# Patient Record
Sex: MALE | Race: WHITE | NOT HISPANIC OR LATINO | Employment: OTHER | URBAN - METROPOLITAN AREA
[De-identification: names, ages, dates, MRNs, and addresses within clinical notes are randomized per-mention and may not be internally consistent; named-entity substitution may affect disease eponyms.]

---

## 2017-06-02 ENCOUNTER — APPOINTMENT (OUTPATIENT)
Dept: ULTRASOUND IMAGING | Facility: CLINIC | Age: 62
End: 2017-06-02
Attending: EMERGENCY MEDICINE
Payer: OTHER GOVERNMENT

## 2017-06-02 ENCOUNTER — HOSPITAL ENCOUNTER (EMERGENCY)
Facility: CLINIC | Age: 62
Discharge: HOME | End: 2017-06-02
Attending: EMERGENCY MEDICINE | Admitting: EMERGENCY MEDICINE
Payer: OTHER GOVERNMENT

## 2017-06-02 VITALS
RESPIRATION RATE: 18 BRPM | OXYGEN SATURATION: 96 % | DIASTOLIC BLOOD PRESSURE: 102 MMHG | SYSTOLIC BLOOD PRESSURE: 145 MMHG

## 2017-06-02 DIAGNOSIS — I80.01 THROMBOPHLEBITIS OF SUPERFICIAL VEINS OF RIGHT LOWER EXTREMITY: ICD-10-CM

## 2017-06-02 PROCEDURE — 93971 EXTREMITY STUDY: CPT | Mod: RT

## 2017-06-02 PROCEDURE — 99284 EMERGENCY DEPT VISIT MOD MDM: CPT | Performed by: EMERGENCY MEDICINE

## 2017-06-02 PROCEDURE — 99284 EMERGENCY DEPT VISIT MOD MDM: CPT | Mod: 25

## 2017-06-02 ASSESSMENT — ENCOUNTER SYMPTOMS
CHEST TIGHTNESS: 0
ABDOMINAL PAIN: 0
FEVER: 0
LIGHT-HEADEDNESS: 0
SHORTNESS OF BREATH: 0
WEAKNESS: 0
COUGH: 0
NUMBNESS: 0
HEADACHES: 0
CHILLS: 0

## 2017-06-02 NOTE — ED AVS SNAPSHOT
[facility] Emergency Department    [address] [address]    Phone:  [phone]    Fax:  [phone]                                       Stiven Nguyễn   MRN: [id number]    Department:  [facility] Emergency Department   Date of Visit:  6/2/2017           Patient Information     Date Of Birth          1955        Your diagnoses for this visit were:     Thrombophlebitis of superficial veins of right lower extremity        You were seen by Colt Ness MD and Trace Guillen DO.        Discharge Instructions       Elevation to reduce dependent edema and help decompress vein.  Warm compresses 20-30 minutes 3-4 times daily  Aleve 2 tablets a.m. and p.m. for approximately 5 days  Within 3 or 4 days attempt to reapply compression stocking-wear for 2 weeks.  Continue other current medications        24 Hour Appointment Hotline       To make an appointment at any [facility] clinic, call [phone] ([phone]). If you don't have a family doctor or clinic, we will help you find one. [facility] clinics are conveniently located to serve the needs of you and your family.             Review of your medicines      Our records show that you are taking the medicines listed below. If these are incorrect, please call your family doctor or clinic.        Dose / Directions Last dose taken    amLODIPine 5 MG tablet   Commonly known as:  NORVASC   Dose:  7.5 mg   Quantity:  130 tablet        Take 1.5 tablets (7.5 mg) by mouth daily   Refills:  3        aspirin 81 MG EC tablet   Dose:  81 mg   Quantity:  30 tablet        Take 1 tablet (81 mg) by mouth daily   Refills:  2        atorvastatin 40 MG tablet   Commonly known as:  LIPITOR   Dose:  40 mg   Quantity:  30 tablet        Take 1 tablet (40 mg) by mouth At Bedtime   Refills:  2        isosorbide mononitrate 30 MG 24 hr tablet   Commonly known as:  IMDUR   Dose:  45 mg   Quantity:  135 tablet        Take 1.5 tablets (45  mg) by mouth daily   Refills:  3        lisinopril 40 MG tablet   Commonly known as:  PRINIVIL/ZESTRIL   Dose:  40 mg        Take 40 mg by mouth daily   Refills:  0        loratadine 10 MG tablet   Commonly known as:  CLARITIN   Dose:  10 mg        Take 10 mg by mouth daily   Refills:  0        nitroglycerin 0.4 MG sublingual tablet   Commonly known as:  NITROSTAT   Quantity:  25 tablet        For chest pain place 1 tablet under the tongue every 5 minutes for 3 doses. If symptoms persist 5 minutes after 1st dose call 911.   Refills:  3        omeprazole 10 MG CR capsule   Commonly known as:  priLOSEC   Dose:  20 mg        Take 20 mg by mouth   Refills:  0        tamsulosin 0.4 MG capsule   Commonly known as:  FLOMAX   Dose:  0.4 mg   Quantity:  90 capsule        Take 1 capsule (0.4 mg) by mouth daily   Refills:  3        TYLENOL PO   Dose:  4 tablet        Take 4 tablets by mouth every 4 hours as needed for mild pain or fever   Refills:  0                Procedures and tests performed during your visit     US Lower Extremity Venous Duplex Right      Orders Needing Specimen Collection     None      Pending Results     No orders found from 5/31/2017 to 6/3/2017.            Pending Culture Results     No orders found from 5/31/2017 to 6/3/2017.            Pending Results Instructions     If you had any lab results that were not finalized at the time of your Discharge, you can call the ED Lab Result RN at [phone]. You will be contacted by this team for any positive Lab results or changes in treatment. The nurses are available 7 days a week from 10A to 6:30P.  You can leave a message 24 hours per day and they will return your call.        Test Results From Your Hospital Stay        6/2/2017  6:11 AM      Narrative     US LOWER EXTREMITY VENOUS DUPLEX RIGHT   6/2/2017 6:08 AM     HISTORY: Right leg pain and swelling. History of DVT.    COMPARISON: None.    FINDINGS: Gray-scale, color and Doppler spectral analysis  ultrasound  was performed of the right leg. Compression and augmentation imaging  was performed.    There is no evidence for deep venous thrombosis. The veins compress  and augment normally.        Impression     IMPRESSION: No DVT.     ZARINA GARG MD                Thank you for choosing [facility]       Thank you for choosing [facility] for your care. Our goal is always to provide you with excellent care. Hearing back from our patients is one way we can continue to improve our services. Please take a few minutes to complete the written survey that you may receive in the mail after you visit with us. Thank you!        [employer] Information     [employer] gives you secure access to your electronic health record. If you see a primary care provider, you can also send messages to your care team and make appointments. If you have questions, please call your primary care clinic.  If you do not have a primary care provider, please call [phone] and they will assist you.        Care EveryWhere ID     This is your Care EveryWhere ID. This could be used by other organizations to access your [facility] medical records  [id number]        After Visit Summary       This is your record. Keep this with you and show to your community pharmacist(s) and doctor(s) at your next visit.

## 2017-06-02 NOTE — ED PROVIDER NOTES
History     Chief Complaint   Patient presents with     Leg Pain     right lower leg (calf) pain since yesterday.  Worsening tonight.  Past history of clots in  leg and lungs     HPI  Stiven Nguyễn is a 62 year old male with significant past medical history for DVT/PE, hyperlipidemia, hypertension, DEEPIKA, coronary artery disease, obesity.  Presents acute right inner calf pain.  Onset history.  Worse through the night.  Patient reports she's had a previous hypercoagulable workup after his PE.  Undetermined etiology.  Was on Coumadin eventually was removed from Coumadin because of no identified recent pitting for long-term anticoagulation.  Patient reports he follows low-sodium diet hypertension.  Does not elevate his legs.  Does wear compression stockings during the months was not too hot and humid.  Has noticed increased discoloration/pigmentation change to his legs in the last 2 years.  Has never had any infection such as cellulitis or open ulcerations.   With this acute pain patient knows of no injury mechanism such as lifting, exercise.  Has noted no significant calf swelling.  Patient reports no chest discomfort.      I have reviewed the Medications, Allergies, Past Medical and Surgical History, and Social History in the Epic system.  Patient Active Problem List   Diagnosis     Weakness     Advanced directives, counseling/discussion     Narcolepsy with cataplexy     Transient alteration of awareness     Spells     Kidney stones     Prostate cancer screening     Hypertrophy of prostate with urinary obstruction     Bladder retention     Chest tightness     Elevated troponin     Chest pressure     Chest pain     Pulmonary nodules     Coronary artery disease     Hypertension     Hyperlipidemia     Sleep apnea       Review of Systems     Constitutional: Negative.    HENT: Negative.    Eyes: Negative.    Respiratory: Negative.    Cardiovascular: Negative.    Gastrointestinal: Negative.    Endocrine: Negative.     Genitourinary: Negative.    Musculoskeletal: Negative.    Skin: Negative.    Allergic/Immunologic: Negative.    Neurological: Negative.    Hematological: Negative.    Psychiatric/Behavioral: Negative.    All other systems reviewed and are negative.    Physical Exam   BP: (!) 145/102  Heart Rate: 53  Resp: 18  SpO2: 96 %  Physical Exam  Vital signs reviewed  Lungs are clear to auscultation with no respiratory splinting  Heart was regular without ectopy or murmur  Patient is obese  Lower extremities:  Right lower extremity-no significant asymmetry to the size or swelling in comparison to the left.  Does have trace dependent edema.  Varicose veins and some venous lakes are present and the disposition are greater saphenous vein.  Greater saphenous vein is very tender as it courses along the inner mid upper calf.  There is slight warmth.  There is no erythema.  Skin in the lower extremity shows hemosiderin deposits consistent with stasis dermatitis.  The calf/gastroc muscles both medial and lateral are nontender to compression.  Homans test was negative.    ED Course     ED Course     Procedures                 Results for orders placed or performed during the hospital encounter of 06/02/17   US Lower Extremity Venous Duplex Right    Narrative    US LOWER EXTREMITY VENOUS DUPLEX RIGHT   6/2/2017 6:08 AM     HISTORY: Right leg pain and swelling. History of DVT.    COMPARISON: None.    FINDINGS: Gray-scale, color and Doppler spectral analysis ultrasound  was performed of the right leg. Compression and augmentation imaging  was performed.    There is no evidence for deep venous thrombosis. The veins compress  and augment normally.      Impression    IMPRESSION: No DVT.     ZARINA GARG MD           62-year-old male presents with inner right calf tenderness.  DVT was ruled out per ultrasound.  Clinical assessment is consistent with superficial thrombophlebitis the distribution greater saphenous vein.  Patient will be  discharged home.  Recommend elevation extremity.  Warm compresses.  NSAIDs therapy -no contraindications for use .  I once tolerated current use of compression stockings .     I have reviewed the nursing notes.    I have reviewed the findings, diagnosis, plan and need for follow up with the patient.    New Prescriptions    No medications on file       Final diagnoses:   Thrombophlebitis of superficial veins of right lower extremity       6/2/2017   [facility] EMERGENCY DEPARTMENT     Trace Guillen DO  06/02/17 0616

## 2017-06-02 NOTE — DISCHARGE INSTRUCTIONS
Elevation to reduce dependent edema and help decompress vein.  Warm compresses 20-30 minutes 3-4 times daily  Aleve 2 tablets a.m. and p.m. for approximately 5 days  Within 3 or 4 days attempt to reapply compression stocking-wear for 2 weeks.  Continue other current medications

## 2017-06-02 NOTE — ED PROVIDER NOTES
History     Chief Complaint   Patient presents with     Leg Pain     right lower leg (calf) pain since yesterday.  Worsening tonight.  Past history of clots in  leg and lungs     HPI  Stiven Nguyễn is a 62 year old male with a history of previous lower extremity DVT as well as PE for which he had formerly been on Coumadin but is no longer presents for evaluation of progressive pain and swelling in his right calf over the past several days.  Patient reports he is often sedentary in his work and has a long commute to work.  No other known risk factors.  No known trauma to the leg.  Patient reports pain is reminiscent of his previous blood clot.    I have reviewed the Medications, Allergies, Past Medical and Surgical History, and Social History in the Epic system.    Review of Systems   Constitutional: Negative for chills and fever.   HENT: Negative for congestion.    Respiratory: Negative for cough, chest tightness and shortness of breath.    Cardiovascular: Positive for leg swelling (right leg). Negative for chest pain.   Gastrointestinal: Negative for abdominal pain.   Skin: Negative for rash.   Neurological: Negative for weakness, light-headedness, numbness and headaches.   All other systems reviewed and are negative.      Physical Exam   BP: (!) 145/102  Heart Rate: 53  Resp: 18  SpO2: 96 %  Physical Exam   Constitutional: He is oriented to person, place, and time. He appears well-developed and well-nourished. No distress.   HENT:   Head: Atraumatic.   Cardiovascular: Regular rhythm.  Bradycardia present.    Pulmonary/Chest: Effort normal.   Abdominal: Soft. There is no tenderness.   Musculoskeletal:        Right lower leg: He exhibits tenderness ( severe tenderness to light palpation) and swelling. He exhibits no deformity.   Neurological: He is alert and oriented to person, place, and time.   Skin: Skin is warm and dry.   Psychiatric: He has a normal mood and affect.   Nursing note and vitals  reviewed.      ED Course     ED Course     Procedures                 Labs Ordered and Resulted from Time of ED Arrival Up to the Time of Departure from the ED - No data to display     6:10 AM: Patient signed out to Dr. Fly Guillen to follow up on ultrasound, pain control, further workup and disposition.    Assessments & Plan (with Medical Decision Making)  62-year-old male with history of previous idiopathic DVT and PE presenting for evaluation of recurrent right calf pain and swelling over the past few days.  With severe tenderness and slight swelling on exam.  Ultrasound ordered to evaluate for acute DVT.  No associated chest symptoms including no chest pain or difficulty breathing.       I have reviewed the nursing notes.    I have reviewed the findings, diagnosis, plan and need for follow up with the patient.    Discharge Medication List as of 6/2/2017  6:45 AM          Final diagnoses:   Thrombophlebitis of superficial veins of right lower extremity       6/2/2017   [facility] EMERGENCY DEPARTMENT     Ness, Colt Bal MD  06/03/17 0639

## 2017-06-02 NOTE — ED AVS SNAPSHOT
[facility] Department    [address] [address]    Phone:  [phone]    Fax:  [phone]                                       Stiven Nguyễn   MRN: [id number]    Department:  [facility] Emergency Department   Date of Visit:  6/2/2017           After Visit Summary Signature Page     I have received my discharge instructions, and my questions have been answered. I have discussed any challenges I see with this plan with the nurse or doctor.    ..........................................................................................................................................  Patient/Patient Representative Signature      ..........................................................................................................................................  Patient Representative Print Name and Relationship to Patient    ..................................................               ................................................  Date                                            Time    ..........................................................................................................................................  Reviewed by Signature/Title    ...................................................              ..............................................  Date                                                            Time

## 2017-06-23 ENCOUNTER — OFFICE VISIT (OUTPATIENT)
Dept: SURGERY | Facility: CLINIC | Age: 62
End: 2017-06-23

## 2017-06-23 VITALS
SYSTOLIC BLOOD PRESSURE: 138 MMHG | OXYGEN SATURATION: 95 % | DIASTOLIC BLOOD PRESSURE: 61 MMHG | HEART RATE: 53 BPM | TEMPERATURE: 98.4 F | HEIGHT: 73 IN | BODY MASS INDEX: 37.59 KG/M2 | WEIGHT: 283.6 LBS

## 2017-06-23 DIAGNOSIS — S06.0X0A CONCUSSION, WITHOUT LOSS OF CONSCIOUSNESS, INITIAL ENCOUNTER: Primary | ICD-10-CM

## 2017-06-23 ASSESSMENT — PAIN SCALES - GENERAL: PAINLEVEL: NO PAIN (0)
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
## 2017-06-23 NOTE — MR AVS SNAPSHOT
After Visit Summary   6/23/2017    Stiven Nguyễn    MRN: [id number]           Patient Information     Date Of Birth          1955        Visit Information        Provider Department      6/23/2017 10:45 AM Aubrey Sotelo NP [facility] Concussion        Today's Diagnoses     Concussion, without loss of consciousness, initial encounter    -  1      Care Instructions    1000 mg Tylenol, three times a day for 2 weeks           Follow-ups after your visit        Additional Services     CONCUSSION  REFERRAL       [facility] is referring you to the Concussion  service at [facility] [place].      The  Representative will assist you in the coordination of your concussion care as prescribed by your physician.    The  Representative will contact you within one business day, or you may contact the  Representative at [phone].    Referral Options: Follow up with me in a month   Non-Sports related concussion management follow up in 4 weeks and Rehab Services: Occupational Therapy, Evaluate and Treat and Physical Therapy, Evaluate and Treat    Coverage of these services are subject to the terms and limitations of your health insurance plan.  Please call member services at your health plan with any benefit or coverage questions.     If X-rays, CT or MRI's have been performed, please contact the facility where they were done, to arrange for  prior to your scheduled appointment.  Please bring this referral request to your appointment and present it to your specialist.                  Who to contact     Please call your clinic at [phone] to:    Ask questions about your health    Make or cancel appointments    Discuss your medicines    Learn about your test results    Speak to your doctor   If you have compliments or concerns about an experience at your clinic, or if you wish to file a complaint, please contact  "[facility] Patient Relations at [phone] or email us at [email or web address]         Additional Information About Your Visit        [employer] Information     The [employer] gives you secure access to your electronic health record. If you see a primary care provider, you can also send messages to your care team and make appointments. If you have questions, please call your primary care clinic.  If you do not have a primary care provider, please call [phone] and they will assist you.      [employer] is an electronic gateway that provides easy, online access to your medical records. With [employer], you can request a clinic appointment, read your test results, renew a prescription or communicate with your care team.     To access your existing account, please contact your [facility] or call [phone] for assistance.        Care EveryWhere ID     This is your Care EveryWhere ID. This could be used by other organizations to access your [facility] medical records  [id number]        Your Vitals Were     Pulse Temperature Height Pulse Oximetry BMI (Body Mass Index)       53 98.4  F (36.9  C) 6' 1\" 95% 37.42 kg/m2        Blood Pressure from Last 3 Encounters:   06/23/17 138/61   06/02/17 (!) 145/102   12/06/16 148/78    Weight from Last 3 Encounters:   06/23/17 283 lb 9.6 oz   12/06/16 283 lb 12.8 oz   10/28/16 281 lb 8 oz              We Performed the Following     CONCUSSION  REFERRAL          Today's Medication Changes          These changes are accurate as of: 6/23/17 11:20 AM.  If you have any questions, ask your nurse or doctor.               These medicines have changed or have updated prescriptions.        Dose/Directions    atorvastatin 40 MG tablet   Commonly known as:  LIPITOR   This may have changed:    - how much to take  - when to take this   Used for:  NSTEMI (non-ST elevated myocardial infarction) (H)        Dose:  40 mg   Take 1 " tablet (40 mg) by mouth At Bedtime   Quantity:  30 tablet   Refills:  2                Primary Care Provider Office Phone # Fax #    Oliva Zhang [phone]       [facility] [address] [facility] [address]        Equal Access to Services     BRIAN GARCIA : Hadii aad ku hadasho Soomaali, waaxda luqadaha, qaybta kaalmada adeegyada, waxay thuanin hayaan adebernadine puga ladhara ah. So [facility] [phone].    ATENCIÓN: Si habla español, tiene a morgan disposición servicios gratuitos de asistencia lingüística. Llame al [phone].    We comply with applicable federal civil rights laws and [place] laws. We do not discriminate on the basis of race, color, national origin, age, disability sex, sexual orientation or gender identity.            Thank you!     Thank you for choosing [facility]  for your care. Our goal is always to provide you with excellent care. Hearing back from our patients is one way we can continue to improve our services. Please take a few minutes to complete the written survey that you may receive in the mail after your visit with us. Thank you!             Your Updated Medication List - Protect others around you: Learn how to safely use, store and throw away your medicines at www.disposemymeds.org.          This list is accurate as of: 6/23/17 11:20 AM.  Always use your most recent med list.                   Brand Name Dispense Instructions for use Diagnosis    amLODIPine 5 MG tablet    NORVASC    130 tablet    Take 1.5 tablets (7.5 mg) by mouth daily    Chest pain, unspecified type       aspirin 81 MG EC tablet     30 tablet    Take 1 tablet (81 mg) by mouth daily    NSTEMI (non-ST elevated myocardial infarction) (H)       atorvastatin 40 MG tablet    LIPITOR    30 tablet    Take 1 tablet (40 mg) by mouth At Bedtime    NSTEMI (non-ST elevated myocardial infarction) (H)       isosorbide mononitrate 30 MG 24 hr tablet    IMDUR    135 tablet    Take 1.5 tablets (45 mg) by  mouth daily    Chest pain, unspecified chest pain type       lisinopril 40 MG tablet    PRINIVIL/ZESTRIL     Take 40 mg by mouth daily        loratadine 10 MG tablet    CLARITIN     Take 10 mg by mouth daily        nitroglycerin 0.4 MG sublingual tablet    NITROSTAT    25 tablet    For chest pain place 1 tablet under the tongue every 5 minutes for 3 doses. If symptoms persist 5 minutes after 1st dose call 911.    Coronary vasospasm (H)       omeprazole 10 MG CR capsule    priLOSEC     Take 20 mg by mouth        tamsulosin 0.4 MG capsule    FLOMAX    90 capsule    Take 1 capsule (0.4 mg) by mouth daily    Hypertrophy of prostate with urinary obstruction and other lower urinary tract symptoms (LUTS), Bladder retention       TYLENOL PO      Take 4 tablets by mouth every 4 hours as needed for mild pain or fever

## 2017-06-23 NOTE — NURSING NOTE
"Chief Complaint   Patient presents with     Consult     concussion       Vitals:    06/23/17 1047   BP: 138/61   BP Location: Left arm   Patient Position: Chair   Cuff Size: Adult Regular   Pulse: 53   Temp: 98.4  F (36.9  C)   SpO2: 95%   Weight: 283 lb 9.6 oz   Height: 6' 1\"       Body mass index is 37.42 kg/(m^2).      Mary Kate Huerta MA                          "
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
## 2017-06-24 ASSESSMENT — PATIENT HEALTH QUESTIONNAIRE - PHQ9: SUM OF ALL RESPONSES TO PHQ QUESTIONS 1-9: 4

## 2017-06-28 DIAGNOSIS — R07.9 CHEST PAIN, UNSPECIFIED TYPE: ICD-10-CM

## 2017-06-28 RX ORDER — AMLODIPINE BESYLATE 5 MG/1
7.5 TABLET ORAL DAILY
Qty: 130 TABLET | Refills: 1 | Status: SHIPPED | OUTPATIENT
Start: 2017-06-28 | End: 2017-11-08

## 2017-07-11 ENCOUNTER — HOSPITAL ENCOUNTER (OUTPATIENT)
Dept: PHYSICAL THERAPY | Facility: CLINIC | Age: 62
Setting detail: THERAPIES SERIES
End: 2017-07-11
Attending: SURGERY
Payer: OTHER GOVERNMENT

## 2017-07-11 PROCEDURE — 97161 PT EVAL LOW COMPLEX 20 MIN: CPT | Mod: GP | Performed by: PHYSICAL THERAPIST

## 2017-07-11 PROCEDURE — 40000767 ZZHC STATISTIC PT CONCUSSION VISIT: Performed by: PHYSICAL THERAPIST

## 2017-07-11 PROCEDURE — 97112 NEUROMUSCULAR REEDUCATION: CPT | Mod: GP | Performed by: PHYSICAL THERAPIST

## 2017-07-11 NOTE — PROGRESS NOTES
PHYSICAL THERAPY INITIAL EVALUATION  07/11/17 1300   Quick Adds   Quick Adds Vestibular Eval   Type of Visit Initial OP PT Evaluation   General Information   Start of Care Date 07/11/17   Referring Physician Aubrey Sotelo,   Orders Evaluate and Treat as Indicated   Order Date 06/23/17   Medical Diagnosis concussion   Onset of illness/injury or Date of Surgery 06/23/17   Precautions/Limitations no known precautions/limitations   Surgical/Medical history reviewed Yes   Pertinent history of current vestibular problem (include personal factors and/or comorbidities that impact the POC)  Prior concussion(s)   Pertinent history of current problem (include personal factors and/or comorbidities that impact the POC) Patient reports he was fixing his [employer] loft and he stood up under it, had to have help to sit down. Happened again that same evening. The following weekend he was weeding under the deck and stood up and hit his head on the deck. [place] really whoozy, having memory issues. Went home from work one day because he couldn't remember how to do his job. Simple things need to be broken down into simple tasks to be able to do them, screwing up making his doll beds. Turning head while trying to push grocery carts, he loses his balance and feels dizzy. Unable to dance like he used to.      Prior level of function comment independent, no issues with balance    Patient role/Employment history Employed  (working full time)   Living environment [place]   Patient/Family Goals Statement get back to normal   Fall Risk Screen   Fall screen completed by PT   Per patient - Fall 2 or more times in past year? No   Per patient - Fall with injury in past year? No   Is patient a fall risk? No   System Outcome Measures   Outcome Measures Concussion (see Concussion Symptom Assessment)   Pain   Patient currently in pain No   Cognitive Status Examination   Cognitive Comment will be seeing OT to assess   Gait   Gait Comments Normal  gait. Gait with head turns horizontal caused path deviation, patient crossing midline occasionally and 1 instance of tripping.    Balance Special Tests   Balance Special Tests Modified CTSIB Conditions   Balance Special Tests Modified CTSIB Conditions   Condition 1, seconds 30 Seconds   Condition 2, seconds 4 Seconds   Cervicogenic Screen   Neck ROM WNL, loses balance while turning head to side or up   Transverse Ligament Test Normal  (no instability noted)   Transverse Ligament Test Comments no symptoms during, after testing patient almost feel forward and had to be caught by therapist, pt reported no other symptoms other than losing balance   Oculomotor Exam   Smooth Pursuit Abnormal   Smooth Pursuit Comment saccadic movements, a lot of blinking, no increased symptoms   Saccades Normal   VOR Normal   VOR Comments blurry with horizontal, normal with verticle    VOR Cancellation Abnormal   VOR Cancellation Comments required mod A from therapist to assist in recovering balance after 1 rep   Rapid Head Thrust Normal   Convergence Testing Normal   Convergence Testing Comments 1) 2 cm, 2) 2 cm, 3) 2 cm. Caused significant amount of eye pain   Infrared Goggle Exam or Frenzel Lense Exam   Exam completed with Room Light   Spontaneous Nystagmus Negative   Gaze Evoked Nystagmus Negative   Gurinder-Hallpike (right) Negative   Gurinder-Hallpike (Left) Negative   [facility] (Right) Negative   [facility] Sup[facility] Roll Test (Left) Negative   Planned Therapy Interventions   Planned Therapy Interventions balance training;neuromuscular re-education   Clinical Impression   Criteria for Skilled Therapeutic Interventions Met yes, treatment indicated   PT Diagnosis dizziness, imbalance    Influenced by the following impairments dizziness, imbalance   Functional limitations due to impairments dynamic gait tasks, changing body positions, turning     Clinical Presentation Evolving/Changing   Clinical Presentation Rationale symptoms vary depending  on the day and the activity   Clinical Decision Making (Complexity) Low complexity   Therapy Frequency 1 time/week   Predicted Duration of Therapy Intervention (days/wks) 8 weeks   Risk & Benefits of therapy have been explained Yes   Patient, Family & other staff in agreement with plan of care Yes   Clinical Impression Comments Patient presenting with vestibular and oculomotor deficits consistent with post-concussion syndrome.   Education Assessment   Preferred Learning Style Listening;Demonstration;Pictures/video   Barriers to Learning No barriers   GOALS   PT Eval Goals 1;2;3;4   Goal 1   Goal Identifier 1   Goal Description Patient will be able to walk and turn head without symptoms or evidence for imbalance.    Target Date 09/05/17   Goal 2   Goal Identifier 2   Goal Description Patient will be able to shop in grocery store without symtpoms.    Target Date 09/05/17   Goal 3   Goal Identifier 3   Goal Description Patient will report no symptoms with quick head movements or body position changes.    Target Date 09/05/17   Goal 4   Goal Identifier 4   Goal Description Patient will be able to return to dancing with wife without symptoms or imbalance.    Target Date 09/05/17   Total Evaluation Time   Total Evaluation Time (Minutes) 45       Please contact me with any questions or concerns.  Thank you for your referral.    Maria Luisa Milner, PT, DPT, OCS  Physical Therapist, Orthopedic Certified Specialist  [facility] [place]  [phone]

## 2017-07-18 ENCOUNTER — HOSPITAL ENCOUNTER (OUTPATIENT)
Dept: OCCUPATIONAL THERAPY | Facility: CLINIC | Age: 62
Setting detail: THERAPIES SERIES
End: 2017-07-18
Attending: NURSE PRACTITIONER
Payer: OTHER GOVERNMENT

## 2017-07-18 PROCEDURE — 40000768 ZZHC STATISTIC OT CONCUSSION VISIT: Performed by: OCCUPATIONAL THERAPIST

## 2017-07-18 PROCEDURE — 97166 OT EVAL MOD COMPLEX 45 MIN: CPT | Mod: GO | Performed by: OCCUPATIONAL THERAPIST

## 2017-07-18 NOTE — PROGRESS NOTES
07/18/17 1200   Quick Adds   Quick Adds Concussion   Type of Visit Initial Outpatient Occupational Therapy Evaluation       Present No   General Information   Start Of Care Date 07/18/17   Referring Physician Aubrey Sotelo   Orders Evaluate and treat as indicated   Orders Date 05/06/17   Medical Diagnosis Concussion   Onset of Illness/Injury or Date of Surgery 06/23/17   Surgical/Medical History Reviewed Yes   Additional Occupational Profile Info/Pertinent History of Current Problem Pt reports that he was fixing his grandkids loft and he stood up under it hitting his head.  He then was weeding under his deck and stook up and hit his head.  Overall he states he has hit his head a few times lately and is having whoozy feelings, balance issues, memory issues, difficulty completing his tasks at work.  He has currently been put on an improvement plan and is concerned about this.  Therapy has been initiated with PT as well   Role/Living Environment   Current Community Support Family/friend caregiver   Patient role/Employment history Employed   Current Living Environment House   Prior Level - Transfers Independent   Prior Level - Ambulation Independent   Prior Level - ADLS Independent   Role/Living Environment Comments Works as    Patient/family Goals Statement Be able to do job and enjoy life without having symptoms of concussion   Vision Interview   Technology Used computer and phone   Technology Use Increases Symptoms Yes   Technology Use Increases Symptoms Comments increasing headaches, eye strain   Do Glasses Help? Yes   Do Glasses Help Comments sunglasses will help with the brightness   Type of Glasses Bifocal   Light Sensitivity/Glare  Indoor;Outdoor   Light Sensitivity/Glare Comments at times tolerates ok and other times needs to have his sun glasses on inside   Unable to Read Small Print He has increased his font size   Reported Reading Speed Slowed   Reported Reading Speed Comments Trail  "Making:  Part A: 31\" with 2 errors (average is 29 no errors), Part B: 40 seconds.    Reading Endurance/Fatigue   Complaints of Visual Fatigue Comments eye fatigue and strain with assessment activity   Convergence Comments see PT note   Pursuits Comments Eye pain/strain with eye movement   Pupillary Function Normal   Difficulties with IADL Performance: Increase in Symptoms with the Following   Difficulty Concentrating at School, Work or Home Difficulty with concentration affecting him (being sent home from work)   Difficulty Multi-Tasking/Planning Is only doing one thing at a time   Mood Changes   Is Patient Experiencing Changes in Mood? Feeling irritable   Patient Mood Comments as reported by his family   Is Patient Currently Receiving Treatment for Mental Health? No   Vestibular Symptoms   Is Patient Experiencing Vestibular Symptoms? Yes   Triggers for Vestibular Symptoms Include: head and eye movement, scanning his environment   Fatigue   General Fatigue Work   General Fatigue Comments feeling exhausted when returning back from work   Pain   Patient currently in pain Yes   Pain location front of head and eyes   Pain rating rated a 3/6 on the concussion symptoms ratting   Pain description Ache   Fall Risk Screen   Comments Currently being seen by PT, fall risk   Cognitive Status Examination   Orientation Orientation to person, place and time   Level of Consciousness Alert   Follows Commands and Answers Questions Unable to follow multi-step instructions;100% of the time;Able to follow single-step instructions   Personal Safety and Judgment Intact   Memory Impaired   Attention Quiet environment required;Selective attention impaired, difficulty ignoring irrelevant stimuli;Alternating attention impaired, difficulty shifting between tasks;Divided attention impaired, difficulty with simultaneous tasks   Organization/Problem Solving Reports problems with organization;Problem solving impaired   Executive Function Working " memory impaired, decreased storage of information for performing tasks;Cognitive flexibility impaired;Planning ability impaired   Cognitive Comment SDMT:  47 completed in 90 seconds with 2 errors placing him between -.5 and 0.0 SD below the norm, however during this test he continued to move his head closer and further from paper and respirations increase signficantly during the task.     Visual Perception   Visual Perception Wears glasses   Visual Acuity further assess, recently had eye exam   Visual Field WNL   Visual Attention fatigues rapidly with visual attention   Oculomotor full oculomotor movement however with eye movement, vestibular reactions occur   Sensation   Upper Extremity Sensory Examination No deficits were identified   Range of Motion (ROM)   ROM Comments see PT for neck ROM concerns   Hand Strength   Hand Dominance Right   Coordination   Coordination Comments Further assess   Balance   Balance Comments Balance impaired due to concussion   Functional Mobility   Ambulation ambulatory, walks next to wall/uses rail when able   Transfer Skill   Level of [place]: Transfers independent   Activity Tolerance   Activity Tolerance Decreased due to concussion symptoms   Planned Therapy Interventions   Planned Therapy Interventions Neuromuscular re-education;Therapeutic activities;Visual perception;Self care/Home management;Cognitive skills   Adult OT Eval Goals   OT Eval Goals (Adult) 1;2;3;4;5;6   OT Goal 1   Goal Identifier Education and home programming   Goal Description Client will verbalize an understanding of findings on assessment, concussion symptom management, and home program recommendations with each visit   Target Date 09/16/17   OT Goal 2   Goal Identifier Reading / Scanning   Goal Description Client will demonstarte the ability to read up to 30 minutes without increase in symptoms   Target Date 09/16/17   OT Goal 3   Goal Identifier Computer Use   Goal Description Client will demontrate  the ability to use computer up to one hour without increase in symptoms   Target Date 09/16/17   OT Goal 4   Goal Identifier Reaction Time   Goal Description Client will measure 52+ on Mode A of the Dynavision showing functional reaction time without increase in symptoms in prep of safe driving   Target Date 09/16/17   OT Goal 5   Goal Identifier Functional Scanning   Goal Description Client will demonstrate the abiltiy to scan while grocery shopping without increase in symptoms   Target Date 09/16/17   OT Goal 6   Goal Identifier Cognition / Visual Memory   Goal Description Client will demonstrate a score of 80% on the medium level of Wii Big Brain Shutl game showing showing functional visual attention and memory when processing information in 3 trials   Target Date 09/16/17   Clinical Impression   Criteria for Skilled Therapeutic Interventions Met Yes, treatment indicated   OT Diagnosis Concussion   Influenced by the following impairments impaired reading, computer use, reaction time, problem solving, memory   Assessment of Occupational Performance 3-5 Performance Deficits   Identified Performance Deficits Deficits with work management (computer use, problem solving, memory, sequencing of task), impaired leisure skills with wood working (ability to remember how to build items he has built before), Home management (unable to grocery shop as scanning for items makes him dizzy), Social Skills (unable to dance)   Clinical Decision Making (Complexity) Moderate complexity   Therapy Frequency 1x week x 8 weeks   Predicted Duration of Therapy Intervention (days/wks) 8 weeks   Risks and Benefits of Treatment have been explained. Yes   Patient, Family & other staff in agreement with plan of care Yes   Clinical Impression Comments 62 year old male seen today for concussion related symptoms affecting his every day life.  He is appropriate for OT intervention to include concussion symptom management, reading/computer use,  improving reaction time in reference to visual scanning, and memory / problem solving strategies.     Education Assessment   Barriers To Learning Reading;Visual;Cognitive   Total Evaluation Time   Total Evaluation Time 45     Morteza Rea, OTR/L

## 2017-07-19 ENCOUNTER — HOSPITAL ENCOUNTER (OUTPATIENT)
Dept: PHYSICAL THERAPY | Facility: CLINIC | Age: 62
Setting detail: THERAPIES SERIES
End: 2017-07-19
Attending: SURGERY
Payer: OTHER GOVERNMENT

## 2017-07-19 PROCEDURE — 97112 NEUROMUSCULAR REEDUCATION: CPT | Mod: GP | Performed by: PHYSICAL THERAPIST

## 2017-07-19 PROCEDURE — 40000767 ZZHC STATISTIC PT CONCUSSION VISIT: Performed by: PHYSICAL THERAPIST

## 2017-07-25 ENCOUNTER — HOSPITAL ENCOUNTER (OUTPATIENT)
Dept: OCCUPATIONAL THERAPY | Facility: CLINIC | Age: 62
Setting detail: THERAPIES SERIES
End: 2017-07-25
Attending: NURSE PRACTITIONER
Payer: OTHER GOVERNMENT

## 2017-07-25 PROCEDURE — 97530 THERAPEUTIC ACTIVITIES: CPT | Mod: GO | Performed by: OCCUPATIONAL THERAPIST

## 2017-07-25 PROCEDURE — 97112 NEUROMUSCULAR REEDUCATION: CPT | Mod: GO | Performed by: OCCUPATIONAL THERAPIST

## 2017-07-25 PROCEDURE — 40000768 ZZHC STATISTIC OT CONCUSSION VISIT: Performed by: OCCUPATIONAL THERAPIST

## 2017-07-28 ENCOUNTER — HOSPITAL ENCOUNTER (OUTPATIENT)
Dept: OCCUPATIONAL THERAPY | Facility: CLINIC | Age: 62
Setting detail: THERAPIES SERIES
End: 2017-07-28
Attending: NURSE PRACTITIONER
Payer: OTHER GOVERNMENT

## 2017-07-28 ENCOUNTER — OFFICE VISIT (OUTPATIENT)
Dept: SURGERY | Facility: CLINIC | Age: 62
End: 2017-07-28

## 2017-07-28 ENCOUNTER — HOSPITAL ENCOUNTER (OUTPATIENT)
Dept: PHYSICAL THERAPY | Facility: CLINIC | Age: 62
Setting detail: THERAPIES SERIES
End: 2017-07-28
Attending: NURSE PRACTITIONER
Payer: OTHER GOVERNMENT

## 2017-07-28 VITALS
TEMPERATURE: 98.2 F | HEIGHT: 73 IN | HEART RATE: 55 BPM | WEIGHT: 282.8 LBS | DIASTOLIC BLOOD PRESSURE: 75 MMHG | OXYGEN SATURATION: 95 % | SYSTOLIC BLOOD PRESSURE: 142 MMHG | BODY MASS INDEX: 37.48 KG/M2

## 2017-07-28 DIAGNOSIS — S06.0X0D CONCUSSION, WITHOUT LOC, SUBSEQUENT ENCOUNTER: Primary | ICD-10-CM

## 2017-07-28 PROCEDURE — 40000768 ZZHC STATISTIC OT CONCUSSION VISIT: Performed by: OCCUPATIONAL THERAPIST

## 2017-07-28 PROCEDURE — 97530 THERAPEUTIC ACTIVITIES: CPT | Mod: GO | Performed by: OCCUPATIONAL THERAPIST

## 2017-07-28 PROCEDURE — 40000767 ZZHC STATISTIC PT CONCUSSION VISIT: Performed by: PHYSICAL THERAPIST

## 2017-07-28 PROCEDURE — 97112 NEUROMUSCULAR REEDUCATION: CPT | Mod: GP | Performed by: PHYSICAL THERAPIST

## 2017-07-28 ASSESSMENT — PAIN SCALES - GENERAL: PAINLEVEL: MILD PAIN (2)

## 2017-07-28 NOTE — NURSING NOTE
"Chief Complaint   Patient presents with     RECHECK     concussion       Vitals:    07/28/17 1330   BP: 142/75   BP Location: Left arm   Patient Position: Chair   Cuff Size: Adult Regular   Pulse: 55   Temp: 98.2  F (36.8  C)   SpO2: 95%   Weight: 282 lb 12.8 oz   Height: 6' 1\"       Body mass index is 37.31 kg/(m^2).    Mary Kate Huerta MA                        Chief Complaint   Patient presents with     RECHECK     concussion       Vitals:    07/28/17 1330   BP: 142/75   BP Location: Left arm   Patient Position: Chair   Cuff Size: Adult Regular   Pulse: 55   Temp: 98.2  F (36.8  C)   SpO2: 95%   Weight: 282 lb 12.8 oz   Height: 6' 1\"       Body mass index is 37.31 kg/(m^2).    Mary Kate Huerta MA                          "

## 2017-07-28 NOTE — MR AVS SNAPSHOT
After Visit Summary   7/28/2017    Stiven Nguyễn    MRN: [id number]           Patient Information     Date Of Birth          1955        Visit Information        Provider Department      7/28/2017 1:30 PM Aubrey Sotelo NP [facility] Concussion        Today's Diagnoses     Concussion, without LOC, subsequent encounter    -  1      Care Instructions    [facility]  [address] [place] [address]  Phone: [phone]          Follow-ups after your visit        Additional Services     CONCUSSION  REFERRAL       [facility] is referring you to the Concussion  service at [facility] [place].      The  Representative will assist you in the coordination of your concussion care as prescribed by your physician.    The  Representative will contact you within one business day, or you may contact the  Representative at [phone].    Referral Options:  Non-Sports related concussion management  9/1/17  Coverage of these services are subject to the terms and limitations of your health insurance plan.  Please call member services at your health plan with any benefit or coverage questions.     If X-rays, CT or MRI's have been performed, please contact the facility where they were done, to arrange for  prior to your scheduled appointment.  Please bring this referral request to your appointment and present it to your specialist.                  Your next 10 appointments already scheduled     Jul 28, 2017  3:30 PM CDT   Concussion Treatment with Morteza Rea OT   [facility] ([facility])    [address]  [address] [place] [address]   [phone]            Jul 28, 2017  4:00 PM CDT   Concussion Treatment with Maria Luisa Milner PT   [facility] ([facility])    [address]  [address]  [facility] [address]    [phone]            Aug 02, 2017  3:00 PM CDT   Concussion Treatment with Maria Luisa Milner, PT   [facility] ([facility])    [address]  [address]  [facility] [address]   [phone]            Aug 07, 2017  1:30 PM CDT   Concussion Treatment with Morteza Rea, OT   [facility] ([facility])    [address]  [address]  [facility] [address]   [phone]            Aug 07, 2017  2:30 PM CDT   Concussion Treatment with Maria Luisa Milner, PT   [facility] ([facility])    [address]  [address]  [facility] [address]   [phone]            Aug 10, 2017  2:15 PM CDT   Concussion Treatment with Morteza Rea OT   [facility] ([facility])    [address]  [address]  [facility] [address]   [phone]              Who to contact     Please call your clinic at [phone] to:    Ask questions about your health    Make or cancel appointments    Discuss your medicines    Learn about your test results    Speak to your doctor   If you have compliments or concerns about an experience at your clinic, or if you wish to file a complaint, please contact [facility] Patient Relations at [phone] or email us at [email or web address]         Additional Information About Your Visit        [employer] Information     [facility] gives you secure access to your electronic health record. If you see a primary care provider, you can also send messages to your care team and make appointments. If you have questions, please call your primary care clinic.  If you do not have a primary care provider, please call [phone] and they will assist you.      [facility] is an electronic gateway that provides easy, online access to your medical records. With [facility], you can request a clinic appointment, read your test results, renew a  "prescription or communicate with your care team.     To access your existing account, please contact your [facility] or call [phone] for assistance.        Care EveryWhere ID     This is your Care EveryWhere ID. This could be used by other organizations to access your [facility] medical records  [id number]        Your Vitals Were     Pulse Temperature Height Pulse Oximetry BMI (Body Mass Index)       55 98.2  F (36.8  C) 6' 1\" 95% 37.31 kg/m2        Blood Pressure from Last 3 Encounters:   07/28/17 142/75   06/23/17 138/61   06/02/17 (!) 145/102    Weight from Last 3 Encounters:   07/28/17 282 lb 12.8 oz   06/23/17 283 lb 9.6 oz   12/06/16 283 lb 12.8 oz              We Performed the Following     CONCUSSION  REFERRAL          Today's Medication Changes          These changes are accurate as of: 7/28/17  2:23 PM.  If you have any questions, ask your nurse or doctor.               These medicines have changed or have updated prescriptions.        Dose/Directions    atorvastatin 40 MG tablet   Commonly known as:  LIPITOR   This may have changed:    - how much to take  - when to take this   Used for:  NSTEMI (non-ST elevated myocardial infarction) (H)        Dose:  40 mg   Take 1 tablet (40 mg) by mouth At Bedtime   Quantity:  30 tablet   Refills:  2                Primary Care Provider Office Phone # Fax #    Oliva Zhang [phone]       [place] [address]        Equal Access to Services     BRIAN GARCIA AH: Hadii savanna ku hadasho Soomaali, waaxda luqadaha, qaybta kaalmada adeegyada, wax daryn lemus . So [facility] [phone].    ATENCIÓN: Si habla español, tiene a morgan disposición servicios gratuitos de asistencia lingüística. Llame al [phone].    We comply with applicable federal civil rights laws and [place] laws. We do not discriminate on the basis of race, color, national origin, age, disability " sex, sexual orientation or gender identity.            Thank you!     Thank you for choosing [facility]  for your care. Our goal is always to provide you with excellent care. Hearing back from our patients is one way we can continue to improve our services. Please take a few minutes to complete the written survey that you may receive in the mail after your visit with us. Thank you!             Your Updated Medication List - Protect others around you: Learn how to safely use, store and throw away your medicines at www.disposemymeds.org.          This list is accurate as of: 7/28/17  2:23 PM.  Always use your most recent med list.                   Brand Name Dispense Instructions for use Diagnosis    amLODIPine 5 MG tablet    NORVASC    130 tablet    Take 1.5 tablets (7.5 mg) by mouth daily    Chest pain, unspecified type       aspirin 81 MG EC tablet     30 tablet    Take 1 tablet (81 mg) by mouth daily    NSTEMI (non-ST elevated myocardial infarction) (H)       atorvastatin 40 MG tablet    LIPITOR    30 tablet    Take 1 tablet (40 mg) by mouth At Bedtime    NSTEMI (non-ST elevated myocardial infarction) (H)       isosorbide mononitrate 30 MG 24 hr tablet    IMDUR    135 tablet    Take 1.5 tablets (45 mg) by mouth daily    Chest pain, unspecified chest pain type       lisinopril 40 MG tablet    PRINIVIL/ZESTRIL     Take 40 mg by mouth daily        loratadine 10 MG tablet    CLARITIN     Take 10 mg by mouth daily        nitroGLYcerin 0.4 MG sublingual tablet    NITROSTAT    25 tablet    For chest pain place 1 tablet under the tongue every 5 minutes for 3 doses. If symptoms persist 5 minutes after 1st dose call 911.    Coronary vasospasm (H)       omeprazole 10 MG CR capsule    priLOSEC     Take 20 mg by mouth        tamsulosin 0.4 MG capsule    FLOMAX    90 capsule    Take 1 capsule (0.4 mg) by mouth daily    Hypertrophy of prostate with urinary obstruction and other lower urinary tract symptoms (LUTS),  Bladder retention       TYLENOL PO      Take 4 tablets by mouth every 4 hours as needed for mild pain or fever

## 2017-07-28 NOTE — LETTER
Work ability Form for  Stiven Nguyễn, 1955. Who  is under my care for a concussion .     Date of most recent in exam July 28, 2017   Date of next exam 9/1/17    Diagnosis:   Concussion without loss of consciousness ICD 10  S06.0X0D      --- May return to work as of date with the following restrictions:  1. Restrictions:  a. Work-  4 hrs 5 days a week from 7/31-8/14           6 hours a 5 days a week from 8/14-8/28                                 8 hours a 5 days a week after the 8/28        The following accommodations may help in reducing the cognitive load, thereby minimizing post-concussion symptoms.  As the employer, it is encouraged to discuss and establish accommodations based on individual work responsibilities.  If symptoms persist, more formal accommodations may be necessary.  1)  Allow more time for, or delay for projects.  2)  Allow more time for work completion.  3)  Allow for reduced work load.  4)  Allow for less multi-task work.  Single tasks until completion will improve productivity.  5)  Allow breaks as needed to control symptom levels.    6)  Provide a quiet area for lunch.  7)  Allow use of sunglasses during the day.     Full or partial days missed due to post-concussion symptoms and follow up appointments should be medically excused.    Follow up evaluation and revision of recommendations to occur on 9/1/17    Please feel free to contact me at the number below with any questions or concerns.    Sincerely,            Narendra Sotelo, NP-C   Dept of Critical Care and Acute Care Surgery   [facility]  Administration office: [phone]  Clinic nurse line: [phone]  Fax: [phone]

## 2017-07-28 NOTE — ADDENDUM NOTE
Encounter addended by: Morteza Rea OT on: 7/28/2017 12:28 PM<BR>     Actions taken: Flowsheet accepted
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
## 2017-07-29 ASSESSMENT — PATIENT HEALTH QUESTIONNAIRE - PHQ9: SUM OF ALL RESPONSES TO PHQ QUESTIONS 1-9: 9

## 2017-08-02 ENCOUNTER — HOSPITAL ENCOUNTER (OUTPATIENT)
Dept: PHYSICAL THERAPY | Facility: CLINIC | Age: 62
Setting detail: THERAPIES SERIES
End: 2017-08-02
Attending: NURSE PRACTITIONER
Payer: OTHER GOVERNMENT

## 2017-08-02 PROCEDURE — 40000767 ZZHC STATISTIC PT CONCUSSION VISIT: Performed by: PHYSICAL THERAPIST

## 2017-08-02 PROCEDURE — 97112 NEUROMUSCULAR REEDUCATION: CPT | Mod: GP | Performed by: PHYSICAL THERAPIST

## 2017-08-07 ENCOUNTER — HOSPITAL ENCOUNTER (OUTPATIENT)
Dept: OCCUPATIONAL THERAPY | Facility: CLINIC | Age: 62
Setting detail: THERAPIES SERIES
End: 2017-08-07
Attending: NURSE PRACTITIONER
Payer: OTHER GOVERNMENT

## 2017-08-07 ENCOUNTER — HOSPITAL ENCOUNTER (OUTPATIENT)
Dept: PHYSICAL THERAPY | Facility: CLINIC | Age: 62
Setting detail: THERAPIES SERIES
End: 2017-08-07
Attending: NURSE PRACTITIONER
Payer: OTHER GOVERNMENT

## 2017-08-07 PROCEDURE — 97530 THERAPEUTIC ACTIVITIES: CPT | Mod: GO | Performed by: OCCUPATIONAL THERAPIST

## 2017-08-07 PROCEDURE — 97112 NEUROMUSCULAR REEDUCATION: CPT | Mod: GP | Performed by: PHYSICAL THERAPIST

## 2017-08-07 PROCEDURE — 40000767 ZZHC STATISTIC PT CONCUSSION VISIT: Performed by: PHYSICAL THERAPIST

## 2017-08-07 PROCEDURE — 97112 NEUROMUSCULAR REEDUCATION: CPT | Mod: GO | Performed by: OCCUPATIONAL THERAPIST

## 2017-08-07 PROCEDURE — 40000768 ZZHC STATISTIC OT CONCUSSION VISIT: Performed by: OCCUPATIONAL THERAPIST

## 2017-08-10 ENCOUNTER — HOSPITAL ENCOUNTER (OUTPATIENT)
Dept: OCCUPATIONAL THERAPY | Facility: CLINIC | Age: 62
Setting detail: THERAPIES SERIES
End: 2017-08-10
Attending: NURSE PRACTITIONER
Payer: OTHER GOVERNMENT

## 2017-08-10 PROCEDURE — 40000768 ZZHC STATISTIC OT CONCUSSION VISIT: Performed by: OCCUPATIONAL THERAPIST

## 2017-08-10 PROCEDURE — 97112 NEUROMUSCULAR REEDUCATION: CPT | Mod: GO | Performed by: OCCUPATIONAL THERAPIST

## 2017-08-10 PROCEDURE — 97530 THERAPEUTIC ACTIVITIES: CPT | Mod: GO | Performed by: OCCUPATIONAL THERAPIST

## 2017-08-15 ENCOUNTER — HOSPITAL ENCOUNTER (OUTPATIENT)
Dept: PHYSICAL THERAPY | Facility: CLINIC | Age: 62
Setting detail: THERAPIES SERIES
End: 2017-08-15
Attending: SURGERY
Payer: OTHER GOVERNMENT

## 2017-08-15 PROCEDURE — 97112 NEUROMUSCULAR REEDUCATION: CPT | Mod: GP | Performed by: PHYSICAL THERAPIST

## 2017-08-15 PROCEDURE — 40000767 ZZHC STATISTIC PT CONCUSSION VISIT: Performed by: PHYSICAL THERAPIST

## 2017-08-17 ENCOUNTER — HOSPITAL ENCOUNTER (OUTPATIENT)
Dept: OCCUPATIONAL THERAPY | Facility: CLINIC | Age: 62
Setting detail: THERAPIES SERIES
End: 2017-08-17
Attending: NURSE PRACTITIONER
Payer: OTHER GOVERNMENT

## 2017-08-17 PROCEDURE — 40000768 ZZHC STATISTIC OT CONCUSSION VISIT: Performed by: OCCUPATIONAL THERAPIST

## 2017-08-17 PROCEDURE — 97530 THERAPEUTIC ACTIVITIES: CPT | Mod: GO | Performed by: OCCUPATIONAL THERAPIST

## 2017-08-17 PROCEDURE — 97112 NEUROMUSCULAR REEDUCATION: CPT | Mod: GO | Performed by: OCCUPATIONAL THERAPIST

## 2017-08-24 ENCOUNTER — HOSPITAL ENCOUNTER (OUTPATIENT)
Dept: OCCUPATIONAL THERAPY | Facility: CLINIC | Age: 62
Setting detail: THERAPIES SERIES
End: 2017-08-24
Attending: NURSE PRACTITIONER
Payer: OTHER GOVERNMENT

## 2017-08-24 PROCEDURE — 97530 THERAPEUTIC ACTIVITIES: CPT | Mod: GO | Performed by: OCCUPATIONAL THERAPIST

## 2017-08-24 PROCEDURE — 40000768 ZZHC STATISTIC OT CONCUSSION VISIT: Performed by: OCCUPATIONAL THERAPIST

## 2017-08-25 ENCOUNTER — HOSPITAL ENCOUNTER (OUTPATIENT)
Dept: PHYSICAL THERAPY | Facility: CLINIC | Age: 62
Setting detail: THERAPIES SERIES
End: 2017-08-25
Attending: SURGERY
Payer: OTHER GOVERNMENT

## 2017-08-25 PROCEDURE — 40000767 ZZHC STATISTIC PT CONCUSSION VISIT: Performed by: PHYSICAL THERAPIST

## 2017-08-25 PROCEDURE — 97112 NEUROMUSCULAR REEDUCATION: CPT | Mod: GP | Performed by: PHYSICAL THERAPIST

## 2017-08-28 ENCOUNTER — HOSPITAL ENCOUNTER (OUTPATIENT)
Dept: PHYSICAL THERAPY | Facility: CLINIC | Age: 62
Setting detail: THERAPIES SERIES
End: 2017-08-28
Attending: SURGERY
Payer: OTHER GOVERNMENT

## 2017-08-28 PROCEDURE — 97112 NEUROMUSCULAR REEDUCATION: CPT | Mod: GP | Performed by: PHYSICAL THERAPIST

## 2017-08-28 PROCEDURE — 40000767 ZZHC STATISTIC PT CONCUSSION VISIT: Performed by: PHYSICAL THERAPIST

## 2017-08-31 ENCOUNTER — CARE COORDINATION (OUTPATIENT)
Dept: SURGERY | Facility: CLINIC | Age: 62
End: 2017-08-31

## 2017-08-31 NOTE — PROGRESS NOTES
Pre Visit Call and Assessment    Date of call:  08/31/2017    Phone numbers:  Cell number on file:    Telephone Information:   Mobile [phone]       Reached patient/confirmed appointment:  Yes  Patient care team/Primary provider:  Oliva Zhang  Preferred outpatient pharmacy:    [facility] [id number] [facility] - [place], [place] - [address] [place] [address]  Phone: [phone] Fax: [phone]    [facility] - [place], [place] - [address]  [address]  [address]  [facility] [address]  Phone: [phone] Fax: [phone]    [facility] - [place], [place] - [address] [place] [address]  Phone: [phone] Fax: [phone]    [facility] - [place], [place] - [address] [place] [address]  [address] [place] [address]  Phone: [phone] Fax: [phone]    Referred to:  Aubrey Sotelo    Reason for visit:  concussion

## 2017-09-01 ENCOUNTER — OFFICE VISIT (OUTPATIENT)
Dept: SURGERY | Facility: CLINIC | Age: 62
End: 2017-09-01

## 2017-09-01 VITALS
BODY MASS INDEX: 37.03 KG/M2 | TEMPERATURE: 97.6 F | SYSTOLIC BLOOD PRESSURE: 139 MMHG | DIASTOLIC BLOOD PRESSURE: 77 MMHG | WEIGHT: 280.7 LBS | OXYGEN SATURATION: 100 % | HEART RATE: 53 BPM

## 2017-09-01 DIAGNOSIS — R56.1 SEIZURE AFTER HEAD INJURY (H): ICD-10-CM

## 2017-09-01 DIAGNOSIS — F07.81 POSTCONCUSSION SYNDROME: Primary | ICD-10-CM

## 2017-09-01 RX ORDER — GABAPENTIN 300 MG/1
CAPSULE ORAL
Qty: 90 CAPSULE | Refills: 0 | Status: SHIPPED | OUTPATIENT
Start: 2017-09-01 | End: 2017-10-18

## 2017-09-01 ASSESSMENT — PAIN SCALES - GENERAL: PAINLEVEL: MODERATE PAIN (5)

## 2017-09-01 NOTE — NURSING NOTE
Chief Complaint   Patient presents with     Consult     concussion f/u       Vitals:    09/01/17 1452   BP: 139/77   Pulse: 53   Temp: 97.6  F (36.4  C)   TempSrc: Oral   SpO2: 100%   Weight: 280 lb 11.2 oz       Body mass index is 37.03 kg/(m^2).    Quoc MICHELE
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
## 2017-09-01 NOTE — MR AVS SNAPSHOT
After Visit Summary   9/1/2017    Stiven Nguyễn    MRN: [id number]           Patient Information     Date Of Birth          1955        Visit Information        Provider Department      9/1/2017 3:15 PM Aubrey Sotelo NP [facility]        Today's Diagnoses     Postconcussion syndrome    -  1    Seizure after head injury (H)           Follow-ups after your visit        Additional Services     CONCUSSION  REFERRAL       [facility] is referring you to the Concussion  service at [facility].      The  Representative will assist you in the coordination of your concussion care as prescribed by your physician.    The  Representative will contact you within one business day, or you may contact the  Representative at [phone].    Referral Options:  Neurology    Coverage of these services are subject to the terms and limitations of your health insurance plan.  Please call member services at your health plan with any benefit or coverage questions.     If X-rays, CT or MRI's have been performed, please contact the facility where they were done, to arrange for  prior to your scheduled appointment.  Please bring this referral request to your appointment and present it to your specialist.            CONCUSSION  REFERRAL       [facility] is referring you to the Concussion  service at [facility].      The  Representative will assist you in the coordination of your concussion care as prescribed by your physician.    The  Representative will contact you within one business day, or you may contact the  Representative at [phone].    Referral Options:  Non-Sports related concussion management follow 10/29    Coverage of these services are subject to the terms and limitations of your health insurance plan.  Please call  member services at your health plan with any benefit or coverage questions.     If X-rays, CT or MRI's have been performed, please contact the facility where they were done, to arrange for  prior to your scheduled appointment.  Please bring this referral request to your appointment and present it to your specialist.                  Your next 10 appointments already scheduled     Sep 07, 2017  3:00 PM CDT   Concussion Treatment with Maria Luisa Milner, PT   [facility] ([facility])    [address]  [address]  [place] [address]   [phone]            Sep 12, 2017  3:00 PM CDT   Concussion Treatment with Maria Luisa Milner PT   [facility] ([facility])    [address]  [place] [address]   [phone]            Sep 20, 2017  3:00 PM CDT   Concussion Treatment with Maria Luisa Milner PT   [facility] ([facility])    [address]  [place] [address]   [phone]              Future tests that were ordered for you today     Open Future Orders        Priority Expected Expires Ordered    MR Brain w/o Contrast Routine  9/1/2018 9/1/2017            Who to contact     Please call your clinic at [phone] to:    Ask questions about your health    Make or cancel appointments    Discuss your medicines    Learn about your test results    Speak to your doctor   If you have compliments or concerns about an experience at your clinic, or if you wish to file a complaint, please contact [facility] Patient Relations at [phone] or email us at [email or web address]         Additional Information About Your Visit        MyChart Information     Every1Mobile gives you secure access to your electronic health record. If you see a primary care provider, you can also send messages to your care team and make appointments. If you have questions,  please call your primary care clinic.  If you do not have a primary care provider, please call [phone] and they will assist you.      iovation is an electronic gateway that provides easy, online access to your medical records. With iovation, you can request a clinic appointment, read your test results, renew a prescription or communicate with your care team.     To access your existing account, please contact your [facility] or call [phone] for assistance.        Care EveryWhere ID     This is your Care EveryWhere ID. This could be used by other organizations to access your [facility] medical records  [id number]        Your Vitals Were     Pulse Temperature Pulse Oximetry BMI (Body Mass Index)          53 97.6  F (36.4  C) (Oral) 100% 37.03 kg/m2         Blood Pressure from Last 3 Encounters:   09/01/17 139/77   07/28/17 142/75   06/23/17 138/61    Weight from Last 3 Encounters:   09/01/17 280 lb 11.2 oz   07/28/17 282 lb 12.8 oz   06/23/17 283 lb 9.6 oz              We Performed the Following     CONCUSSION  REFERRAL     CONCUSSION  REFERRAL          Today's Medication Changes          These changes are accurate as of: 9/1/17  3:45 PM.  If you have any questions, ask your nurse or doctor.               Start taking these medicines.        Dose/Directions    gabapentin 300 MG capsule   Commonly known as:  NEURONTIN   Used for:  Postconcussion syndrome   Started by:  Aubrey Sotelo, SAMANTHA        Take 1 tablet (300 mg) every night for 1-3 days, then 1 tablet twice daily for 1-3 days, then 1 tablet three times daily   Quantity:  90 capsule   Refills:  0         These medicines have changed or have updated prescriptions.        Dose/Directions    atorvastatin 40 MG tablet   Commonly known as:  LIPITOR   This may have changed:    - how much to take  - when to take this   Used for:  NSTEMI (non-ST elevated myocardial infarction) (H)        Dose:  40 mg   Take 1  tablet (40 mg) by mouth At Bedtime   Quantity:  30 tablet   Refills:  2            Where to get your medicines      These medications were sent to [facility] - [place], [place] - [address]   [address] [facility] [address]     Phone:  [phone]     gabapentin 300 MG capsule                Primary Care Provider Office Phone # Fax #    Oliva Zhang [phone]       [facility] [address] [address]        Equal Access to Services     [facility]: Hadii aad ku hadasho Soomaali, waaxda luqadaha, qaybta kaalmada adeegyada, waxloyda stearns haymarya lemus . So [facility] [phone].    ATENCIÓN: Si habla español, tiene a morgan disposición servicios gratuitos de asistencia lingüística. [facility] [phone].    We comply with applicable federal civil rights laws and [place] laws. We do not discriminate on the basis of race, color, national origin, age, disability sex, sexual orientation or gender identity.            Thank you!     Thank you for choosing [facility]  for your care. Our goal is always to provide you with excellent care. Hearing back from our patients is one way we can continue to improve our services. Please take a few minutes to complete the written survey that you may receive in the mail after your visit with us. Thank you!             Your Updated Medication List - Protect others around you: Learn how to safely use, store and throw away your medicines at www.disposemymeds.org.          This list is accurate as of: 9/1/17  3:45 PM.  Always use your most recent med list.                   Brand Name Dispense Instructions for use Diagnosis    amLODIPine 5 MG tablet    NORVASC    130 tablet    Take 1.5 tablets (7.5 mg) by mouth daily    Chest pain, unspecified type       aspirin 81 MG EC tablet     30 tablet    Take 1 tablet (81 mg) by mouth daily    NSTEMI (non-ST elevated myocardial infarction) (H)       atorvastatin 40 MG tablet     LIPITOR    30 tablet    Take 1 tablet (40 mg) by mouth At Bedtime    NSTEMI (non-ST elevated myocardial infarction) (H)       gabapentin 300 MG capsule    NEURONTIN    90 capsule    Take 1 tablet (300 mg) every night for 1-3 days, then 1 tablet twice daily for 1-3 days, then 1 tablet three times daily    Postconcussion syndrome       isosorbide mononitrate 30 MG 24 hr tablet    IMDUR    135 tablet    Take 1.5 tablets (45 mg) by mouth daily    Chest pain, unspecified chest pain type       lisinopril 40 MG tablet    PRINIVIL/ZESTRIL     Take 40 mg by mouth daily        loratadine 10 MG tablet    CLARITIN     Take 10 mg by mouth daily        nitroGLYcerin 0.4 MG sublingual tablet    NITROSTAT    25 tablet    For chest pain place 1 tablet under the tongue every 5 minutes for 3 doses. If symptoms persist 5 minutes after 1st dose call 911.    Coronary vasospasm (H)       omeprazole 10 MG CR capsule    priLOSEC     Take 20 mg by mouth        tamsulosin 0.4 MG capsule    FLOMAX    90 capsule    Take 1 capsule (0.4 mg) by mouth daily    Hypertrophy of prostate with urinary obstruction and other lower urinary tract symptoms (LUTS), Bladder retention       TYLENOL PO      Take 4 tablets by mouth every 4 hours as needed for mild pain or fever

## 2017-09-01 NOTE — LETTER
Health [facility]cussion  [address] [address]  [phone]           September 1, 2017     Work ability Form for  Stiven Nguyễn, 1955. Who  is under my care for a concussion .      Date of most recent in exam September 1, 2017    Date of next exam 10/13/17     Diagnosis:   Concussion without loss of consciousness ICD 10  S06.0X0D        --- May return to work as of date with the following restrictions:  8 hours a day, 5 days week            The following accommodations may help in reducing the cognitive load, thereby minimizing post-concussion symptoms.  As the employer, it is encouraged to discuss and establish accommodations based on individual work responsibilities.  If symptoms persist, more formal accommodations may be necessary.  1)  Allow more time for, or delay for projects.  2)  Allow more time for work completion.  3)  Allow for reduced work load.  4)  Allow for less multi-task work.  Single tasks until completion will improve productivity.  5)  Allow breaks as needed to control symptom levels.    6)  Provide a quiet area for lunch.  7)  Allow use of sunglasses during the day.      Full or partial days missed due to post-concussion symptoms and follow up appointments should be medically excused.    Follow up evaluation and revision of recommendations to occur on 9/1/17     Please feel free to contact me at the number below with any questions or concerns.     Sincerely,                Narendra Sotelo, NP-C   Dept of Critical Care and Acute Care Surgery   [facility]  Administration office: [phone]  Clinic nurse line: [phone]  Fax: [phone]

## 2017-09-11 ENCOUNTER — HOSPITAL ENCOUNTER (OUTPATIENT)
Dept: OCCUPATIONAL THERAPY | Facility: CLINIC | Age: 62
Setting detail: THERAPIES SERIES
End: 2017-09-11
Attending: NURSE PRACTITIONER
Payer: OTHER GOVERNMENT

## 2017-09-11 PROCEDURE — 97530 THERAPEUTIC ACTIVITIES: CPT | Mod: GO | Performed by: OCCUPATIONAL THERAPIST

## 2017-09-11 PROCEDURE — 40000768 ZZHC STATISTIC OT CONCUSSION VISIT: Performed by: OCCUPATIONAL THERAPIST

## 2017-09-12 ENCOUNTER — HOSPITAL ENCOUNTER (OUTPATIENT)
Dept: PHYSICAL THERAPY | Facility: CLINIC | Age: 62
Setting detail: THERAPIES SERIES
End: 2017-09-12
Attending: SURGERY
Payer: OTHER GOVERNMENT

## 2017-09-12 PROCEDURE — 40000767 ZZHC STATISTIC PT CONCUSSION VISIT: Performed by: PHYSICAL THERAPIST

## 2017-09-12 PROCEDURE — 97112 NEUROMUSCULAR REEDUCATION: CPT | Mod: GP | Performed by: PHYSICAL THERAPIST

## 2017-09-12 NOTE — PROGRESS NOTES
Outpatient Occupational Therapy Discharge Note     Patient: Markel Nguyễn  : 1955  Insurance:   Payor/Plan Subscriber Name Rel Member # Group #   CRICKET/DULCE MARIA - FELICITA* MARKEL NGUYỄN  [id number]       [facility] CLAIMS, [address]       Beginning/End Dates of Reporting Period:  2017 to 2017    Referring Provider: Aubrey Sr Diagnosis: Concussion    Client Self Report: Therapeutic Listening.  Reporting feeling that his concussion is getting better.  He was able to work all day without increased eye strain or head ache with the use of his new glasses (2 pairs:  One for computer/reading and one for distance, both having bifocals in them).  He reports his mistakes at work have decreased as well.   Arya has been seen x 8 visits. It is noted that during this last session that Arya had an episode of inability to talk with contorting movement.  His wife was called and she talked him through this.  Overall the episode and return to function lasted 5 minutes.  He is following up with neurology for these episodes.  A message was also sent to his concussion doctor.      Objective Measurements:   Dynavision:  Mode A:  60pts   Wii Big Brain:  Easy:  100%, Medium 90%   Work Tolerance:  Work days are tolerated with improving tolerance and decreased symptoms on computer and reading          Goals:     Goal Identifier Education and home programming   Goal Description Client will verbalize an understanding of findings on assessment, concussion symptom management, and home program recommendations with each visit   Target Date 17   Date Met   2017   Progress:  Met:  Recommendations and management have been educated and completed resulting in increased tolerance for work day     Goal Identifier Reading / Scanning   Goal Description Client will demonstarte the ability to read up to 30 minutes without increase in symptoms   Target Date 17   Date Met   2017   Progress:  Met:   With the use of the new reading glasses he is able to complete this     Goal Identifier Computer Use   Goal Description Client will demontrate the ability to use computer up to one hour without increase in symptoms   Target Date 09/16/17   Date Met   09/11/2017   Progress:  Met:  Client reports that with the use of the new glasses he does not have any eye strain or headaches when working his day     Goal Identifier Reaction Time   Goal Description Client will measure 52+ on Mode A of the Dynavision showing functional reaction time without increase in symptoms in prep of safe driving   Target Date 09/16/17   Date Met   09/11/2017   Progress:  Met:  Scoring 60pts.       Goal Identifier Functional Scanning   Goal Description Client will demonstrate the abiltiy to scan while grocery shopping without increase in symptoms   Target Date 09/16/17   Date Met      Progress:     Goal Identifier Cognition / Visual Memory   Goal Description Client will demonstrate a score of 80% on the medium level of Wii Big Brain Bird Cage game showing showing functional visual attention and memory when processing information in 3 trials   Target Date 09/16/17   Date Met   09/11/2017   Progress:  Met     Progress Toward Goals:   Progress this reporting period: as noted above.  All goals met    Plan:  Discharge from therapy, he is to follow up with PT and Concussion Clinic.      Discharge:    Reason for Discharge: Patient has met all goals.    Equipment Issued: n/a    Discharge Plan: Other services: PT.

## 2017-09-15 ENCOUNTER — HOSPITAL ENCOUNTER (OUTPATIENT)
Dept: MRI IMAGING | Facility: CLINIC | Age: 62
Discharge: HOME | End: 2017-09-15
Attending: NURSE PRACTITIONER | Admitting: NURSE PRACTITIONER
Payer: OTHER GOVERNMENT

## 2017-09-15 DIAGNOSIS — F07.81 POSTCONCUSSION SYNDROME: ICD-10-CM

## 2017-09-15 PROCEDURE — 70551 MRI BRAIN STEM W/O DYE: CPT

## 2017-09-20 ENCOUNTER — HOSPITAL ENCOUNTER (OUTPATIENT)
Dept: PHYSICAL THERAPY | Facility: CLINIC | Age: 62
Setting detail: THERAPIES SERIES
End: 2017-09-20
Attending: SURGERY
Payer: OTHER GOVERNMENT

## 2017-09-20 PROCEDURE — 97112 NEUROMUSCULAR REEDUCATION: CPT | Mod: GP | Performed by: PHYSICAL THERAPIST

## 2017-09-20 PROCEDURE — 40000718 ZZHC STATISTIC PT DEPARTMENT ORTHO VISIT: Performed by: PHYSICAL THERAPIST

## 2017-09-25 ENCOUNTER — TRANSFERRED RECORDS (OUTPATIENT)
Dept: HEALTH INFORMATION MANAGEMENT | Facility: CLINIC | Age: 62
End: 2017-09-25

## 2017-10-18 DIAGNOSIS — F07.81 POSTCONCUSSION SYNDROME: ICD-10-CM

## 2017-10-18 RX ORDER — GABAPENTIN 300 MG/1
CAPSULE ORAL
Qty: 90 CAPSULE | Refills: 1 | Status: SHIPPED | OUTPATIENT
Start: 2017-10-18 | End: 2017-11-08

## 2017-10-27 ENCOUNTER — OFFICE VISIT (OUTPATIENT)
Dept: SURGERY | Facility: CLINIC | Age: 62
End: 2017-10-27

## 2017-10-27 VITALS
WEIGHT: 289.2 LBS | SYSTOLIC BLOOD PRESSURE: 140 MMHG | OXYGEN SATURATION: 97 % | HEART RATE: 55 BPM | BODY MASS INDEX: 38.16 KG/M2 | DIASTOLIC BLOOD PRESSURE: 73 MMHG

## 2017-10-27 DIAGNOSIS — F07.81 POSTCONCUSSION SYNDROME: Primary | ICD-10-CM

## 2017-10-27 ASSESSMENT — PAIN SCALES - GENERAL: PAINLEVEL: NO PAIN (0)
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
## 2017-10-27 NOTE — PATIENT INSTRUCTIONS
Please talk to the neurologist to see if we can add Namenda or Aricept without issues from their end.     Conner will call you to set up neuropsychology  testing.     Increase gabapentin to two tabs in the morning and at night. Start with 2 tabs at night and one in the morning for 3 days then increase to two in the AM and at night.

## 2017-10-27 NOTE — NURSING NOTE
Chief Complaint   Patient presents with     Head Injury     concussion f/u        Vitals:    10/27/17 0853   BP: 140/73   Pulse: 55   SpO2: 97%   Weight: 289 lb 3.2 oz       Body mass index is 38.16 kg/(m^2).    Quoc MICHELE

## 2017-10-27 NOTE — MR AVS SNAPSHOT
After Visit Summary   10/27/2017    Stiven Nguyễn    MRN: [id number]           Patient Information     Date Of Birth          1955        Visit Information        Provider Department      10/27/2017 9:00 AM Aubrey Sotelo NP [facility]        Today's Diagnoses     Postconcussion syndrome    -  1      Care Instructions    Please talk to the neurologist to see if we can add Namenda or Aricept without issues from their end.     Conner will call you to set up neuropsychology  testing.     Increase gabapentin to two tabs in the morning and at night. Start with 2 tabs at night and one in the morning for 3 days then increase to two in the AM and at night.            Follow-ups after your visit        Your next 10 appointments already scheduled     Dec 05, 2017 10:30 AM CST   Return Visit with Gómez Pickard MD   [facility] PHYSIC[facility] HEART AT [facility] ([facility])    [address] [place] [id number] [phone]              Who to contact     Please call your clinic at [phone] to:    Ask questions about your health    Make or cancel appointments    Discuss your medicines    Learn about your test results    Speak to your doctor   If you have compliments or concerns about an experience at your clinic, or if you wish to file a complaint, please contact [facility] Patient Relations at [phone] or email us at [email or web address]         Additional Information About Your Visit        MyChart Information     ELERTSt gives you secure access to your electronic health record. If you see a primary care provider, you can also send messages to your care team and make appointments. If you have questions, please call your primary care clinic.  If you do not have a primary care provider, please call [phone] and they will assist you.      Valkee is an electronic gateway that provides easy, online access to your  medical records. With Peers App, you can request a clinic appointment, read your test results, renew a prescription or communicate with your care team.     To access your existing account, please contact your [facility] or call [phone] for assistance.        Care EveryWhere ID     This is your Care EveryWhere ID. This could be used by other organizations to access your [facility] medical records  [id number]        Your Vitals Were     Pulse Pulse Oximetry BMI (Body Mass Index)             55 97% 38.16 kg/m2          Blood Pressure from Last 3 Encounters:   10/27/17 140/73   09/01/17 139/77   07/28/17 142/75    Weight from Last 3 Encounters:   10/27/17 289 lb 3.2 oz   09/01/17 280 lb 11.2 oz   07/28/17 282 lb 12.8 oz              Today, you had the following     No orders found for display         Today's Medication Changes          These changes are accurate as of: 10/27/17  9:42 AM.  If you have any questions, ask your nurse or doctor.               These medicines have changed or have updated prescriptions.        Dose/Directions    atorvastatin 40 MG tablet   Commonly known as:  LIPITOR   This may have changed:    - how much to take  - when to take this   Used for:  NSTEMI (non-ST elevated myocardial infarction) (H)        Dose:  40 mg   Take 1 tablet (40 mg) by mouth At Bedtime   Quantity:  30 tablet   Refills:  2                Primary Care Provider Office Phone # Fax #    Oliva Zhang [phone]       [place] [address]        Equal Access to Services     BRIAN GARCIA AH: Hadii savanna ku hadasho Soomaali, waaxda luqadaha, qaybta kaalmada adeegyada, carolina pakrs. So [facility] [phone].    ATENCIÓN: Si habla español, tiene a morgan disposición servicios gratuitos de asistencia lingüística. Llame al [phone].    We comply with applicable federal civil rights laws and [place] laws. We do not discriminate  on the basis of race, color, national origin, age, disability, sex, sexual orientation, or gender identity.            Thank you!     Thank you for choosing [facility]  for your care. Our goal is always to provide you with excellent care. Hearing back from our patients is one way we can continue to improve our services. Please take a few minutes to complete the written survey that you may receive in the mail after your visit with us. Thank you!             Your Updated Medication List - Protect others around you: Learn how to safely use, store and throw away your medicines at www.disposemymeds.org.          This list is accurate as of: 10/27/17  9:42 AM.  Always use your most recent med list.                   Brand Name Dispense Instructions for use Diagnosis    amLODIPine 5 MG tablet    NORVASC    130 tablet    Take 1.5 tablets (7.5 mg) by mouth daily    Chest pain, unspecified type       aspirin 81 MG EC tablet     30 tablet    Take 1 tablet (81 mg) by mouth daily    NSTEMI (non-ST elevated myocardial infarction) (H)       atorvastatin 40 MG tablet    LIPITOR    30 tablet    Take 1 tablet (40 mg) by mouth At Bedtime    NSTEMI (non-ST elevated myocardial infarction) (H)       gabapentin 300 MG capsule    NEURONTIN    90 capsule    Take 1 tablet (300 mg) in the morning and in the evening.    Postconcussion syndrome       isosorbide mononitrate 30 MG 24 hr tablet    IMDUR    135 tablet    Take 1.5 tablets (45 mg) by mouth daily    Chest pain, unspecified chest pain type       lisinopril 40 MG tablet    PRINIVIL/ZESTRIL     Take 40 mg by mouth daily        loratadine 10 MG tablet    CLARITIN     Take 10 mg by mouth daily        nitroGLYcerin 0.4 MG sublingual tablet    NITROSTAT    25 tablet    For chest pain place 1 tablet under the tongue every 5 minutes for 3 doses. If symptoms persist 5 minutes after 1st dose call 911.    Coronary vasospasm (H)       omeprazole 10 MG CR capsule    priLOSEC     Take 20 mg  by mouth        tamsulosin 0.4 MG capsule    FLOMAX    90 capsule    Take 1 capsule (0.4 mg) by mouth daily    Hypertrophy of prostate with urinary obstruction and other lower urinary tract symptoms (LUTS), Bladder retention       TYLENOL PO      Take 4 tablets by mouth every 4 hours as needed for mild pain or fever

## 2017-10-27 NOTE — LETTER
[facility]  [address] [id number] [phone]            October 27, 2017      Work ability Form for  Stiven Nguyễn, 1955. Who  is under my care for a concussion .      Date of most recent exam October 27, 2017   Date of next exam to occur in 6-8 weeks       Diagnosis:   Concussion without loss of consciousness ICD 10  S06.0X0D          --- May return to work as of date with the following restrictions:    8 hours a day, 5 days week. Goal is to reach 14 points within the [employer] system, if he cannot meet that point value please excuse whatever deficit that maybe given his traumatic brain injury               The following accommodations may help in reducing the cognitive load, thereby minimizing post-concussion symptoms.  As the employer, it is encouraged to discuss and establish accommodations based on individual work responsibilities.  If symptoms persist, more formal accommodations may be necessary.  1)  Allow more time for, or delay for projects.  2)  Allow more time for work completion.  3)  Allow for reduced work load.  4)  Allow for less multi-task work.  Single tasks until completion will improve productivity.  5)  Allow breaks as needed to control symptom levels.    6)  Provide a quiet area for lunch.  7)  Allow use of sunglasses during the day.       Full or partial days missed due to post-concussion symptoms and follow up appointments should be medically excused.    Follow up evaluation and revision of recommendations to occur in 6-8 weeks       Please feel free to contact me at the number below with any questions or concerns.      Sincerely,                  Narendra Sotelo, NP-C   Dept of Critical Care and Acute Care Surgery   [facility]  Administration office: [phone]  Clinic nurse line: [phone]  Fax: [phone]
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
## 2017-11-02 ENCOUNTER — TRANSFERRED RECORDS (OUTPATIENT)
Dept: HEALTH INFORMATION MANAGEMENT | Facility: CLINIC | Age: 62
End: 2017-11-02

## 2017-11-08 ENCOUNTER — HOSPITAL ENCOUNTER (OUTPATIENT)
Facility: CLINIC | Age: 62
Setting detail: OBSERVATION
Discharge: HOME | End: 2017-11-09
Attending: EMERGENCY MEDICINE | Admitting: HOSPITALIST
Payer: OTHER GOVERNMENT

## 2017-11-08 ENCOUNTER — APPOINTMENT (OUTPATIENT)
Dept: CT IMAGING | Facility: CLINIC | Age: 62
End: 2017-11-08
Attending: INTERNAL MEDICINE
Payer: OTHER GOVERNMENT

## 2017-11-08 DIAGNOSIS — R07.9 ACUTE CHEST PAIN: ICD-10-CM

## 2017-11-08 DIAGNOSIS — R79.89 ELEVATED TROPONIN: ICD-10-CM

## 2017-11-08 DIAGNOSIS — I20.1 CORONARY VASOSPASM (H): Primary | ICD-10-CM

## 2017-11-08 LAB
ANION GAP SERPL CALCULATED.3IONS-SCNC: 6 MMOL/L (ref 3–14)
BASOPHILS # BLD AUTO: 0 10E9/L (ref 0–0.2)
BASOPHILS NFR BLD AUTO: 0.3 %
BUN SERPL-MCNC: 17 MG/DL (ref 7–30)
CALCIUM SERPL-MCNC: 8.7 MG/DL (ref 8.5–10.1)
CHLORIDE SERPL-SCNC: 107 MMOL/L (ref 94–109)
CO2 SERPL-SCNC: 27 MMOL/L (ref 20–32)
CREAT SERPL-MCNC: 0.92 MG/DL (ref 0.66–1.25)
D DIMER PPP FEU-MCNC: 0.3 UG/ML FEU (ref 0–0.5)
DIFFERENTIAL METHOD BLD: NORMAL
EOSINOPHIL # BLD AUTO: 0.2 10E9/L (ref 0–0.7)
EOSINOPHIL NFR BLD AUTO: 3.2 %
ERYTHROCYTE [DISTWIDTH] IN BLOOD BY AUTOMATED COUNT: 13.2 % (ref 10–15)
GFR SERPL CREATININE-BSD FRML MDRD: 84 ML/MIN/1.7M2
GLUCOSE SERPL-MCNC: 217 MG/DL (ref 70–99)
HCT VFR BLD AUTO: 42.2 % (ref 40–53)
HGB BLD-MCNC: 14.9 G/DL (ref 13.3–17.7)
IMM GRANULOCYTES # BLD: 0 10E9/L (ref 0–0.4)
IMM GRANULOCYTES NFR BLD: 0.5 %
INTERPRETATION ECG - MUSE: NORMAL
LYMPHOCYTES # BLD AUTO: 1.3 10E9/L (ref 0.8–5.3)
LYMPHOCYTES NFR BLD AUTO: 22.5 %
MCH RBC QN AUTO: 30.2 PG (ref 26.5–33)
MCHC RBC AUTO-ENTMCNC: 35.3 G/DL (ref 31.5–36.5)
MCV RBC AUTO: 85 FL (ref 78–100)
MONOCYTES # BLD AUTO: 0.7 10E9/L (ref 0–1.3)
MONOCYTES NFR BLD AUTO: 11.7 %
NEUTROPHILS # BLD AUTO: 3.7 10E9/L (ref 1.6–8.3)
NEUTROPHILS NFR BLD AUTO: 61.8 %
NRBC # BLD AUTO: 0 10*3/UL
NRBC BLD AUTO-RTO: 0 /100
PLATELET # BLD AUTO: 163 10E9/L (ref 150–450)
POTASSIUM SERPL-SCNC: 3.9 MMOL/L (ref 3.4–5.3)
RBC # BLD AUTO: 4.94 10E12/L (ref 4.4–5.9)
SODIUM SERPL-SCNC: 140 MMOL/L (ref 133–144)
TROPONIN I SERPL-MCNC: 0.12 UG/L (ref 0–0.04)
TROPONIN I SERPL-MCNC: 0.13 UG/L (ref 0–0.04)
WBC # BLD AUTO: 5.9 10E9/L (ref 4–11)

## 2017-11-08 PROCEDURE — 25000132 ZZH RX MED GY IP 250 OP 250 PS 637: Performed by: INTERNAL MEDICINE

## 2017-11-08 PROCEDURE — 70450 CT HEAD/BRAIN W/O DYE: CPT

## 2017-11-08 PROCEDURE — 80048 BASIC METABOLIC PNL TOTAL CA: CPT | Performed by: EMERGENCY MEDICINE

## 2017-11-08 PROCEDURE — 85025 COMPLETE CBC W/AUTO DIFF WBC: CPT | Performed by: EMERGENCY MEDICINE

## 2017-11-08 PROCEDURE — G0378 HOSPITAL OBSERVATION PER HR: HCPCS

## 2017-11-08 PROCEDURE — 84484 ASSAY OF TROPONIN QUANT: CPT | Performed by: EMERGENCY MEDICINE

## 2017-11-08 PROCEDURE — 99214 OFFICE O/P EST MOD 30 MIN: CPT | Performed by: INTERNAL MEDICINE

## 2017-11-08 PROCEDURE — 25000132 ZZH RX MED GY IP 250 OP 250 PS 637: Performed by: HOSPITALIST

## 2017-11-08 PROCEDURE — 84484 ASSAY OF TROPONIN QUANT: CPT | Performed by: HOSPITALIST

## 2017-11-08 PROCEDURE — 93005 ELECTROCARDIOGRAM TRACING: CPT

## 2017-11-08 PROCEDURE — 85379 FIBRIN DEGRADATION QUANT: CPT | Performed by: EMERGENCY MEDICINE

## 2017-11-08 PROCEDURE — 36415 COLL VENOUS BLD VENIPUNCTURE: CPT | Performed by: HOSPITALIST

## 2017-11-08 PROCEDURE — 99220 ZZC INITIAL OBSERVATION CARE,LEVL III: CPT | Performed by: HOSPITALIST

## 2017-11-08 PROCEDURE — 99285 EMERGENCY DEPT VISIT HI MDM: CPT

## 2017-11-08 RX ORDER — NITROGLYCERIN 0.4 MG/1
0.4 TABLET SUBLINGUAL EVERY 5 MIN PRN
Status: DISCONTINUED | OUTPATIENT
Start: 2017-11-08 | End: 2017-11-08

## 2017-11-08 RX ORDER — GABAPENTIN 600 MG/1
600 TABLET ORAL AT BEDTIME
Status: DISCONTINUED | OUTPATIENT
Start: 2017-11-08 | End: 2017-11-09 | Stop reason: HOSPADM

## 2017-11-08 RX ORDER — VERAPAMIL HYDROCHLORIDE 180 MG/1
180 TABLET, EXTENDED RELEASE ORAL AT BEDTIME
Status: DISCONTINUED | OUTPATIENT
Start: 2017-11-08 | End: 2017-11-09

## 2017-11-08 RX ORDER — ACETAMINOPHEN 500 MG
1000 TABLET ORAL EVERY 8 HOURS PRN
Status: DISCONTINUED | OUTPATIENT
Start: 2017-11-08 | End: 2017-11-09 | Stop reason: HOSPADM

## 2017-11-08 RX ORDER — GABAPENTIN 300 MG/1
300 CAPSULE ORAL EVERY MORNING
Status: DISCONTINUED | OUTPATIENT
Start: 2017-11-09 | End: 2017-11-09 | Stop reason: HOSPADM

## 2017-11-08 RX ORDER — AMLODIPINE BESYLATE 5 MG/1
5 TABLET ORAL DAILY
Status: DISCONTINUED | OUTPATIENT
Start: 2017-11-09 | End: 2017-11-08

## 2017-11-08 RX ORDER — LANOLIN ALCOHOL/MO/W.PET/CERES
3 CREAM (GRAM) TOPICAL
Status: DISCONTINUED | OUTPATIENT
Start: 2017-11-08 | End: 2017-11-09 | Stop reason: HOSPADM

## 2017-11-08 RX ORDER — ATORVASTATIN CALCIUM 40 MG/1
40 TABLET, FILM COATED ORAL AT BEDTIME
Status: DISCONTINUED | OUTPATIENT
Start: 2017-11-08 | End: 2017-11-09 | Stop reason: HOSPADM

## 2017-11-08 RX ORDER — LISINOPRIL 40 MG/1
40 TABLET ORAL DAILY
Status: DISCONTINUED | OUTPATIENT
Start: 2017-11-09 | End: 2017-11-09 | Stop reason: HOSPADM

## 2017-11-08 RX ORDER — NITROGLYCERIN 0.4 MG/1
0.4 TABLET SUBLINGUAL EVERY 5 MIN PRN
Status: DISCONTINUED | OUTPATIENT
Start: 2017-11-08 | End: 2017-11-09 | Stop reason: HOSPADM

## 2017-11-08 RX ORDER — ASPIRIN 81 MG/1
81 TABLET ORAL DAILY
Status: DISCONTINUED | OUTPATIENT
Start: 2017-11-09 | End: 2017-11-09 | Stop reason: DRUGHIGH

## 2017-11-08 RX ORDER — LIDOCAINE 40 MG/G
CREAM TOPICAL
Status: DISCONTINUED | OUTPATIENT
Start: 2017-11-08 | End: 2017-11-09 | Stop reason: HOSPADM

## 2017-11-08 RX ORDER — ASPIRIN 81 MG/1
162 TABLET, CHEWABLE ORAL ONCE
Status: DISCONTINUED | OUTPATIENT
Start: 2017-11-08 | End: 2017-11-09

## 2017-11-08 RX ORDER — TAMSULOSIN HYDROCHLORIDE 0.4 MG/1
0.4 CAPSULE ORAL EVERY EVENING
Status: DISCONTINUED | OUTPATIENT
Start: 2017-11-08 | End: 2017-11-09 | Stop reason: HOSPADM

## 2017-11-08 RX ORDER — ALUMINA, MAGNESIA, AND SIMETHICONE 2400; 2400; 240 MG/30ML; MG/30ML; MG/30ML
30 SUSPENSION ORAL EVERY 4 HOURS PRN
Status: DISCONTINUED | OUTPATIENT
Start: 2017-11-08 | End: 2017-11-09 | Stop reason: HOSPADM

## 2017-11-08 RX ORDER — NALOXONE HYDROCHLORIDE 0.4 MG/ML
.1-.4 INJECTION, SOLUTION INTRAMUSCULAR; INTRAVENOUS; SUBCUTANEOUS
Status: DISCONTINUED | OUTPATIENT
Start: 2017-11-08 | End: 2017-11-08

## 2017-11-08 RX ORDER — ACETAMINOPHEN 650 MG/1
650 SUPPOSITORY RECTAL EVERY 4 HOURS PRN
Status: DISCONTINUED | OUTPATIENT
Start: 2017-11-08 | End: 2017-11-09 | Stop reason: HOSPADM

## 2017-11-08 RX ORDER — ACETAMINOPHEN 325 MG/1
650 TABLET ORAL EVERY 4 HOURS PRN
Status: DISCONTINUED | OUTPATIENT
Start: 2017-11-08 | End: 2017-11-09

## 2017-11-08 RX ADMIN — TAMSULOSIN HYDROCHLORIDE 0.4 MG: 0.4 CAPSULE ORAL at 21:06

## 2017-11-08 RX ADMIN — GABAPENTIN 600 MG: 600 TABLET, FILM COATED ORAL at 21:06

## 2017-11-08 RX ADMIN — VERAPAMIL HYDROCHLORIDE 180 MG: 180 TABLET, FILM COATED, EXTENDED RELEASE ORAL at 21:06

## 2017-11-08 RX ADMIN — ATORVASTATIN CALCIUM 40 MG: 40 TABLET, FILM COATED ORAL at 21:06

## 2017-11-08 ASSESSMENT — ENCOUNTER SYMPTOMS
SHORTNESS OF BREATH: 1
FEVER: 0
DIARRHEA: 0
NAUSEA: 0
VOMITING: 0
ABDOMINAL PAIN: 0
LIGHT-HEADEDNESS: 1

## 2017-11-08 NOTE — PHARMACY-ADMISSION MEDICATION HISTORY
Admission medication history interview status for the 11/8/2017  admission is complete. See EPIC admission navigator for prior to admission medications     Medication history source reliability:Good    Actions taken by pharmacist (provider contacted, etc):Veriifed all meds with patient's written list that he brought to the hospital     Additional medication history information not noted on PTA med list :None    Medication reconciliation/reorder completed by provider prior to medication history? No    Time spent in this activity: 15 minutes    Prior to Admission medications    Medication Sig Last Dose Taking? Auth Provider   AMLODIPINE BESYLATE PO Take 5 mg by mouth daily 11/8/2017 at am Yes Unknown, Entered By History   OMEPRAZOLE PO Take 20 mg by mouth every morning 11/8/2017 at am Yes Unknown, Entered By History   GABAPENTIN PO Take 300 mg by mouth every morning 11/8/2017 at am Yes Unknown, Entered By History   GABAPENTIN PO Take 600 mg by mouth At Bedtime 11/7/2017 at hs Yes Unknown, Entered By History   nitroglycerin (NITROSTAT) 0.4 MG sublingual tablet For chest pain place 1 tablet under the tongue every 5 minutes for 3 doses. If symptoms persist 5 minutes after 1st dose call 911. prn med Yes Olga Higgins DO   loratadine (CLARITIN) 10 MG tablet Take 10 mg by mouth every evening  11/7/2017 at pm Yes Reported, Patient   lisinopril (PRINIVIL,ZESTRIL) 40 MG tablet Take 40 mg by mouth daily 11/8/2017 at am Yes Reported, Patient   isosorbide mononitrate (IMDUR) 30 MG 24 hr tablet Take 1.5 tablets (45 mg) by mouth daily 11/8/2017 at am Yes Gómez Pickard MD   Acetaminophen (TYLENOL PO) Take 1,000 mg by mouth every 8 hours as needed for mild pain or fever  Past Week at Unknown time Yes Reported, Patient   aspirin EC 81 MG EC tablet Take 1 tablet (81 mg) by mouth daily 11/8/2017 at am Yes Mervin Fernandez MD   atorvastatin (LIPITOR) 40 MG tablet Take 1 tablet (40 mg) by mouth At Bedtime 11/7/2017 at pm Yes  Mervin Fernandez MD   tamsulosin (FLOMAX) 0.4 MG 24 hr capsule Take 1 capsule (0.4 mg) by mouth daily  Patient taking differently: Take 0.4 mg by mouth every evening  11/7/2017 at pm Yes Kyle Torres MD

## 2017-11-08 NOTE — IP AVS SNAPSHOT
MRN:[id number]                      After Visit Summary   11/8/2017    Stiven Nguyễn    MRN: [id number]           Visit Information        Department      11/8/2017  1:02 PM [facility]        Reason for your hospital stay       You were admitted for evaluation of chest pain. You underwent a coronary angiogram which was unchanged. Your Norvasc was increased to 10 mg/d. Continue compression stockings for swelling. Follow up with Cardiology in 2 weeks. Their office will call to you set up this appointment.                     Review of your medicines      UNREVIEWED medicines. Ask your doctor about these medicines        Dose / Directions    TYLENOL PO        Dose:  1000 mg   Take 1,000 mg by mouth every 8 hours as needed for mild pain or fever   Refills:  0         CONTINUE these medicines which may have CHANGED, or have new prescriptions. If we are uncertain of the size of tablets/capsules you have at home, strength may be listed as something that might have changed.        Dose / Directions    amLODIPine 10 MG tablet   Commonly known as:  NORVASC   This may have changed:    - medication strength  - how much to take   Used for:  Coronary vasospasm (H)        Dose:  10 mg   Take 1 tablet (10 mg) by mouth daily   Quantity:  90 tablet   Refills:  0       tamsulosin 0.4 MG capsule   Commonly known as:  FLOMAX   This may have changed:  when to take this   Used for:  Hypertrophy of prostate with urinary obstruction and other lower urinary tract symptoms (LUTS), Bladder retention        Dose:  0.4 mg   Take 1 capsule (0.4 mg) by mouth daily   Quantity:  90 capsule   Refills:  3         CONTINUE these medicines which have NOT CHANGED        Dose / Directions    aspirin 81 MG EC tablet   Used for:  NSTEMI (non-ST elevated myocardial infarction) (H)        Dose:  81 mg   Take 1 tablet (81 mg) by mouth daily   Quantity:  30 tablet   Refills:  2       atorvastatin 40 MG tablet   Commonly  known as:  LIPITOR   Used for:  NSTEMI (non-ST elevated myocardial infarction) (H)        Dose:  40 mg   Take 1 tablet (40 mg) by mouth At Bedtime   Quantity:  30 tablet   Refills:  2       * GABAPENTIN PO        Dose:  300 mg   Take 300 mg by mouth every morning   Refills:  0       * GABAPENTIN PO        Dose:  600 mg   Take 600 mg by mouth At Bedtime   Refills:  0       isosorbide mononitrate 30 MG 24 hr tablet   Commonly known as:  IMDUR   Used for:  Chest pain, unspecified chest pain type        Dose:  45 mg   Take 1.5 tablets (45 mg) by mouth daily   Quantity:  135 tablet   Refills:  3       lisinopril 40 MG tablet   Commonly known as:  PRINIVIL/ZESTRIL        Dose:  40 mg   Take 40 mg by mouth daily   Refills:  0       loratadine 10 MG tablet   Commonly known as:  CLARITIN        Dose:  10 mg   Take 10 mg by mouth every evening   Refills:  0       nitroGLYcerin 0.4 MG sublingual tablet   Commonly known as:  NITROSTAT   Used for:  Coronary vasospasm (H)        For chest pain place 1 tablet under the tongue every 5 minutes for 3 doses. If symptoms persist 5 minutes after 1st dose call 911.   Quantity:  25 tablet   Refills:  3       OMEPRAZOLE PO        Dose:  20 mg   Take 20 mg by mouth every morning   Refills:  0       * Notice:  This list has 2 medication(s) that are the same as other medications prescribed for you. Read the directions carefully, and ask your doctor or other care provider to review them with you.         Where to get your medicines      These medications were sent to [facility] [place] [address]     Phone:  [phone]     amLODIPine 10 MG tablet               Prescriptions were sent or printed at these locations (1 Prescription)                   [facility] [place] [address]    Telephone:  [phone]   Fax:  [phone]    Hours:                  E-Prescribed (1 of 1)         amLODIPine (NORVASC) 10 MG tablet                 Protect others around you: Learn how to safely use, store and throw away your medicines at www.disposemymeds.org.         Follow-ups after your visit        Additional Services     Follow-Up with Cardiac Advanced Practice Provider                 Follow-up Appointments     Follow-up and recommended labs and tests        1. Follow up with Cardiology. Their office will call you to arrange this follow up appointment.                  Your next 10 appointments already scheduled     Dec 05, 2017 10:30 AM CST   Return Visit with Gómez Pickard MD   [facility] ([facility])    [address] [place] [address]   [phone]            Jan 26, 2018  9:00 AM CST   (Arrive by 8:45 AM)   RETURN CONCUSSION with HAZEL [facility] ([facility])    [address]  [address] [facility] [address] [phone]               Care Instructions        After Care Instructions     Diet       Follow this diet upon discharge: Orders Placed This Encounter      Advance Diet as Tolerated: Clear Liquid Diet                  Further instructions from your care team       Cardiac Angiogram Discharge Instructions - Radial    After you go home:      Have an adult stay with you until tomorrow.    Drink extra fluids for 2 days.    You may resume your normal diet.    No smoking       For 24 hours - due to the sedation you received:    Relax and take it easy.    Do NOT make any important or legal decisions.    Do NOT drive or operate machines at home or at work.    Do NOT drink alcohol.    Care of Wrist Puncture Site:      For the first 24 hrs - check the puncture site every 1-2 hours while awake.    It is normal to have soreness at the puncture site and mild tingling in your hand for up to 3 days.    Remove the bandaid  after 24 hours. If there is minor oozing, apply another bandaid and remove it after 12 hours.    You may shower tomorrow.  Do NOT take a bath, or use a hot tub or pool for at least 3 days. Do NOT scrub the site. Do not use lotion or powder near the puncture site.           Activity:        For 2 days:     do not use your hand or arm to support your weight (such as rising from a chair)     do not bend your wrist (such as lifting a garage door).    do not lift more than 5 pounds or exercise your arm (such as tennis, golf or bowling).    Do NOT do any heavy activity such as exercise, lifting, or straining.     Bleeding:      If you start bleeding from the site in your wrist, sit down and press firmly on/above the site for 10 minutes.     Once bleeding stops, keep arm still for 2 hours.     Call [email or web address] as soon as you can.       Call 911 right away if you have heavy bleeding or bleeding that does not stop.      Medicines:      If you are taking antiplatelet medications such as Plavix, Brilinta or Effient, do not stop taking it until you talk to your cardiologist.        If you are on Metformin (Glucophage), do not restart it until you have blood tests (within 2 to 3 days after discharge).  After you have your blood drawn, you may restart the Metformin.     Take your medications, including blood thinners, unless your provider tells you not to.  If you take Coumadin (Warfarin), have your INR checked by your provider in  3-5 days. Call your clinic to schedule this.    If you have stopped any medicines, check with your provider about when to restart them.    Follow Up Appointments:      Follow up with [email or web address] Heart Nurse Practitioner at [email or web address] of patient preference in 7-10 days.    Call the clinic if:      You have a large or growing hard lump around the site.    The site is red, swollen, hot or tender.    Blood or fluid is draining from the site.    You have chills or a fever greater than 101 F (38 C).    Your arm  turns feels numb, cool or changes color.    You have hives, a rash or unusual itching.    Any questions or concerns.          [facility]:    [phone] [facility] (7 days a week)            Statement of Approval     Ordered          11/09/17 8241  I have reviewed and agree with all the recommendations and orders detailed in this document.  EFFECTIVE NOW     Approved and electronically signed by:  Mary Jo Morgan PA-C              Additional Information About Your Visit        MyChart Information     [facility] gives you secure access to your electronic health record. If you see a primary care provider, you can also send messages to your care team and make appointments. If you have questions, please call your primary care clinic.  If you do not have a primary care provider, please call [phone] and they will assist you.        Care EveryWhere ID     This is your Care EveryWhere ID. This could be used by other organizations to access your [facility] medical records  [id number]        Your Vitals Were     Blood Pressure Pulse Temperature Respirations Pulse Oximetry       119/77 63 96.1  F (35.6  C) (Oral) 16 96%        Primary Care Provider Office Phone # Fax #    Oliva Zhang [phone]      Equal Access to Services     BRIAN GARCIA AH: Hadii savanna kruegero Sofausto, waaxda luqadaha, qaybta kaalmada adebernadineyada, [place]. So [facility] [phone].    ATENCIÓN: Si habla español, tiene a morgan disposición servicios gratuitos de asistencia lingüística. Llame al [phone].    We comply with applicable federal civil rights laws and [place] laws. We do not discriminate on the basis of race, color, national origin, age, disability, sex, sexual orientation, or gender identity.            Thank you!     Thank you for choosing [facility] for your care. Our goal is always to provide you with excellent care. Hearing back from our patients is  one way we can continue to improve our services. Please take a few minutes to complete the written survey that you may receive in the mail after you visit with us. Thank you!             Medication List: This is a list of all your medications and when to take them. Check marks below indicate your daily home schedule. Keep this list as a reference.      Medications           Morning Afternoon Evening Bedtime As Needed    amLODIPine 10 MG tablet   Commonly known as:  NORVASC   Take 1 tablet (10 mg) by mouth daily   Last time this was given:  10 mg on 11/9/2017 11:20 AM                                aspirin 81 MG EC tablet   Take 1 tablet (81 mg) by mouth daily   Last time this was given:  243 mg on 11/9/2017 11:20 AM                                atorvastatin 40 MG tablet   Commonly known as:  LIPITOR   Take 1 tablet (40 mg) by mouth At Bedtime   Last time this was given:  40 mg on 11/8/2017  9:06 PM                                * GABAPENTIN PO   Take 300 mg by mouth every morning   Last time this was given:  300 mg on 11/9/2017  8:49 AM                                * GABAPENTIN PO   Take 600 mg by mouth At Bedtime   Last time this was given:  300 mg on 11/9/2017  8:49 AM                                isosorbide mononitrate 30 MG 24 hr tablet   Commonly known as:  IMDUR   Take 1.5 tablets (45 mg) by mouth daily   Last time this was given:  45 mg on 11/9/2017  8:48 AM                                lisinopril 40 MG tablet   Commonly known as:  PRINIVIL/ZESTRIL   Take 40 mg by mouth daily   Last time this was given:  40 mg on 11/9/2017  8:49 AM                                loratadine 10 MG tablet   Commonly known as:  CLARITIN   Take 10 mg by mouth every evening                                nitroGLYcerin 0.4 MG sublingual tablet   Commonly known as:  NITROSTAT   For chest pain place 1 tablet under the tongue every 5 minutes for 3 doses. If symptoms persist 5 minutes after 1st dose call 911.                                 OMEPRAZOLE PO   Take 20 mg by mouth every morning   Last time this was given:  20 mg on 11/9/2017  8:49 AM                                tamsulosin 0.4 MG capsule   Commonly known as:  FLOMAX   Take 1 capsule (0.4 mg) by mouth daily   Last time this was given:  0.4 mg on 11/8/2017  9:06 PM                                TYLENOL PO   Take 1,000 mg by mouth every 8 hours as needed for mild pain or fever                                * Notice:  This list has 2 medication(s) that are the same as other medications prescribed for you. Read the directions carefully, and ask your doctor or other care provider to review them with you.

## 2017-11-08 NOTE — ED NOTES
Bed: ED12  Expected date: 11/8/17  Expected time:   Means of arrival:   Comments:  concepcion Hernandez cp

## 2017-11-08 NOTE — ED NOTES
[facility]  ED Nurse Handoff Report    ED Chief complaint: Chest Pain (CP start at 0900. nitro x1. anxious)      ED Diagnosis:   Final diagnoses:   Acute chest pain   Elevated troponin       Code Status: Full Code    Allergies:   Allergies   Allergen Reactions     Cialis [Tadalafil] Other (See Comments)     Back ached and horrible pressure behind eyes.     Cyclobenzaprine Fatigue     Flexeril-Sever Fatigue       Vardenafil Other (See Comments)     Back ached and horrible pressure behind eyes      Venlafaxine Other (See Comments)     Significant worsening of presumed cataplexy.     Biaxin [Clarithromycin] Rash       Activity level - Baseline/Home:  Independent    Activity Level - Current:   Independent     Needed?: No    Isolation: No  Infection: Not Applicable    Bariatric?: Yes    Vital Signs:   Vitals:    11/08/17 1311   BP: 173/88   Pulse: 54   Resp: 18   Temp: 97.8  F (36.6  C)   TempSrc: Oral   SpO2: 96%       Cardiac Rhythm: ,        Pain level:      Is this patient confused?: No    Patient Report: Initial Complaint: Chest Pain  Focused Assessment: pt has had intermittent CP for last 2-3 days. Took 1nitro today with mild relief. Called EMS for transport. Pt has Hx of CAD. Pt states that his troponin levels are always elevated, but unable to find previous elevated troponins. Pt to be admitted for further cardiac workup  Tests Performed: labs  Abnormal Results: troponin 0.126  Treatments provided: none    Family Comments: wife at bedside    OBS brochure/video discussed/provided to patient: No    ED Medications: Medications - No data to display    Drips infusing?:  No      ED NURSE PHONE NUMBER: [phone]

## 2017-11-08 NOTE — CONSULTS
CARDIAC CONSULTATION      REFERRING PHYSICIAN:  Hospitalist service.        Dear doctor, it is my pleasure to see your patient, Stiven Nguyễn.  He is a very pleasant 62-year-old patient who is followed by Dr. Gómez Pickard from our clinic.  This is a patient with a known history of coronary artery spasm, though he also has mild to moderate disease in the left anterior descending artery, with a 40% to 50% stenosis in the LAD diagnosed on coronary angiography on the 23rd of February of 2016.  The fractional flow reserve across that lesion was 0.84 on another angiogram in 2015.  No significant disease was present in the ramus, circumflex or right coronary artery.  The patient has been treated with isosorbide mononitrate at a dose of 45 mg per day and also amlodipine 5 mg per day.  He does appear to get significant peripheral edema with the amlodipine.  He was seen by my partner Dr. Ruby Higgins, who also felt that there was an element of venous incompetence in both of his legs, and he does wear support hose for this.  The patient is also taking atorvastatin.  He does not like to take aspirin, and he does not like to take sublingual nitroglycerin.      With that background in mind, at around 9:00 this morning, he began to develop retrosternal chest discomfort.  The discomfort began to get worse as the day wore on.  He was having lunch at around 11:30, and he noticed that his right hand was clumsy when he was trying to play cribbage.  He was having the chest discomfort at that stage, and he was also feeling that his chest was going to jump out of his chest.  So, he took a sublingual nitroglycerin and went down the stairs but felt somewhat dizzy and lightheaded and sat down.  After about 20 minutes, the discomfort went away and so did the clumsiness in his right hand.  He came to the Emergency Room pain-free.  However, his first cardiac enzyme was raised at 0.126.  A 12-lead electrocardiogram was performed when he was  not having chest discomfort, and this appears quite normal.  His wife tells me that he was having increasing episodes of discomfort in the days leading up to this particular event.      PAST MEDICAL HISTORY:   1.  Coronary artery disease as described above.   2.  Coronary artery spasm as described above.   3.  Benign prostatic hypertrophy.   4.  Borderline diabetes.   5.  Chronic kidney disease.   6.  Essential hypertension.     7.  Nephrolithiasis.   8.  Past history of pulmonary embolism in 2014.   9.  Obesity.   10.  History of cataplexy.     11.  Headaches.      PAST SURGICAL HISTORY:  Lithotripsy and stent placement in his ureters.  Has had orthopedic surgery in the past.      FAMILY HISTORY:  There is no family history of coronary artery spasm.  His mother had breast cancer.  Daughter has a neurological disorder and depression.  Paternal grandfather had alcoholism and drug abuse.      SOCIAL HISTORY:  He works for the [employer].  He stopped smoking many years ago, and he quit smokeless tobacco about 6 years ago, but he does use snuff occasionally.  He does not drink alcohol.      MEDICATIONS:   1.  Amlodipine 5 mg per day.   2.  Aspirin 81 mg per day.    3.  Atorvastatin 40 mg per day.    4.  Gabapentin 300 mg in the morning and 600 mg at bedtime.    5.  Isosorbide mononitrate 45 mg per day.   6.  Lisinopril 40 mg per day.   7.  Omeprazole 20 mg per day.    8.  Tamsulosin 0.4 mg orally every evening.      ALLERGIES:  Backache and pressure behind his eyes with Cialis.  He gets severe fatigue with cyclobenzaprine.  Severe backache and pressure behind his eyes with vardenafil.  Worsening of cataplexy with venlafaxine.  Rash with Biaxin.      REVIEW OF SYSTEMS:   CONSTITUTIONAL:  Negative.   EYES:  Negative.   ENT:  Negative.   CARDIOVASCULAR:  As above.   RESPIRATORY:  Nil.   GASTROINTESTINAL:  Nil.   GENITOURINARY:  Nil.   MUSCULOSKELETAL:  Nil.   NEUROLOGICAL:  As above.   PSYCHIATRIC:  Nil.    ENDOCRINE:  Nil.   HEMATOLYMPHATIC:  Nil.   ALLERGY/IMMUNOLOGY:  As above.      PHYSICAL EXAMINATION:   GENERAL:  He is a very pleasant man.  He is in no apparent distress.   VITAL SIGNS:  His blood pressure is 162/80.  His pulse is 64 beats per minute, sinus rhythm on the monitor.  Temperature is 95.6 degrees Fahrenheit.  Respiratory rate is 16 and sats are 95%.   HEENT:  He has normal facial symmetry.  He is wearing spectacles.  His carotids are normal with no bruits.  His jugular venous pulse is not raised.  His trachea is not deviated.   HEART:  [place] beat is impalpable.  Heart sounds 1 and 2 are normal and distant.  No murmurs were heard.   CHEST:  Clear to percussion and auscultation with no added sounds.  He has symmetrical expansion of the chest.   ABDOMEN:  Reveals at least moderate truncal obesity.  He has no obvious organomegaly.  He has no tenderness.  He is wearing support stockings.  He does appear to have 1+ bilateral peripheral edema.  His distal pulses are 1+.   SKIN:  He has no obvious ulcers.  He has no obvious skin lesions, and his skin is warm and dry to the touch.   NEUROLOGIC:  He is fully orientated to time, place and person with a normal affect.      LABORATORY INVESTIGATIONS:  Sodium is 140, potassium is 3.9, BUN is 17, creatinine is 0.92.  GFR is 84.  First troponin is 0.126.  White cell count is 5.9, hemoglobin 14.9, platelet count is 163,000.  Glucose is 217.  This was a random glucose.  D-dimer is normal at 0.3.      IMPRESSION:   1.  Chest discomfort with a mildly raised troponin.  The patient is having increasing episodes of chest discomfort, and while this may represent worsening coronary artery spasm, the patient does have fixed coronary artery disease with a 40% stenosis in the left anterior descending artery, and the fractional flow reserve across this vessel in 2015 was 0.84.  0.80 indicates flow limitation.   2.  Clumsiness of the right hand when he had the chest discomfort.   This is an unusual symptom to have with cardiac chest discomfort.  Certainly, people can have numbness in their hands, but loss of functionality of a hand is unusual, and certainly one could consider transient ischemic attack.  It is interesting, however, that once his chest discomfort went with sublingual nitroglycerin, so did the hand clumsiness.   3.  Obesity.   4.  Probable early diabetes or metabolic syndrome.   5.  Peripheral edema, most likely from amlodipine, though as Dr. Higgins correctly surmised, he could have venous insufficiency also.      PLAN:  I will stop the amlodipine medication and try and use verapamil, which historically has less peripheral edema than Norvasc.  It is probably a more powerful vasodilator and is a classically used calcium channel blocker in patients with coronary artery spasm.  However, the patient's pulse rate is on the slower side.  I am going to have to be cognizant of that when using verapamil, which can have an effective slowing of the heart rate.  I will keep the patient fasting overnight to see how high his troponin rise goes to.  I think it would also be worthwhile to perform a CT of his head without contrast to make sure that he did not have an intracranial event with the clumsiness of his right hand.  I will also check a fasting lipid profile.        It has been my pleasure to be involved in the care of this nice patient.  I will see him first thing in the morning.         SHILA GAMING MD, [facility]             D: 2017 17:04   T: 2017 17:39   MT:       Name:     MARKEL HEADLEY   MRN:      [id number]        Account:       [id number]   :      1955           Consult Date:  2017      Document: [id number]

## 2017-11-08 NOTE — ED NOTES
DATE:  11/8/2017   TIME OF RECEIPT FROM LAB:  2067  LAB TEST:  troponin  LAB VALUE:  0.126  RESULTS GIVEN WITH READ-BACK TO (PROVIDER):  Harris Zuluaga MD  TIME LAB VALUE REPORTED TO PROVIDER:   9506

## 2017-11-08 NOTE — ED PROVIDER NOTES
History     Chief Complaint:  Chest Pain     HPI   Stiven Nguyễn is a 62 year old male with a history of recurrent NSTEMI due to coronary vasospasm, hypertension, and PE/DVT not currently anticoagulated who presents via EMS for evaluation of chest pain. The patient reports he has had intermittent non-exertional right sided chest pain for the last 2-3 nights. This morning at 9am, 4 hours ago, the patient developed significantly worse chest pain while he was sitting at his desk at work. He describes right sided pressure pain that radiated down his right arm.  He notes shortness of breath with the pain as well. Pain was not pleuritic. He waited for the pain to go away but it persisted, so he took 1 Nitro around noon. Pain resolved about 20 minutes after the Nitro. This is the first time he has ever had to take Nitro. He was walking out of work to go to the ER when he suddenly got light-headed, so he sat down and someone called 911. He was brought to the ED by EMS, BP was 190/90s in route. On arrival, the patient is chest pain free. He denies any shortness of breath or light-headedness. He denies any abdominal pain, leg pain or swelling, recent weight changes, or diet changes. He states he is getting ready to retire any may be more stressed. The patient follows with Dr. Pickard of cardiology. He has been taking all of his medications as prescribed. He states his blood pressure normally runs in the 120-130s.  The patient has never had a stent placed.     CARDIAC RISK FACTORS:  Sex:    Male  Tobacco:   No  Hypertension:   Yes  Hyperlipidemia:  No  Diabetes:   No  Family History:  No    PE/DVT RISK FACTORS:  Sex:    Male  Hormones:   No  Tobacco:   No  Cancer:   No  Travel:   No  Surgery:   No  Other immobilization: No  Personal history:  Yes  Family history:  No    Allergies:  Cialis  Cyclobenzaprine  Vardenafil  Venlafaxine  Biaxin      Medications:    gabapentin (NEURONTIN) 300 MG capsule  amLODIPine (NORVASC) 5 MG  tablet  nitroglycerin (NITROSTAT) 0.4 MG sublingual tablet  loratadine (CLARITIN) 10 MG tablet  omeprazole (PRILOSEC) 10 MG capsule  lisinopril (PRINIVIL,ZESTRIL) 40 MG tablet  isosorbide mononitrate (IMDUR) 30 MG 24 hr tablet  Acetaminophen (TYLENOL PO)  aspirin EC 81 MG EC tablet  atorvastatin (LIPITOR) 40 MG tablet  tamsulosin (FLOMAX) 0.4 MG 24 hr capsule    Past Medical History:    Recurrent NSTEMI   Coronary vasospasm  Anxiety  Back pain  Borderline diabetes  Narcolepsy with cataplexy  Chronic kidney disease  Coronary artery disease  DVT  PE  GERD  Headache  Hypertension  Nephrolithiasis  Obesity  Sleep apnea    Past Surgical History:    Lithotripsy  Orthopedic surgery  Coronary angiography x3, no stents    Family History:    Cancer  Neurologic disorder  Depression     Social History:  Smoking status: Former smoker, quit 2011  Alcohol use: No  Marital Status:   [2]     Review of Systems   Constitutional: Negative for fever.   Respiratory: Positive for shortness of breath.    Cardiovascular: Positive for chest pain. Negative for leg swelling.   Gastrointestinal: Negative for abdominal pain, diarrhea, nausea and vomiting.   Neurological: Positive for light-headedness. Negative for syncope.   All other systems reviewed and are negative.      Physical Exam     Patient Vitals for the past 24 hrs:   BP Temp Temp src Pulse Heart Rate Resp SpO2   11/08/17 1530 (!) 168/91 - - - 53 10 96 %   11/08/17 1500 (!) 144/94 - - - 53 22 94 %   11/08/17 1430 122/66 - - - 65 17 97 %   11/08/17 1400 141/76 - - - 51 18 97 %   11/08/17 1330 165/76 - - - 68 14 96 %   11/08/17 1311 173/88 97.8  F (36.6  C) Oral 54 - 18 96 %       Physical Exam  GENERAL: well developed, pleasant  HEAD: atraumatic  EYES: pupils reactive, extraocular muscles intact, conjunctivae normal  ENT:  mucus membranes moist  NECK:  trachea midline, normal range of motion  RESPIRATORY: no tachypnea, breath sounds clear to auscultation   CVS: normal S1/S2, no  murmurs, intact distal pulses  ABDOMEN: soft, nontender, nondistention  MUSCULOSKELETAL: no deformities  SKIN: warm and dry, no acute rashes or ulceration  NEURO: GCS 15, cranial nerves intact, alert and oriented x3  PSYCH:  Mood/affect normal    Emergency Department Course   ECG (13:18:09):  Rate 57 bpm. NM interval 152. QRS duration 104. QT/QTc 420/408. P-R-T axes 46 10 18. Sinus bradycardia with premature atrial complexes. Otherwise normal ECG   Interpreted at 1328 by Harris Zuluaga MD.    Laboratory:  Troponin: 0.126(HH)  D-dimer: 0.3  CBC: WNL (WBC 5.9, HGB 14.9, )   BMP: Glucose 217((H), o/w WNL (Creatinine 0.92)    Emergency Department Course:  The patient arrived in the emergency department via EMS.  Past medical records, nursing notes, and vitals reviewed.  1329: I performed an exam of the patient and obtained history, as documented above.  IV inserted and blood drawn. The patient was placed on continuous cardiac monitoring and pulse oximetry.  ECG obtained, results above.     1445: I rechecked the patient. Explained findings to the patient.  1448: I spoke to the physician on call for cardiology who recommended admission.   1450: I rechecked the patient. Explained findings to the patient.    1507: I spoke to Dr. Pappas of the hospitalist service who accepts the patient for admission.     Findings and plan explained to the Patient who consents to admission.   Discussed the patient with Dr. Pappas, who will admit the patient to an obs bed for further monitoring, evaluation, and treatment.     Impression & Plan      Medical Decision Making:  Patient presents with chest pain that has been ongoing the last 3 days on and off. Today it was much worse and would not subside. He did take Nitroglycerin prior to arrival and after about 20 minutes pain subsided. The patient does have a history of vasospasm and has been taking his meds as prescribed. Troponin is slightly elevated. I did speak with cariology who  suggested admission and further work up.  I did speak with the hospitalist as well.     Diagnosis:    ICD-10-CM   1. Acute chest pain R07.9   2. Elevated troponin R74.8     Plan: Admitted under the care of Dr. Mian Best  11/8/2017    EMERGENCY DEPARTMENT    I, Diana Best, am serving as a scribe at 1:29 PM on 11/8/2017 to document services personally performed by Harris Zuluaga MD based on my observations and the provider's statements to me.        Harris Zuluaga MD  11/08/17 1738
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
## 2017-11-09 ENCOUNTER — APPOINTMENT (OUTPATIENT)
Dept: CARDIOLOGY | Facility: CLINIC | Age: 62
End: 2017-11-09
Attending: INTERNAL MEDICINE
Payer: OTHER GOVERNMENT

## 2017-11-09 VITALS
DIASTOLIC BLOOD PRESSURE: 76 MMHG | TEMPERATURE: 96.1 F | HEART RATE: 63 BPM | RESPIRATION RATE: 16 BRPM | OXYGEN SATURATION: 96 % | SYSTOLIC BLOOD PRESSURE: 132 MMHG

## 2017-11-09 PROBLEM — I21.4 NSTEMI (NON-ST ELEVATED MYOCARDIAL INFARCTION) (H): Status: ACTIVE | Noted: 2017-11-09

## 2017-11-09 LAB
CHOLEST SERPL-MCNC: 113 MG/DL
HDLC SERPL-MCNC: 38 MG/DL
LDLC SERPL CALC-MCNC: 61 MG/DL
NONHDLC SERPL-MCNC: 75 MG/DL
POTASSIUM SERPL-SCNC: 4.2 MMOL/L (ref 3.4–5.3)
TRIGL SERPL-MCNC: 72 MG/DL
TROPONIN I SERPL-MCNC: 0.1 UG/L (ref 0–0.04)
TROPONIN I SERPL-MCNC: 0.1 UG/L (ref 0–0.04)

## 2017-11-09 PROCEDURE — 25000132 ZZH RX MED GY IP 250 OP 250 PS 637: Performed by: HOSPITALIST

## 2017-11-09 PROCEDURE — 27210856 ZZH ACCESS HEART CATH CR2

## 2017-11-09 PROCEDURE — 27211089 ZZH KIT ACIST INJECTOR CR3

## 2017-11-09 PROCEDURE — 84484 ASSAY OF TROPONIN QUANT: CPT | Performed by: INTERNAL MEDICINE

## 2017-11-09 PROCEDURE — 25000132 ZZH RX MED GY IP 250 OP 250 PS 637: Performed by: INTERNAL MEDICINE

## 2017-11-09 PROCEDURE — 25000128 H RX IP 250 OP 636: Performed by: INTERNAL MEDICINE

## 2017-11-09 PROCEDURE — 27210787 ZZH MANIFOLD CR2

## 2017-11-09 PROCEDURE — 93458 L HRT ARTERY/VENTRICLE ANGIO: CPT

## 2017-11-09 PROCEDURE — 93005 ELECTROCARDIOGRAM TRACING: CPT

## 2017-11-09 PROCEDURE — B2111ZZ FLUOROSCOPY OF MULTIPLE CORONARY ARTERIES USING LOW OSMOLAR CONTRAST: ICD-10-PCS | Performed by: INTERNAL MEDICINE

## 2017-11-09 PROCEDURE — 99152 MOD SED SAME PHYS/QHP 5/>YRS: CPT

## 2017-11-09 PROCEDURE — 80061 LIPID PANEL: CPT | Performed by: INTERNAL MEDICINE

## 2017-11-09 PROCEDURE — 99214 OFFICE O/P EST MOD 30 MIN: CPT | Mod: 25 | Performed by: INTERNAL MEDICINE

## 2017-11-09 PROCEDURE — 25000125 ZZHC RX 250: Performed by: INTERNAL MEDICINE

## 2017-11-09 PROCEDURE — 99152 MOD SED SAME PHYS/QHP 5/>YRS: CPT | Performed by: INTERNAL MEDICINE

## 2017-11-09 PROCEDURE — 93571 IV DOP VEL&/PRESS C FLO 1ST: CPT | Mod: 26 | Performed by: INTERNAL MEDICINE

## 2017-11-09 PROCEDURE — 40000065 ZZH STATISTIC EKG NON-CHARGEABLE

## 2017-11-09 PROCEDURE — G0378 HOSPITAL OBSERVATION PER HR: HCPCS

## 2017-11-09 PROCEDURE — 99153 MOD SED SAME PHYS/QHP EA: CPT

## 2017-11-09 PROCEDURE — 99217 ZZC OBSERVATION CARE DISCHARGE: CPT | Performed by: PHYSICIAN ASSISTANT

## 2017-11-09 PROCEDURE — 93458 L HRT ARTERY/VENTRICLE ANGIO: CPT | Mod: 26 | Performed by: INTERNAL MEDICINE

## 2017-11-09 PROCEDURE — 84132 ASSAY OF SERUM POTASSIUM: CPT | Performed by: INTERNAL MEDICINE

## 2017-11-09 PROCEDURE — 27210946 ZZH KIT HC TOTES DISP CR8

## 2017-11-09 PROCEDURE — 4A023N7 MEASUREMENT OF CARDIAC SAMPLING AND PRESSURE, LEFT HEART, PERCUTANEOUS APPROACH: ICD-10-PCS | Performed by: INTERNAL MEDICINE

## 2017-11-09 PROCEDURE — 36415 COLL VENOUS BLD VENIPUNCTURE: CPT | Performed by: INTERNAL MEDICINE

## 2017-11-09 PROCEDURE — B2151ZZ FLUOROSCOPY OF LEFT HEART USING LOW OSMOLAR CONTRAST: ICD-10-PCS | Performed by: INTERNAL MEDICINE

## 2017-11-09 PROCEDURE — C1769 GUIDE WIRE: HCPCS

## 2017-11-09 PROCEDURE — 27210914 ZZH SHEATH CR8

## 2017-11-09 PROCEDURE — 40000852 ZZH STATISTIC HEART CATH LAB OR EP LAB

## 2017-11-09 PROCEDURE — 27210892 ZZH CATH CR4

## 2017-11-09 PROCEDURE — 27210795 ZZH PAD DEFIB QUICK CR4

## 2017-11-09 PROCEDURE — 4A033BC MEASUREMENT OF ARTERIAL PRESSURE, CORONARY, PERCUTANEOUS APPROACH: ICD-10-PCS | Performed by: INTERNAL MEDICINE

## 2017-11-09 PROCEDURE — 93571 IV DOP VEL&/PRESS C FLO 1ST: CPT

## 2017-11-09 RX ORDER — METHYLPREDNISOLONE SODIUM SUCCINATE 125 MG/2ML
125 INJECTION, POWDER, LYOPHILIZED, FOR SOLUTION INTRAMUSCULAR; INTRAVENOUS
Status: DISCONTINUED | OUTPATIENT
Start: 2017-11-09 | End: 2017-11-09 | Stop reason: HOSPADM

## 2017-11-09 RX ORDER — LIDOCAINE HYDROCHLORIDE 10 MG/ML
1-10 INJECTION, SOLUTION EPIDURAL; INFILTRATION; INTRACAUDAL; PERINEURAL
Status: COMPLETED | OUTPATIENT
Start: 2017-11-09 | End: 2017-11-09

## 2017-11-09 RX ORDER — NITROGLYCERIN 0.4 MG/1
0.4 TABLET SUBLINGUAL EVERY 5 MIN PRN
Status: DISCONTINUED | OUTPATIENT
Start: 2017-11-09 | End: 2017-11-09 | Stop reason: HOSPADM

## 2017-11-09 RX ORDER — ATROPINE SULFATE 0.1 MG/ML
.5-1 INJECTION INTRAVENOUS
Status: DISCONTINUED | OUTPATIENT
Start: 2017-11-09 | End: 2017-11-09 | Stop reason: HOSPADM

## 2017-11-09 RX ORDER — POTASSIUM CHLORIDE 29.8 MG/ML
20 INJECTION INTRAVENOUS
Status: DISCONTINUED | OUTPATIENT
Start: 2017-11-09 | End: 2017-11-09 | Stop reason: HOSPADM

## 2017-11-09 RX ORDER — POTASSIUM CHLORIDE 7.45 MG/ML
10 INJECTION INTRAVENOUS
Status: DISCONTINUED | OUTPATIENT
Start: 2017-11-09 | End: 2017-11-09 | Stop reason: HOSPADM

## 2017-11-09 RX ORDER — ASPIRIN 81 MG/1
81 TABLET ORAL DAILY
Status: DISCONTINUED | OUTPATIENT
Start: 2017-11-10 | End: 2017-11-09 | Stop reason: HOSPADM

## 2017-11-09 RX ORDER — NITROGLYCERIN 5 MG/ML
100-200 VIAL (ML) INTRAVENOUS
Status: DISCONTINUED | OUTPATIENT
Start: 2017-11-09 | End: 2017-11-09 | Stop reason: HOSPADM

## 2017-11-09 RX ORDER — ADENOSINE 3 MG/ML
12-12000 INJECTION, SOLUTION INTRAVENOUS
Status: DISCONTINUED | OUTPATIENT
Start: 2017-11-09 | End: 2017-11-09 | Stop reason: HOSPADM

## 2017-11-09 RX ORDER — SODIUM CHLORIDE 9 MG/ML
INJECTION, SOLUTION INTRAVENOUS CONTINUOUS
Status: DISCONTINUED | OUTPATIENT
Start: 2017-11-09 | End: 2017-11-09 | Stop reason: HOSPADM

## 2017-11-09 RX ORDER — ASPIRIN 325 MG
325 TABLET ORAL
Status: DISCONTINUED | OUTPATIENT
Start: 2017-11-09 | End: 2017-11-09 | Stop reason: HOSPADM

## 2017-11-09 RX ORDER — HYDRALAZINE HYDROCHLORIDE 20 MG/ML
10-20 INJECTION INTRAMUSCULAR; INTRAVENOUS
Status: DISCONTINUED | OUTPATIENT
Start: 2017-11-09 | End: 2017-11-09 | Stop reason: HOSPADM

## 2017-11-09 RX ORDER — PROTAMINE SULFATE 10 MG/ML
25-100 INJECTION, SOLUTION INTRAVENOUS EVERY 5 MIN PRN
Status: DISCONTINUED | OUTPATIENT
Start: 2017-11-09 | End: 2017-11-09 | Stop reason: HOSPADM

## 2017-11-09 RX ORDER — MORPHINE SULFATE 2 MG/ML
1-2 INJECTION, SOLUTION INTRAMUSCULAR; INTRAVENOUS EVERY 5 MIN PRN
Status: DISCONTINUED | OUTPATIENT
Start: 2017-11-09 | End: 2017-11-09 | Stop reason: HOSPADM

## 2017-11-09 RX ORDER — BUPIVACAINE HYDROCHLORIDE 2.5 MG/ML
1-10 INJECTION, SOLUTION EPIDURAL; INFILTRATION; INTRACAUDAL
Status: DISCONTINUED | OUTPATIENT
Start: 2017-11-09 | End: 2017-11-09 | Stop reason: HOSPADM

## 2017-11-09 RX ORDER — DIPHENHYDRAMINE HYDROCHLORIDE 50 MG/ML
25-50 INJECTION INTRAMUSCULAR; INTRAVENOUS
Status: DISCONTINUED | OUTPATIENT
Start: 2017-11-09 | End: 2017-11-09 | Stop reason: HOSPADM

## 2017-11-09 RX ORDER — CLOPIDOGREL BISULFATE 75 MG/1
75 TABLET ORAL
Status: DISCONTINUED | OUTPATIENT
Start: 2017-11-09 | End: 2017-11-09 | Stop reason: HOSPADM

## 2017-11-09 RX ORDER — NICARDIPINE HYDROCHLORIDE 2.5 MG/ML
100 INJECTION INTRAVENOUS
Status: DISCONTINUED | OUTPATIENT
Start: 2017-11-09 | End: 2017-11-09 | Stop reason: HOSPADM

## 2017-11-09 RX ORDER — NALOXONE HYDROCHLORIDE 0.4 MG/ML
0.4 INJECTION, SOLUTION INTRAMUSCULAR; INTRAVENOUS; SUBCUTANEOUS EVERY 5 MIN PRN
Status: DISCONTINUED | OUTPATIENT
Start: 2017-11-09 | End: 2017-11-09 | Stop reason: HOSPADM

## 2017-11-09 RX ORDER — FENTANYL CITRATE 50 UG/ML
25-50 INJECTION, SOLUTION INTRAMUSCULAR; INTRAVENOUS
Status: DISCONTINUED | OUTPATIENT
Start: 2017-11-09 | End: 2017-11-09 | Stop reason: HOSPADM

## 2017-11-09 RX ORDER — PHENYLEPHRINE HCL IN 0.9% NACL 1 MG/10 ML
20-100 SYRINGE (ML) INTRAVENOUS
Status: DISCONTINUED | OUTPATIENT
Start: 2017-11-09 | End: 2017-11-09 | Stop reason: HOSPADM

## 2017-11-09 RX ORDER — FLUMAZENIL 0.1 MG/ML
0.2 INJECTION, SOLUTION INTRAVENOUS
Status: DISCONTINUED | OUTPATIENT
Start: 2017-11-09 | End: 2017-11-09 | Stop reason: HOSPADM

## 2017-11-09 RX ORDER — NITROGLYCERIN 5 MG/ML
100-500 VIAL (ML) INTRAVENOUS
Status: COMPLETED | OUTPATIENT
Start: 2017-11-09 | End: 2017-11-09

## 2017-11-09 RX ORDER — VERAPAMIL HYDROCHLORIDE 2.5 MG/ML
1-2.5 INJECTION, SOLUTION INTRAVENOUS
Status: COMPLETED | OUTPATIENT
Start: 2017-11-09 | End: 2017-11-09

## 2017-11-09 RX ORDER — LIDOCAINE 40 MG/G
CREAM TOPICAL
Status: DISCONTINUED | OUTPATIENT
Start: 2017-11-09 | End: 2017-11-09 | Stop reason: HOSPADM

## 2017-11-09 RX ORDER — NITROGLYCERIN 20 MG/100ML
.07-1.52 INJECTION INTRAVENOUS CONTINUOUS PRN
Status: DISCONTINUED | OUTPATIENT
Start: 2017-11-09 | End: 2017-11-09 | Stop reason: HOSPADM

## 2017-11-09 RX ORDER — PRASUGREL 10 MG/1
10-60 TABLET, FILM COATED ORAL
Status: DISCONTINUED | OUTPATIENT
Start: 2017-11-09 | End: 2017-11-09 | Stop reason: HOSPADM

## 2017-11-09 RX ORDER — ASPIRIN 81 MG/1
243 TABLET ORAL ONCE
Status: COMPLETED | OUTPATIENT
Start: 2017-11-09 | End: 2017-11-09

## 2017-11-09 RX ORDER — ASPIRIN 81 MG/1
81-324 TABLET, CHEWABLE ORAL
Status: DISCONTINUED | OUTPATIENT
Start: 2017-11-09 | End: 2017-11-09 | Stop reason: HOSPADM

## 2017-11-09 RX ORDER — ATROPINE SULFATE 0.1 MG/ML
0.5 INJECTION INTRAVENOUS EVERY 5 MIN PRN
Status: DISCONTINUED | OUTPATIENT
Start: 2017-11-09 | End: 2017-11-09 | Stop reason: HOSPADM

## 2017-11-09 RX ORDER — LIDOCAINE HYDROCHLORIDE 10 MG/ML
30 INJECTION, SOLUTION EPIDURAL; INFILTRATION; INTRACAUDAL; PERINEURAL
Status: DISCONTINUED | OUTPATIENT
Start: 2017-11-09 | End: 2017-11-09 | Stop reason: HOSPADM

## 2017-11-09 RX ORDER — HYDROCODONE BITARTRATE AND ACETAMINOPHEN 5; 325 MG/1; MG/1
1-2 TABLET ORAL EVERY 4 HOURS PRN
Status: DISCONTINUED | OUTPATIENT
Start: 2017-11-09 | End: 2017-11-09 | Stop reason: HOSPADM

## 2017-11-09 RX ORDER — AMLODIPINE BESYLATE 10 MG/1
10 TABLET ORAL DAILY
Qty: 90 TABLET | Refills: 0 | Status: SHIPPED | OUTPATIENT
Start: 2017-11-09 | End: 2017-12-05

## 2017-11-09 RX ORDER — METOPROLOL TARTRATE 1 MG/ML
5 INJECTION, SOLUTION INTRAVENOUS EVERY 5 MIN PRN
Status: DISCONTINUED | OUTPATIENT
Start: 2017-11-09 | End: 2017-11-09 | Stop reason: HOSPADM

## 2017-11-09 RX ORDER — DOPAMINE HYDROCHLORIDE 160 MG/100ML
2-20 INJECTION, SOLUTION INTRAVENOUS CONTINUOUS PRN
Status: DISCONTINUED | OUTPATIENT
Start: 2017-11-09 | End: 2017-11-09 | Stop reason: HOSPADM

## 2017-11-09 RX ORDER — LORAZEPAM 2 MG/ML
0.5 INJECTION INTRAMUSCULAR
Status: DISCONTINUED | OUTPATIENT
Start: 2017-11-09 | End: 2017-11-09 | Stop reason: HOSPADM

## 2017-11-09 RX ORDER — POTASSIUM CHLORIDE 1500 MG/1
20 TABLET, EXTENDED RELEASE ORAL
Status: DISCONTINUED | OUTPATIENT
Start: 2017-11-09 | End: 2017-11-09 | Stop reason: HOSPADM

## 2017-11-09 RX ORDER — FUROSEMIDE 10 MG/ML
20-100 INJECTION INTRAMUSCULAR; INTRAVENOUS
Status: DISCONTINUED | OUTPATIENT
Start: 2017-11-09 | End: 2017-11-09 | Stop reason: HOSPADM

## 2017-11-09 RX ORDER — PROTAMINE SULFATE 10 MG/ML
1-5 INJECTION, SOLUTION INTRAVENOUS
Status: DISCONTINUED | OUTPATIENT
Start: 2017-11-09 | End: 2017-11-09 | Stop reason: HOSPADM

## 2017-11-09 RX ORDER — ONDANSETRON 2 MG/ML
4 INJECTION INTRAMUSCULAR; INTRAVENOUS EVERY 4 HOURS PRN
Status: DISCONTINUED | OUTPATIENT
Start: 2017-11-09 | End: 2017-11-09 | Stop reason: HOSPADM

## 2017-11-09 RX ORDER — HEPARIN SODIUM 1000 [USP'U]/ML
1000-10000 INJECTION, SOLUTION INTRAVENOUS; SUBCUTANEOUS EVERY 5 MIN PRN
Status: DISCONTINUED | OUTPATIENT
Start: 2017-11-09 | End: 2017-11-09 | Stop reason: HOSPADM

## 2017-11-09 RX ORDER — EPINEPHRINE 1 MG/ML
0.3 INJECTION, SOLUTION, CONCENTRATE INTRAVENOUS
Status: DISCONTINUED | OUTPATIENT
Start: 2017-11-09 | End: 2017-11-09 | Stop reason: HOSPADM

## 2017-11-09 RX ORDER — AMLODIPINE BESYLATE 10 MG/1
10 TABLET ORAL DAILY
Status: DISCONTINUED | OUTPATIENT
Start: 2017-11-09 | End: 2017-11-09 | Stop reason: HOSPADM

## 2017-11-09 RX ORDER — DOBUTAMINE HYDROCHLORIDE 200 MG/100ML
2-20 INJECTION INTRAVENOUS CONTINUOUS PRN
Status: DISCONTINUED | OUTPATIENT
Start: 2017-11-09 | End: 2017-11-09 | Stop reason: HOSPADM

## 2017-11-09 RX ORDER — LORAZEPAM 0.5 MG/1
0.5 TABLET ORAL
Status: DISCONTINUED | OUTPATIENT
Start: 2017-11-09 | End: 2017-11-09 | Stop reason: HOSPADM

## 2017-11-09 RX ORDER — DEXTROSE MONOHYDRATE 25 G/50ML
12.5-5 INJECTION, SOLUTION INTRAVENOUS EVERY 30 MIN PRN
Status: DISCONTINUED | OUTPATIENT
Start: 2017-11-09 | End: 2017-11-09 | Stop reason: HOSPADM

## 2017-11-09 RX ORDER — PROMETHAZINE HYDROCHLORIDE 25 MG/ML
6.25-25 INJECTION, SOLUTION INTRAMUSCULAR; INTRAVENOUS EVERY 4 HOURS PRN
Status: DISCONTINUED | OUTPATIENT
Start: 2017-11-09 | End: 2017-11-09 | Stop reason: HOSPADM

## 2017-11-09 RX ORDER — ACETAMINOPHEN 325 MG/1
325-650 TABLET ORAL EVERY 4 HOURS PRN
Status: DISCONTINUED | OUTPATIENT
Start: 2017-11-09 | End: 2017-11-09

## 2017-11-09 RX ORDER — NIFEDIPINE 10 MG/1
10 CAPSULE ORAL
Status: DISCONTINUED | OUTPATIENT
Start: 2017-11-09 | End: 2017-11-09 | Stop reason: HOSPADM

## 2017-11-09 RX ORDER — LORAZEPAM 2 MG/ML
.5-2 INJECTION INTRAMUSCULAR EVERY 4 HOURS PRN
Status: DISCONTINUED | OUTPATIENT
Start: 2017-11-09 | End: 2017-11-09 | Stop reason: HOSPADM

## 2017-11-09 RX ORDER — CLOPIDOGREL 300 MG/1
300-600 TABLET, FILM COATED ORAL
Status: DISCONTINUED | OUTPATIENT
Start: 2017-11-09 | End: 2017-11-09 | Stop reason: HOSPADM

## 2017-11-09 RX ORDER — IOPAMIDOL 755 MG/ML
120 INJECTION, SOLUTION INTRAVASCULAR ONCE
Status: DISCONTINUED | OUTPATIENT
Start: 2017-11-09 | End: 2017-11-09 | Stop reason: HOSPADM

## 2017-11-09 RX ORDER — SODIUM NITROPRUSSIDE 25 MG/ML
100-200 INJECTION INTRAVENOUS
Status: DISCONTINUED | OUTPATIENT
Start: 2017-11-09 | End: 2017-11-09 | Stop reason: HOSPADM

## 2017-11-09 RX ORDER — NALOXONE HYDROCHLORIDE 0.4 MG/ML
.1-.4 INJECTION, SOLUTION INTRAMUSCULAR; INTRAVENOUS; SUBCUTANEOUS
Status: DISCONTINUED | OUTPATIENT
Start: 2017-11-09 | End: 2017-11-09 | Stop reason: HOSPADM

## 2017-11-09 RX ORDER — ENALAPRILAT 1.25 MG/ML
1.25-2.5 INJECTION INTRAVENOUS
Status: DISCONTINUED | OUTPATIENT
Start: 2017-11-09 | End: 2017-11-09 | Stop reason: HOSPADM

## 2017-11-09 RX ORDER — LIDOCAINE 40 MG/G
CREAM TOPICAL
Status: DISCONTINUED | OUTPATIENT
Start: 2017-11-09 | End: 2017-11-09 | Stop reason: DRUGHIGH

## 2017-11-09 RX ORDER — NALOXONE HYDROCHLORIDE 0.4 MG/ML
.2-.4 INJECTION, SOLUTION INTRAMUSCULAR; INTRAVENOUS; SUBCUTANEOUS
Status: DISCONTINUED | OUTPATIENT
Start: 2017-11-09 | End: 2017-11-09

## 2017-11-09 RX ADMIN — Medication 5000 UNITS: at 13:54

## 2017-11-09 RX ADMIN — ASPIRIN 81 MG: 81 TABLET, COATED ORAL at 08:49

## 2017-11-09 RX ADMIN — LIDOCAINE HYDROCHLORIDE 20 MG: 10 INJECTION, SOLUTION EPIDURAL; INFILTRATION; INTRACAUDAL; PERINEURAL at 13:53

## 2017-11-09 RX ADMIN — MIDAZOLAM HYDROCHLORIDE 1 MG: 1 INJECTION, SOLUTION INTRAMUSCULAR; INTRAVENOUS at 14:11

## 2017-11-09 RX ADMIN — OMEPRAZOLE 20 MG: 20 CAPSULE, DELAYED RELEASE ORAL at 08:49

## 2017-11-09 RX ADMIN — FENTANYL CITRATE 50 MCG: 50 INJECTION, SOLUTION INTRAMUSCULAR; INTRAVENOUS at 13:53

## 2017-11-09 RX ADMIN — ISOSORBIDE MONONITRATE 45 MG: 30 TABLET, EXTENDED RELEASE ORAL at 08:48

## 2017-11-09 RX ADMIN — FENTANYL CITRATE 50 MCG: 50 INJECTION, SOLUTION INTRAMUSCULAR; INTRAVENOUS at 13:48

## 2017-11-09 RX ADMIN — NITROGLYCERIN 200 MCG: 5 INJECTION, SOLUTION INTRAVENOUS at 13:53

## 2017-11-09 RX ADMIN — NITROGLYCERIN 300 MCG: 5 INJECTION, SOLUTION INTRAVENOUS at 14:07

## 2017-11-09 RX ADMIN — AMLODIPINE BESYLATE 10 MG: 5 TABLET ORAL at 11:20

## 2017-11-09 RX ADMIN — VERAPAMIL HYDROCHLORIDE 2.5 MG: 2.5 INJECTION, SOLUTION INTRAVENOUS at 13:54

## 2017-11-09 RX ADMIN — MIDAZOLAM HYDROCHLORIDE 0.5 MG: 1 INJECTION, SOLUTION INTRAMUSCULAR; INTRAVENOUS at 14:02

## 2017-11-09 RX ADMIN — ADENOSINE 100 MCG: 3 INJECTION, SOLUTION INTRAVENOUS at 14:08

## 2017-11-09 RX ADMIN — GABAPENTIN 300 MG: 300 CAPSULE ORAL at 08:49

## 2017-11-09 RX ADMIN — SODIUM CHLORIDE: 9 INJECTION, SOLUTION INTRAVENOUS at 13:40

## 2017-11-09 RX ADMIN — LISINOPRIL 40 MG: 40 TABLET ORAL at 08:49

## 2017-11-09 RX ADMIN — ACETAMINOPHEN 1000 MG: 500 TABLET ORAL at 17:34

## 2017-11-09 RX ADMIN — MIDAZOLAM HYDROCHLORIDE 2 MG: 1 INJECTION, SOLUTION INTRAMUSCULAR; INTRAVENOUS at 13:53

## 2017-11-09 RX ADMIN — ADENOSINE 150 MCG: 3 INJECTION, SOLUTION INTRAVENOUS at 14:09

## 2017-11-09 RX ADMIN — ASPIRIN 243 MG: 81 TABLET, COATED ORAL at 11:20

## 2017-11-09 NOTE — PLAN OF CARE
Problem: Patient Care Overview  Goal: Plan of Care/Patient Progress Review  Outcome: No Change  PRIMARY DIAGNOSIS: CHEST PAIN  OUTPATIENT/OBSERVATION GOALS TO BE MET BEFORE DISCHARGE:  1. Ruled out acute coronary syndrome (negative or stable Troponin):  Yes  2. Pain Status: Pain free.  3. Appropriate provocative testing performed: Yes  - Stress Test Procedure: NA  - Interpretation of cardiac rhythm per telemetry tech: Sinus Angel     4. Cleared by Consultants (if applicable):No  5. Return to near baseline physical activity: Yes  6. Coronary Angiogram - Not met, ordered  Discharge Planner Nurse   Safe discharge environment identified: No  Barriers to discharge: No       Entered by: Karoline Arriola 11/09/2017 2:22 PM     Please review provider order for any additional goals.   Nurse to notify provider when observation goals have been met and patient is ready for discharge.

## 2017-11-09 NOTE — PLAN OF CARE
Problem: Patient Care Overview  Goal: Plan of Care/Patient Progress Review  Outcome: Improving  RN: Alert and oriented X 4, up Ind without assistance. CP free since admission to unit. Known hx of elevated baseline trop; first trop 0.126, improved to 0.116. Next check ordered for 2300. Endorsed brief accompanying episode of loss of fx in R hand this afternoon, since resolved. Head CT negative for changes. Cards consulted completed, will continue to follow. Amlodipine changed to verapomil d/t associated LE edema. Tolerating cardiac diet, NPO at midnight. BP improved to 140's. Pt and wife updated and agreeable with POC.

## 2017-11-09 NOTE — PLAN OF CARE
Problem: Patient Care Overview  Goal: Plan of Care/Patient Progress Review  Outcome: Improving  VSS. A/O. Up independently. Denies chest pain. Tele SB. Verapamil discontinued and restarted on Norvasc. BLE edema +2. NPO. To CV1 around 1340 with flying squad. Compression stockings placed.

## 2017-11-09 NOTE — PROGRESS NOTES
[facility] Cardiology Progress Note            Interval History:   Chest pain. Small troponin rise. Significant bradycardia with verapamil. Significant edema with amlodipine. May be an element of venous insufficiency. No chest pain overnight.              Medications:       amLODIPine  10 mg Oral Daily     aspirin  81 mg Oral Daily     atorvastatin  40 mg Oral At Bedtime     gabapentin (NEURONTIN) capsule 300 mg  300 mg Oral QAM     gabapentin (NEURONTIN) tablet 600 mg  600 mg Oral At Bedtime     isosorbide mononitrate  45 mg Oral Daily     lisinopril  40 mg Oral Daily     omeprazole (priLOSEC) CR capsule 20 mg  20 mg Oral QAM     tamsulosin  0.4 mg Oral QPM     sodium chloride (PF)  3 mL Intracatheter Q8H                 Physical Exam:   Blood pressure 144/65, pulse (!) 45, temperature 96.1  F (35.6  C), temperature source Oral, resp. rate 16, SpO2 97 %.  Rhythm:  Sinus   Constitutional:   awake, alert, cooperative, no apparent distress, and appears stated age     Hematologic / Lymphatic:   no cervical lymphadenopathy and JVP is not raised. Carotids normal with no bruits     Lungs:   No increased work of breathing, good air exchange, clear to auscultation bilaterally, no crackles or wheezing     Cardiovascular:   regular rate and rhythm, normal S1 and S2, S4 present, no murmur noted and no edema                    Data:          Lab Results   Component Value Date     11/08/2017     07/17/2016     05/03/2016    Lab Results   Component Value Date    CHLORIDE 107 11/08/2017    CHLORIDE 106 07/17/2016    CHLORIDE 105 05/03/2016    Lab Results   Component Value Date    BUN 17 11/08/2017    BUN 14 07/17/2016    BUN 16 05/03/2016      Lab Results   Component Value Date    POTASSIUM 3.9 11/08/2017    POTASSIUM 3.9 07/17/2016    POTASSIUM 4.7 05/03/2016    Lab Results   Component Value Date    CO2 27 11/08/2017    CO2 29 07/17/2016    CO2 27 03/01/2016    Lab Results   Component Value Date    CR  0.92 11/08/2017    CR 0.97 07/17/2016    CR 1.1 05/03/2016        Lab Results   Component Value Date    HGB 14.9 11/08/2017    HGB 13.9 07/17/2016    HGB 14.3 05/03/2016     No results found for: TSH  Lab Results   Component Value Date    TROPONIN 0.00 03/01/2016    TROPI 0.105 (H) 11/09/2017    TROPI 0.100 (H) 11/09/2017    TROPI 0.116 (H) 11/08/2017     Lab Results   Component Value Date    WBC 5.9 11/08/2017    WBC 5.8 07/17/2016    WBC 4.9 05/03/2016            EKG and Imaging results:    I have reviewed recent EKGs and imaging studies.         Assessment and Plan:   Assessment:   Problem List    NSTEMI (non-ST elevated myocardial infarction) (H)    * No resolved hospital problems. *       Plan:   Coronary angiogram today. Risks and benefits explained and patient wishes to proceed.  Stop verapamil.  Restart norvasc at higher dose , 10 mg .  Support socks for edema.       Attestation:  I have reviewed today's vital signs, notes, medications, labs and imaging.  Care coordination / counseling time: 20 minutes  Total time: 30 minutes   Ramsey Jhaveri MD, MD 11/9/2017  9:42 AM

## 2017-11-09 NOTE — DISCHARGE INSTRUCTIONS
Cardiac Angiogram Discharge Instructions - Radial    After you go home:      Have an adult stay with you until tomorrow.    Drink extra fluids for 2 days.    You may resume your normal diet.    No smoking       For 24 hours - due to the sedation you received:    Relax and take it easy.    Do NOT make any important or legal decisions.    Do NOT drive or operate machines at home or at work.    Do NOT drink alcohol.    Care of Wrist Puncture Site:      For the first 24 hrs - check the puncture site every 1-2 hours while awake.    It is normal to have soreness at the puncture site and mild tingling in your hand for up to 3 days.    Remove the bandaid after 24 hours. If there is minor oozing, apply another bandaid and remove it after 12 hours.    You may shower tomorrow.  Do NOT take a bath, or use a hot tub or pool for at least 3 days. Do NOT scrub the site. Do not use lotion or powder near the puncture site.           Activity:        For 2 days:     do not use your hand or arm to support your weight (such as rising from a chair)     do not bend your wrist (such as lifting a garage door).    do not lift more than 5 pounds or exercise your arm (such as tennis, golf or bowling).    Do NOT do any heavy activity such as exercise, lifting, or straining.     Bleeding:      If you start bleeding from the site in your wrist, sit down and press firmly on/above the site for 10 minutes.     Once bleeding stops, keep arm still for 2 hours.     Call [facility] as soon as you can.       Call 911 right away if you have heavy bleeding or bleeding that does not stop.      Medicines:      If you are taking antiplatelet medications such as Plavix, Brilinta or Effient, do not stop taking it until you talk to your cardiologist.        If you are on Metformin (Glucophage), do not restart it until you have blood tests (within 2 to 3 days after discharge).  After you have your blood drawn, you may restart the Metformin.     Take your  medications, including blood thinners, unless your provider tells you not to.  If you take Coumadin (Warfarin), have your INR checked by your provider in  3-5 days. Call your clinic to schedule this.    If you have stopped any medicines, check with your provider about when to restart them.    Follow Up Appointments:      Follow up with [facility] Nurse Practitioner at [facility] of patient preference in 7-10 days.    Call the clinic if:      You have a large or growing hard lump around the site.    The site is red, swollen, hot or tender.    Blood or fluid is draining from the site.    You have chills or a fever greater than 101 F (38 C).    Your arm turns feels numb, cool or changes color.    You have hives, a rash or unusual itching.    Any questions or concerns.          [facility] [place]:    [phone] [facility] (7 days a week)

## 2017-11-09 NOTE — PROGRESS NOTES
Received from cath lab. Pt from OBS. Drowsy. VSS. Denies pain. TR band to right wrist with 12 cc air. No bleeding/swelling at site. Instructed on activity restrictions. Taking fluids.. Wife at bedside. Dr Rock here.

## 2017-11-09 NOTE — PLAN OF CARE
Problem: Patient Care Overview  Goal: Plan of Care/Patient Progress Review  Outcome: No Change  PRIMARY DIAGNOSIS: CHEST PAIN  OUTPATIENT/OBSERVATION GOALS TO BE MET BEFORE DISCHARGE:  1. Ruled out acute coronary syndrome (negative or stable Troponin):  Trops trending down - 0.126, 0.116, 0.100  2. Pain Status: Pain free.  3. Appropriate provocative testing performed: No stress test ordered at this time  - Stress Test Procedure:n/a  - Interpretation of cardiac rhythm per telemetry tech: Sinus dysrythmia     4. Cleared by Consultants (if applicable):No, cardiology following  5. Return to near baseline physical activity: Yes  Discharge Planner Nurse   Safe discharge environment identified: Yes  Barriers to discharge: No       Entered by: Leonora Colon 11/09/2017 1:12 AM     Please review provider order for any additional goals.   Nurse to notify provider when observation goals have been met and patient is ready for discharge.

## 2017-11-09 NOTE — PROGRESS NOTES
"[facility]    Hospitalist Progress Note      Assessment & Plan   Stiven Nguyễn is a markedly pleasant 62 year old gentleman with prior recurrent NSTEMI due to coronary vasospasm, as well as mild CAD, morbid obesity, DEEPIKA, hypertension, dyslipidemia, and TBI who presents with chest pain and myonecrosis.     H/o CAD of native vessel with known 40-50% stenosis LAD  Coronary Vasospasm  NSTEMI:  Mr. Nguyễn has been diagnosed with coronary vasospasm and is followed here by Cardiology. His last angiogram 2/2016 (done after a Lexiscan suggested anterior and inferior wall ischemia) showed only 40-50% mid-LAD disease and similar or less stenosis in other arteries. His last TTE 5/2016 showed preserved LVEF. He takes Amlodipine and Imdur for the vasospasm. By history this pain is consistent with prior episodes of vasospasm.   - Trop trend 0.126--0.116--0.105  - Greatly appreciate Cardiology assistance  - Initially transitioned from Norvasc to Verapamil but HR dropped into the 40s. Transitioned back to Norvasc and increased back to 10 mg/d  - Continue PTA ASA, Imdur, Ace, and statin    H/o Neurologic spells: Ongoing for 30 years at one time treated for both epilepsy and cataplexy in the past. Previous neurologic work up in 2013 included negative VEEG and it was thought that spells may be psyogenic in nature. Complained of \"clusminess\" of right hand prior to admission. Neurologically intact.   - Head CT negative  - No further neurologic complaints         Chronic leg edema: Thought to have been exacerbated by the Amlodipine, but also ameliorated by leg stockings so both are continued  - Continue Norvasc and Compression stockings    DVT Prophylaxis: Ambulate every shift  Code Status: Full Code    Disposition: Pending angiogram and plan per Cardiology.     Mary Jo Morgan    Interval History   Doing well today. Anxious regarding angiogram. No further neurologic symptoms. Denies chest pain.    -Data reviewed " today: I reviewed all new labs and imaging results over the last 24 hours. I personally reviewed no images or EKG's today.    Physical Exam   Temp: 96.1  F (35.6  C) Temp src: Oral BP: 144/65 Pulse: (!) 45 Heart Rate: 51 Resp: 16 SpO2: 97 % O2 Device: None (Room air)    There were no vitals filed for this visit.  Vital Signs with Ranges  Temp:  [95.6  F (35.3  C)-97.8  F (36.6  C)] 96.1  F (35.6  C)  Pulse:  [45-54] 45  Heart Rate:  [51-68] 51  Resp:  [10-22] 16  BP: (122-173)/(59-94) 144/65  SpO2:  [94 %-97 %] 97 %  I/O last 3 completed shifts:  In: 240 [P.O.:240]  Out: -     Constitutional: Alert, resting comfortably in NAD  Respiratory: Normal effort, symmetric expansion, no crackles or wheezing  Cardiovascular: RRR no murmurs   GI: Non distended, normal bowels sounds, no tenderness or guarding  MSK: LE without edema. Dorsalis pedis pulse palpated bilaterally.   Skin/Integumen: Clear  Neuro: CN II-XII grossly intact  Psych:  Alert and oriented x 3. Normal affect      Medications     NaCl         amLODIPine  10 mg Oral Daily     aspirin EC  325 mg Oral Daily     aspirin EC  243 mg Oral Once     atorvastatin  40 mg Oral At Bedtime     gabapentin (NEURONTIN) capsule 300 mg  300 mg Oral QAM     gabapentin (NEURONTIN) tablet 600 mg  600 mg Oral At Bedtime     isosorbide mononitrate  45 mg Oral Daily     lisinopril  40 mg Oral Daily     omeprazole (priLOSEC) CR capsule 20 mg  20 mg Oral QAM     tamsulosin  0.4 mg Oral QPM     sodium chloride (PF)  3 mL Intracatheter Q8H       Data     Recent Labs  Lab 11/09/17  0516 11/09/17  0005 11/08/17  1750 11/08/17  1310   WBC  --   --   --  5.9   HGB  --   --   --  14.9   MCV  --   --   --  85   PLT  --   --   --  163   NA  --   --   --  140   POTASSIUM  --   --   --  3.9   CHLORIDE  --   --   --  107   CO2  --   --   --  27   BUN  --   --   --  17   CR  --   --   --  0.92   ANIONGAP  --   --   --  6   NICKO  --   --   --  8.7   GLC  --   --   --  217*   TROPI 0.105* 0.100* 0.116*  0.126*       Recent Results (from the past 24 hour(s))   CT Head w/o Contrast    Narrative    CT SCAN OF THE HEAD WITHOUT CONTRAST   11/8/2017 7:58 PM     HISTORY: Right hand clumsiness.    TECHNIQUE: Axial images of the head and coronal reformations without  IV contrast material. Radiation dose for this scan was reduced using  automated exposure control, adjustment of the mA and/or kV according  to patient size, or iterative reconstruction technique.    COMPARISON: Brain MR 9/15/2017.    FINDINGS: There is no evidence of intracranial hemorrhage, mass, acute  infarct or anomaly. The ventricles are normal in size, shape and  configuration. The brain parenchyma and subarachnoid spaces are  normal.    The visualized portions of the sinuses and mastoids appear normal. The  bony calvarium and bones of the skull base appear intact.      Impression    IMPRESSION: No evidence of acute intracranial hemorrhage, mass, or  herniation.      MENDEL SCOTT MD

## 2017-11-09 NOTE — PLAN OF CARE
Problem: Patient Care Overview  Goal: Plan of Care/Patient Progress Review  Outcome: Improving  A&Ox4. David, otherwise VSS. Independent. NPO @ midnight. Saline locked, IV partially fell out and wouldn't flush after pt took shower, reinsertion tried by RN, unsuccessful, IV reinserted by Bin Dominguez RN. Tele sinus dysrhythmia, david. Denies c/p, SOB, dizziness. Mild edema BLE, baseline. Troponins 0.126, 0.116, 0.100, 0.105. CPAP used overnight. Cardiology following. Continue to monitor.

## 2017-11-09 NOTE — PLAN OF CARE
Problem: Patient Care Overview  Goal: Plan of Care/Patient Progress Review  Outcome: No Change  PRIMARY DIAGNOSIS: CHEST PAIN  OUTPATIENT/OBSERVATION GOALS TO BE MET BEFORE DISCHARGE:  1. Ruled out acute coronary syndrome (negative or stable Troponin):  Trops trending down - 0.126, 0.116, 0.100  2. Pain Status: Pain free.  3. Appropriate provocative testing performed: No stress test ordered at this time  - Stress Test Procedure:n/a  - Interpretation of cardiac rhythm per telemetry tech: Sinus dysrythmia     4. Cleared by Consultants (if applicable):No, cardiology following  5. Return to near baseline physical activity: Yes  Discharge Planner Nurse   Safe discharge environment identified: Yes  Barriers to discharge: No       Entered by: Leonora Colon 11/09/2017 4:24 AM     Please review provider order for any additional goals.   Nurse to notify provider when observation goals have been met and patient is ready for discharge.

## 2017-11-09 NOTE — PLAN OF CARE
Problem: Patient Care Overview  Goal: Plan of Care/Patient Progress Review  Outcome: No Change  PRIMARY DIAGNOSIS: CHEST PAIN  OUTPATIENT/OBSERVATION GOALS TO BE MET BEFORE DISCHARGE:  1. Ruled out acute coronary syndrome (negative or stable Troponin):  Yes  2. Pain Status: Pain free.  3. Appropriate provocative testing performed: Yes  - Stress Test Procedure: NA  - Interpretation of cardiac rhythm per telemetry tech: Sinus Angel     4. Cleared by Consultants (if applicable):No  5. Return to near baseline physical activity: Yes  Discharge Planner Nurse   Safe discharge environment identified: No  Barriers to discharge: No       Entered by: Karoline Arriola 11/09/2017 8:22 AM     Please review provider order for any additional goals.   Nurse to notify provider when observation goals have been met and patient is ready for discharge.

## 2017-11-09 NOTE — DISCHARGE SUMMARY
[facility]  Discharge Summary        Stiven Nguyễn MRN# [id number]   YOB: 1955 Age: 62 year old     Date of Admission:  11/8/2017  Date of Discharge:  11/9/2017  Admitting Physician:  French Pappas MD  Discharge Physician:  Mary Jo Morgan PA-C  Discharging Service:  Hospitalist     Primary Provider: Oliva Zhang [phone]     DISCHARGE DIAGNOSES/PROBLEM ORIENTED HOSPITAL COURSE:     Stiven Nguyễn is a markedly pleasant 62 year old gentleman with prior recurrent NSTEMI due to coronary vasospasm, as well as mild CAD, morbid obesity, DEEPIKA, hypertension, dyslipidemia, and TBI who presents with chest pain and elevated troponin. His symptoms with were consistent with previous episodes of coronary vasospasm but given his hisotry of LAD stenosis and elevated troponin he was admitted for observation. For additional details regarding HPI ,please see H&P by Dr. Mian MD      H/o CAD of native vessel with known 40-50% stenosis LAD  Coronary Vasospasm  NSTEMI:  Mr. Nguyễn has been diagnosed with coronary vasospasm and is followed here by Cardiology. His last angiogram 2/2016 (done after a Lexiscan suggested anterior and inferior wall ischemia) showed only 40-50% mid-LAD disease and similar or less stenosis in other arteries. His last TTE 5/2016 showed preserved LVEF. He takes Amlodipine and Imdur for the vasospasm. By history this pain is consistent with prior episodes of vasospasm.   - Trop trend 0.126--0.116--0.105  - Greatly appreciate Cardiology assistance  - Initially transitioned from Norvasc to Verapamil but HR dropped into the 40s. Transitioned back to Norvasc and increased  to 10 mg/d (5 mg/d PTA)  - Continue PTA ASA, Imdur, Ace, and statin  - Underwent angiogram 11/9 that was unchanged from previous. Continue current regimen and follow up with Cardiology in 1-2 weeks.      H/o Neurologic spells: Ongoing for 30 years at one time treated for both epilepsy and  "cataplexy in the past. Previous neurologic work up in 2013 included negative VEEG and it was thought that spells may be psyogenic in nature. Complained of \"clusminess\" of right hand prior to admission. Neurologically intact.   - Head CT negative  - No further neurologic complaints         Chronic leg edema: Thought to have been exacerbated by the Amlodipine, but also ameliorated by leg stockings so both are continued  - Continue Norvasc and Compression stockings    CODE STATUS:  Full    BRIEF HOSPITAL STAY SUMMARY (SENT HOME WITH PATIENT IN AVS):   Reason for your hospital stay       You were admitted for evaluation of chest pain. You underwent a coronary   angiogram which was unchanged. Your Norvasc was increased to 10 mg/d.   Continue compression stockings for swelling. Follow up with Cardiology in   2 weeks. Their office will call to you set up this appointment.                      PENDING RESULTS:  Unresulted Labs Ordered in the Past 30 Days of this Admission     No orders found for last 61 day(s).          DISCHARGE INSTRUCTIONS AND FOLLOW-UP:  Follow-up Appointments     Follow-up and recommended labs and tests        1. Follow up with Cardiology. Their office will call you to arrange this   follow up appointment.                    DISCHARGE DISPOSITION:  Discharged to Home    DISCHARGE MEDICATIONS:  Current Discharge Medication List      CONTINUE these medications which have CHANGED    Details   amLODIPine (NORVASC) 10 MG tablet Take 1 tablet (10 mg) by mouth daily  Qty: 90 tablet, Refills: 0    Associated Diagnoses: Coronary vasospasm (H)         CONTINUE these medications which have NOT CHANGED    Details   OMEPRAZOLE PO Take 20 mg by mouth every morning      !! GABAPENTIN PO Take 300 mg by mouth every morning      !! GABAPENTIN PO Take 600 mg by mouth At Bedtime      nitroglycerin (NITROSTAT) 0.4 MG sublingual tablet For chest pain place 1 tablet under the tongue every 5 minutes for 3 doses. If symptoms " persist 5 minutes after 1st dose call 911.  Qty: 25 tablet, Refills: 3    Associated Diagnoses: Coronary vasospasm (H)      loratadine (CLARITIN) 10 MG tablet Take 10 mg by mouth every evening       lisinopril (PRINIVIL,ZESTRIL) 40 MG tablet Take 40 mg by mouth daily      isosorbide mononitrate (IMDUR) 30 MG 24 hr tablet Take 1.5 tablets (45 mg) by mouth daily  Qty: 135 tablet, Refills: 3    Associated Diagnoses: Chest pain, unspecified chest pain type      Acetaminophen (TYLENOL PO) Take 1,000 mg by mouth every 8 hours as needed for mild pain or fever       aspirin EC 81 MG EC tablet Take 1 tablet (81 mg) by mouth daily  Qty: 30 tablet, Refills: 2    Associated Diagnoses: NSTEMI (non-ST elevated myocardial infarction) (H)      atorvastatin (LIPITOR) 40 MG tablet Take 1 tablet (40 mg) by mouth At Bedtime  Qty: 30 tablet, Refills: 2    Associated Diagnoses: NSTEMI (non-ST elevated myocardial infarction) (H)      tamsulosin (FLOMAX) 0.4 MG 24 hr capsule Take 1 capsule (0.4 mg) by mouth daily  Qty: 90 capsule, Refills: 3    Associated Diagnoses: Hypertrophy of prostate with urinary obstruction and other lower urinary tract symptoms (LUTS); Bladder retention       !! - Potential duplicate medications found. Please discuss with provider.            CONSULTATIONS THIS HOSPITAL STAY:  Cardiology  CONDITION DISCHARGE:  Discharge Condition: Stable    DISCHARGE ORDERS FOR FACILITY:  After Care Instructions     Diet       Follow this diet upon discharge: Orders Placed This Encounter      Advance Diet as Tolerated: Clear Liquid Diet                      Referrals     Future Labs/Procedures    Follow-Up with Cardiac Advanced Practice Provider         DISCHARGE TIME:  Greater than 30 minutes.    IMAGING RESULTS FROM THIS HOSPITAL STAY:  Results for orders placed or performed during the hospital encounter of 11/08/17   CT Head w/o Contrast    Narrative    CT SCAN OF THE HEAD WITHOUT CONTRAST   11/8/2017 7:58 PM     HISTORY: Right  hand clumsiness.    TECHNIQUE: Axial images of the head and coronal reformations without  IV contrast material. Radiation dose for this scan was reduced using  automated exposure control, adjustment of the mA and/or kV according  to patient size, or iterative reconstruction technique.    COMPARISON: Brain MR 9/15/2017.    FINDINGS: There is no evidence of intracranial hemorrhage, mass, acute  infarct or anomaly. The ventricles are normal in size, shape and  configuration. The brain parenchyma and subarachnoid spaces are  normal.    The visualized portions of the sinuses and mastoids appear normal. The  bony calvarium and bones of the skull base appear intact.      Impression    IMPRESSION: No evidence of acute intracranial hemorrhage, mass, or  herniation.      MENDEL SCOTT MD       MOST RECENT LAB RESULTS:  Most Recent 3 CBC's:  Recent Labs   Lab Test  11/08/17   1310  07/17/16   1300 05/03/16   WBC  5.9  5.8  4.9   HGB  14.9  13.9  14.3   MCV  85  86  85   PLT  163  158  157      Most Recent 3 BMP's:  Recent Labs   Lab Test  11/09/17   0516  11/08/17   1310  07/17/16   1300 05/03/16 03/01/16   0753   NA   --   140  140  146  142   POTASSIUM  4.2  3.9  3.9  4.7  4.3   CHLORIDE   --   107  106  105  109   CO2   --   27  29   --   27   BUN   --   17  14  16  18   CR   --   0.92  0.97  1.1  1.02   ANIONGAP   --   6  5   --   6   NICKO   --   8.7  8.6  9.5  8.6   GLC   --   217*  146*  129*  140*     Most Recent 3 Troponin's:  Recent Labs   Lab Test  11/09/17   0516  11/09/17   0005  11/08/17   1750   03/01/16   0752   TROPI  0.105*  0.100*  0.116*   < >   --    TROPONIN   --    --    --    --   0.00    < > = values in this interval not displayed.     Most Recent 3 INR's:  Recent Labs   Lab Test  05/26/15   1629  06/11/14   0750   INR  1.00  2.24*     Most Recent 2 LFT's:  Recent Labs   Lab Test  07/17/16   1300 05/03/16 03/01/16   0753   AST  23  31  19   ALT  40  32  42   ALKPHOS  94  84  87   BILITOTAL  0.9   --    0.6     Most Recent Cholesterol Panel:  Recent Labs   Lab Test  11/09/17   0516   CHOL  113   LDL  61   HDL  38*   TRIG  72     Most Recent 6 Bacteria Isolates From Any Culture (See EPIC Reports for Culture Details):  Recent Labs   Lab Test  11/25/12   1905  07/17/11   2214   CULT  No growth after 2 days  <10,000 colonies/mL Mixed gram positive carolyn     Most Recent TSH, T4 and HgbA1c:   Recent Labs   Lab Test 05/03/16   A1C  6.6*

## 2017-11-09 NOTE — PLAN OF CARE
Problem: Patient Care Overview  Goal: Plan of Care/Patient Progress Review  Outcome: No Change  PRIMARY DIAGNOSIS: CHEST PAIN  OUTPATIENT/OBSERVATION GOALS TO BE MET BEFORE DISCHARGE:  1. Ruled out acute coronary syndrome (negative or stable Troponin):  Trops trending down. Last trop ordered for 11pm  2. Pain Status: Pain free.  3. Appropriate provocative testing performed: No stress test ordered at this time  - Stress Test Procedure:n/a  - Interpretation of cardiac rhythm per telemetry tech: Sinus dysrythmia     4. Cleared by Consultants (if applicable):No, cardiology following  5. Return to near baseline physical activity: Yes  Discharge Planner Nurse   Safe discharge environment identified: Yes  Barriers to discharge: No       Entered by: Ronaldo Smith 11/08/2017 7:47 PM     Please review provider order for any additional goals.   Nurse to notify provider when observation goals have been met and patient is ready for discharge.

## 2017-11-09 NOTE — PROGRESS NOTES
1530 TR band air decreased to 9cc. Bled. 15cc air replaced.  1545 Air decreased per protocol. Rt radial site WDL. Pt liyah lite lunch. Voided. Pt states understanding of DC instructions re site care, medications, FU etc. AVS to pt.  1700 Rt radial site WDL. Band aid D+I. Will escort pt to ride per wc with all belongings.

## 2017-11-10 ENCOUNTER — CARE COORDINATION (OUTPATIENT)
Dept: CARDIOLOGY | Facility: CLINIC | Age: 62
End: 2017-11-10

## 2017-11-10 LAB — INTERPRETATION ECG - MUSE: NORMAL

## 2017-11-10 NOTE — PROGRESS NOTES
Called patient to discuss any post hospital d/c questions he may have, review medication changes, and confirm f/u appts. Patient was seen by cardiology for NSTEMI and had left heart cath on 11/8/17. Patient denied any questions regarding new medications or changes to some of his current medications that he was taking prior to admission. Patient denied any SOB, chest pain, or light headedness. RN confirmed with patient that he has an apt scheduled on 12/5/17 with Dr. Pickard. Patient advised to call clinic with any cardiac related questions or concerns prior to his apt on 12/5/17. Patient verbalized understanding and agreed with plan.

## 2017-11-11 ENCOUNTER — APPOINTMENT (OUTPATIENT)
Dept: ULTRASOUND IMAGING | Facility: CLINIC | Age: 62
End: 2017-11-11
Attending: FAMILY MEDICINE
Payer: OTHER GOVERNMENT

## 2017-11-11 ENCOUNTER — HOSPITAL ENCOUNTER (EMERGENCY)
Facility: CLINIC | Age: 62
Discharge: HOME | End: 2017-11-11
Attending: FAMILY MEDICINE | Admitting: FAMILY MEDICINE
Payer: OTHER GOVERNMENT

## 2017-11-11 VITALS
SYSTOLIC BLOOD PRESSURE: 152 MMHG | RESPIRATION RATE: 16 BRPM | TEMPERATURE: 97 F | OXYGEN SATURATION: 99 % | DIASTOLIC BLOOD PRESSURE: 81 MMHG | HEART RATE: 93 BPM | WEIGHT: 275 LBS | BODY MASS INDEX: 36.28 KG/M2

## 2017-11-11 DIAGNOSIS — I80.8 PHLEBITIS OF SUPERFICIAL VEIN OF UPPER EXTREMITY: ICD-10-CM

## 2017-11-11 DIAGNOSIS — Z98.890 HISTORY OF CORONARY ANGIOGRAM: ICD-10-CM

## 2017-11-11 DIAGNOSIS — I82.611 ACUTE EMBOLISM AND THROMBOSIS OF SUPERFICIAL VEIN OF RIGHT UPPER EXTREMITY: ICD-10-CM

## 2017-11-11 DIAGNOSIS — M79.631 PAIN OF RIGHT FOREARM: ICD-10-CM

## 2017-11-11 LAB
ANION GAP SERPL CALCULATED.3IONS-SCNC: 5 MMOL/L (ref 3–14)
BUN SERPL-MCNC: 15 MG/DL (ref 7–30)
CALCIUM SERPL-MCNC: 8.8 MG/DL (ref 8.5–10.1)
CHLORIDE SERPL-SCNC: 106 MMOL/L (ref 94–109)
CO2 SERPL-SCNC: 29 MMOL/L (ref 20–32)
CREAT SERPL-MCNC: 0.92 MG/DL (ref 0.66–1.25)
D DIMER PPP FEU-MCNC: 0.4 UG/ML FEU (ref 0–0.5)
GFR SERPL CREATININE-BSD FRML MDRD: 84 ML/MIN/1.7M2
GLUCOSE SERPL-MCNC: 128 MG/DL (ref 70–99)
POTASSIUM SERPL-SCNC: 3.9 MMOL/L (ref 3.4–5.3)
SODIUM SERPL-SCNC: 140 MMOL/L (ref 133–144)

## 2017-11-11 PROCEDURE — 80048 BASIC METABOLIC PNL TOTAL CA: CPT | Performed by: FAMILY MEDICINE

## 2017-11-11 PROCEDURE — 93971 EXTREMITY STUDY: CPT | Mod: RT

## 2017-11-11 PROCEDURE — 99284 EMERGENCY DEPT VISIT MOD MDM: CPT | Mod: Z6 | Performed by: FAMILY MEDICINE

## 2017-11-11 PROCEDURE — 99284 EMERGENCY DEPT VISIT MOD MDM: CPT | Mod: 25

## 2017-11-11 PROCEDURE — 93931 UPPER EXTREMITY STUDY: CPT | Mod: RT

## 2017-11-11 PROCEDURE — 85379 FIBRIN DEGRADATION QUANT: CPT | Performed by: FAMILY MEDICINE

## 2017-11-11 RX ORDER — DICLOFENAC SODIUM 75 MG/1
75 TABLET, DELAYED RELEASE ORAL 2 TIMES DAILY WITH MEALS
Qty: 20 TABLET | Refills: 0 | Status: SHIPPED | OUTPATIENT
Start: 2017-11-11 | End: 2017-12-05

## 2017-11-11 ASSESSMENT — ENCOUNTER SYMPTOMS
CHILLS: 0
FEVER: 0
BRUISES/BLEEDS EASILY: 0
SHORTNESS OF BREATH: 0
NAUSEA: 0
COLOR CHANGE: 1
ABDOMINAL PAIN: 0
PSYCHIATRIC NEGATIVE: 1
NUMBNESS: 0
WEAKNESS: 0
ARTHRALGIAS: 1

## 2017-11-11 NOTE — ED AVS SNAPSHOT
[facility] Emergency Department    [address] [address]    Phone:  [phone]    Fax:  [phone]                                       Stiven Nguyễn   MRN: [id number]    Department:  [facility] Emergency Department   Date of Visit:  11/11/2017           After Visit Summary Signature Page     I have received my discharge instructions, and my questions have been answered. I have discussed any challenges I see with this plan with the nurse or doctor.    ..........................................................................................................................................  Patient/Patient Representative Signature      ..........................................................................................................................................  Patient Representative Print Name and Relationship to Patient    ..................................................               ................................................  Date                                            Time    ..........................................................................................................................................  Reviewed by Signature/Title    ...................................................              ..............................................  Date                                                            Time

## 2017-11-11 NOTE — ED AVS SNAPSHOT
[facility] Emergency Department    [address] [address]    Phone:  [phone]    Fax:  [phone]                                       Stiven Nguyễn   MRN: [id number]    Department:  [facility] Emergency Department   Date of Visit:  11/11/2017           Patient Information     Date Of Birth          1955        Your diagnoses for this visit were:     Pain of right forearm     History of coronary angiogram     Phlebitis of superficial vein of upper extremity        You were seen by Gonzalez López MD.        Discharge Instructions       Take prescribed medication as directed.    Continue your usual medications.    Heat may help and gentle stretching.    For worsening pain, swelling or other concerning symptoms, seek medical attention or return to the E R.    I suggest see your doctor or heart specialist in 4-5 days for re check.    Future Appointments        Provider Department Dept Phone Center    12/5/2017 10:30 AM Gómez Pickard MD [facility] [phone] [facility] PSA CLIN    1/26/2018 9:00 AM HAZEL [facility] [phone] [facility]      24 Hour Appointment Hotline       To make an appointment at any [facility], call [phone] ([phone]). If you don't have a family doctor or clinic, we will help you find one. [facility] are conveniently located to serve the needs of you and your family.             Review of your medicines      START taking        Dose / Directions Last dose taken    diclofenac 75 MG EC tablet   Commonly known as:  VOLTAREN   Dose:  75 mg   Quantity:  20 tablet        Take 1 tablet (75 mg) by mouth 2 times daily (with meals) for 10 days   Refills:  0          Our records show that you are taking the medicines listed below. If these are incorrect, please call your family doctor or clinic.        Dose / Directions Last dose taken    amLODIPine 10 MG tablet    Commonly known as:  NORVASC   Dose:  10 mg   Quantity:  90 tablet        Take 1 tablet (10 mg) by mouth daily   Refills:  0        aspirin 81 MG EC tablet   Dose:  81 mg   Quantity:  30 tablet        Take 1 tablet (81 mg) by mouth daily   Refills:  2        atorvastatin 40 MG tablet   Commonly known as:  LIPITOR   Dose:  40 mg   Quantity:  30 tablet        Take 1 tablet (40 mg) by mouth At Bedtime   Refills:  2        * GABAPENTIN PO   Dose:  300 mg        Take 300 mg by mouth every morning   Refills:  0        * GABAPENTIN PO   Dose:  600 mg        Take 600 mg by mouth At Bedtime   Refills:  0        isosorbide mononitrate 30 MG 24 hr tablet   Commonly known as:  IMDUR   Dose:  45 mg   Quantity:  135 tablet        Take 1.5 tablets (45 mg) by mouth daily   Refills:  3        lisinopril 40 MG tablet   Commonly known as:  PRINIVIL/ZESTRIL   Dose:  40 mg        Take 40 mg by mouth daily   Refills:  0        loratadine 10 MG tablet   Commonly known as:  CLARITIN   Dose:  10 mg        Take 10 mg by mouth every evening   Refills:  0        nitroGLYcerin 0.4 MG sublingual tablet   Commonly known as:  NITROSTAT   Quantity:  25 tablet        For chest pain place 1 tablet under the tongue every 5 minutes for 3 doses. If symptoms persist 5 minutes after 1st dose call 911.   Refills:  3        OMEPRAZOLE PO   Dose:  20 mg        Take 20 mg by mouth every morning   Refills:  0        tamsulosin 0.4 MG capsule   Commonly known as:  FLOMAX   Dose:  0.4 mg   Quantity:  90 capsule        Take 1 capsule (0.4 mg) by mouth daily   Refills:  3        TYLENOL PO   Dose:  1000 mg        Take 1,000 mg by mouth every 8 hours as needed for mild pain or fever   Refills:  0        * Notice:  This list has 2 medication(s) that are the same as other medications prescribed for you. Read the directions carefully, and ask your doctor or other care provider to review them with you.            Prescriptions were sent or printed at these locations  (1 Prescription)                   [facility] - [place], [place] - [address]    [address] [facility] [address]    Telephone:  [phone]   Fax:  [phone]   Hours:                  E-Prescribed (1 of 1)         diclofenac (VOLTAREN) 75 MG EC tablet                Procedures and tests performed during your visit     Basic metabolic panel    D dimer quantitative    US Upper Extremity Arterial Duplex Right    US Upper Extremity Venous Duplex Right      Orders Needing Specimen Collection     None      Pending Results     Date and Time Order Name Status Description    11/11/2017 1441 US Upper Extremity Arterial Duplex Right Preliminary     11/11/2017 1441 US Upper Extremity Venous Duplex Right Preliminary             Pending Culture Results     No orders found from 11/9/2017 to 11/12/2017.            Pending Results Instructions     If you had any lab results that were not finalized at the time of your Discharge, you can call the ED Lab Result RN at [phone]. You will be contacted by this team for any positive Lab results or changes in treatment. The nurses are available 7 days a week from 10A to 6:30P.  You can leave a message 24 hours per day and they will return your call.        Test Results From Your Hospital Stay        11/11/2017  3:20 PM      Component Results     Component Value Ref Range & Units Status    Sodium 140 133 - 144 mmol/L Final    Potassium 3.9 3.4 - 5.3 mmol/L Final    Chloride 106 94 - 109 mmol/L Final    Carbon Dioxide 29 20 - 32 mmol/L Final    Anion Gap 5 3 - 14 mmol/L Final    Glucose 128 (H) 70 - 99 mg/dL Final    Urea Nitrogen 15 7 - 30 mg/dL Final    Creatinine 0.92 0.66 - 1.25 mg/dL Final    GFR Estimate 84 >60 mL/min/1.7m2 Final    Non  GFR Calc    GFR Estimate If Black >90 >60 mL/min/1.7m2 Final    African American GFR Calc    Calcium 8.8 8.5 - 10.1 mg/dL Final         11/11/2017  3:17 PM      Component Results     Component Value Ref  Range & Units Status    D Dimer 0.4 0.0 - 0.50 ug/ml FEU Final    This D-dimer assay is intended for use in conjunction with a clinical pretest   probability assessment model to exclude pulmonary embolism (PE) and deep   venous thrombosis (DVT) in outpatients suspected of PE or DVT. The cut-off   value is 0.5 ug/mL FEU.           11/11/2017  3:42 PM      Narrative     US UPPER EXTREMITY VENOUS DUPLEX RIGHT  11/11/2017 3:37 PM     HISTORY: Pain, pain in forearm post angiogram, history of DVTs and PE.    COMPARISON: None.    TECHNIQUE: Examination of the veins of the upper extremity was  performed with graded compression and 2-D ultrasound and color doppler  spectral waveform analysis.     FINDINGS: Short segment of nonocclusive thrombus seen in the mid and  distal basilic vein as well as a small amount of thrombus seen in the  distal cephalic vein. There is no evidence of thrombosis of the  internal jugular, subclavian, axillary, or brachial veins.  The veins  in the forearm show no evidence of thrombosis.        Impression     IMPRESSION: Short segment superficial thrombus in the distal cephalic  vein and mid/distal basilic vein.          11/11/2017  3:46 PM      Narrative     US UPPER EXTREMITY ARTERIAL DUPLEX RIGHT   11/11/2017 3:37 PM     HISTORY: Forearm pain post angiogram performed in right radial artery.      COMPARISON: Cardiac catheterization 11/9/2017.    FINDINGS: Color Doppler and spectral waveform analysis was performed  throughout the arteries of the right upper extremity. Images show the  subclavian artery, axillary artery, brachial artery, radial artery and  ulnar arteries are patent with multiphasic waveforms. Incidentally  noted there is an irregular heartbeat.        Impression     IMPRESSION:   1. Patent arteries throughout the right upper extremity.  2. Irregular heartbeat.                 Thank you for choosing [facility]       Thank you for choosing [facility] for your care. Our goal is always to  provide you with excellent care. Hearing back from our patients is one way we can continue to improve our services. Please take a few minutes to complete the written survey that you may receive in the mail after you visit with us. Thank you!        [facility] Information     [facility] gives you secure access to your electronic health record. If you see a primary care provider, you can also send messages to your care team and make appointments. If you have questions, please call your primary care clinic.  If you do not have a primary care provider, please call [phone] and they will assist you.        Care EveryWhere ID     This is your Care EveryWhere ID. This could be used by other organizations to access your [facility] medical records  [id number]        Equal Access to Services     BRIAN GARCIA : Ezra Garcia, dominga martinez, lilli willard, [place]. So [facility] [phone].    ATENCIÓN: Si habla español, tiene a morgan disposición servicios gratuitos de asistencia lingüística. Llame al [phone].    We comply with applicable federal civil rights laws and [place] laws. We do not discriminate on the basis of race, color, national origin, age, disability, sex, sexual orientation, or gender identity.            After Visit Summary       This is your record. Keep this with you and show to your community pharmacist(s) and doctor(s) at your next visit.
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
## 2017-11-11 NOTE — ED NOTES
Pt arrives with wife following angiogram this week. Complains now of right arm pain at site of insertion in radial artery. Hx of DVT and PE. Not currently on blood thinners, takes daily ASA. Pt is alert, oriented in NAD. Changed into gown, denies shortness of breath. Pt in room, oriented to room and call light. Call light within reach.

## 2017-11-11 NOTE — DISCHARGE INSTRUCTIONS
Take prescribed medication as directed.    Continue your usual medications.    Heat may help and gentle stretching.    For worsening pain, swelling or other concerning symptoms, seek medical attention or return to the E R.    I suggest see your doctor or heart specialist in 4-5 days for re check.

## 2017-12-05 ENCOUNTER — OFFICE VISIT (OUTPATIENT)
Dept: CARDIOLOGY | Facility: CLINIC | Age: 62
End: 2017-12-05
Attending: INTERNAL MEDICINE
Payer: OTHER GOVERNMENT

## 2017-12-05 VITALS
DIASTOLIC BLOOD PRESSURE: 70 MMHG | WEIGHT: 292 LBS | OXYGEN SATURATION: 96 % | HEART RATE: 43 BPM | BODY MASS INDEX: 38.52 KG/M2 | SYSTOLIC BLOOD PRESSURE: 130 MMHG

## 2017-12-05 DIAGNOSIS — R60.0 LOCALIZED EDEMA: ICD-10-CM

## 2017-12-05 DIAGNOSIS — R07.9 CHEST PAIN, UNSPECIFIED TYPE: ICD-10-CM

## 2017-12-05 DIAGNOSIS — G47.33 OBSTRUCTIVE SLEEP APNEA SYNDROME: ICD-10-CM

## 2017-12-05 DIAGNOSIS — I20.1 CORONARY VASOSPASM (H): Primary | ICD-10-CM

## 2017-12-05 DIAGNOSIS — I21.4 NON-ST ELEVATION MYOCARDIAL INFARCTION (NSTEMI) (H): ICD-10-CM

## 2017-12-05 PROCEDURE — 99213 OFFICE O/P EST LOW 20 MIN: CPT | Performed by: INTERNAL MEDICINE

## 2017-12-05 RX ORDER — AMLODIPINE BESYLATE 10 MG/1
10 TABLET ORAL DAILY
Qty: 90 TABLET | Refills: 3 | Status: SHIPPED | OUTPATIENT
Start: 2017-12-05 | End: 2019-01-10

## 2017-12-05 NOTE — PROGRESS NOTES
HISTORY OF PRESENT ILLNESS:  Mr. Nguyễn is a pleasant 62-year-old gentleman with a history of vasospastic angina, mild coronary artery disease for which he has not undergone PCI, history of DVT and PE for which he was seen at the [facility] and it was felt that he did not require long-term anticoagulant therapy.  The patient returns in post-hospitalization followup.      Unfortunately, the patient was admitted to the hospital with recurrent vasospastic angina.  They made an attempt to replace his amlodipine with verapamil which was unsuccessful.  He has been placed back on amlodipine 10 mg daily.  He has had 1 further recurrence of chest discomfort since and was seen in the emergency room.  However, I do not believe they admitted him to the hospital.  He has otherwise subsequently been chest pain-free.      He relates to me that he does relieve the chest pain after about 10-15 minutes with several nitroglycerin.  He is unsure of when he needs to come to the emergency room.  In reviewing his chart, his most recent admission there was an elevation of his troponin.      He otherwise is denying any other symptoms such as any other types of chest pain, chest pressure, shortness of breath, orthopnea or paroxysmal nocturnal dyspnea.      Please see my separate note with his full physical examination.      IMPRESSION AND PLAN:  Mr. Nguyễn is a pleasant 62-year-old gentleman with vasospastic angina with coexisting coronary artery disease.  During his most recent hospitalization, he underwent repeat cardiac catheterization which did not show progression of his disease.  No PCI was performed.  At today's visit, we discussed either increasing his amlodipine or having the patient take 3 sublingual nitroglycerins 5 minutes apart.  If after 20 minutes he continues to have chest pain then he needs to present to the emergency room.  He has elected to continue his isosorbide mononitrate dose unchanged and will take the nitroglycerin  as described.  I will also continue his amlodipine 10 mg daily and I sent a prescription in for this.  For his underlying coronary disease, he will remain on aspirin and statin unchanged.  We will plan to see the patient back in 6 months.         JERMAINE BIRD MD             D: 2017 11:26   T: 2017 12:52   MT: DINH      Name:     MARKEL HEADLEY   MRN:      [id number]        Account:      [id number]   :      1955           Service Date: 2017      Document: [id number]

## 2017-12-05 NOTE — MR AVS SNAPSHOT
After Visit Summary   12/5/2017    Stiven Nguyễn    MRN: [id number]           Patient Information     Date Of Birth          1955        Visit Information        Provider Department      12/5/2017 10:30 AM Gómez Pickard MD [facility]        Today's Diagnoses     Coronary vasospasm (H)    -  1    Chest pain, unspecified type        Non-ST elevation myocardial infarction (NSTEMI) (H)        Localized edema        Obstructive sleep apnea syndrome           Follow-ups after your visit        Additional Services     Follow-Up with Cardiologist                 Your next 10 appointments already scheduled     Jan 26, 2018  9:00 AM CST   (Arrive by 8:45 AM)   RETURN CONCUSSION with HAZEL [facility] ([facility] [place])    [address] [place] [id number] [phone]              Future tests that were ordered for you today     Open Future Orders        Priority Expected Expires Ordered    Follow-Up with Cardiologist Routine 6/3/2018 12/5/2018 12/5/2017            Who to contact     If you have questions or need follow up information about today's clinic visit or your schedule please contact [facility] directly at [phone].  Normal or non-critical lab and imaging results will be communicated to you by MyChart, letter or phone within 4 business days after the clinic has received the results. If you do not hear from us within 7 days, please contact the clinic through [employer] or phone. If you have a critical or abnormal lab result, we will notify you by phone as soon as possible.  Submit refill requests through [employer] or call your pharmacy and they will forward the refill request to us. Please allow 3 business days for your refill to be completed.          Additional Information About Your Visit        MyChart Information     [employer] gives  you secure access to your electronic health record. If you see a primary care provider, you can also send messages to your care team and make appointments. If you have questions, please call your primary care clinic.  If you do not have a primary care provider, please call [phone] and they will assist you.        Care EveryWhere ID     This is your Care EveryWhere ID. This could be used by other organizations to access your [facility] medical records  [id number]        Your Vitals Were     Pulse Pulse Oximetry BMI (Body Mass Index)             43 96% 38.52 kg/m2          Blood Pressure from Last 3 Encounters:   12/05/17 130/70   11/11/17 152/81   11/09/17 132/76    Weight from Last 3 Encounters:   12/05/17 132.5 kg (292 lb)   11/11/17 124.7 kg (275 lb)   10/27/17 131.2 kg (289 lb 3.2 oz)              We Performed the Following     Follow-Up with Cardiologist          Today's Medication Changes          These changes are accurate as of: 12/5/17 10:31 AM.  If you have any questions, ask your nurse or doctor.               These medicines have changed or have updated prescriptions.        Dose/Directions    tamsulosin 0.4 MG capsule   Commonly known as:  FLOMAX   This may have changed:  when to take this   Used for:  Hypertrophy of prostate with urinary obstruction and other lower urinary tract symptoms (LUTS), Bladder retention        Dose:  0.4 mg   Take 1 capsule (0.4 mg) by mouth daily   Quantity:  90 capsule   Refills:  3            Where to get your medicines      These medications were sent to [facility] [address] [address]     Phone:  [phone]     amLODIPine 10 MG tablet                Primary Care Provider Office Phone # Fax #    Oliva Zhang [phone]       [address] [address]        Equal Access to Services     BRIAN GARCIA AH: dominga Rebolledo  lilli martinezcarolina gonzalez ah. [facility] [phone].    ATENCIÓN: Si roberth armendariz, tiene a morgan disposición servicios gratuitos de asistencia lingüística. Antonieta al [phone].    We comply with applicable federal civil rights laws and [place] laws. We do not discriminate on the basis of race, color, national origin, age, disability, sex, sexual orientation, or gender identity.            Thank you!     Thank you for choosing [facility]  for your care. Our goal is always to provide you with excellent care. Hearing back from our patients is one way we can continue to improve our services. Please take a few minutes to complete the written survey that you may receive in the mail after your visit with us. Thank you!             Your Updated Medication List - Protect others around you: Learn how to safely use, store and throw away your medicines at www.disposemymeds.org.          This list is accurate as of: 12/5/17 10:31 AM.  Always use your most recent med list.                   Brand Name Dispense Instructions for use Diagnosis    amLODIPine 10 MG tablet    NORVASC    90 tablet    Take 1 tablet (10 mg) by mouth daily    Coronary vasospasm (H)       aspirin 81 MG EC tablet     30 tablet    Take 1 tablet (81 mg) by mouth daily    NSTEMI (non-ST elevated myocardial infarction) (H)       atorvastatin 40 MG tablet    LIPITOR    30 tablet    Take 1 tablet (40 mg) by mouth At Bedtime    NSTEMI (non-ST elevated myocardial infarction) (H)       * GABAPENTIN PO      Take 300 mg by mouth every morning        * GABAPENTIN PO      Take 600 mg by mouth At Bedtime        isosorbide mononitrate 30 MG 24 hr tablet    IMDUR    135 tablet    Take 1.5 tablets (45 mg) by mouth daily    Chest pain, unspecified chest pain type       lisinopril 40 MG tablet    PRINIVIL/ZESTRIL     Take 40 mg by mouth daily        loratadine 10 MG tablet    CLARITIN      Take 10 mg by mouth every evening        nitroGLYcerin 0.4 MG sublingual tablet    NITROSTAT    25 tablet    For chest pain place 1 tablet under the tongue every 5 minutes for 3 doses. If symptoms persist 5 minutes after 1st dose call 911.    Coronary vasospasm (H)       OMEPRAZOLE PO      Take 20 mg by mouth every morning        tamsulosin 0.4 MG capsule    FLOMAX    90 capsule    Take 1 capsule (0.4 mg) by mouth daily    Hypertrophy of prostate with urinary obstruction and other lower urinary tract symptoms (LUTS), Bladder retention       TYLENOL PO      Take 1,000 mg by mouth every 8 hours as needed for mild pain or fever        * Notice:  This list has 2 medication(s) that are the same as other medications prescribed for you. Read the directions carefully, and ask your doctor or other care provider to review them with you.
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
## 2017-12-05 NOTE — LETTER
12/5/2017      Oliva Zhang MD  [facility] [address] [facility] [address]      RE: Stiven Nguyễn       Dear Colleague,    I had the pleasure of seeing Stiven Nguyễn in the [facility].    HISTORY OF PRESENT ILLNESS:  Mr. Nguyễn is a pleasant 62-year-old gentleman with a history of vasospastic angina, mild coronary artery disease for which he has not undergone PCI, history of DVT and PE for which he was seen at the [facility] and it was felt that he did not require long-term anticoagulant therapy.  The patient returns in post-hospitalization followup.      Unfortunately, the patient was admitted to the hospital with recurrent vasospastic angina.  They made an attempt to replace his amlodipine with verapamil which was unsuccessful.  He has been placed back on amlodipine 10 mg daily.  He has had 1 further recurrence of chest discomfort since and was seen in the emergency room.  However, I do not believe they admitted him to the hospital.  He has otherwise subsequently been chest pain-free.      He relates to me that he does relieve the chest pain after about 10-15 minutes with several nitroglycerin.  He is unsure of when he needs to come to the emergency room.  In reviewing his chart, his most recent admission there was an elevation of his troponin.      He otherwise is denying any other symptoms such as any other types of chest pain, chest pressure, shortness of breath, orthopnea or paroxysmal nocturnal dyspnea.      Please see my separate note with his full physical examination.      IMPRESSION AND PLAN:  Mr. Nguyễn is a pleasant 62-year-old gentleman with vasospastic angina with coexisting coronary artery disease.  During his most recent hospitalization, he underwent repeat cardiac catheterization which did not show progression of his disease.  No PCI was performed.  At today's visit, we discussed either increasing his amlodipine or having the patient take 3 sublingual  nitroglycerins 5 minutes apart.  If after 20 minutes he continues to have chest pain then he needs to present to the emergency room.  He has elected to continue his isosorbide mononitrate dose unchanged and will take the nitroglycerin as described.  I will also continue his amlodipine 10 mg daily and I sent a prescription in for this.  For his underlying coronary disease, he will remain on aspirin and statin unchanged.  We will plan to see the patient back in 6 months.       Outpatient Encounter Prescriptions as of 12/5/2017   Medication Sig Dispense Refill     amLODIPine (NORVASC) 10 MG tablet Take 1 tablet (10 mg) by mouth daily 90 tablet 3     OMEPRAZOLE PO Take 20 mg by mouth every morning       GABAPENTIN PO Take 300 mg by mouth every morning       GABAPENTIN PO Take 600 mg by mouth At Bedtime       nitroglycerin (NITROSTAT) 0.4 MG sublingual tablet For chest pain place 1 tablet under the tongue every 5 minutes for 3 doses. If symptoms persist 5 minutes after 1st dose call 911. 25 tablet 3     loratadine (CLARITIN) 10 MG tablet Take 10 mg by mouth every evening        lisinopril (PRINIVIL,ZESTRIL) 40 MG tablet Take 40 mg by mouth daily       isosorbide mononitrate (IMDUR) 30 MG 24 hr tablet Take 1.5 tablets (45 mg) by mouth daily 135 tablet 3     Acetaminophen (TYLENOL PO) Take 1,000 mg by mouth every 8 hours as needed for mild pain or fever        aspirin EC 81 MG EC tablet Take 1 tablet (81 mg) by mouth daily 30 tablet 2     atorvastatin (LIPITOR) 40 MG tablet Take 1 tablet (40 mg) by mouth At Bedtime 30 tablet 2     tamsulosin (FLOMAX) 0.4 MG 24 hr capsule Take 1 capsule (0.4 mg) by mouth daily (Patient taking differently: Take 0.4 mg by mouth every evening ) 90 capsule 3     [DISCONTINUED] diclofenac (VOLTAREN) 75 MG EC tablet Take 1 tablet (75 mg) by mouth 2 times daily (with meals) for 10 days 20 tablet 0     [DISCONTINUED] amLODIPine (NORVASC) 10 MG tablet Take 1 tablet (10 mg) by mouth daily 90 tablet 0      No facility-administered encounter medications on file as of 12/5/2017.      Again, thank you for allowing me to participate in the care of your patient.      Sincerely,    Gómez Pickard MD     [facility]

## 2017-12-14 ENCOUNTER — TELEPHONE (OUTPATIENT)
Dept: SURGERY | Facility: CLINIC | Age: 62
End: 2017-12-14

## 2017-12-14 ENCOUNTER — TELEPHONE (OUTPATIENT)
Dept: ENDOCRINOLOGY | Facility: CLINIC | Age: 62
End: 2017-12-14

## 2017-12-14 DIAGNOSIS — R42 DIZZINESS: Primary | ICD-10-CM

## 2017-12-14 RX ORDER — MECLIZINE HYDROCHLORIDE 25 MG/1
25 TABLET ORAL EVERY 6 HOURS PRN
Qty: 30 TABLET | Refills: 1 | Status: SHIPPED | OUTPATIENT
Start: 2017-12-14 | End: 2018-10-04

## 2017-12-14 NOTE — TELEPHONE ENCOUNTER
Notified patient after discussing with provider that he should discontinue the Gabapentin and take Meclizine PRN for Dizziness. A referral for PT has been submitted.

## 2017-12-14 NOTE — TELEPHONE ENCOUNTER
Patient returned call and left a message.He does not want to take meclizine. He has taken this in the past and found that it doesn't agree with him. He also is not willing to see physical therapy. Is willing to keep the follow up appointment in January with Asher DOW .

## 2017-12-14 NOTE — TELEPHONE ENCOUNTER
Patient called questioning if he really needs to take gabapentin any longer. Stated he is doing ok most of the time. Is working and tolerating work well. Does have dizzy spells 1-2 times a week. These spells are triggered by light , noise and movement. These are also positionally triggered most often if he looks up and puts his head back. Does not feel the Gabapentin has helped his symptoms and also states he has had a lot of vivid and or bad dreams since starting on it.He is currently taking one tablet every am and H.S. Is scheduled to see a Neurologist in 2 weeks and a follow up appointment with Concussion clinic next month.

## 2017-12-22 ENCOUNTER — TELEPHONE (OUTPATIENT)
Dept: SURGERY | Facility: CLINIC | Age: 62
End: 2017-12-22

## 2017-12-22 NOTE — TELEPHONE ENCOUNTER
"Called patient to follow up on call. Stated he has weaned off of the Gabapentin. He slowly reduced dosage every three days.When asked why he decided to do this he stated, \" I don't remember. \"  Last dose was two days ago.Stated he had to call his wife because he could not find his way home from work yesterday.admitted that he has had this issue before. His balance continues to be a issue and has not followed up with PT and OT. Stated, he felt there was a reason but could not remember. Frustrates easily. States he feels lost and scattered much of the time. He continues to have severe headaches in the occipital area of his head.Has difficulty expressing himself. Reassurance given.. Encouraged him to follow up with OT and PT.  "

## 2017-12-28 ENCOUNTER — OFFICE VISIT (OUTPATIENT)
Dept: NEUROPSYCHOLOGY | Facility: CLINIC | Age: 62
End: 2017-12-28
Payer: OTHER GOVERNMENT

## 2017-12-28 DIAGNOSIS — F54 PSYCHOSOMATIC FACTOR IN PHYSICAL CONDITION: ICD-10-CM

## 2017-12-28 DIAGNOSIS — F09 COGNITIVE DISORDER: Primary | ICD-10-CM

## 2017-12-28 NOTE — MR AVS SNAPSHOT
After Visit Summary   12/28/2017    Stiven Nguyễn    MRN: [id number]           Patient Information     Date Of Birth          1955        Visit Information        Provider Department      12/28/2017 8:30 AM Charity Serna PsyD M [facility] Neuropsychology        Today's Diagnoses     Cognitive disorder    -  1    Psychosomatic factor in physical condition           Follow-ups after your visit        Your next 10 appointments already scheduled     Jan 26, 2018  9:00 AM CST   (Arrive by 8:45 AM)   RETURN CONCUSSION with HAZEL Sloan CNP [facility] Concussion ([facility] [place])    [address] [place] [id number] [phone]              Who to contact     Please call your clinic at [phone] to:    Ask questions about your health    Make or cancel appointments    Discuss your medicines    Learn about your test results    Speak to your doctor   If you have compliments or concerns about an experience at your clinic, or if you wish to file a complaint, please contact [facility] Patient Relations at [phone] or email us at [email or web address]         Additional Information About Your Visit        MyChart Information     [email or web address] gives you secure access to your electronic health record. If you see a primary care provider, you can also send messages to your care team and make appointments. If you have questions, please call your primary care clinic.  If you do not have a primary care provider, please call [phone] and they will assist you.      [email or web address] is an electronic gateway that provides easy, online access to your medical records. With Ark, you can request a clinic appointment, read your test results, renew a prescription or communicate with your care team.     To access your existing account, please contact your [facility] or call [phone] for  assistance.        Care EveryWhere ID     This is your Care EveryWhere ID. This could be used by other organizations to access your [facility] medical records  [id number]         Blood Pressure from Last 3 Encounters:   12/05/17 130/70   11/11/17 152/81   11/09/17 132/76    Weight from Last 3 Encounters:   12/05/17 132.5 kg (292 lb)   11/11/17 124.7 kg (275 lb)   10/27/17 131.2 kg (289 lb 3.2 oz)              We Performed the Following     [id number] TESTING, PER HR/PSYCHOLOGIST     NEUROPSYCH TESTING BY TECH          Today's Medication Changes          These changes are accurate as of: 12/28/17 11:59 PM.  If you have any questions, ask your nurse or doctor.               These medicines have changed or have updated prescriptions.        Dose/Directions    tamsulosin 0.4 MG capsule   Commonly known as:  FLOMAX   This may have changed:  when to take this   Used for:  Hypertrophy of prostate with urinary obstruction and other lower urinary tract symptoms (LUTS), Bladder retention        Dose:  0.4 mg   Take 1 capsule (0.4 mg) by mouth daily   Quantity:  90 capsule   Refills:  3                Primary Care Provider Office Phone # Fax #    Oliva Zhang [phone]       [facility] [address] [place] [id number]        Equal Access to Services     BRIAN GARCIA AH: Ezra ybarra hadasho Soomaali, waaxda luqadaha, qaybta kaalmada adeegyada, [place]. So [facility] [phone].    ATENCIÓN: Si habla español, tiene a morgan disposición servicios gratuitos de asistencia lingüística. Llame al [phone].    We comply with applicable federal civil rights laws and [place] laws. We do not discriminate on the basis of race, color, national origin, age, disability, sex, sexual orientation, or gender identity.            Thank you!     Thank you for choosing [facility] NEUROPSYCHOLOGY  for your care. Our goal is always to provide you with excellent care. Hearing back from our  patients is one way we can continue to improve our services. Please take a few minutes to complete the written survey that you may receive in the mail after your visit with us. Thank you!             Your Updated Medication List - Protect others around you: Learn how to safely use, store and throw away your medicines at www.disposemymeds.org.          This list is accurate as of: 12/28/17 11:59 PM.  Always use your most recent med list.                   Brand Name Dispense Instructions for use Diagnosis    amLODIPine 10 MG tablet    NORVASC    90 tablet    Take 1 tablet (10 mg) by mouth daily    Coronary vasospasm (H)       aspirin 81 MG EC tablet     30 tablet    Take 1 tablet (81 mg) by mouth daily    NSTEMI (non-ST elevated myocardial infarction) (H)       atorvastatin 40 MG tablet    LIPITOR    30 tablet    Take 1 tablet (40 mg) by mouth At Bedtime    NSTEMI (non-ST elevated myocardial infarction) (H)       * GABAPENTIN PO      Take 300 mg by mouth every morning        * GABAPENTIN PO      Take 600 mg by mouth At Bedtime        isosorbide mononitrate 30 MG 24 hr tablet    IMDUR    135 tablet    Take 1.5 tablets (45 mg) by mouth daily    Chest pain, unspecified chest pain type       lisinopril 40 MG tablet    PRINIVIL/ZESTRIL     Take 40 mg by mouth daily        loratadine 10 MG tablet    CLARITIN     Take 10 mg by mouth every evening        meclizine 25 MG tablet    ANTIVERT    30 tablet    Take 1 tablet (25 mg) by mouth every 6 hours as needed for dizziness    Dizziness       nitroGLYcerin 0.4 MG sublingual tablet    NITROSTAT    25 tablet    For chest pain place 1 tablet under the tongue every 5 minutes for 3 doses. If symptoms persist 5 minutes after 1st dose call 911.    Coronary vasospasm (H)       OMEPRAZOLE PO      Take 20 mg by mouth every morning        tamsulosin 0.4 MG capsule    FLOMAX    90 capsule    Take 1 capsule (0.4 mg) by mouth daily    Hypertrophy of prostate with urinary obstruction and  other lower urinary tract symptoms (LUTS), Bladder retention       TYLENOL PO      Take 1,000 mg by mouth every 8 hours as needed for mild pain or fever        * Notice:  This list has 2 medication(s) that are the same as other medications prescribed for you. Read the directions carefully, and ask your doctor or other care provider to review them with you.

## 2017-12-28 NOTE — PROGRESS NOTES
The patient was seen for neuropsychological evaluation at the request of Aubrey Sotelo NP for the purposes of diagnostic clarification and treatment planning.  1 hour and 15 minutes of face-to-face testing were provided by this writer.  Please see Dr. Charity Serna's report for a full interpretation of the findings.
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
## 2018-01-03 NOTE — PROGRESS NOTES
WECHSLER ADULT INTELLIGENCE SCALE-IV CONTROLLED WORD ASSOCIATION TEST        Age-Scaled Score Score   31    Vocabulary           9       Digit Span           8    TRAIL MAKING TEST   Block Design         11     Coding         12   Time Errors     A   25              1    WECHSLER MEMORY SCALES  B   42              0        Logical Mem Immediate  13/13  PSYCHOMOTOR TESTS   Logical Mem 30 Minute   8/12     Visual Repr Immediate                  8    Right    Left   Visual Repr 30                   8  Finger Tapping   57     51           30 Minute Recognition                  4   Grooved Pegboard   91           94       Drops: 1            Drops: 0     TEST OF SUSTAINED ATTENTION & TRACKING     MAKE A FIGURE TEST    Total Time     87       Total Errors    2         Score    43        RICKY AUDITORY VERBAL LEARNING TEST BOSTON NAMING TEST         I   II           III IV V           VI VII Score  56      3    7     10      9     11      4     10      STROOP COLOR-WORD TEST    30 Minute Recall  10     T-Score    30 Minute Recognition  14  Color  43    Intrusions    5  Word    52     Color-Word  42

## 2018-01-04 ENCOUNTER — TELEPHONE (OUTPATIENT)
Dept: SURGERY | Facility: CLINIC | Age: 63
End: 2018-01-04

## 2018-01-04 NOTE — TELEPHONE ENCOUNTER
Patient called requesting Gabapentin refill. Stated that he went off of it for awhile but felt much better when he took it. Has a follow up appointment in 3 weeks. Feeling well at this time and without further questions or concerns.

## 2018-01-25 ENCOUNTER — CARE COORDINATION (OUTPATIENT)
Dept: SURGERY | Facility: CLINIC | Age: 63
End: 2018-01-25

## 2018-01-26 ENCOUNTER — OFFICE VISIT (OUTPATIENT)
Dept: SURGERY | Facility: CLINIC | Age: 63
End: 2018-01-26
Payer: OTHER GOVERNMENT

## 2018-01-26 VITALS
BODY MASS INDEX: 38.51 KG/M2 | SYSTOLIC BLOOD PRESSURE: 144 MMHG | OXYGEN SATURATION: 100 % | HEIGHT: 73 IN | WEIGHT: 290.6 LBS | HEART RATE: 44 BPM | DIASTOLIC BLOOD PRESSURE: 77 MMHG

## 2018-01-26 DIAGNOSIS — F43.29 STRESS AND ADJUSTMENT REACTION: Primary | ICD-10-CM

## 2018-01-26 RX ORDER — GABAPENTIN 300 MG/1
300 CAPSULE ORAL
Qty: 180 CAPSULE | Refills: 1 | Status: SHIPPED | OUTPATIENT
Start: 2018-01-26 | End: 2018-07-14

## 2018-01-26 ASSESSMENT — PAIN SCALES - GENERAL: PAINLEVEL: NO PAIN (0)

## 2018-01-26 NOTE — MR AVS SNAPSHOT
After Visit Summary   1/26/2018    Stiven Nguyễn    MRN: [id number]           Patient Information     Date Of Birth          1955        Visit Information        Provider Department      1/26/2018 9:00 AM Asher Rebolledo PA-C [facility]        Today's Diagnoses     Stress and adjustment reaction    -  1      Care Instructions    Please continue Gabapentin 300mg- 1 tablet twice daily.    Per report from neuropsychology: NO permanent brain damage.    Stress may temporarily cause symptoms to return.    It was a pleasure to meet you today!  Please follow up with Oliva Zhang M.D.          Follow-ups after your visit        Who to contact     Please call your clinic at [phone] to:    Ask questions about your health    Make or cancel appointments    Discuss your medicines    Learn about your test results    Speak to your doctor   If you have compliments or concerns about an experience at your clinic, or if you wish to file a complaint, please contact [facility] at [phone] or email us at [email or web address]         Additional Information About Your Visit        MyChart Information     [employer] gives you secure access to your electronic health record. If you see a primary care provider, you can also send messages to your care team and make appointments. If you have questions, please call your primary care clinic.  If you do not have a primary care provider, please call [phone] and they will assist you.      [employer] is an electronic gateway that provides easy, online access to your medical records. With [employer], you can request a clinic appointment, read your test results, renew a prescription or communicate with your care team.     To access your existing account, please contact your [facility] or call [phone] for assistance.        Care EveryWhere ID     This is your Care EveryWhere  "ID. This could be used by other organizations to access your [place] medical records  [id number]        Your Vitals Were     Pulse Height Pulse Oximetry BMI (Body Mass Index)          44 6' 1\" 100% 38.34 kg/m2         Blood Pressure from Last 3 Encounters:   01/26/18 144/77   12/05/17 130/70   11/11/17 152/81    Weight from Last 3 Encounters:   01/26/18 290 lb 9.6 oz   12/05/17 292 lb   11/11/17 275 lb              Today, you had the following     No orders found for display         Today's Medication Changes          These changes are accurate as of 1/26/18  9:32 AM.  If you have any questions, ask your nurse or doctor.               These medicines have changed or have updated prescriptions.        Dose/Directions    * GABAPENTIN PO   This may have changed:  Another medication with the same name was added. Make sure you understand how and when to take each.   Changed by:  Asher Rebolledo PA-C        Dose:  300 mg   Take 300 mg by mouth every morning   Refills:  0       * GABAPENTIN PO   This may have changed:  Another medication with the same name was added. Make sure you understand how and when to take each.   Changed by:  Asher Rebolledo PA-C        Dose:  600 mg   Take 600 mg by mouth At Bedtime   Refills:  0       * gabapentin 300 MG capsule   Commonly known as:  NEURONTIN   This may have changed:  You were already taking a medication with the same name, and this prescription was added. Make sure you understand how and when to take each.   Used for:  Stress and adjustment reaction   Changed by:  Asher Rebolledo PA-C        Dose:  300 mg   Take 1 capsule (300 mg) by mouth 2 times daily   Quantity:  180 capsule   Refills:  1       tamsulosin 0.4 MG capsule   Commonly known as:  FLOMAX   This may have changed:  when to take this   Used for:  Hypertrophy of prostate with urinary obstruction and other lower urinary tract symptoms (LUTS), Bladder retention        Dose:  0.4 mg   Take 1 capsule (0.4 mg) " by mouth daily   Quantity:  90 capsule   Refills:  3       * Notice:  This list has 3 medication(s) that are the same as other medications prescribed for you. Read the directions carefully, and ask your doctor or other care provider to review them with you.         Where to get your medicines      These medications were sent to [facility] - [place], [place] - [address] [place] [address]     Phone:  [phone]     gabapentin 300 MG capsule                Primary Care Provider Office Phone # Fax #    Oliva Zhang [phone]       [facility] [address] [place] [address]        Equal Access to Services     [facility] : Hadii savanna ybarra hadasho Sofausto, waaxda luqadaha, qaybta kaalmada adebernadineyada, carolina lemus . So [facility] [phone].    ATENCIÓN: Si habla español, tiene a morgan disposición servicios gratuitos de asistencia lingüística. Llame al [phone].    We comply with applicable federal civil rights laws and [place] laws. We do not discriminate on the basis of race, color, national origin, age, disability, sex, sexual orientation, or gender identity.            Thank you!     Thank you for choosing [facility]  for your care. Our goal is always to provide you with excellent care. Hearing back from our patients is one way we can continue to improve our services. Please take a few minutes to complete the written survey that you may receive in the mail after your visit with us. Thank you!             Your Updated Medication List - Protect others around you: Learn how to safely use, store and throw away your medicines at www.disposemymeds.org.          This list is accurate as of 1/26/18  9:32 AM.  Always use your most recent med list.                   Brand Name Dispense Instructions for use Diagnosis    amLODIPine 10 MG tablet    NORVASC    90 tablet    Take 1 tablet (10 mg) by mouth daily     Coronary vasospasm (H)       aspirin 81 MG EC tablet     30 tablet    Take 1 tablet (81 mg) by mouth daily    NSTEMI (non-ST elevated myocardial infarction) (H)       atorvastatin 40 MG tablet    LIPITOR    30 tablet    Take 1 tablet (40 mg) by mouth At Bedtime    NSTEMI (non-ST elevated myocardial infarction) (H)       * GABAPENTIN PO      Take 300 mg by mouth every morning        * GABAPENTIN PO      Take 600 mg by mouth At Bedtime        * gabapentin 300 MG capsule    NEURONTIN    180 capsule    Take 1 capsule (300 mg) by mouth 2 times daily    Stress and adjustment reaction       isosorbide mononitrate 30 MG 24 hr tablet    IMDUR    135 tablet    Take 1.5 tablets (45 mg) by mouth daily    Chest pain, unspecified chest pain type       lisinopril 40 MG tablet    PRINIVIL/ZESTRIL     Take 40 mg by mouth daily        loratadine 10 MG tablet    CLARITIN     Take 10 mg by mouth every evening        meclizine 25 MG tablet    ANTIVERT    30 tablet    Take 1 tablet (25 mg) by mouth every 6 hours as needed for dizziness    Dizziness       nitroGLYcerin 0.4 MG sublingual tablet    NITROSTAT    25 tablet    For chest pain place 1 tablet under the tongue every 5 minutes for 3 doses. If symptoms persist 5 minutes after 1st dose call 911.    Coronary vasospasm (H)       OMEPRAZOLE PO      Take 20 mg by mouth every morning        tamsulosin 0.4 MG capsule    FLOMAX    90 capsule    Take 1 capsule (0.4 mg) by mouth daily    Hypertrophy of prostate with urinary obstruction and other lower urinary tract symptoms (LUTS), Bladder retention       TYLENOL PO      Take 1,000 mg by mouth every 8 hours as needed for mild pain or fever        * Notice:  This list has 3 medication(s) that are the same as other medications prescribed for you. Read the directions carefully, and ask your doctor or other care provider to review them with you.

## 2018-01-26 NOTE — PATIENT INSTRUCTIONS
Please continue Gabapentin 300mg- 1 tablet twice daily.    Per report from neuropsychology: NO permanent brain damage.    Stress may temporarily cause symptoms to return.    It was a pleasure to meet you today!  Please follow up with Oliva Zhang M.D.

## 2018-01-26 NOTE — PROGRESS NOTES
"[facility] Follow up January 26, 2018      Assessment:  (F43.29) Stress and adjustment reaction  (primary encounter diagnosis)    Stiven Nguyễn is a 63 year old yo male who presents for follow-up evaluation of concussion that occurred in May, 2017.  He is here today with his wife, Genna.  He reports that overall he is feeling \"on track\" and that his symptoms are greatly improved with gabapentin 300 mg twice daily.  He notes feeling much calmer, able to focus, and does not \"bark\" as much.  When he discontinued the medication he felt quite poorly.  He underwent neuropsychology testing in 12/2017.  We reviewed the results together.  No cognitive deficits were identified, and it appears from a mild concussion standpoint Mr. Nguyễn has recovered.  It is likely that he has significant somatization in conjunction to stress and anxiety; this is no doubt the primary effect that gabapentin is having for him.  Emphasized today that he does not have permanent brain damage, but that stress may temporarily cause his symptoms to return.  I refilled his gabapentin for him today, and instructed him to follow-up with his primary care physician for ongoing care and maintenance.  Given his significant response to gabapentin, this may be a maintenance medication indefinitely.    Mr. Nguyễn is doing well with prism lenses prescribed to him by Dr. Eldon OD from [facility].  These were not covered by his insurance ([employer]).  He requests some sort of documentation that outlines that these lenses were medically necessary.  The best that I can find on review of his chart is notes from visit dated 7/28/17 when he was referred for vision therapy for his visual deficits.  If he requires further documentation I have referred him to medical records.  I am not clear despite this documentation whether or not his insurance will ultimately cover the lenses.    Plan:  1. Biggest concern of pt addressed: medications    2. Follow up " "with PCP.    AVS Instructions:  Please continue Gabapentin 300mg- 1 tablet twice daily.    Per report from neuropsychology: NO permanent brain damage.    Stress may temporarily cause symptoms to return.    It was a pleasure to meet you today!  Please follow up with Oliva Zhang M.D.      [facility]  Time/date of injury: 5/6/17    Feeling on-track. Gabapentin is helping a lot.  Calmer, able to focus, not \"barking as much\".  Did poorly off gabapentin.    Overall HA and other Sx have improved.  Still requiring prism lenses for adequate visual acuity.  These were not covered by his insurance. Requesting a record demonstrating medical necessity.     REVIEW OF SYSTEMS:  Refer to DocFlowsheets:  Concussion symptoms  GASTROINTESTINAL: no N/V  MUSCULOSKELETAL: no severe neck pn  NEUROLOGIC: No HA, dizziness  PSYCHIATRIC: see PHQ-9 and STACY    Current medications:  Reconciled in chart today by clinic staff and reviewed by me.  Current Outpatient Prescriptions   Medication     meclizine (ANTIVERT) 25 MG tablet     amLODIPine (NORVASC) 10 MG tablet     OMEPRAZOLE PO     GABAPENTIN PO     GABAPENTIN PO     nitroglycerin (NITROSTAT) 0.4 MG sublingual tablet     loratadine (CLARITIN) 10 MG tablet     lisinopril (PRINIVIL,ZESTRIL) 40 MG tablet     isosorbide mononitrate (IMDUR) 30 MG 24 hr tablet     Acetaminophen (TYLENOL PO)     aspirin EC 81 MG EC tablet     atorvastatin (LIPITOR) 40 MG tablet     tamsulosin (FLOMAX) 0.4 MG 24 hr capsule     No current facility-administered medications for this visit.        OBJECTIVE:   /77  Pulse (!) 44  Ht 6' 1\"  Wt 290 lb 9.6 oz  SpO2 100%  BMI 38.34 kg/m2  Wt Readings from Last 4 Encounters:   01/26/18 290 lb 9.6 oz   12/05/17 292 lb   11/11/17 275 lb   10/27/17 289 lb 3.2 oz     EXAM:  GENERAL: alert, oriented to person, place, time  Head: NC/AT  Lungs: speaking in full sentences comfortably.  PSYCHIATRIC: irritable mood and affect.  Easily frustrated in conversation.   Neuro: CN II-XII " grossly intact. Normal coordination and tone. Normal tandem gait.      Time spent in one-on-one evaluation and discussion with patient regarding nature of problem, course, prior treatments, and therapeutic options, 80% of this 30 minute visit was spent in counseling including this patients personal symptom triggers and education thereof.

## 2018-01-29 ENCOUNTER — RECORDS - HEALTHEAST (OUTPATIENT)
Dept: LAB | Facility: CLINIC | Age: 63
End: 2018-01-29

## 2018-01-29 LAB
ANION GAP SERPL CALCULATED.3IONS-SCNC: 8 MMOL/L (ref 5–18)
AST SERPL W P-5'-P-CCNC: 21 U/L (ref 0–40)
BUN SERPL-MCNC: 15 MG/DL (ref 8–22)
CALCIUM SERPL-MCNC: 9.1 MG/DL (ref 8.5–10.5)
CHLORIDE BLD-SCNC: 105 MMOL/L (ref 98–107)
CHOLEST SERPL-MCNC: 126 MG/DL
CO2 SERPL-SCNC: 27 MMOL/L (ref 22–31)
CREAT SERPL-MCNC: 0.92 MG/DL (ref 0.7–1.3)
FASTING STATUS PATIENT QL REPORTED: NO
GFR SERPL CREATININE-BSD FRML MDRD: >60 ML/MIN/1.73M2
GLUCOSE BLD-MCNC: 248 MG/DL (ref 70–125)
HDLC SERPL-MCNC: 39 MG/DL
LDLC SERPL CALC-MCNC: 68 MG/DL
POTASSIUM BLD-SCNC: 4.3 MMOL/L (ref 3.5–5)
PSA SERPL-MCNC: 1.3 NG/ML (ref 0–4.5)
SODIUM SERPL-SCNC: 140 MMOL/L (ref 136–145)
TRIGL SERPL-MCNC: 96 MG/DL

## 2018-03-20 ENCOUNTER — APPOINTMENT (OUTPATIENT)
Dept: ULTRASOUND IMAGING | Facility: CLINIC | Age: 63
End: 2018-03-20
Attending: EMERGENCY MEDICINE
Payer: OTHER GOVERNMENT

## 2018-03-20 ENCOUNTER — HOSPITAL ENCOUNTER (EMERGENCY)
Facility: CLINIC | Age: 63
Discharge: HOME | End: 2018-03-20
Attending: EMERGENCY MEDICINE | Admitting: EMERGENCY MEDICINE
Payer: OTHER GOVERNMENT

## 2018-03-20 VITALS
OXYGEN SATURATION: 95 % | SYSTOLIC BLOOD PRESSURE: 131 MMHG | DIASTOLIC BLOOD PRESSURE: 71 MMHG | TEMPERATURE: 98.2 F | RESPIRATION RATE: 16 BRPM

## 2018-03-20 DIAGNOSIS — M79.662 PAIN OF LEFT LOWER LEG: ICD-10-CM

## 2018-03-20 PROCEDURE — 25000132 ZZH RX MED GY IP 250 OP 250 PS 637: Performed by: EMERGENCY MEDICINE

## 2018-03-20 PROCEDURE — 99284 EMERGENCY DEPT VISIT MOD MDM: CPT | Mod: 25 | Performed by: EMERGENCY MEDICINE

## 2018-03-20 PROCEDURE — 93971 EXTREMITY STUDY: CPT | Mod: LT

## 2018-03-20 PROCEDURE — 99284 EMERGENCY DEPT VISIT MOD MDM: CPT | Mod: Z6 | Performed by: EMERGENCY MEDICINE

## 2018-03-20 RX ORDER — OXYCODONE AND ACETAMINOPHEN 5; 325 MG/1; MG/1
1 TABLET ORAL EVERY 4 HOURS PRN
Qty: 6 TABLET | Refills: 0 | Status: SHIPPED | OUTPATIENT
Start: 2018-03-20 | End: 2018-10-04

## 2018-03-20 RX ORDER — OXYCODONE AND ACETAMINOPHEN 5; 325 MG/1; MG/1
1 TABLET ORAL ONCE
Status: COMPLETED | OUTPATIENT
Start: 2018-03-20 | End: 2018-03-20

## 2018-03-20 RX ADMIN — OXYCODONE HYDROCHLORIDE AND ACETAMINOPHEN 1 TABLET: 5; 325 TABLET ORAL at 05:27

## 2018-03-20 ASSESSMENT — ENCOUNTER SYMPTOMS
DIZZINESS: 0
CONFUSION: 0
ABDOMINAL PAIN: 0
BACK PAIN: 0
BRUISES/BLEEDS EASILY: 0
FEVER: 0
NECK PAIN: 0
ARTHRALGIAS: 1
MYALGIAS: 1
WEAKNESS: 0
CHILLS: 0
LIGHT-HEADEDNESS: 0
SHORTNESS OF BREATH: 0
ACTIVITY CHANGE: 1

## 2018-03-20 NOTE — ED NOTES
"Pt awoke at approx 12 midnight with an intense \"ache\" to his left lower leg.  The pain starts in his shin and goes all the way through.  He also has an intense itch to bottom of his left foot.  This pain make it difficult to walk but does not increase in intensity with walking.  Tylenol was taken at 12 midnight with no change in the pain.  Nothing was taken for itching.    "

## 2018-03-20 NOTE — DISCHARGE INSTRUCTIONS
Return if symptoms worsen or new symptoms develop.  Follow-up with primary care physician later this week. take it easy for the next day elevate leg and rest take ibuprofen for pain.  If increasing pain swelling or numbness of the leg and the lower portion please return for recheck.  Acute Pain, Uncertain Cause  Pain can be caused by many conditions that range from very minor to very serious. In some cases, though, pain comes and goes with no apparent cause.  We were not able to find the exact cause for your pain. At this time there is no sign of any serious illness causing your pain. More tests may be needed to determine the cause. In many cases, pain like this goes away by itself.  Home care  Take any medicines as prescribed. If another medicine was not prescribed for pain, you can take an over-the-counter pain medicine such as ibuprofen or acetaminophen. Use these as directed on the label.    Follow-up care  Follow up with your healthcare provider or our staff as directed.  When to seek medical advice  Call your healthcare provider for any of the following:    Pain changes in pattern    Pain doesn't lessen or gets worse    New symptoms appear    Fever of 100.4 F (38 C) or higher, or as directed by your healthcare provider  Date Last Reviewed: 7/26/2015 2000-2017 [employer]. [address] [place] [address], [place] [address]. All rights reserved. This information is not intended as a substitute for professional medical care. Always follow your healthcare professional's instructions.          Treating Strains and Sprains  Strains and sprains happen when muscles or other soft tissues near your bones stretch or tear. These injuries can cause bruising, swelling, and pain. To ease your discomfort and speed the healing of your strain or sprain, follow the tips below. Remember, a strain or sprain can take 6 to 8 weeks to heal.     Important Note: Do not give aspirin to children or teens without discussing it  with your healthcare provider first.        Ice first, heat later    Use ice for the first 24 to 48 hours after injury. Ice helps prevent swelling and reduce pain. Ice the injury for no more than 20 minutes at a time and allow at least  20 minutes between icing sessions.    Apply heat after the first 72 hours, once the swelling has gone down. Heat relaxes muscles and increases blood flow. Soak the injured area in warm water or use a heating pad set on low for no more than 15 minutes at a time.  Wrap and elevate    Wrap an injured limb firmly with an elastic bandage. This provides support and helps prevent swelling. Don t wear an elastic bandage overnight. Watch for tingling, numbness, or increased pain, and remove the bandage immediately if any of these occurs.    Elevate the injured area to help reduce swelling and throbbing. It s best to raise an injured limb above the level of your heart.     Medicines    Over-the-counter medicines such as acetaminophen or ibuprofen can help reduce pain. Some also help reduce swelling.    Take medicine only as directed.    Rest the area even if medicines are controlling the pain.  Rest    Rest the injured area by not using it for 24 hours.    When you re ready, return slowly to your normal activities. Rest the injured area often.    Don t use or walk on an injured limb if it hurts.  Date Last Reviewed: 9/3/2015    [id number] [employer]. [address] [facility], [place] [address]. All rights reserved. This information is not intended as a substitute for professional medical care. Always follow your healthcare professional's instructions.

## 2018-03-20 NOTE — ED AVS SNAPSHOT
[facility] Emergency Department    [address] [facility] [address] [address]    Phone:  [phone]    Fax:  [phone]                                       Stiven Nguyễn   MRN: [id number]    Department:  [facility] Emergency Department   Date of Visit:  3/20/2018           Patient Information     Date Of Birth          1955        Your diagnoses for this visit were:     Pain of left lower leg        You were seen by Giacomo Grant MD.      Follow-up Information     Follow up with Oliva Zhang MD.    Specialty:  Family Practice    Why:  As needed    Contact information:    [facility]  [address] [facility] [address]  [phone]          Follow up with [facility] Emergency Department.    Specialty:  EMERGENCY MEDICINE    Why:  If symptoms worsen    Contact information:    [address] [place] [id number] [phone]    Additional information:    The medical center is located at   [address] ([address]   [facility] [address] in [place], [place] miles north   of [place]).        Discharge Instructions       Return if symptoms worsen or new symptoms develop.  Follow-up with primary care physician later this week. take it easy for the next day elevate leg and rest take ibuprofen for pain.  If increasing pain swelling or numbness of the leg and the lower portion please return for recheck.  Acute Pain, Uncertain Cause  Pain can be caused by many conditions that range from very minor to very serious. In some cases, though, pain comes and goes with no apparent cause.  We were not able to find the exact cause for your pain. At this time there is no sign of any serious illness causing your pain. More tests may be needed to determine the cause. In many cases, pain like this goes away by itself.  Home care  Take any medicines as prescribed. If another medicine was not prescribed for pain, you can take an over-the-counter pain medicine such as ibuprofen or  acetaminophen. Use these as directed on the label.    Follow-up care  Follow up with your healthcare provider or our staff as directed.  When to seek medical advice  Call your healthcare provider for any of the following:    Pain changes in pattern    Pain doesn't lessen or gets worse    New symptoms appear    Fever of 100.4 F (38 C) or higher, or as directed by your healthcare provider  Date Last Reviewed: 7/26/2015 2000-2017 [employer]. [address] [place] [address], [place] [address]. All rights reserved. This information is not intended as a substitute for professional medical care. Always follow your healthcare professional's instructions.          Treating Strains and Sprains  Strains and sprains happen when muscles or other soft tissues near your bones stretch or tear. These injuries can cause bruising, swelling, and pain. To ease your discomfort and speed the healing of your strain or sprain, follow the tips below. Remember, a strain or sprain can take 6 to 8 weeks to heal.     Important Note: Do not give aspirin to children or teens without discussing it with your healthcare provider first.        Ice first, heat later    Use ice for the first 24 to 48 hours after injury. Ice helps prevent swelling and reduce pain. Ice the injury for no more than 20 minutes at a time and allow at least  20 minutes between icing sessions.    Apply heat after the first 72 hours, once the swelling has gone down. Heat relaxes muscles and increases blood flow. Soak the injured area in warm water or use a heating pad set on low for no more than 15 minutes at a time.  Wrap and elevate    Wrap an injured limb firmly with an elastic bandage. This provides support and helps prevent swelling. Don t wear an elastic bandage overnight. Watch for tingling, numbness, or increased pain, and remove the bandage immediately if any of these occurs.    Elevate the injured area to help reduce swelling and throbbing. It s best to raise  an injured limb above the level of your heart.     Medicines    Over-the-counter medicines such as acetaminophen or ibuprofen can help reduce pain. Some also help reduce swelling.    Take medicine only as directed.    Rest the area even if medicines are controlling the pain.  Rest    Rest the injured area by not using it for 24 hours.    When you re ready, return slowly to your normal activities. Rest the injured area often.    Don t use or walk on an injured limb if it hurts.  Date Last Reviewed: 9/3/2015    2148-0944 [employer]. [address] [place] [address], [place] [address]. All rights reserved. This information is not intended as a substitute for professional medical care. Always follow your healthcare professional's instructions.          24 Hour Appointment Hotline       To make an appointment at any [facility], call [phone] ([phone]). If you don't have a family doctor or clinic, we will help you find one. [facility] clinics are conveniently located to serve the needs of you and your family.             Review of your medicines      START taking        Dose / Directions Last dose taken    oxyCODONE-acetaminophen 5-325 MG per tablet   Commonly known as:  PERCOCET   Dose:  1 tablet   Quantity:  6 tablet        Take 1 tablet by mouth every 4 hours as needed   Refills:  0          Our records show that you are taking the medicines listed below. If these are incorrect, please call your family doctor or clinic.        Dose / Directions Last dose taken    amLODIPine 10 MG tablet   Commonly known as:  NORVASC   Dose:  10 mg   Quantity:  90 tablet        Take 1 tablet (10 mg) by mouth daily   Refills:  3        aspirin 81 MG EC tablet   Dose:  81 mg   Quantity:  30 tablet        Take 1 tablet (81 mg) by mouth daily   Refills:  2        atorvastatin 40 MG tablet   Commonly known as:  LIPITOR   Dose:  40 mg   Quantity:  30 tablet        Take 1 tablet (40 mg) by mouth At Bedtime   Refills:  2         * GABAPENTIN PO   Dose:  300 mg        Take 300 mg by mouth every morning   Refills:  0        * GABAPENTIN PO   Dose:  600 mg        Take 600 mg by mouth At Bedtime   Refills:  0        * gabapentin 300 MG capsule   Commonly known as:  NEURONTIN   Dose:  300 mg   Quantity:  180 capsule        Take 1 capsule (300 mg) by mouth 2 times daily   Refills:  1        isosorbide mononitrate 30 MG 24 hr tablet   Commonly known as:  IMDUR   Dose:  45 mg   Quantity:  135 tablet        Take 1.5 tablets (45 mg) by mouth daily   Refills:  3        lisinopril 40 MG tablet   Commonly known as:  PRINIVIL/ZESTRIL   Dose:  40 mg        Take 40 mg by mouth daily   Refills:  0        loratadine 10 MG tablet   Commonly known as:  CLARITIN   Dose:  10 mg        Take 10 mg by mouth every evening   Refills:  0        meclizine 25 MG tablet   Commonly known as:  ANTIVERT   Dose:  25 mg   Quantity:  30 tablet        Take 1 tablet (25 mg) by mouth every 6 hours as needed for dizziness   Refills:  1        nitroGLYcerin 0.4 MG sublingual tablet   Commonly known as:  NITROSTAT   Quantity:  25 tablet        For chest pain place 1 tablet under the tongue every 5 minutes for 3 doses. If symptoms persist 5 minutes after 1st dose call 911.   Refills:  3        OMEPRAZOLE PO   Dose:  20 mg        Take 20 mg by mouth every morning   Refills:  0        tamsulosin 0.4 MG capsule   Commonly known as:  FLOMAX   Dose:  0.4 mg   Quantity:  90 capsule        Take 1 capsule (0.4 mg) by mouth daily   Refills:  3        TYLENOL PO   Dose:  1000 mg        Take 1,000 mg by mouth every 8 hours as needed for mild pain or fever   Refills:  0        * Notice:  This list has 3 medication(s) that are the same as other medications prescribed for you. Read the directions carefully, and ask your doctor or other care provider to review them with you.            Prescriptions were sent or printed at these locations (1 Prescription)                   Other Prescriptions                 Printed at Department/Unit printer (1 of 1)         oxyCODONE-acetaminophen (PERCOCET) 5-325 MG per tablet                Procedures and tests performed during your visit     US Lower Extremity Venous Duplex Left      Orders Needing Specimen Collection     None      Pending Results     No orders found from 3/18/2018 to 3/21/2018.            Pending Culture Results     No orders found from 3/18/2018 to 3/21/2018.            Pending Results Instructions     If you had any lab results that were not finalized at the time of your Discharge, you can call the ED Lab Result RN at [phone]. You will be contacted by this team for any positive Lab results or changes in treatment. The nurses are available 7 days a week from 10A to 6:30P.  You can leave a message 24 hours per day and they will return your call.        Test Results From Your Hospital Stay        3/20/2018  4:50 AM      Narrative     US LOWER EXTREMITY VENOUS DUPLEX LEFT   3/20/2018 4:35 AM     HISTORY: Left calf pain.    COMPARISON: None.    FINDINGS: Gray-scale, color and Doppler spectral analysis ultrasound  was performed of the left leg. Compression and augmentation imaging  was performed.    There is no evidence for deep venous thrombosis. The veins compress  and augment normally.        Impression     IMPRESSION: No DVT.    ZARINA GARG MD                Thank you for choosing [facility]       Thank you for choosing [facility] for your care. Our goal is always to provide you with excellent care. Hearing back from our patients is one way we can continue to improve our services. Please take a few minutes to complete the written survey that you may receive in the mail after you visit with us. Thank you!        [employer] Information     [email or web address] gives you secure access to your electronic health record. If you see a primary care provider, you can also send messages to your care team and make appointments. If you have questions, please call your  primary care clinic.  If you do not have a primary care provider, please call [phone] and they will assist you.        Care EveryWhere ID     This is your Care EveryWhere ID. This could be used by other organizations to access your [facility] medical records  [id number]        Equal Access to Services     BRIAN GARCIA : Ezra Garcia, dominga martinez, lilli willard, [place]. So [facility] [phone].    ATENCIÓN: Si habla español, tiene a morgan disposición servicios gratuitos de asistencia lingüística. Llame al [phone].    We comply with applicable federal civil rights laws and [place] laws. We do not discriminate on the basis of race, color, national origin, age, disability, sex, sexual orientation, or gender identity.            After Visit Summary       This is your record. Keep this with you and show to your community pharmacist(s) and doctor(s) at your next visit.

## 2018-03-20 NOTE — ED AVS SNAPSHOT
[facility] Emergency Department    [address] [facility] [address]    Phone:  [phone]    Fax:  [phone]                                       Stiven Nguyễn   MRN: [id number]    Department:  [facility] Emergency Department   Date of Visit:  3/20/2018           After Visit Summary Signature Page     I have received my discharge instructions, and my questions have been answered. I have discussed any challenges I see with this plan with the nurse or doctor.    ..........................................................................................................................................  Patient/Patient Representative Signature      ..........................................................................................................................................  Patient Representative Print Name and Relationship to Patient    ..................................................               ................................................  Date                                            Time    ..........................................................................................................................................  Reviewed by Signature/Title    ...................................................              ..............................................  Date                                                            Time
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
## 2018-07-14 ENCOUNTER — HOSPITAL ENCOUNTER (EMERGENCY)
Facility: CLINIC | Age: 63
Discharge: HOME | End: 2018-07-14
Attending: EMERGENCY MEDICINE | Admitting: EMERGENCY MEDICINE
Payer: OTHER GOVERNMENT

## 2018-07-14 ENCOUNTER — APPOINTMENT (OUTPATIENT)
Dept: GENERAL RADIOLOGY | Facility: CLINIC | Age: 63
End: 2018-07-14
Attending: EMERGENCY MEDICINE
Payer: OTHER GOVERNMENT

## 2018-07-14 VITALS
HEART RATE: 69 BPM | DIASTOLIC BLOOD PRESSURE: 93 MMHG | RESPIRATION RATE: 16 BRPM | TEMPERATURE: 98.5 F | OXYGEN SATURATION: 99 % | BODY MASS INDEX: 37.6 KG/M2 | WEIGHT: 285 LBS | SYSTOLIC BLOOD PRESSURE: 147 MMHG
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
## 2018-07-14 DIAGNOSIS — M25.552 HIP PAIN, LEFT: ICD-10-CM

## 2018-07-14 PROCEDURE — 96374 THER/PROPH/DIAG INJ IV PUSH: CPT

## 2018-07-14 PROCEDURE — 99283 EMERGENCY DEPT VISIT LOW MDM: CPT | Mod: Z6 | Performed by: EMERGENCY MEDICINE

## 2018-07-14 PROCEDURE — 25000132 ZZH RX MED GY IP 250 OP 250 PS 637: Performed by: EMERGENCY MEDICINE

## 2018-07-14 PROCEDURE — 73502 X-RAY EXAM HIP UNI 2-3 VIEWS: CPT

## 2018-07-14 PROCEDURE — 99284 EMERGENCY DEPT VISIT MOD MDM: CPT | Mod: 25

## 2018-07-14 PROCEDURE — 25000128 H RX IP 250 OP 636: Performed by: EMERGENCY MEDICINE

## 2018-07-14 RX ORDER — KETOROLAC TROMETHAMINE 30 MG/ML
30 INJECTION, SOLUTION INTRAMUSCULAR; INTRAVENOUS ONCE
Status: COMPLETED | OUTPATIENT
Start: 2018-07-14 | End: 2018-07-14

## 2018-07-14 RX ORDER — ACETAMINOPHEN 500 MG
1000 TABLET ORAL ONCE
Status: COMPLETED | OUTPATIENT
Start: 2018-07-14 | End: 2018-07-14

## 2018-07-14 RX ADMIN — ACETAMINOPHEN 1000 MG: 500 TABLET ORAL at 14:16

## 2018-07-14 RX ADMIN — KETOROLAC TROMETHAMINE 30 MG: 30 INJECTION, SOLUTION INTRAMUSCULAR at 14:32
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
## 2018-07-14 NOTE — ED NOTES
Pt states pain is improved and is ready to go home. Pt taken out to car in wheelchair accompanied by wife and other family. Verbalized understanding of discharge instructions and need for icing and rest, and to follow up with primary care provider in 7-10 days as needed for continued pain.

## 2018-07-14 NOTE — ED AVS SNAPSHOT
[facility] Emergency Department    [address] [address]    Phone:  [phone]    Fax:  [phone]                                       Stiven Nguyễn   MRN: [id number]    Department:  [facility] Emergency Department   Date of Visit:  7/14/2018           Patient Information     Date Of Birth          1955        Your diagnoses for this visit were:     Fall, initial encounter     Hip pain, left        You were seen by Trace Guillen DO.        Discharge Instructions       Apply ice to left hip -ice 30 minutes 4 times a day 2 days  May use Tylenol as needed for hip discomfort and headache  Left hip pain remains tender after 7-10 days recommend follow-up with  primary clinic provider  Activity as tolerated    24 Hour Appointment Hotline       To make an appointment at any [facility] clinic, call [phone] ([phone]). If you don't have a family doctor or clinic, we will help you find one. [facility] clinics are conveniently located to serve the needs of you and your family.             Review of your medicines      Our records show that you are taking the medicines listed below. If these are incorrect, please call your family doctor or clinic.        Dose / Directions Last dose taken    amLODIPine 10 MG tablet   Commonly known as:  NORVASC   Dose:  10 mg   Quantity:  90 tablet        Take 1 tablet (10 mg) by mouth daily   Refills:  3        aspirin 81 MG EC tablet   Dose:  81 mg   Quantity:  30 tablet        Take 1 tablet (81 mg) by mouth daily   Refills:  2        atorvastatin 40 MG tablet   Commonly known as:  LIPITOR   Dose:  40 mg   Quantity:  30 tablet        Take 1 tablet (40 mg) by mouth At Bedtime   Refills:  2        * GABAPENTIN PO   Dose:  300 mg        Take 300 mg by mouth every morning   Refills:  0        * GABAPENTIN PO   Dose:  600 mg        Take 600 mg by mouth At Bedtime   Refills:  0        * gabapentin 300 MG capsule   Commonly known as:   NEURONTIN   Dose:  300 mg   Quantity:  180 capsule        Take 1 capsule (300 mg) by mouth 2 times daily   Refills:  1        isosorbide mononitrate 30 MG 24 hr tablet   Commonly known as:  IMDUR   Dose:  45 mg   Quantity:  135 tablet        Take 1.5 tablets (45 mg) by mouth daily   Refills:  3        lisinopril 40 MG tablet   Commonly known as:  PRINIVIL/ZESTRIL   Dose:  40 mg        Take 40 mg by mouth daily   Refills:  0        loratadine 10 MG tablet   Commonly known as:  CLARITIN   Dose:  10 mg        Take 10 mg by mouth every evening   Refills:  0        meclizine 25 MG tablet   Commonly known as:  ANTIVERT   Dose:  25 mg   Quantity:  30 tablet        Take 1 tablet (25 mg) by mouth every 6 hours as needed for dizziness   Refills:  1        nitroGLYcerin 0.4 MG sublingual tablet   Commonly known as:  NITROSTAT   Quantity:  25 tablet        For chest pain place 1 tablet under the tongue every 5 minutes for 3 doses. If symptoms persist 5 minutes after 1st dose call 911.   Refills:  3        OMEPRAZOLE PO   Dose:  20 mg        Take 20 mg by mouth every morning   Refills:  0        oxyCODONE-acetaminophen 5-325 MG per tablet   Commonly known as:  PERCOCET   Dose:  1 tablet   Quantity:  6 tablet        Take 1 tablet by mouth every 4 hours as needed   Refills:  0        tamsulosin 0.4 MG capsule   Commonly known as:  FLOMAX   Dose:  0.4 mg   Quantity:  90 capsule        Take 1 capsule (0.4 mg) by mouth daily   Refills:  3        TYLENOL PO   Dose:  1000 mg        Take 1,000 mg by mouth every 8 hours as needed for mild pain or fever   Refills:  0        * Notice:  This list has 3 medication(s) that are the same as other medications prescribed for you. Read the directions carefully, and ask your doctor or other care provider to review them with you.            Procedures and tests performed during your visit     Orthostatic blood pressure and pulse    Pelvis XR w/ unilateral hip left      Orders Needing Specimen  Collection     None      Pending Results     Date and Time Order Name Status Description    7/14/2018 1256 Pelvis XR w/ unilateral hip left Preliminary             Pending Culture Results     No orders found from 7/12/2018 to 7/15/2018.            Pending Results Instructions     If you had any lab results that were not finalized at the time of your Discharge, you can call the ED Lab Result RN at [phone]. You will be contacted by this team for any positive Lab results or changes in treatment. The nurses are available 7 days a week from 10A to 6:30P.  You can leave a message 24 hours per day and they will return your call.        Test Results From Your Hospital Stay        7/14/2018  1:33 PM      Narrative     PELVIS AND LEFT HIP ONE VIEW  7/14/2018 1:24 PM     COMPARISON: None.    HISTORY:  Fall.     FINDINGS: The visualized bones and joint spaces are within normal  limits.        Impression     IMPRESSION: No evidence for fracture, dislocation or significant  degenerative change of the pelvis or left hip.                Thank you for choosing [facility]       Thank you for choosing [facility] for your care. Our goal is always to provide you with excellent care. Hearing back from our patients is one way we can continue to improve our services. Please take a few minutes to complete the written survey that you may receive in the mail after you visit with us. Thank you!        [employer] Information     [employer] gives you secure access to your electronic health record. If you see a primary care provider, you can also send messages to your care team and make appointments. If you have questions, please call your primary care clinic.  If you do not have a primary care provider, please call [phone] and they will assist you.        Care EveryWhere ID     This is your Care EveryWhere ID. This could be used by other organizations to access your [facility] medical records  [id number]        Equal Access to Services     BRIAN  RADHA : Danielii savanna Garcia, watinyda luqadaha, qaybta kaneymar willard, [place]. [facility] [phone].    ATENCIÓN: Si habla español, tiene a morgan disposición servicios gratuitos de asistencia lingüística. Llame al [phone].    We comply with applicable federal civil rights laws and [place] laws. We do not discriminate on the basis of race, color, national origin, age, disability, sex, sexual orientation, or gender identity.            After Visit Summary       This is your record. Keep this with you and show to your community pharmacist(s) and doctor(s) at your next visit.
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
## 2018-07-14 NOTE — DISCHARGE INSTRUCTIONS
Apply ice to left hip -ice 30 minutes 4 times a day 2 days  May use Tylenol as needed for hip discomfort and headache  Left hip pain remains tender after 7-10 days recommend follow-up with  primary clinic provider  Activity as tolerated

## 2018-07-14 NOTE — ED AVS SNAPSHOT
[facility] Emergency Department    [address] [address]    Phone:  [phone]    Fax:  [phone]                                       Sitven Nguyễn   MRN: [id number]    Department:  [facility] Emergency Department   Date of Visit:  7/14/2018           After Visit Summary Signature Page     I have received my discharge instructions, and my questions have been answered. I have discussed any challenges I see with this plan with the nurse or doctor.    ..........................................................................................................................................  Patient/Patient Representative Signature      ..........................................................................................................................................  Patient Representative Print Name and Relationship to Patient    ..................................................               ................................................  Date                                            Time    ..........................................................................................................................................  Reviewed by Signature/Title    ...................................................              ..............................................  Date                                                            Time

## 2018-10-04 ENCOUNTER — OFFICE VISIT (OUTPATIENT)
Dept: CARDIOLOGY | Facility: CLINIC | Age: 63
End: 2018-10-04
Attending: INTERNAL MEDICINE
Payer: OTHER GOVERNMENT

## 2018-10-04 VITALS
SYSTOLIC BLOOD PRESSURE: 155 MMHG | BODY MASS INDEX: 36.86 KG/M2 | HEART RATE: 55 BPM | DIASTOLIC BLOOD PRESSURE: 88 MMHG | OXYGEN SATURATION: 97 % | WEIGHT: 279.4 LBS

## 2018-10-04 DIAGNOSIS — I10 ESSENTIAL HYPERTENSION: ICD-10-CM

## 2018-10-04 DIAGNOSIS — I20.1 CORONARY VASOSPASM (H): Primary | ICD-10-CM

## 2018-10-04 PROCEDURE — 99214 OFFICE O/P EST MOD 30 MIN: CPT | Performed by: INTERNAL MEDICINE

## 2018-10-04 RX ORDER — NITROGLYCERIN 0.4 MG/1
TABLET SUBLINGUAL
Qty: 25 TABLET | Refills: 3 | Status: SHIPPED | OUTPATIENT
Start: 2018-10-04 | End: 2019-02-26
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
## 2018-10-04 NOTE — MR AVS SNAPSHOT
After Visit Summary   10/4/2018    Stiven Nguyễn    MRN: [id number]           Patient Information     Date Of Birth          1955        Visit Information        Provider Department      10/4/2018 3:00 PM Gómez Pickard MD [facility]        Today's Diagnoses     Coronary vasospasm (H)    -  1    Essential hypertension           Follow-ups after your visit        Additional Services     Follow-Up with Cardiologist                 Future tests that were ordered for you today     Open Future Orders        Priority Expected Expires Ordered    Follow-Up with Cardiologist Routine 10/4/2019 10/5/2019 10/4/2018            Who to contact     If you have questions or need follow up information about today's clinic visit or your schedule please contact [facility] directly at [phone].  Normal or non-critical lab and imaging results will be communicated to you by Desinohart, letter or phone within 4 business days after the clinic has received the results. If you do not hear from us within 7 days, please contact the clinic through Desinohart or phone. If you have a critical or abnormal lab result, we will notify you by phone as soon as possible.  Submit refill requests through Selligy or call your pharmacy and they will forward the refill request to us. Please allow 3 business days for your refill to be completed.          Additional Information About Your Visit        MyChart Information     Selligy gives you secure access to your electronic health record. If you see a primary care provider, you can also send messages to your care team and make appointments. If you have questions, please call your primary care clinic.  If you do not have a primary care provider, please call [phone] and they will assist you.        Care EveryWhere ID     This is your Care EveryWhere ID. This could be used by other organizations to access  your [facility] medical records  [id number]        Your Vitals Were     Pulse Pulse Oximetry BMI (Body Mass Index)             55 97% 36.86 kg/m2          Blood Pressure from Last 3 Encounters:   10/04/18 155/88   07/14/18 (!) 147/93   03/20/18 131/71    Weight from Last 3 Encounters:   10/04/18 126.7 kg (279 lb 6.4 oz)   07/14/18 129.3 kg (285 lb)   01/26/18 131.8 kg (290 lb 9.6 oz)              We Performed the Following     Follow-Up with Cardiologist          Today's Medication Changes          These changes are accurate as of 10/4/18  3:11 PM.  If you have any questions, ask your nurse or doctor.               These medicines have changed or have updated prescriptions.        Dose/Directions    atorvastatin 40 MG tablet   Commonly known as:  LIPITOR   This may have changed:  how much to take   Used for:  NSTEMI (non-ST elevated myocardial infarction) (H)        Dose:  40 mg   Take 1 tablet (40 mg) by mouth At Bedtime   Quantity:  30 tablet   Refills:  2       tamsulosin 0.4 MG capsule   Commonly known as:  FLOMAX   This may have changed:  when to take this   Used for:  Hypertrophy of prostate with urinary obstruction and other lower urinary tract symptoms (LUTS), Bladder retention        Dose:  0.4 mg   Take 1 capsule (0.4 mg) by mouth daily   Quantity:  90 capsule   Refills:  3            Where to get your medicines      These medications were sent to [facility] [place], [place] - [address]   [address] [facility] [address]     Phone:  [phone]     nitroGLYcerin 0.4 MG sublingual tablet                Primary Care Provider Office Phone # Fax #    Oliva Zhang [phone]       [facility] [address] [facility] [address]        Equal Access to Services     BRIAN GARCIA AH: Ezra Garcia, wadenys martinez, qaybta kaalkat willard, [place]. So [facility] [phone].    ATENCIÓN: Si carola parker morgan  disposición servicios gratuitos de asistencia lingüística. Antonieta madrigal [phone].    We comply with applicable federal civil rights laws and [place] laws. We do not discriminate on the basis of race, color, national origin, age, disability, sex, sexual orientation, or gender identity.            Thank you!     Thank you for choosing [facility]  for your care. Our goal is always to provide you with excellent care. Hearing back from our patients is one way we can continue to improve our services. Please take a few minutes to complete the written survey that you may receive in the mail after your visit with us. Thank you!             Your Updated Medication List - Protect others around you: Learn how to safely use, store and throw away your medicines at www.disposemymeds.org.          This list is accurate as of 10/4/18  3:11 PM.  Always use your most recent med list.                   Brand Name Dispense Instructions for use Diagnosis    amLODIPine 10 MG tablet    NORVASC    90 tablet    Take 1 tablet (10 mg) by mouth daily    Coronary vasospasm (H)       aspirin 81 MG EC tablet     30 tablet    Take 1 tablet (81 mg) by mouth daily    NSTEMI (non-ST elevated myocardial infarction) (H)       atorvastatin 40 MG tablet    LIPITOR    30 tablet    Take 1 tablet (40 mg) by mouth At Bedtime    NSTEMI (non-ST elevated myocardial infarction) (H)       isosorbide mononitrate 30 MG 24 hr tablet    IMDUR    135 tablet    Take 1.5 tablets (45 mg) by mouth daily    Chest pain, unspecified chest pain type       lisinopril 40 MG tablet    PRINIVIL/ZESTRIL     Take 40 mg by mouth daily        loratadine 10 MG tablet    CLARITIN     Take 10 mg by mouth every evening        nitroGLYcerin 0.4 MG sublingual tablet    NITROSTAT    25 tablet    For chest pain place 1 tablet under the tongue every 5 minutes for 3 doses. If symptoms persist 5 minutes after 1st dose call 911.    Coronary vasospasm (H)        OMEPRAZOLE PO      Take 20 mg by mouth every morning        tamsulosin 0.4 MG capsule    FLOMAX    90 capsule    Take 1 capsule (0.4 mg) by mouth daily    Hypertrophy of prostate with urinary obstruction and other lower urinary tract symptoms (LUTS), Bladder retention       TYLENOL PO      Take 1,000 mg by mouth every 8 hours as needed for mild pain or fever
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
## 2018-10-04 NOTE — PROGRESS NOTES
Service Date: 10/04/2018      HISTORY OF PRESENT ILLNESS:  Mr. Headley is a pleasant 63-year-old gentleman with a history of vasospastic angina, mild coronary artery disease, history of DVT and PE for which he was seen at the [facility], and it was felt that he did not require long-term anticoagulation therapy, who returns in annual followup.  The patient has been feeling well since I have seen him last.  He has had only 3 episodes of chest discomfort, all of which have responded to 1 sublingual nitroglycerin.  Overall, he feels that he is doing quite well.      The patient denies any other types of chest pain, chest pressure, shortness of breath, orthopnea or paroxysmal nocturnal dyspnea.      His blood pressure is elevated at today's visit at 155/88.  He was recently seen by his family physician at [facility] and at that time had a blood pressure in the 120s.      Please see my separate note with his full physical examination.      IMPRESSION AND PLAN:  Mr. Headley is a pleasant 63-year-old gentleman with vasospastic angina with coexisting mild coronary artery disease.  He has been relatively clinically asymptomatic over the last 10 months.  He has only had to take nitroglycerin 3 times.  At this time, I have refilled his nitroglycerin.  He is instructed to follow back with his family physician for his elevated blood pressures.  I will plan to see him back in routine followup in 1 year.         JERMAINE BIRD MD             D: 10/04/2018   T: 10/04/2018   MT: DINH      Name:     MARKEL HEADLEY   MRN:      [id number]        Account:      [id number]   :      1955           Service Date: 10/04/2018      Document: [id number]

## 2018-10-04 NOTE — LETTER
10/4/2018      Oliva Zhang MD  [facility] [address] [address]      RE: Markel Headley       Dear Colleague,    I had the pleasure of seeing Markel Headley in the [facility].    Service Date: 10/04/2018      HISTORY OF PRESENT ILLNESS:  Mr. Headley is a pleasant 63-year-old gentleman with a history of vasospastic angina, mild coronary artery disease, history of DVT and PE for which he was seen at the [facility], and it was felt that he did not require long-term anticoagulation therapy, who returns in annual followup.  The patient has been feeling well since I have seen him last.  He has had only 3 episodes of chest discomfort, all of which have responded to 1 sublingual nitroglycerin.  Overall, he feels that he is doing quite well.      The patient denies any other types of chest pain, chest pressure, shortness of breath, orthopnea or paroxysmal nocturnal dyspnea.      His blood pressure is elevated at today's visit at 155/88.  He was recently seen by his family physician at [facility] and at that time had a blood pressure in the 120s.      Please see my separate note with his full physical examination.      IMPRESSION AND PLAN:  Mr. Headley is a pleasant 63-year-old gentleman with vasospastic angina with coexisting mild coronary artery disease.  He has been relatively clinically asymptomatic over the last 10 months.  He has only had to take nitroglycerin 3 times.  At this time, I have refilled his nitroglycerin.  He is instructed to follow back with his family physician for his elevated blood pressures.  I will plan to see him back in routine followup in 1 year.         JERMAINE BIRD MD             D: 10/04/2018   T: 10/04/2018   MT: DINH      Name:     MARKEL HEADLEY   MRN:      [id number]        Account:      [id number]   :      1955           Service Date: 10/04/2018      Document: [id number]         Outpatient Encounter Prescriptions as of  10/4/2018   Medication Sig Dispense Refill     Acetaminophen (TYLENOL PO) Take 1,000 mg by mouth every 8 hours as needed for mild pain or fever        amLODIPine (NORVASC) 10 MG tablet Take 1 tablet (10 mg) by mouth daily 90 tablet 3     aspirin EC 81 MG EC tablet Take 1 tablet (81 mg) by mouth daily 30 tablet 2     atorvastatin (LIPITOR) 40 MG tablet Take 1 tablet (40 mg) by mouth At Bedtime (Patient taking differently: Take 20 mg by mouth At Bedtime ) 30 tablet 2     isosorbide mononitrate (IMDUR) 30 MG 24 hr tablet Take 1.5 tablets (45 mg) by mouth daily 135 tablet 3     lisinopril (PRINIVIL,ZESTRIL) 40 MG tablet Take 40 mg by mouth daily       loratadine (CLARITIN) 10 MG tablet Take 10 mg by mouth every evening        nitroGLYcerin (NITROSTAT) 0.4 MG sublingual tablet For chest pain place 1 tablet under the tongue every 5 minutes for 3 doses. If symptoms persist 5 minutes after 1st dose call 911. 25 tablet 3     OMEPRAZOLE PO Take 20 mg by mouth every morning       tamsulosin (FLOMAX) 0.4 MG 24 hr capsule Take 1 capsule (0.4 mg) by mouth daily (Patient taking differently: Take 0.4 mg by mouth every evening ) 90 capsule 3     [DISCONTINUED] meclizine (ANTIVERT) 25 MG tablet Take 1 tablet (25 mg) by mouth every 6 hours as needed for dizziness 30 tablet 1     [DISCONTINUED] nitroglycerin (NITROSTAT) 0.4 MG sublingual tablet For chest pain place 1 tablet under the tongue every 5 minutes for 3 doses. If symptoms persist 5 minutes after 1st dose call 911. 25 tablet 3     [DISCONTINUED] oxyCODONE-acetaminophen (PERCOCET) 5-325 MG per tablet Take 1 tablet by mouth every 4 hours as needed 6 tablet 0     No facility-administered encounter medications on file as of 10/4/2018.        Again, thank you for allowing me to participate in the care of your patient.      Sincerely,    Gómez Pickard MD     [facility]

## 2018-10-15 ENCOUNTER — HOSPITAL ENCOUNTER (OUTPATIENT)
Dept: PHYSICAL THERAPY | Facility: CLINIC | Age: 63
Setting detail: THERAPIES SERIES
End: 2018-10-15
Attending: FAMILY MEDICINE
Payer: OTHER GOVERNMENT

## 2018-10-15 PROCEDURE — 97110 THERAPEUTIC EXERCISES: CPT | Mod: GP | Performed by: PHYSICAL THERAPIST

## 2018-10-15 PROCEDURE — 40000718 ZZHC STATISTIC PT DEPARTMENT ORTHO VISIT: Performed by: PHYSICAL THERAPIST

## 2018-10-15 PROCEDURE — 97161 PT EVAL LOW COMPLEX 20 MIN: CPT | Mod: GP | Performed by: PHYSICAL THERAPIST
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
## 2018-10-24 ENCOUNTER — HOSPITAL ENCOUNTER (OUTPATIENT)
Dept: PHYSICAL THERAPY | Facility: CLINIC | Age: 63
Setting detail: THERAPIES SERIES
End: 2018-10-24
Attending: FAMILY MEDICINE
Payer: OTHER GOVERNMENT

## 2018-10-24 PROCEDURE — 97140 MANUAL THERAPY 1/> REGIONS: CPT | Mod: GP | Performed by: PHYSICAL THERAPIST

## 2018-10-24 PROCEDURE — 97110 THERAPEUTIC EXERCISES: CPT | Mod: GP | Performed by: PHYSICAL THERAPIST

## 2018-10-24 PROCEDURE — 40000718 ZZHC STATISTIC PT DEPARTMENT ORTHO VISIT: Performed by: PHYSICAL THERAPIST

## 2018-10-26 ENCOUNTER — APPOINTMENT (OUTPATIENT)
Dept: GENERAL RADIOLOGY | Facility: CLINIC | Age: 63
End: 2018-10-26
Attending: EMERGENCY MEDICINE
Payer: OTHER GOVERNMENT

## 2018-10-26 ENCOUNTER — HOSPITAL ENCOUNTER (EMERGENCY)
Facility: CLINIC | Age: 63
Discharge: HOME | End: 2018-10-26
Attending: EMERGENCY MEDICINE | Admitting: EMERGENCY MEDICINE
Payer: OTHER GOVERNMENT

## 2018-10-26 VITALS
HEIGHT: 72 IN | OXYGEN SATURATION: 95 % | WEIGHT: 277 LBS | BODY MASS INDEX: 37.52 KG/M2 | DIASTOLIC BLOOD PRESSURE: 96 MMHG | SYSTOLIC BLOOD PRESSURE: 153 MMHG | TEMPERATURE: 97 F | RESPIRATION RATE: 13 BRPM

## 2018-10-26 DIAGNOSIS — R07.9 ACUTE CHEST PAIN: ICD-10-CM

## 2018-10-26 DIAGNOSIS — Z86.79 HISTORY OF CORONARY ARTERY DISEASE: ICD-10-CM

## 2018-10-26 DIAGNOSIS — R79.89 ELEVATED TROPONIN: ICD-10-CM

## 2018-10-26 LAB
ANION GAP SERPL CALCULATED.3IONS-SCNC: 6 MMOL/L (ref 3–14)
BASOPHILS # BLD AUTO: 0 10E9/L (ref 0–0.2)
BASOPHILS NFR BLD AUTO: 0.3 %
BUN SERPL-MCNC: 22 MG/DL (ref 7–30)
CALCIUM SERPL-MCNC: 8.9 MG/DL (ref 8.5–10.1)
CHLORIDE SERPL-SCNC: 106 MMOL/L (ref 94–109)
CO2 SERPL-SCNC: 29 MMOL/L (ref 20–32)
CREAT SERPL-MCNC: 0.92 MG/DL (ref 0.66–1.25)
D DIMER PPP FEU-MCNC: 0.6 UG/ML FEU (ref 0–0.5)
DIFFERENTIAL METHOD BLD: NORMAL
EOSINOPHIL # BLD AUTO: 0.2 10E9/L (ref 0–0.7)
EOSINOPHIL NFR BLD AUTO: 2.5 %
ERYTHROCYTE [DISTWIDTH] IN BLOOD BY AUTOMATED COUNT: 13.7 % (ref 10–15)
GFR SERPL CREATININE-BSD FRML MDRD: 84 ML/MIN/1.7M2
GLUCOSE SERPL-MCNC: 190 MG/DL (ref 70–99)
HCT VFR BLD AUTO: 43.1 % (ref 40–53)
HGB BLD-MCNC: 14.7 G/DL (ref 13.3–17.7)
IMM GRANULOCYTES # BLD: 0 10E9/L (ref 0–0.4)
IMM GRANULOCYTES NFR BLD: 0.3 %
INTERPRETATION ECG - MUSE: NORMAL
INTERPRETATION ECG - MUSE: NORMAL
LYMPHOCYTES # BLD AUTO: 1.4 10E9/L (ref 0.8–5.3)
LYMPHOCYTES NFR BLD AUTO: 19.1 %
MCH RBC QN AUTO: 29.7 PG (ref 26.5–33)
MCHC RBC AUTO-ENTMCNC: 34.1 G/DL (ref 31.5–36.5)
MCV RBC AUTO: 87 FL (ref 78–100)
MONOCYTES # BLD AUTO: 0.7 10E9/L (ref 0–1.3)
MONOCYTES NFR BLD AUTO: 9 %
NEUTROPHILS # BLD AUTO: 5 10E9/L (ref 1.6–8.3)
NEUTROPHILS NFR BLD AUTO: 68.8 %
NRBC # BLD AUTO: 0 10*3/UL
NRBC BLD AUTO-RTO: 0 /100
PLATELET # BLD AUTO: 164 10E9/L (ref 150–450)
POTASSIUM SERPL-SCNC: 3.8 MMOL/L (ref 3.4–5.3)
RBC # BLD AUTO: 4.95 10E12/L (ref 4.4–5.9)
SODIUM SERPL-SCNC: 141 MMOL/L (ref 133–144)
TROPONIN I SERPL-MCNC: 0.07 UG/L (ref 0–0.04)
TROPONIN I SERPL-MCNC: 0.08 UG/L (ref 0–0.04)
WBC # BLD AUTO: 7.2 10E9/L (ref 4–11)

## 2018-10-26 PROCEDURE — 84484 ASSAY OF TROPONIN QUANT: CPT | Mod: 91 | Performed by: EMERGENCY MEDICINE

## 2018-10-26 PROCEDURE — 93005 ELECTROCARDIOGRAM TRACING: CPT | Mod: 76

## 2018-10-26 PROCEDURE — 85025 COMPLETE CBC W/AUTO DIFF WBC: CPT | Performed by: EMERGENCY MEDICINE

## 2018-10-26 PROCEDURE — 84484 ASSAY OF TROPONIN QUANT: CPT | Performed by: EMERGENCY MEDICINE

## 2018-10-26 PROCEDURE — 71046 X-RAY EXAM CHEST 2 VIEWS: CPT

## 2018-10-26 PROCEDURE — 99285 EMERGENCY DEPT VISIT HI MDM: CPT | Mod: 25

## 2018-10-26 PROCEDURE — 85379 FIBRIN DEGRADATION QUANT: CPT | Performed by: EMERGENCY MEDICINE

## 2018-10-26 PROCEDURE — 93005 ELECTROCARDIOGRAM TRACING: CPT

## 2018-10-26 PROCEDURE — 80048 BASIC METABOLIC PNL TOTAL CA: CPT | Performed by: EMERGENCY MEDICINE

## 2018-10-26 RX ORDER — ISOSORBIDE MONONITRATE 60 MG/1
60 TABLET, EXTENDED RELEASE ORAL DAILY
Qty: 30 TABLET | Refills: 0 | Status: SHIPPED | OUTPATIENT
Start: 2018-10-26 | End: 2018-11-12 | Stop reason: DRUGHIGH

## 2018-10-26 NOTE — ED PROVIDER NOTES
H/o vasospastic angina. Abnormal trop 0.078, lowest ever.  Last CP at 10 AM resolved w/ 2 nitroglycerin. Doesn't want to be admitted.  D-dimer, normal for age.     Awaiting for repeat troponin and likely discharge.     Discharge plan w/ increase in Imdur if troponin is unchanged.    Patient's troponin is not significantly changed and discharged. Return precautions discussed.     Mayo Roberts MD  10/26/18 4034

## 2018-10-26 NOTE — ED AVS SNAPSHOT
Emergency Department    [address] [place] [address]    Phone:  [phone]    Fax:  [phone]                                       Stiven Nguyễn   MRN: [id number]    Department:   Emergency Department   Date of Visit:  10/26/2018           After Visit Summary Signature Page     I have received my discharge instructions, and my questions have been answered. I have discussed any challenges I see with this plan with the nurse or doctor.    ..........................................................................................................................................  Patient/Patient Representative Signature      ..........................................................................................................................................  Patient Representative Print Name and Relationship to Patient    ..................................................               ................................................  Date                                   Time    ..........................................................................................................................................  Reviewed by Signature/Title    ...................................................              ..............................................  Date                                               Time          22EPIC Rev 08/18

## 2018-10-26 NOTE — ED PROVIDER NOTES
"  History     Chief Complaint:  Chest Pain    HPI   Stiven Nguyễn is a 63 year old male, with history of anxiety, CAD, GERD, HLD, HTN, and vasospastic angina previously angiographically demonstrated, among others, who presents for evaluation of chest pain. This morning at 1000, he began having mid sternal chest pain at work. He took a nitroglycerin, and slowed down his work. He took a second nitroglycerin at 1200, and felt \"like someone was sitting on my chest\". He then came here to the ED. He now feels ok.  No nausea or vomiting. No new swelling in his legs. He does have a history of DVT, and PE, and when asked if this pain feels similar he states he is unsure as \"it's been a while\".  He thinks that his symptoms today were from recurrent vasospasm as it felt the same.  He reports compliance with his Imdur 45mg.  No recent cough or fevers.  No abdominal pain.  No vomiting.  He took his aspirin and Imdur already this morning.    Allergies:  Cialis [Tadalafil]  Cyclobenzaprine  Vardenafil  Venlafaxine  Biaxin [Clarithromycin]      Medications:    Tylenol  Amlodipine  Asprin  Lipitor  Imdur  Lisinopril  Claritin  Nitroglycerin  Omeprazole  Flomax     Past Medical History:    Anxiety  Borderline diabetes  Cataplexy  Chronic kidney disease  Coronary artery disease  DVT (deep vein thrombosis)  GERD  Headache  Hyperlipidemia  Hypertension  Nephrolithiasis  NSTEMI - 2017  Obese  Pulmonary embolism  Sleep apnea    Past Surgical History:    Coronary angiography mLAD 40-50% stenosis x2  Laser holmium lithotripsy ureter, insert stent  Orthopedic surgery    Family History:    Cancer  Neurologic disorder  Depression    Social History:  Relationship status:   Tobacco use: Former snuff, quit date 7/7/2011  Alcohol use: No  The patient presents on his own.    Marital Status:   [2]     Review of Systems   All other systems reviewed and are negative.    Physical Exam     Patient Vitals for the past 24 hrs:   BP Temp " Temp src Heart Rate Resp SpO2 Height Weight   10/26/18 1354 - - - 60 18 - - -   10/26/18 1307 169/80 97  F (36.1  C) Temporal 73 16 98 % 1.829 m (6') 125.6 kg (277 lb)        Physical Exam  General: nontoxic appearing male sitting upright in room 20, wife later at bedside  HENT: mucous membranes moist, facial hair  CV: regular rate, regular rhythm, no lower extremity edema, no JVD, palpable symmetric radial pulses, no murmur audible  Resp: clear throughout, normal effort, no crackles or wheezing  GI: abdomen soft and nontender, no guarding, negative Grover's sign  MSK: no bony tenderness to chest  Skin: appropriately warm and dry, no erythema or vesicles to chest wall  Neuro: alert, clear speech, oriented   Psych: calm, cooperative, pleasant, makes jokes      Emergency Department Course     ECG: #1  Indication: Chest pain  Completed at 1306.  Read at 1307 by Dr. Solis, and 1330 by Dr. Magdaleno.   Sinus rhythm with sinus arrhythmia  Junctional ST depression, probably normal  Rate 69 bpm. OR interval 136. QRS duration 86. QT/QTc 398/426. P-R-T axes 69  23  57.     ECG: #2  Indication: Chest pain  Completed at 1438.  Read at 1442.   Sinus bradycardia.  Rate 55 bpm. OR interval 146. QRS duration 102. QT/QTc 446/426. P-R-T axes 60  30  41.     Imaging:  Radiology findings were communicated with the patient who voiced understanding of the findings.    XR Chest 2 Views  IMPRESSION: Lungs are well-inflated and clear. Heart size is normal.   Reading per radiology.     Laboratory:  Laboratory findings were communicated with the patient who voiced understanding of the findings.  D Dimer (Collected 1308): 0.6(H)   BMP: Glucose: 190(H) o/w WNL (Creatinine 0.92)  CBC: AWNL (WBC 7.2, HGB 14.7, )  Troponin (Collected 1308): 0.078(H)   Troponin - ordered to be drawn 16:00    Interventions:  Cardiac monitoring.    Emergency Department Course:  Nursing notes and vitals reviewed.    EKG's obtained in the ED, see results above.      IV was inserted and blood was drawn for laboratory testing, results above.     The patient was sent for a XR while in the emergency department, results above.       The patient was placed on continuous cardiac and pulse ox monitoring.  I performed electronic chart review in EPIC.    1321 I performed an exam of the patient as documented above.     1448 I rechecked the patient.     1506 I spoke with Dr. Lauren of Cardiology.  He thinks that checking a repeat troponin followed by discharge home would be reasonable if not significantly trending upward.  Recommended increasing Imdur from 45 mg to 60 mg daily.    1514 The patient has an inpatient bed placed, but is now requesting a private bed upstairs. Threatening to leave AMA if he does not get his private room.     1530 I discussed the patient's case with my colleague, Dr. Roberts.      Findings and plan explained to the patient. I personally reviewed the laboratory results with the patient and answered all related questions prior to sign out.    Impression & Plan      Medical Decision Making:  Given his history of prior episodes like this, the patient and I both think it is most likely that he is having recurrent coronary artery vasospasm as a cause of his acute chest pain.  This has been previously demonstrated angiographically.  Certainly a more conventional acute coronary syndrome including coronary artery occlusion from thrombus was considered as well, and is not yet completely ruled out.  Comp getting his evaluation is that his troponin is always abnormal, though today's level is lower than it has been in the past.  He had a cath within the past year without interval change, expresses a strong motivation for discharge, declined my offer of hospitalization given this abnormal test result.  If his troponin remains essentially flat, I think his request for discharge would be reasonable, with recommendation to increase his Imdur from his cardiologist.  Return  precautions reviewed in detail and agreed upon.  I have asked my colleague Dr. Roberts to follow-up on the results of his repeat troponin and monitor the patient in the interim, and address any interval changes and finalize his disposition.  His troponin is significantly trending upward, hospitalization will be appropriate.  Normal d-dimer when adjusted for age makes pulmonary embolism very unlikely.  He is asymptomatic at this time I do not suspect aortic catastrophe.  Chest x-ray shows no pneumothorax or infiltrate.    Diagnosis:    ICD-10-CM    1. Acute chest pain R07.9     very likely vasospastic angina   2. History of coronary artery disease Z86.79    3. Elevated troponin R74.8       Disposition:   Signed out to Dr. Roberts.        Scribe Disclosure:  I, Patt Samson, am serving as a scribe at 1:27 PM on 10/26/2018 to document services personally performed by Hemant Magdaleno MD, based on my observations and the provider's statements to me.     This record was created at least in part using electronic voice recognition software, so please excuse any typographical errors.      Patt Samson  10/26/2018    EMERGENCY DEPARTMENT       Hemant Magdaleno MD  10/26/18 5468

## 2018-10-26 NOTE — ED AVS SNAPSHOT
Emergency Department    [address] [facility] [address]    Phone:  [phone]    Fax:  [phone]                                       Stiven Nguyễn   MRN: [id number]    Department:   Emergency Department   Date of Visit:  10/26/2018           Patient Information     Date Of Birth          1955        Your diagnoses for this visit were:     Acute chest pain very likely vasospastic angina    History of coronary artery disease     Elevated troponin        You were seen by Hemant Magdaelno MD and Mayo Roberts MD.      Follow-up Information     Follow up with Oliva Zhang MD. Call in 3 days.    Specialty:  Family Practice    Contact information:    [facility]  [address]  [place] [address]  [phone]          Follow up with Gómez Pickard MD. Call in 3 days.    Specialty:  Cardiology    Contact information:    [address]  [facility] [id number] [phone]          Follow up with  Emergency Department.    Specialty:  EMERGENCY MEDICINE    Why:  As needed, If symptoms worsen    Contact information:    [address] [facility] [id number] [phone]      Discharge References/Attachments     CHEST PAIN, UNCERTAIN CAUSE (ENGLISH)      Your next 10 appointments already scheduled     Nov 08, 2018  1:30 PM CST   Ortho Treatment with Kris Hoenk, PT   [facility] ([facility])    [address]  [address]  [facility] [address] [phone]              24 Hour Appointment Hotline       To make an appointment at any [facility], call [phone] ([phone]). If you don't have a family doctor or clinic, we will help you find one. [facility] are conveniently located to serve the needs of you and your family.             Review of your medicines      CONTINUE these medicines which may have CHANGED, or have new prescriptions. If we are uncertain of the size of  tablets/capsules you have at home, strength may be listed as something that might have changed.        Dose / Directions Last dose taken    isosorbide mononitrate 60 MG 24 hr tablet   Commonly known as:  IMDUR   Dose:  60 mg   What changed:    - medication strength  - how much to take   Quantity:  30 tablet        Take 1 tablet (60 mg) by mouth daily   Refills:  0          Our records show that you are taking the medicines listed below. If these are incorrect, please call your family doctor or clinic.        Dose / Directions Last dose taken    amLODIPine 10 MG tablet   Commonly known as:  NORVASC   Dose:  10 mg   Quantity:  90 tablet        Take 1 tablet (10 mg) by mouth daily   Refills:  3        aspirin 81 MG EC tablet   Dose:  81 mg   Quantity:  30 tablet        Take 1 tablet (81 mg) by mouth daily   Refills:  2        atorvastatin 40 MG tablet   Commonly known as:  LIPITOR   Dose:  40 mg   Quantity:  30 tablet        Take 1 tablet (40 mg) by mouth At Bedtime   Refills:  2        lisinopril 40 MG tablet   Commonly known as:  PRINIVIL/ZESTRIL   Dose:  40 mg        Take 40 mg by mouth daily   Refills:  0        loratadine 10 MG tablet   Commonly known as:  CLARITIN   Dose:  10 mg        Take 10 mg by mouth every evening   Refills:  0        nitroGLYcerin 0.4 MG sublingual tablet   Commonly known as:  NITROSTAT   Quantity:  25 tablet        For chest pain place 1 tablet under the tongue every 5 minutes for 3 doses. If symptoms persist 5 minutes after 1st dose call 911.   Refills:  3        OMEPRAZOLE PO   Dose:  20 mg        Take 20 mg by mouth every morning   Refills:  0        tamsulosin 0.4 MG capsule   Commonly known as:  FLOMAX   Dose:  0.4 mg   Quantity:  90 capsule        Take 1 capsule (0.4 mg) by mouth daily   Refills:  3        TYLENOL PO   Dose:  1000 mg        Take 1,000 mg by mouth every 8 hours as needed for mild pain or fever   Refills:  0                Prescriptions were sent or printed at these  locations (1 Prescription)                   Other Prescriptions                Printed at Department/Unit printer (1 of 1)         isosorbide mononitrate (IMDUR) 60 MG 24 hr tablet                Procedures and tests performed during your visit     Procedure/Test Number of Times Performed    Basic metabolic panel 1    CBC with platelets + differential 1    Cardiac Continuous Monitoring 1    D dimer quantitative 1    EKG 12 lead 2    Peripheral IV catheter 1    Pulse oximetry nursing 1    Troponin I 1    Troponin I (now) 1    XR Chest 2 Views 1      Orders Needing Specimen Collection     None      Pending Results     No orders found from 10/24/2018 to 10/27/2018.            Pending Culture Results     No orders found from 10/24/2018 to 10/27/2018.            Pending Results Instructions     If you had any lab results that were not finalized at the time of your Discharge, you can call the ED Lab Result RN at [phone]. You will be contacted by this team for any positive Lab results or changes in treatment. The nurses are available 7 days a week from 10A to 6:30P.  You can leave a message 24 hours per day and they will return your call.        Test Results From Your Hospital Stay        10/26/2018  2:37 PM      Component Results     Component Value Ref Range & Units Status    Sodium 141 133 - 144 mmol/L Final    Potassium 3.8 3.4 - 5.3 mmol/L Final    Chloride 106 94 - 109 mmol/L Final    Carbon Dioxide 29 20 - 32 mmol/L Final    Anion Gap 6 3 - 14 mmol/L Final    Glucose 190 (H) 70 - 99 mg/dL Final    Urea Nitrogen 22 7 - 30 mg/dL Final    Creatinine 0.92 0.66 - 1.25 mg/dL Final    GFR Estimate 84 >60 mL/min/1.7m2 Final    Non  GFR Calc    GFR Estimate If Black >90 >60 mL/min/1.7m2 Final    African American GFR Calc    Calcium 8.9 8.5 - 10.1 mg/dL Final         10/26/2018  2:23 PM      Component Results     Component Value Ref Range & Units Status    WBC 7.2 4.0 - 11.0 10e9/L Final    RBC Count  4.95 4.4 - 5.9 10e12/L Final    Hemoglobin 14.7 13.3 - 17.7 g/dL Final    Hematocrit 43.1 40.0 - 53.0 % Final    MCV 87 78 - 100 fl Final    MCH 29.7 26.5 - 33.0 pg Final    MCHC 34.1 31.5 - 36.5 g/dL Final    RDW 13.7 10.0 - 15.0 % Final    Platelet Count 164 150 - 450 10e9/L Final    Diff Method Automated Method  Final    % Neutrophils 68.8 % Final    % Lymphocytes 19.1 % Final    % Monocytes 9.0 % Final    % Eosinophils 2.5 % Final    % Basophils 0.3 % Final    % Immature Granulocytes 0.3 % Final    Nucleated RBCs 0 0 /100 Final    Absolute Neutrophil 5.0 1.6 - 8.3 10e9/L Final    Absolute Lymphocytes 1.4 0.8 - 5.3 10e9/L Final    Absolute Monocytes 0.7 0.0 - 1.3 10e9/L Final    Absolute Eosinophils 0.2 0.0 - 0.7 10e9/L Final    Absolute Basophils 0.0 0.0 - 0.2 10e9/L Final    Abs Immature Granulocytes 0.0 0 - 0.4 10e9/L Final    Absolute Nucleated RBC 0.0  Final         10/26/2018  2:41 PM      Component Results     Component Value Ref Range & Units Status    Troponin I ES 0.078 (H) 0.000 - 0.045 ug/L Final    The 99th percentile for upper reference range is 0.045 ug/L.  Troponin values   in the range of 0.045 - 0.120 ug/L may be associated with risks of adverse   clinical events.           10/26/2018  2:31 PM      Narrative     CHEST TWO VIEWS   10/26/2018 1:32 PM     HISTORY: Acute chest pain.     COMPARISON: Chest CT 9/28/2016        Impression     IMPRESSION: Lungs are well-inflated and clear. Heart size is normal.    ALEJANDRO HOLM MD         10/26/2018  2:37 PM      Component Results     Component Value Ref Range & Units Status    D Dimer 0.6 (H) 0.0 - 0.50 ug/ml FEU Final    This D-dimer assay is intended for use in conjunction with a clinical pretest   probability assessment model to exclude pulmonary embolism (PE) and deep   venous thrombosis (DVT) in outpatients suspected of PE or DVT. The cut-off   value is 0.5 ug/mL FEU.           10/26/2018  4:36 PM      Component Results     Component Value Ref  Range & Units Status    Troponin I ES 0.074 (H) 0.000 - 0.045 ug/L Final    The 99th percentile for upper reference range is 0.045 ug/L.  Troponin values   in the range of 0.045 - 0.120 ug/L may be associated with risks of adverse   clinical events.                  Clinical Quality Measure: Blood Pressure Screening     Your blood pressure was checked while you were in the emergency department today. The last reading we obtained was  BP: (!) 153/96 . Please read the guidelines below about what these numbers mean and what you should do about them.  If your systolic blood pressure (the top number) is less than 120 and your diastolic blood pressure (the bottom number) is less than 80, then your blood pressure is normal. There is nothing more that you need to do about it.  If your systolic blood pressure (the top number) is 120-139 or your diastolic blood pressure (the bottom number) is 80-89, your blood pressure may be higher than it should be. You should have your blood pressure rechecked within a year by a primary care provider.  If your systolic blood pressure (the top number) is 140 or greater or your diastolic blood pressure (the bottom number) is 90 or greater, you may have high blood pressure. High blood pressure is treatable, but if left untreated over time it can put you at risk for heart attack, stroke, or kidney failure. You should have your blood pressure rechecked by a primary care provider within the next 4 weeks.  If your provider in the emergency department today gave you specific instructions to follow-up with your doctor or provider even sooner than that, you should follow that instruction and not wait for up to 4 weeks for your follow-up visit.        Thank you for choosing [facility]       Thank you for choosing [facility] for your care. Our goal is always to provide you with excellent care. Hearing back from our patients is one way we can continue to improve our services. Please take a few minutes to  complete the written survey that you may receive in the mail after you visit with us. Thank you!        [facility] Information     [facility] gives you secure access to your electronic health record. If you see a primary care provider, you can also send messages to your care team and make appointments. If you have questions, please call your primary care clinic.  If you do not have a primary care provider, please call [phone] and they will assist you.        Care EveryWhere ID     This is your Care EveryWhere ID. This could be used by other organizations to access your [facility] medical records  [id number]        Equal Access to Services     [facility] Hospital[facility]OUIE : Ezra Garcia, dominga martinez, carolina lockwood. So [facility] [phone].    ATENCIÓN: Si habla español, tiene a morgan disposición servicios gratuitos de asistencia lingüística. Llame al [phone].    We comply with applicable federal civil rights laws and [place] laws. We do not discriminate on the basis of race, color, national origin, age, disability, sex, sexual orientation, or gender identity.            After Visit Summary       This is your record. Keep this with you and show to your community pharmacist(s) and doctor(s) at your next visit.

## 2018-11-08 ENCOUNTER — HOSPITAL ENCOUNTER (OUTPATIENT)
Dept: PHYSICAL THERAPY | Facility: CLINIC | Age: 63
Setting detail: THERAPIES SERIES
End: 2018-11-08
Attending: FAMILY MEDICINE
Payer: OTHER GOVERNMENT

## 2018-11-08 PROCEDURE — 97110 THERAPEUTIC EXERCISES: CPT | Mod: GP | Performed by: PHYSICAL THERAPIST

## 2018-11-08 PROCEDURE — 40000718 ZZHC STATISTIC PT DEPARTMENT ORTHO VISIT: Performed by: PHYSICAL THERAPIST

## 2018-11-08 NOTE — PROGRESS NOTES
Stiven E Gopi   PHYSICAL THERAPY DISCHARGE  11/08/18 1300   Signing Clinician's Name / Credentials   Signing clinician's name / credentials Kris Hoenk, PT   Session Number   Session Number 3 , seen from 10/15/18 to 11/8/18   Ortho Goal 1   Goal Description restore full painfree shoulder ROM for improved ADL's, including reaching and dressing in 6wk   Target Date 11/26/18   Date Met 11/08/18   Ortho Goal 2   Goal Description pt will be able to sleep without R shoulder waking him in 6wk   Target Date 11/26/18   Date Met 11/08/18   Subjective Report   Subjective Report doing better, added some of his weights back in, no longerpainful at rest, little twinge on occas   Objective Measure 1   Details AROM painfree and WNL, excpet IR behind back to T10 with pain. MMT ER and iR 5/5 no pain,Empty can neg   Treatment Interventions   Interventions Manual Therapy   Therapeutic Procedure/exercise   Minutes 15   Skilled Intervention change HEP, cues for painfree ranges only - finalize HEP for DC   Patient Response no pain with any today   Treatment Detail did grn TB ER x20 full range, grn TB IR x20, shld ext x20, rows x15   Plan   Plan for next session goals met, DC to HEP, pain has decreased   Total Session Time   Timed Code Treatment Minutes 15   Total Treatment Time (sum of timed and untimed services) 15

## 2018-11-12 ENCOUNTER — TELEPHONE (OUTPATIENT)
Dept: CARDIOLOGY | Facility: CLINIC | Age: 63
End: 2018-11-12

## 2018-11-12 ENCOUNTER — OFFICE VISIT (OUTPATIENT)
Dept: CARDIOLOGY | Facility: CLINIC | Age: 63
End: 2018-11-12
Payer: OTHER GOVERNMENT

## 2018-11-12 VITALS
BODY MASS INDEX: 37.57 KG/M2 | WEIGHT: 277 LBS | HEART RATE: 50 BPM | DIASTOLIC BLOOD PRESSURE: 73 MMHG | SYSTOLIC BLOOD PRESSURE: 141 MMHG | OXYGEN SATURATION: 95 %

## 2018-11-12 DIAGNOSIS — I10 BENIGN ESSENTIAL HYPERTENSION: ICD-10-CM

## 2018-11-12 DIAGNOSIS — I20.1 VASOSPASTIC ANGINA (H): Primary | ICD-10-CM

## 2018-11-12 DIAGNOSIS — I25.10 NONOBSTRUCTIVE ATHEROSCLEROSIS OF CORONARY ARTERY: ICD-10-CM

## 2018-11-12 PROCEDURE — 99214 OFFICE O/P EST MOD 30 MIN: CPT | Performed by: NURSE PRACTITIONER

## 2018-11-12 RX ORDER — ISOSORBIDE MONONITRATE 30 MG/1
45 TABLET, EXTENDED RELEASE ORAL DAILY
COMMUNITY
Start: 2018-08-13 | End: 2022-06-27

## 2018-11-12 RX ORDER — RANOLAZINE 500 MG/1
500 TABLET, EXTENDED RELEASE ORAL 2 TIMES DAILY
Qty: 60 TABLET | Refills: 11 | Status: SHIPPED | OUTPATIENT
Start: 2018-11-12 | End: 2018-12-14

## 2018-11-12 NOTE — MR AVS SNAPSHOT
After Visit Summary   11/12/2018    Stiven Nguyễn    MRN: [id number]           Patient Information     Date Of Birth          1955        Visit Information        Provider Department      11/12/2018 11:00 AM Andreina Quezada APRN CNP [facility]        Today's Diagnoses     Vasospastic angina (H)    -  1    Nonobstructive atherosclerosis of coronary artery          Care Instructions    Thank you for your [facility] visit today. Your provider has recommended the following:  Medication Changes:  START ranexa 500 mg twice a day   Recommendations:  1. Call if having more episodes of chest discomfort   Follow-up:  See Andreina SINGH for cardiology follow up in 1 month.  Call [phone] two months prior to request date to schedule any future appointments.  Reminder:  1. Please bring in all current medications, over the counter supplements and vitamin bottles to your next appointment.               [facility]~[address]~[place], [place]~[address]  Questions about your visit today?   Call your Cardiology Clinic RN's-Ruby Ibarra and/or Clotilde Lauren at [phone].            Follow-ups after your visit        Additional Services     Follow-Up with Cardiac Advanced Practice Provider                 Future tests that were ordered for you today     Open Future Orders        Priority Expected Expires Ordered    Follow-Up with Cardiac Advanced Practice Provider Routine 12/13/2018 11/11/2020 11/12/2018            Who to contact     If you have questions or need follow up information about today's clinic visit or your schedule please contact [facility] directly at [phone].  Normal or non-critical lab and imaging results will be communicated to you by MyChart, letter or phone within 4 business days after the clinic has received the results. If you  do not hear from us within 7 days, please contact the clinic through [employer] or phone. If you have a critical or abnormal lab result, we will notify you by phone as soon as possible.  Submit refill requests through [employer] or call your pharmacy and they will forward the refill request to us. Please allow 3 business days for your refill to be completed.          Additional Information About Your Visit        [employer] Information     [employer] gives you secure access to your electronic health record. If you see a primary care provider, you can also send messages to your care team and make appointments. If you have questions, please call your primary care clinic.  If you do not have a primary care provider, please call [phone] and they will assist you.        Care EveryWhere ID     This is your Care EveryWhere ID. This could be used by other organizations to access your [facility] medical records  [id number]        Your Vitals Were     Pulse Pulse Oximetry BMI (Body Mass Index)             50 95% 37.57 kg/m2          Blood Pressure from Last 3 Encounters:   11/12/18 141/73   10/26/18 (!) 153/96   10/04/18 155/88    Weight from Last 3 Encounters:   11/12/18 125.6 kg (277 lb)   10/26/18 125.6 kg (277 lb)   10/04/18 126.7 kg (279 lb 6.4 oz)                 Today's Medication Changes          These changes are accurate as of 11/12/18 11:28 AM.  If you have any questions, ask your nurse or doctor.               Start taking these medicines.        Dose/Directions    ranolazine 500 MG 12 hr tablet   Commonly known as:  RANEXA   Used for:  Vasospastic angina (H), Nonobstructive atherosclerosis of coronary artery   Started by:  Andreina Quezada APRN CNP        Dose:  500 mg   Take 1 tablet (500 mg) by mouth 2 times daily   Quantity:  60 tablet   Refills:  11         These medicines have changed or have updated prescriptions.        Dose/Directions    atorvastatin 40 MG tablet   Commonly known as:  LIPITOR   This may  have changed:  how much to take   Used for:  NSTEMI (non-ST elevated myocardial infarction) (H)        Dose:  40 mg   Take 1 tablet (40 mg) by mouth At Bedtime   Quantity:  30 tablet   Refills:  2       isosorbide mononitrate 30 MG 24 hr tablet   Commonly known as:  IMDUR   This may have changed:  Another medication with the same name was removed. Continue taking this medication, and follow the directions you see here.   Changed by:  Andreina Quezada, HAZEL CNP        45mg daily (1.5 tab)   Refills:  0       tamsulosin 0.4 MG capsule   Commonly known as:  FLOMAX   This may have changed:  when to take this   Used for:  Hypertrophy of prostate with urinary obstruction and other lower urinary tract symptoms (LUTS), Bladder retention        Dose:  0.4 mg   Take 1 capsule (0.4 mg) by mouth daily   Quantity:  90 capsule   Refills:  3            Where to get your medicines      These medications were sent to [facility] [address] [place] [address] [address]   [address] [facility] [address]     Phone:  [phone]     ranolazine 500 MG 12 hr tablet                Primary Care Provider Office Phone # Fax #    Oliva Debbie Zhang [phone]       [facility] [address] [place] [address]        Equal Access to Services     BRIAN GARCIA AH: Hadii savanna ybarra hadasho Soomaali, waaxda luqadaha, qaybta kaalmada adeegyada, carolina stearns haymarya parks. So [facility] [phone].    ATENCIÓN: Si habla español, tiene a morgan disposición servicios gratuitos de asistencia lingüística. Antonieta al [phone].    We comply with applicable federal civil rights laws and [place] laws. We do not discriminate on the basis of race, color, national origin, age, disability, sex, sexual orientation, or gender identity.            Thank you!     Thank you for choosing [facility]  for your care. Our goal is always to provide you with excellent care.  Hearing back from our patients is one way we can continue to improve our services. Please take a few minutes to complete the written survey that you may receive in the mail after your visit with us. Thank you!             Your Updated Medication List - Protect others around you: Learn how to safely use, store and throw away your medicines at www.disposemymeds.org.          This list is accurate as of 11/12/18 11:28 AM.  Always use your most recent med list.                   Brand Name Dispense Instructions for use Diagnosis    amLODIPine 10 MG tablet    NORVASC    90 tablet    Take 1 tablet (10 mg) by mouth daily    Coronary vasospasm (H)       aspirin 81 MG EC tablet     30 tablet    Take 1 tablet (81 mg) by mouth daily    NSTEMI (non-ST elevated myocardial infarction) (H)       atorvastatin 40 MG tablet    LIPITOR    30 tablet    Take 1 tablet (40 mg) by mouth At Bedtime    NSTEMI (non-ST elevated myocardial infarction) (H)       isosorbide mononitrate 30 MG 24 hr tablet    IMDUR     45mg daily (1.5 tab)        lisinopril 40 MG tablet    PRINIVIL/ZESTRIL     Take 40 mg by mouth daily        loratadine 10 MG tablet    CLARITIN     Take 10 mg by mouth every evening        nitroGLYcerin 0.4 MG sublingual tablet    NITROSTAT    25 tablet    For chest pain place 1 tablet under the tongue every 5 minutes for 3 doses. If symptoms persist 5 minutes after 1st dose call 911.    Coronary vasospasm (H)       OMEPRAZOLE PO      Take 20 mg by mouth every morning        ranolazine 500 MG 12 hr tablet    RANEXA    60 tablet    Take 1 tablet (500 mg) by mouth 2 times daily    Vasospastic angina (H), Nonobstructive atherosclerosis of coronary artery       tamsulosin 0.4 MG capsule    FLOMAX    90 capsule    Take 1 capsule (0.4 mg) by mouth daily    Hypertrophy of prostate with urinary obstruction and other lower urinary tract symptoms (LUTS), Bladder retention       TYLENOL PO      Take 1,000 mg by mouth every 8 hours as needed  for mild pain or fever
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
## 2018-11-12 NOTE — PROGRESS NOTES
Cardiology Clinic Progress Note  Stiven Nguyễn MRN# [id number]   YOB: 1955 Age: 63 year old     Reason For Visit:  ED follow-up   Primary Cardiologist:   Dr. Pickard          History of Presenting Illness:    Stiven Nguyễn is a pleasant 63 year old patient with a past cardiac history significant for mild nonobstructive CAD with vasospastic angina, hypertension, and hyperlipidemia.  Past medical history significant for DVT and PE not requiring long-term therapy.    Pt was last seen by Dr. Pickard on 10/4/2018.  He reported 3 episodes of chest discomfort which usually responded to sublingual nitroglycerin.  Patient was overall doing well.  Blood pressure was elevated and he recommended following up with PCP for this.     Patient was seen in the ED on 10/26/2018 for chest pain.  Troponin was elevated but was lower than it had been previously.  His Imdur was increased to 60 mg daily.    Pt presents today for ED follow-up.  Since the ED, he has had 2 more episodes of chest discomfort.  These have just been short twinges of pain and not significant chest pressure.  These occur at rest.  He did increase his Imdur to 60 mg daily but unfortunately had worsening headaches with this and self decreased this back to 45 mg daily.  He still continues with headaches on 45 mg but states that these are tolerable.  He is agreeable to trying Ranexa.  He will also continue to use sublingual nitroglycerin, if needed.  He is able to go on the treadmill twice a day for 15 minutes without any angina. Patient reports no shortness of breath, PND, orthopnea, presyncope, syncope, edema, heart racing, or palpitations.      Current Cardiac Medications   Amlodipine 10 mg daily  Aspirin 81 mg daily  Atorvastatin 20 mg daily  Imdur 60 mg daily  Lisinopril 40 mg daily  Nitro as needed                   Assessment and Plan:     Plan  1.  Start Ranexa 500 mg BID for vasospasm   2.  Follow-up with [facility] in 1 month      1. Mild  nonobstructive CAD    angiogram 11/2017 with no obstructive disease, 70-80% mid LAD, negative by FFR.    Episodes of vasospastic angina    Continue statin, aspirin, ACE inhibitor, CCB, Imdur    No BB d/t bradycardia and headaches with imdur doses higher than 45 mg      2. Hypertension    Controlled    Continue amlodipine, lisinopril, Imdur      3. Hyperlipidemia    Last LDL 61 on 11/2017    Continue atorvastatin 20 mg daily         Thank you for allowing me to participate in this delightful patient's care.      This note was completed in part using Dragon voice recognition software. Although reviewed after completion, some word and grammatical errors may occur.    Andreina Elmore, HAZEL, CNP           Data:   All laboratory data reviewed

## 2018-11-12 NOTE — PATIENT INSTRUCTIONS
Thank you for your [facility] visit today. Your provider has recommended the following:  Medication Changes:  START ranexa 500 mg twice a day   Recommendations:  1. Call if having more episodes of chest discomfort   Follow-up:  See Andreina SINGH for cardiology follow up in 1 month.  Call [phone] two months prior to request date to schedule any future appointments.  Reminder:  1. Please bring in all current medications, over the counter supplements and vitamin bottles to your next appointment.               [facility]  [facility]~[address]~[place], [place]~[address]  Questions about your visit today?   Call your Cardiology Clinic RN's-Ruby Ibarra and/or Clotilde Lauren at [phone].
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
## 2018-11-14 NOTE — TELEPHONE ENCOUNTER
Drug is covered by current benefit plan. No further PA activity needed.      Central Prior Authorization Team   Phone: [phone]      PA Initiation    Medication: RANEXA 500 MG 12 hr tablet -PA NOT NEEDED  Insurance Company: [employer] - Phone [phone] Fax [phone]  Pharmacy Filling the Rx: [facility] [address] [place] [address] - [facility] [address]  Filling Pharmacy Phone: [phone]  Filling Pharmacy Fax:    Start Date: 11/14/2018

## 2018-11-20 ENCOUNTER — TELEPHONE (OUTPATIENT)
Dept: CARDIOLOGY | Facility: CLINIC | Age: 63
End: 2018-11-20
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
## 2018-12-14 ENCOUNTER — OFFICE VISIT (OUTPATIENT)
Dept: CARDIOLOGY | Facility: CLINIC | Age: 63
End: 2018-12-14
Attending: NURSE PRACTITIONER
Payer: OTHER GOVERNMENT

## 2018-12-14 VITALS
BODY MASS INDEX: 38.11 KG/M2 | DIASTOLIC BLOOD PRESSURE: 71 MMHG | HEART RATE: 52 BPM | SYSTOLIC BLOOD PRESSURE: 140 MMHG | OXYGEN SATURATION: 95 % | WEIGHT: 281 LBS

## 2018-12-14 DIAGNOSIS — I25.10 NONOBSTRUCTIVE ATHEROSCLEROSIS OF CORONARY ARTERY: ICD-10-CM

## 2018-12-14 DIAGNOSIS — I10 ESSENTIAL HYPERTENSION: ICD-10-CM

## 2018-12-14 DIAGNOSIS — I20.1 VASOSPASTIC ANGINA (H): Primary | ICD-10-CM

## 2018-12-14 PROCEDURE — 99214 OFFICE O/P EST MOD 30 MIN: CPT | Performed by: NURSE PRACTITIONER
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
## 2018-12-14 NOTE — PROGRESS NOTES
Cardiology Clinic Progress Note  Stiven Nguyễn MRN# [id number]   YOB: 1955 Age: 63 year old     Reason For Visit:  follow-up angina    Primary Cardiologist:   Dr. Pickard          History of Presenting Illness:    Stiven Nguyễn is a pleasant 63 year old patient with a past cardiac history significant for mild nonobstructive CAD with vasospastic angina, hypertension, and hyperlipidemia.  Past medical history significant for DVT and PE not requiring long-term therapy.    Pt saw Aislinn Pickard on 10/4/2018.  He reported 3 episodes of chest discomfort which usually responded to sublingual nitroglycerin.  Patient was overall doing well.  Blood pressure was elevated and he recommended following up with PCP for this.     Patient was seen in the ED on 10/26/2018 for chest pain.  Troponin was elevated but was lower than it had been previously.  His Imdur was increased to 60 mg daily.    Patient was last seen by me on 11/12/2018.  He had 2 more episodes of chest discomfort described as short twinges of pain, at rest.  He noted worsening headaches after increasing his Imdur to 60 mg daily and self decreased this back to 45 mg daily.  He was able to tolerate that dose.  He was able to walk on the treadmill for 15 minutes twice a day without any angina.  He was started on Ranexa for his vasospastic angina and also recommended using sublingual nitroglycerin, if needed.    Pt presents today for angina follow-up.  He started taking Ranexa but noticed urinary incontinence issues and constipation.  He called the clinic and we recommended holding the medication for couple days to see if the symptoms resolved and then restarting.  Unfortunately, he did not know that he should restart the medication and has not been taking it.  His urinary incontinence and constipation did resolve.  He has had one episode of chest discomfort and this was noted to be during a stressful time.  The chest discomfort did resolve  spontaneously, without needing nitroglycerin.  At this time, he does not wish to restart Ranexa and we discussed using sublingual nitroglycerin if needed. Blood pressure today in the clinic is 140/71.  I discussed recommendations for systolic blood pressure less than 130 but at this time he wishes to continue his current medications.  Should his blood pressures become elevated greater than 140 systolic, he will call the clinic.  He does report that his hydrochlorothiazide was previously discontinued but is unsure why.  Looking back at the records, it appears that this was discontinued by his primary provider who is outside of [place].  Patient will check and see why he was taken off of HCTZ in case we need to restart this in the future.  He has otherwise been doing well from a cardiac standpoint. Patient reports no shortness of breath, PND, orthopnea, presyncope, syncope, edema, heart racing, or palpitations.      Current Cardiac Medications   Amlodipine 10 mg daily  Aspirin 81 mg daily  Atorvastatin 20 mg daily  Imdur 45 mg daily  Lisinopril 40 mg daily  Nitro as needed  Ranexa 500 mg twice daily- not taking                    Assessment and Plan:     Plan  1.  Discontinue Ranexa d/t urinary incontinence and constipation  2.  Call the clinic if your blood pressure is consistently greater than 140/80.   3.  follow-up with Dr. Pickard 10/2019 for annual visit      1. Mild nonobstructive CAD    angiogram 11/2017 with no obstructive disease, 70-80% mid LAD, negative by FFR.    Episodes of vasospastic angina    Continue statin, aspirin, ACE inhibitor, CCB, Imdur    No BB d/t bradycardia, and headaches with imdur doses higher than 45 mg      2. Hypertension    Not well Controlled    Continue amlodipine, lisinopril, Imdur    Consider starting HCTZ or spironolactone, if needed      3. Hyperlipidemia    Last LDL 61 on 11/2017    Continue atorvastatin 20 mg daily         Thank you for allowing me to participate in this  delightful patient's care.      This note was completed in part using Dragon voice recognition software. Although reviewed after completion, some word and grammatical errors may occur.    HAZEL Guadalupe, CNP           Data:   All laboratory data reviewed
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
## 2018-12-14 NOTE — PATIENT INSTRUCTIONS
Thank you for your [facility] visit today. Your provider has recommended the following:  Medication Changes:  No changes   Recommendations:  Call the clinic if your blood pressure is consistently greater than 140/80.     Follow-up:  See Dr. Pickard for cardiology follow up in Oct 2019- call in July to schedule.  Call [phone] two months prior to request date to schedule any future appointments.  Reminder:  1. Please bring in all current medications, over the counter supplements and vitamin bottles to your next appointment.               [facility]  [facility]~[address]~[place], [place]~[address]  Questions about your visit today?   Call your Cardiology Clinic RN's-Ruby Ibarra and/or Clotilde Lauren at [phone].

## 2019-01-10 DIAGNOSIS — I20.1 CORONARY VASOSPASM (H): ICD-10-CM

## 2019-01-10 RX ORDER — AMLODIPINE BESYLATE 10 MG/1
10 TABLET ORAL DAILY
Qty: 90 TABLET | Refills: 3 | Status: SHIPPED | OUTPATIENT
Start: 2019-01-10 | End: 2020-01-08

## 2019-02-26 DIAGNOSIS — I20.1 CORONARY VASOSPASM (H): ICD-10-CM

## 2019-02-26 RX ORDER — NITROGLYCERIN 0.4 MG/1
TABLET SUBLINGUAL
Qty: 90 TABLET | Refills: 3 | Status: SHIPPED | OUTPATIENT
Start: 2019-02-26 | End: 2023-04-06

## 2019-04-11 ENCOUNTER — TRANSFERRED RECORDS (OUTPATIENT)
Dept: HEALTH INFORMATION MANAGEMENT | Facility: CLINIC | Age: 64
End: 2019-04-11

## 2019-04-11 ENCOUNTER — MEDICAL CORRESPONDENCE (OUTPATIENT)
Dept: HEALTH INFORMATION MANAGEMENT | Facility: CLINIC | Age: 64
End: 2019-04-11

## 2019-04-11 ENCOUNTER — RECORDS - HEALTHEAST (OUTPATIENT)
Dept: LAB | Facility: CLINIC | Age: 64
End: 2019-04-11

## 2019-04-11 LAB
ANION GAP SERPL CALCULATED.3IONS-SCNC: 7 MMOL/L (ref 5–18)
BUN SERPL-MCNC: 18 MG/DL (ref 8–22)
CALCIUM SERPL-MCNC: 9.7 MG/DL (ref 8.5–10.5)
CHLORIDE BLD-SCNC: 108 MMOL/L (ref 98–107)
CHOLEST SERPL-MCNC: 110 MG/DL
CHOLEST SERPL-MCNC: 110 MG/DL
CO2 SERPL-SCNC: 27 MMOL/L (ref 22–31)
CREAT SERPL-MCNC: 0.93 MG/DL (ref 0.7–1.3)
FASTING STATUS PATIENT QL REPORTED: NO
GFR SERPL CREATININE-BSD FRML MDRD: >60 ML/MIN/1.73M2
GLUCOSE BLD-MCNC: 171 MG/DL (ref 70–125)
HBA1C MFR BLD: 7.4 % (ref 4.2–6.1)
HDLC SERPL-MCNC: 38 MG/DL
HDLC SERPL-MCNC: 38 MG/DL
LDLC SERPL CALC-MCNC: 57 MG/DL
LDLC SERPL CALC-MCNC: 57 MG/DL
POTASSIUM BLD-SCNC: 4.5 MMOL/L (ref 3.5–5)
PSA SERPL-MCNC: 1.7 NG/ML (ref 0–4.5)
SODIUM SERPL-SCNC: 142 MMOL/L (ref 136–145)
TRIGL SERPL-MCNC: 77 MG/DL
TRIGL SERPL-MCNC: 77 MG/DL

## 2019-04-18 ENCOUNTER — HOSPITAL ENCOUNTER (EMERGENCY)
Facility: CLINIC | Age: 64
Discharge: HOME | End: 2019-04-18
Attending: PHYSICIAN ASSISTANT | Admitting: PHYSICIAN ASSISTANT
Payer: OTHER GOVERNMENT

## 2019-04-18 VITALS
HEIGHT: 73 IN | BODY MASS INDEX: 35.78 KG/M2 | TEMPERATURE: 98 F | DIASTOLIC BLOOD PRESSURE: 76 MMHG | HEART RATE: 54 BPM | WEIGHT: 270 LBS | OXYGEN SATURATION: 97 % | SYSTOLIC BLOOD PRESSURE: 150 MMHG | RESPIRATION RATE: 16 BRPM

## 2019-04-18 DIAGNOSIS — T16.2XXA ACUTE FOREIGN BODY OF LEFT EAR CANAL, INITIAL ENCOUNTER: ICD-10-CM

## 2019-04-18 PROCEDURE — G0463 HOSPITAL OUTPT CLINIC VISIT: HCPCS

## 2019-04-18 PROCEDURE — 99213 OFFICE O/P EST LOW 20 MIN: CPT | Mod: Z6 | Performed by: PHYSICIAN ASSISTANT

## 2019-04-18 ASSESSMENT — MIFFLIN-ST. JEOR: SCORE: 2078.59
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
## 2019-04-18 NOTE — ED AVS SNAPSHOT
[facility] Emergency Department  [address] [address]  Phone:  [phone]  Fax:  [phone]                                    Stiven Nguyễn   MRN: [id number]    Department:  [facility] Emergency Department   Date of Visit:  4/18/2019           After Visit Summary Signature Page    I have received my discharge instructions, and my questions have been answered. I have discussed any challenges I see with this plan with the nurse or doctor.    ..........................................................................................................................................  Patient/Patient Representative Signature      ..........................................................................................................................................  Patient Representative Print Name and Relationship to Patient    ..................................................               ................................................  Date                                   Time    ..........................................................................................................................................  Reviewed by Signature/Title    ...................................................              ..............................................  Date                                               Time          22EPIC Rev 08/18

## 2019-05-07 ENCOUNTER — OFFICE VISIT (OUTPATIENT)
Dept: UROLOGY | Facility: CLINIC | Age: 64
End: 2019-05-07
Payer: OTHER GOVERNMENT

## 2019-05-07 VITALS
TEMPERATURE: 98.6 F | WEIGHT: 270 LBS | HEART RATE: 47 BPM | SYSTOLIC BLOOD PRESSURE: 132 MMHG | DIASTOLIC BLOOD PRESSURE: 59 MMHG | BODY MASS INDEX: 35.62 KG/M2

## 2019-05-07 DIAGNOSIS — R35.0 URINARY FREQUENCY: Primary | ICD-10-CM

## 2019-05-07 DIAGNOSIS — E66.01 MORBID OBESITY (H): ICD-10-CM

## 2019-05-07 PROCEDURE — 51798 US URINE CAPACITY MEASURE: CPT | Performed by: UROLOGY

## 2019-05-07 PROCEDURE — 99202 OFFICE O/P NEW SF 15 MIN: CPT | Mod: 25 | Performed by: UROLOGY

## 2019-05-07 RX ORDER — OXYBUTYNIN CHLORIDE 5 MG/1
5 TABLET, EXTENDED RELEASE ORAL DAILY
Qty: 30 TABLET | Refills: 3 | Status: SHIPPED | OUTPATIENT
Start: 2019-05-07 | End: 2019-07-02

## 2019-05-07 NOTE — NURSING NOTE
Active order to obtain bladder scan? Yes   Name of ordering provider: Dr. Yung  Bladder scan preformed post void No, used bathroom prior to appointment.  Bladder scan revealed 150ml  Provider notified?  Yes    Guido GARCIA CMA

## 2019-05-07 NOTE — PATIENT INSTRUCTIONS
Per Physician's instructions.      If you have questions or concerns on any instructions given to you by your provider today or if you need to schedule an appointment, you can reach us at [phone].     Thank you!          Guido GARCIA CMA

## 2019-05-07 NOTE — NURSING NOTE
"Initial /59 (BP Location: Right arm, Patient Position: Sitting, Cuff Size: Adult Large)   Pulse (!) 47   Temp 98.6  F (37  C)   Wt 122.5 kg (270 lb)   BMI 35.62 kg/m   Estimated body mass index is 35.62 kg/m  as calculated from the following:    Height as of 4/18/19: 1.854 m (6' 1\").    Weight as of this encounter: 122.5 kg (270 lb). .    Guido GARCIA CMA    "

## 2019-05-07 NOTE — PROGRESS NOTES
Appointment source: Established Patient  Patient name: Stiven Nguyễn  Urology Staff: Mervin Yung MD    Subjective: This is a 64 year old year old male returning for follow up of urinary frequency and erectile dysfunction.    Describes nocturia x1 and almost hourly daily urination. Urge incontinence has occurred when bathrooms were not easily available.    Strong family history of enuresis. Was not enuretic.    Continuing issue of erectile dysfunction with intolerance to phosphodiesterase inhibitors.    Objective:  Post void residual 150 mL.    PSA 1.7 ng/mL    Assessment:  Erectile dysfunction and urinary urgency possible neurogenic component and BPH.    Plan:  Continue taking tamsulosin and start oxybutynin 5 mg XR.     Purchase erection aid pump.    Total time 25 minutes, counseling 20 minutes discussing erectile dysfunction and urinary frequency.

## 2019-06-04 ENCOUNTER — OFFICE VISIT (OUTPATIENT)
Dept: UROLOGY | Facility: CLINIC | Age: 64
End: 2019-06-04
Payer: OTHER GOVERNMENT

## 2019-06-04 VITALS
SYSTOLIC BLOOD PRESSURE: 146 MMHG | DIASTOLIC BLOOD PRESSURE: 78 MMHG | HEART RATE: 53 BPM | TEMPERATURE: 97.7 F | RESPIRATION RATE: 16 BRPM

## 2019-06-04 DIAGNOSIS — R39.15 URINARY URGENCY: Primary | ICD-10-CM

## 2019-06-04 PROCEDURE — 99214 OFFICE O/P EST MOD 30 MIN: CPT | Performed by: UROLOGY

## 2019-06-04 RX ORDER — OXYBUTYNIN CHLORIDE 5 MG/1
5 TABLET ORAL DAILY
Qty: 60 TABLET | Refills: 3 | Status: SHIPPED | OUTPATIENT
Start: 2019-06-04 | End: 2019-07-02

## 2019-06-04 NOTE — PROGRESS NOTES
Appointment source: Established Patient  Patient name: Stiven Nguyễn  Urology Staff: Mervin Yung MD    Subjective: This is a 64 year old year old male returning for follow up of urinary urgency.    Stopped oxybutynin 5 mg XR due to obstructive symptoms. Urine flow was slow to the point of dribbling.    The oxybutynin did help control the urgency.    Objective:  Post void residual 223 mL    Assessment:  Continued issue of urgency responsive to oxybutynin but with too much impact on voiding flow.    Plan:  Will reduce the dose of the oxybutynin but changing to immediate release that can be cut in half.    Return for follow up in about 6 weeks.    Total time 25 minutes, counseling 15 minutes

## 2019-06-04 NOTE — NURSING NOTE
"Chief Complaint   Patient presents with     RECHECK     Urgency, ED        Initial /78 (BP Location: Right arm, Patient Position: Chair, Cuff Size: Adult Large)   Pulse 53   Temp 97.7  F (36.5  C) (Tympanic)   Resp 16  Estimated body mass index is 35.62 kg/m  as calculated from the following:    Height as of 4/18/19: 1.854 m (6' 1\").    Weight as of 5/7/19: 122.5 kg (270 lb).  BP completed using cuff size: large   Medications and allergies reviewed.      Tawana BRANTLEY CMA     "

## 2019-06-04 NOTE — PATIENT INSTRUCTIONS
Per physician instructions.    If you have questions or concerns on any instructions given to you by your provider today or if you need to schedule an appointment, you can reach us at [phone].    Thank you!

## 2019-07-02 ENCOUNTER — OFFICE VISIT (OUTPATIENT)
Dept: UROLOGY | Facility: CLINIC | Age: 64
End: 2019-07-02
Payer: OTHER GOVERNMENT

## 2019-07-02 VITALS
TEMPERATURE: 97.7 F | SYSTOLIC BLOOD PRESSURE: 128 MMHG | HEART RATE: 42 BPM | DIASTOLIC BLOOD PRESSURE: 64 MMHG | RESPIRATION RATE: 18 BRPM

## 2019-07-02 DIAGNOSIS — R39.15 URINARY URGENCY: ICD-10-CM

## 2019-07-02 LAB
ALBUMIN UR-MCNC: NEGATIVE MG/DL
APPEARANCE UR: CLEAR
BILIRUB UR QL STRIP: NEGATIVE
COLOR UR AUTO: YELLOW
GLUCOSE UR STRIP-MCNC: NEGATIVE MG/DL
HGB UR QL STRIP: NEGATIVE
KETONES UR STRIP-MCNC: NEGATIVE MG/DL
LEUKOCYTE ESTERASE UR QL STRIP: NEGATIVE
NITRATE UR QL: NEGATIVE
PH UR STRIP: 5.5 PH (ref 5–7)
RBC #/AREA URNS AUTO: NORMAL /HPF
SOURCE: NORMAL
SP GR UR STRIP: 1.02 (ref 1–1.03)
UROBILINOGEN UR STRIP-ACNC: 0.2 EU/DL (ref 0.2–1)
WBC #/AREA URNS AUTO: NORMAL /HPF

## 2019-07-02 PROCEDURE — 51798 US URINE CAPACITY MEASURE: CPT | Performed by: UROLOGY

## 2019-07-02 PROCEDURE — 87086 URINE CULTURE/COLONY COUNT: CPT | Performed by: UROLOGY

## 2019-07-02 PROCEDURE — 81001 URINALYSIS AUTO W/SCOPE: CPT | Performed by: UROLOGY

## 2019-07-02 PROCEDURE — 99213 OFFICE O/P EST LOW 20 MIN: CPT | Mod: 25 | Performed by: UROLOGY

## 2019-07-02 RX ORDER — OXYBUTYNIN CHLORIDE 5 MG/1
5 TABLET ORAL DAILY
Qty: 60 TABLET | Refills: 3 | Status: SHIPPED | OUTPATIENT
Start: 2019-07-02 | End: 2020-02-20

## 2019-07-02 NOTE — NURSING NOTE
"Chief Complaint   Patient presents with     RECHECK     Urinary Urgency, Incomplete Bladder Emptying        Initial /64 (BP Location: Left arm, Patient Position: Chair, Cuff Size: Adult Large)   Pulse (!) 42   Temp 97.7  F (36.5  C) (Tympanic)   Resp 18  Estimated body mass index is 35.62 kg/m  as calculated from the following:    Height as of 4/18/19: 1.854 m (6' 1\").    Weight as of 5/7/19: 122.5 kg (270 lb).  BP completed using cuff size: large   Medications and allergies reviewed.      Tawana BRANTLEY CMA     "

## 2019-07-02 NOTE — PATIENT INSTRUCTIONS
Per physician instructions.    If you have questions or concerns on any instructions given to you by your provider today or if you need to schedule an appointment, you can reach us at [phone].    Thank you!

## 2019-07-02 NOTE — NURSING NOTE
Active order to obtain bladder scan? Yes   Name of ordering provider:  Dilshad   Bladder scan preformed post void Yes.  Bladder scan reveled 167ML  Provider notified?  Yes    Tawana BRANTLEY CMA

## 2019-07-03 LAB
BACTERIA SPEC CULT: NORMAL
Lab: NORMAL
SPECIMEN SOURCE: NORMAL

## 2019-07-15 LAB
ALBUMIN (URINE) MG/SPEC: 10 MG/DL
ALBUMIN/CREATININE RATIO: <30 MG/G
CREATININE (URINE): 100 MG/DL (ref 10–300)
HBA1C MFR BLD: 6.8 % (ref 4.2–6.1)

## 2019-08-13 ENCOUNTER — HOSPITAL ENCOUNTER (EMERGENCY)
Facility: CLINIC | Age: 64
Discharge: HOME | End: 2019-08-13
Attending: EMERGENCY MEDICINE | Admitting: EMERGENCY MEDICINE
Payer: OTHER GOVERNMENT

## 2019-08-13 ENCOUNTER — APPOINTMENT (OUTPATIENT)
Dept: GENERAL RADIOLOGY | Facility: CLINIC | Age: 64
End: 2019-08-13
Attending: EMERGENCY MEDICINE
Payer: OTHER GOVERNMENT

## 2019-08-13 VITALS
RESPIRATION RATE: 30 BRPM | SYSTOLIC BLOOD PRESSURE: 148 MMHG | BODY MASS INDEX: 35.65 KG/M2 | TEMPERATURE: 98 F | HEIGHT: 73 IN | DIASTOLIC BLOOD PRESSURE: 79 MMHG | WEIGHT: 269 LBS | HEART RATE: 42 BPM | OXYGEN SATURATION: 94 %

## 2019-08-13 DIAGNOSIS — R07.9 CHEST PAIN, UNSPECIFIED TYPE: ICD-10-CM

## 2019-08-13 LAB
ALBUMIN SERPL-MCNC: 4 G/DL (ref 3.4–5)
ALP SERPL-CCNC: 100 U/L (ref 40–150)
ALT SERPL W P-5'-P-CCNC: 37 U/L (ref 0–70)
ANION GAP SERPL CALCULATED.3IONS-SCNC: 5 MMOL/L (ref 3–14)
AST SERPL W P-5'-P-CCNC: 22 U/L (ref 0–45)
BASOPHILS # BLD AUTO: 0 10E9/L (ref 0–0.2)
BASOPHILS NFR BLD AUTO: 0.5 %
BILIRUB SERPL-MCNC: 0.8 MG/DL (ref 0.2–1.3)
BUN SERPL-MCNC: 18 MG/DL (ref 7–30)
CALCIUM SERPL-MCNC: 8.8 MG/DL (ref 8.5–10.1)
CHLORIDE SERPL-SCNC: 109 MMOL/L (ref 94–109)
CO2 SERPL-SCNC: 30 MMOL/L (ref 20–32)
CREAT SERPL-MCNC: 0.97 MG/DL (ref 0.66–1.25)
DIFFERENTIAL METHOD BLD: NORMAL
EOSINOPHIL # BLD AUTO: 0.2 10E9/L (ref 0–0.7)
EOSINOPHIL NFR BLD AUTO: 2.9 %
ERYTHROCYTE [DISTWIDTH] IN BLOOD BY AUTOMATED COUNT: 13.2 % (ref 10–15)
GFR SERPL CREATININE-BSD FRML MDRD: 83 ML/MIN/{1.73_M2}
GLUCOSE SERPL-MCNC: 121 MG/DL (ref 70–99)
HCT VFR BLD AUTO: 43.7 % (ref 40–53)
HGB BLD-MCNC: 14.4 G/DL (ref 13.3–17.7)
IMM GRANULOCYTES # BLD: 0 10E9/L (ref 0–0.4)
IMM GRANULOCYTES NFR BLD: 0.3 %
LYMPHOCYTES # BLD AUTO: 1.4 10E9/L (ref 0.8–5.3)
LYMPHOCYTES NFR BLD AUTO: 20.8 %
MCH RBC QN AUTO: 28.7 PG (ref 26.5–33)
MCHC RBC AUTO-ENTMCNC: 33 G/DL (ref 31.5–36.5)
MCV RBC AUTO: 87 FL (ref 78–100)
MONOCYTES # BLD AUTO: 0.7 10E9/L (ref 0–1.3)
MONOCYTES NFR BLD AUTO: 10.3 %
NEUTROPHILS # BLD AUTO: 4.3 10E9/L (ref 1.6–8.3)
NEUTROPHILS NFR BLD AUTO: 65.2 %
NRBC # BLD AUTO: 0 10*3/UL
NRBC BLD AUTO-RTO: 0 /100
PLATELET # BLD AUTO: 156 10E9/L (ref 150–450)
POTASSIUM SERPL-SCNC: 4 MMOL/L (ref 3.4–5.3)
PROT SERPL-MCNC: 7.7 G/DL (ref 6.8–8.8)
RBC # BLD AUTO: 5.02 10E12/L (ref 4.4–5.9)
SODIUM SERPL-SCNC: 144 MMOL/L (ref 133–144)
TROPONIN I SERPL-MCNC: 0.13 UG/L (ref 0–0.04)
TROPONIN I SERPL-MCNC: 0.13 UG/L (ref 0–0.04)
WBC # BLD AUTO: 6.5 10E9/L (ref 4–11)

## 2019-08-13 PROCEDURE — 71046 X-RAY EXAM CHEST 2 VIEWS: CPT

## 2019-08-13 PROCEDURE — 84484 ASSAY OF TROPONIN QUANT: CPT | Mod: 91 | Performed by: EMERGENCY MEDICINE

## 2019-08-13 PROCEDURE — 99285 EMERGENCY DEPT VISIT HI MDM: CPT | Mod: 25 | Performed by: EMERGENCY MEDICINE

## 2019-08-13 PROCEDURE — 93010 ELECTROCARDIOGRAM REPORT: CPT | Mod: Z6 | Performed by: EMERGENCY MEDICINE

## 2019-08-13 PROCEDURE — 80053 COMPREHEN METABOLIC PANEL: CPT | Performed by: EMERGENCY MEDICINE

## 2019-08-13 PROCEDURE — 85025 COMPLETE CBC W/AUTO DIFF WBC: CPT | Performed by: EMERGENCY MEDICINE

## 2019-08-13 PROCEDURE — 93005 ELECTROCARDIOGRAM TRACING: CPT | Performed by: EMERGENCY MEDICINE

## 2019-08-13 PROCEDURE — 36415 COLL VENOUS BLD VENIPUNCTURE: CPT | Performed by: EMERGENCY MEDICINE

## 2019-08-13 RX ORDER — FEXOFENADINE HCL 180 MG/1
1 TABLET ORAL EVERY EVENING
COMMUNITY
Start: 2019-06-11

## 2019-08-13 RX ORDER — ATORVASTATIN CALCIUM 10 MG/1
1 TABLET, FILM COATED ORAL EVERY EVENING
COMMUNITY
Start: 2019-06-13

## 2019-08-13 ASSESSMENT — MIFFLIN-ST. JEOR: SCORE: 2074.06

## 2019-08-13 NOTE — ED NOTES
Pt is adamant he go home, he refuses admit/transfer and any further workup in the ER today.  MD is aware and has counseled pt at length regarding options and potential outcomes.  Pt expresses understanding.

## 2019-08-13 NOTE — ED NOTES
Pressure in chest for about 3 days, much worse today bringing the patient in. Pt has hx of vasospasms. He reports this feels a little different than typical. Pain is a pressure in his mid chest and is currently a 5/10. pt reports today when the pain got much worse he took a total of 3 nitro but thinks that 2 were . Pain got better after the thrid for about 30 min and started to come back. He also complains of some SOA when pain was at its worst.

## 2019-08-13 NOTE — ED AVS SNAPSHOT
[facility]  [address] [address]  Phone:  [phone]  Fax:  [phone]                                    Stiven Nguyễn   MRN: [id number]    Department:  [facility]   Date of Visit:  8/13/2019           After Visit Summary Signature Page    I have received my discharge instructions, and my questions have been answered. I have discussed any challenges I see with this plan with the nurse or doctor.    ..........................................................................................................................................  Patient/Patient Representative Signature      ..........................................................................................................................................  Patient Representative Print Name and Relationship to Patient    ..................................................               ................................................  Date                                   Time    ..........................................................................................................................................  Reviewed by Signature/Title    ...................................................              ..............................................  Date                                               Time          22EPIC Rev 08/18

## 2019-08-13 NOTE — ED NOTES
DATE:  8/13/2019   TIME OF RECEIPT FROM LAB:  1511  LAB TEST:  troponin  LAB VALUE:  .134  RESULTS GIVEN WITH READ-BACK TO (PROVIDER):  Mcgeehan  TIME LAB VALUE REPORTED TO PROVIDER:   8880

## 2019-08-13 NOTE — DISCHARGE INSTRUCTIONS
Please make an appoint with your primary care provider and/or your cardiologist to follow-up on today's visit.  If you experience any return of symptoms chest pain, shortness of breath, nausea vomiting or diaphoresis, please return to the emergency department immediately.    We could not rule out a heart attack on today's visit and recommended that you come into the hospital for further testing.

## 2019-08-13 NOTE — ED NOTES
..DATE:  8/13/2019   TIME OF RECEIPT FROM LAB:  1701  LAB TEST: trop  LAB VALUE:  0.129  RESULTS GIVEN WITH READ-BACK TO (PROVIDER): dany LIU LAB VALUE REPORTED TO PROVIDER:   0890
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
## 2019-12-11 ENCOUNTER — OFFICE VISIT (OUTPATIENT)
Dept: CARDIOLOGY | Facility: CLINIC | Age: 64
End: 2019-12-11
Payer: OTHER GOVERNMENT

## 2019-12-11 VITALS
OXYGEN SATURATION: 95 % | SYSTOLIC BLOOD PRESSURE: 128 MMHG | HEART RATE: 52 BPM | DIASTOLIC BLOOD PRESSURE: 66 MMHG | HEIGHT: 73 IN | BODY MASS INDEX: 35.49 KG/M2

## 2019-12-11 DIAGNOSIS — I20.1 VASOSPASTIC ANGINA (H): Primary | ICD-10-CM

## 2019-12-11 DIAGNOSIS — I25.10 NONOBSTRUCTIVE ATHEROSCLEROSIS OF CORONARY ARTERY: ICD-10-CM

## 2019-12-11 PROCEDURE — 99204 OFFICE O/P NEW MOD 45 MIN: CPT | Performed by: INTERNAL MEDICINE

## 2019-12-11 RX ORDER — DILTIAZEM HYDROCHLORIDE 120 MG/1
120 CAPSULE, EXTENDED RELEASE ORAL DAILY
Qty: 90 CAPSULE | Refills: 3 | Status: SHIPPED | OUTPATIENT
Start: 2019-12-11 | End: 2020-01-13

## 2019-12-11 NOTE — PROGRESS NOTES
Service Date: 12/11/2019      HISTORY OF PRESENT ILLNESS:  Mr. Nguyễn is a pleasant 64-year-old gentleman who I am meeting for the first time.  He was previously followed by Dr. Pickard.  He has a past medical history significant for coronary artery disease that has been nonobstructive.  Specifically, he underwent angiograms in 2015, 2016 and 2017 and with each of these he had FFR of his LAD for a 40%-60% lesion and it was 0.84.  He did see [facility] back in 2016 and they agreed with this assessment, although they did not undergo an angiogram with that visit.  They did do an echocardiogram and he had normal ejection fraction without any regional wall motion abnormalities or significant valve disease.      Mr. Nguyễn has diabetes.  He reports his A1c when checked this summer was under good control.  Our last records show 2016 it was 6.6 and in 2017 his cholesterol LDL was 61 with an HDL of 38.  He in the past has been on Ranexa but did not tolerate it.  He was on 60 of Imdur at one point but could not tolerate it and is now on 45 mg a day.      Mr. Nguyễn does have obstructive sleep apnea, uses a CPAP mask.  He has hypertension, which has been controlled on his medication regimen.  He is retired from the  and after that worked at the [employer] for a number of years.  He is in the office today with his wife.  There is a family history of early coronary artery disease.      Mr. Nguyễn has not been on verapamil or diltiazem in the past.  His heart rate tends to run on the lower side in the 50s to 60s.  He has been on Norvasc 10 mg daily.  He has not had any lightheadedness.  He has had ongoing chest pain that is atypical and typical in nature.  He reports that the chest pressure has been getting worse recently, maybe over the past year and he has had to take nitroglycerin on occasion.  He was recently in the ER at [facility] after going to his primary for the shoulder pain and then he ended up getting a sent to the ER where  they did troponin.  He does have a chronically elevated troponin and they became concerned.  He was frustrated by this, as he did not feel this was his heart but rather musculoskeletal, and he would like this documented in his chart.      IMPRESSION, REPORT, PLAN:   1.  History of nonobstructive coronary artery disease in the LAD with FFR of 0.84 and a history of abnormal stress test in 2016 in this distribution.   2.  Episodes of vasospastic angina.   3.  Hypertension.   4.  Hyperlipidemia.   5.  Obstructive sleep apnea, uses a CPAP.   6.  Diabetes.   7.  Obesity.      DISCUSSION:  It was a pleasure to see Mr. Nguyễn in Cardiology.  Today I discussed his history and symptoms in detail as outlined and reviewed his testing.  We discussed options for management.  At this time in light of the fact that he feels that his symptoms have worsened, we discussed coronary angiogram versus trying a very small dose of verapamil or diltiazem.  We decided to try 120 daily of diltiazem and then have him follow up in clinic.  If he is still having symptoms that he feels are unstable, then at that time we probably would send him for a cath, as it has been several years and it is certainly possible that his LAD disease has worsened.  If, however, he is asymptomatic, then we will continue to manage him medically and follow him closely.  He understands and is in agreement with the plan as outlined.  All questions were answered.  It was a pleasure to see him today.  Please do not hesitate to contact me with any questions or concerns.         SELENE MARTINEZ MD             D: 2019   T: 2019   MT: CHRIS      Name:     MARKEL NGUYỄN   MRN:      [id number]        Account:      [id number]   :      1955           Service Date: 2019      Document: [id number]
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
## 2019-12-11 NOTE — PATIENT INSTRUCTIONS
"[facility]~[address]~[place], [place]~[address]  Thank you for your  Heart Care visit today. If you have questions regarding your visit, please contact your cardiology RN, Clotilde Chung, at [phone].    Please arrive 15 minutes early to all cardiology appointments.    To schedule a future appointment, we kindly ask that you call cardiology scheduling at [phone] three months prior to requested revisit date.  [facility] cardiology clinic is staffed with \"Advance Practice Providers\". These are our cardiology Physician Assistants and Nurse Practitioners.   Please call cardiology scheduling if you feel you need clinical evaluation with them at any time for any cardiac reason.     Reminder:  For your safety, we ask that you bring in your current medication(s) or an updated list of your medications with you to EACH office visit. Include the medication name, dose of pill on bottle and how you are taking it. Include over-the-counter medications or supplements. Your provider will review this at each visit and plan your care based on your current information.   ~~~~~~~~~~~~~~~~~~~~~~~~~~~~~~~~~~~~~~~  \"[facility]\" [place] telephone numbers for reference:  Cardiology Scheduling~[phone]  Diagnostic Imaging Scheduling~[phone]  Lab Scheduling~[phone]  Anticoagulation Clinic~[phone]  Cardiac Rehabilitation~[phone]  CORE Clinic RN's~[phone] (at [facility])  Congential Team [phone] (at [facility])  Cardiology Clinic RN's~[phone] (Clotilde Chung, RN)  ~~~~~~~~~~~~~~~~~~~~~~~~~~~~~~~~~~~~~~~~        "

## 2019-12-11 NOTE — LETTER
12/11/2019      Oliva Zhang MD  [place] [address]  [place] [address]      RE: Stiven Nguyễn       Dear Colleague,    I had the pleasure of seeing Stiven Nguyễn in the [facility].    Service Date: 12/11/2019      HISTORY OF PRESENT ILLNESS:  Mr. Nguyễn is a pleasant 64-year-old gentleman who I am meeting for the first time.  He was previously followed by Dr. Pickard.  He has a past medical history significant for coronary artery disease that has been nonobstructive.  Specifically, he underwent angiograms in 2015, 2016 and 2017 and with each of these he had FFR of his LAD for a 40%-60% lesion and it was 0.84.  He did see [facility] back in 2016 and they agreed with this assessment, although they did not undergo an angiogram with that visit.  They did do an echocardiogram and he had normal ejection fraction without any regional wall motion abnormalities or significant valve disease.      Mr. Nguyễn has diabetes.  He reports his A1c when checked this summer was under good control.  Our last records show 2016 it was 6.6 and in 2017 his cholesterol LDL was 61 with an HDL of 38.  He in the past has been on Ranexa but did not tolerate it.  He was on 60 of Imdur at one point but could not tolerate it and is now on 45 mg a day.      Mr. Nguyễn does have obstructive sleep apnea, uses a CPAP mask.  He has hypertension, which has been controlled on his medication regimen.  He is retired from the  and after that worked at the [employer] for a number of years.  He is in the office today with his wife.  There is a family history of early coronary artery disease.      Mr. Nguyễn has not been on verapamil or diltiazem in the past.  His heart rate tends to run on the lower side in the 50s to 60s.  He has been on Norvasc 10 mg daily.  He has not had any lightheadedness.  He has had ongoing chest pain that is atypical and typical in nature.  He reports that the chest pressure  has been getting worse recently, maybe over the past year and he has had to take nitroglycerin on occasion.  He was recently in the ER at [place] after going to his primary for the shoulder pain and then he ended up getting a sent to the ER where they did troponin.  He does have a chronically elevated troponin and they became concerned.  He was frustrated by this, as he did not feel this was his heart but rather musculoskeletal, and he would like this documented in his chart.      IMPRESSION, REPORT, PLAN:   1.  History of nonobstructive coronary artery disease in the LAD with FFR of 0.84 and a history of abnormal stress test in 2016 in this distribution.   2.  Episodes of vasospastic angina.   3.  Hypertension.   4.  Hyperlipidemia.   5.  Obstructive sleep apnea, uses a CPAP.   6.  Diabetes.   7.  Obesity.      DISCUSSION:  It was a pleasure to see Mr. Nguyễn in Cardiology.  Today I discussed his history and symptoms in detail as outlined and reviewed his testing.  We discussed options for management.  At this time in light of the fact that he feels that his symptoms have worsened, we discussed coronary angiogram versus trying a very small dose of verapamil or diltiazem.  We decided to try 120 daily of diltiazem and then have him follow up in clinic.  If he is still having symptoms that he feels are unstable, then at that time we probably would send him for a cath, as it has been several years and it is certainly possible that his LAD disease has worsened.  If, however, he is asymptomatic, then we will continue to manage him medically and follow him closely.  He understands and is in agreement with the plan as outlined.  All questions were answered.  It was a pleasure to see him today.  Please do not hesitate to contact me with any questions or concerns.         SELENE MARTINEZ MD             D: 12/11/2019   T: 12/11/2019   MT: CHRIS      Name:     MARKEL NGUYỄN   MRN:      [id number]        Account:       [id number]   :      1955           Service Date: 2019      Document: [id number]         Outpatient Encounter Medications as of 2019   Medication Sig Dispense Refill     Acetaminophen (TYLENOL PO) Take 1,000 mg by mouth every 8 hours as needed for mild pain or fever        amLODIPine (NORVASC) 10 MG tablet Take 1 tablet (10 mg) by mouth daily 90 tablet 3     aspirin EC 81 MG EC tablet Take 1 tablet (81 mg) by mouth daily 30 tablet 2     atorvastatin (LIPITOR) 10 MG tablet Take 1 tablet by mouth every evening       diltiazem ER (TIAZAC) 120 MG 24 hr ER beaded capsule Take 1 capsule (120 mg) by mouth daily 90 capsule 3     fexofenadine (ALLEGRA) 180 MG tablet Take 1 tablet by mouth every evening       isosorbide mononitrate (IMDUR) 30 MG 24 hr tablet Take 45 mg by mouth daily 45mg daily (1.5 tab)       lisinopril (PRINIVIL,ZESTRIL) 40 MG tablet Take 40 mg by mouth daily       nitroGLYcerin (NITROSTAT) 0.4 MG sublingual tablet For chest pain place 1 tablet under the tongue every 5 minutes for 3 doses. If symptoms persist 5 minutes after 1st dose call 911. 90 tablet 3     OMEPRAZOLE PO Take 20 mg by mouth every morning       oxybutynin (DITROPAN) 5 MG tablet Take 1 tablet (5 mg) by mouth daily 60 tablet 3     tamsulosin (FLOMAX) 0.4 MG 24 hr capsule Take 1 capsule (0.4 mg) by mouth daily (Patient taking differently: Take 0.4 mg by mouth every evening ) 90 capsule 3     No facility-administered encounter medications on file as of 2019.        Again, thank you for allowing me to participate in the care of your patient.      Sincerely,    Ike Reddy MD     [facility]
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
## 2019-12-19 DIAGNOSIS — I25.10 NONOBSTRUCTIVE ATHEROSCLEROSIS OF CORONARY ARTERY: Primary | ICD-10-CM

## 2019-12-23 ENCOUNTER — HOSPITAL ENCOUNTER (EMERGENCY)
Facility: CLINIC | Age: 64
Discharge: HOME | End: 2019-12-23
Attending: FAMILY MEDICINE | Admitting: FAMILY MEDICINE
Payer: OTHER GOVERNMENT

## 2019-12-23 ENCOUNTER — TELEPHONE (OUTPATIENT)
Dept: CARDIOLOGY | Facility: CLINIC | Age: 64
End: 2019-12-23

## 2019-12-23 VITALS
HEIGHT: 73 IN | RESPIRATION RATE: 16 BRPM | DIASTOLIC BLOOD PRESSURE: 73 MMHG | SYSTOLIC BLOOD PRESSURE: 147 MMHG | WEIGHT: 270 LBS | TEMPERATURE: 98 F | BODY MASS INDEX: 35.78 KG/M2 | OXYGEN SATURATION: 97 %

## 2019-12-23 DIAGNOSIS — L27.0 DRUG RASH: ICD-10-CM

## 2019-12-23 PROCEDURE — 99284 EMERGENCY DEPT VISIT MOD MDM: CPT | Mod: Z6 | Performed by: FAMILY MEDICINE

## 2019-12-23 PROCEDURE — 99282 EMERGENCY DEPT VISIT SF MDM: CPT | Performed by: FAMILY MEDICINE

## 2019-12-23 RX ORDER — PREDNISONE 20 MG/1
20 TABLET ORAL 2 TIMES DAILY
Qty: 10 TABLET | Refills: 0 | Status: SHIPPED | OUTPATIENT
Start: 2019-12-23 | End: 2020-01-13

## 2019-12-23 ASSESSMENT — MIFFLIN-ST. JEOR: SCORE: 2078.59

## 2019-12-23 NOTE — TELEPHONE ENCOUNTER
"Pt called stating he started a new medication after seeing Dr Reddy 12/1/19/  Pt was prescribed Diltiazem.  Pt states last Thursday 12/19/19 started to get rash all over.  Stopped taking medication on Saturday but woke up this morning with worsening rash and swelling in hands.  Pt states he \"cant lay in bed or have anything touch him and feels like he will claw his skin off\" Pt advised to go to urgent care for treatment.  Will notify Dr Reddy of reaction.  DRISS LANGLEY RN on 12/23/2019 at 9:21 AM      ADDENDUM:  Pt has appt 1/13/20 with [facility] (per Dr Reddy recommendations.  \"I am sorry to hear that. He was treated for a drug rash. NP should see in follow up.\"  DRISS LANGLEY RN on 12/30/2019 at 2:30 PM    "
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
## 2019-12-23 NOTE — DISCHARGE INSTRUCTIONS
Return to the Emergency Room if the following occurs:     Severely worsened pain, worsened breathing, or for any concern at anytime.    Or, follow-up with the following provider as we discussed:     Discuss any new medication changes with your cardiologist.    Medications discussed:    Prednisone.    If you received pain-relieving or sedating medication during your time in the ER, avoid alcohol, driving automobiles, or working with machinery.  Also, a responsible adult must stay with you.        Call the Nurse Advice Line at [phone] or [phone] for any concern at anytime.

## 2019-12-23 NOTE — ED AVS SNAPSHOT
[facility] Emergency Department  [address] [address]  Phone:  [phone]  Fax:  [phone]                                    Stiven Nguyễn   MRN: [id number]    Department:  [facility] Emergency Department   Date of Visit:  12/23/2019           After Visit Summary Signature Page    I have received my discharge instructions, and my questions have been answered. I have discussed any challenges I see with this plan with the nurse or doctor.    ..........................................................................................................................................  Patient/Patient Representative Signature      ..........................................................................................................................................  Patient Representative Print Name and Relationship to Patient    ..................................................               ................................................  Date                                   Time    ..........................................................................................................................................  Reviewed by Signature/Title    ...................................................              ..............................................  Date                                               Time          22EPIC Rev 08/18

## 2020-01-08 DIAGNOSIS — I20.1 CORONARY VASOSPASM (H): ICD-10-CM

## 2020-01-08 RX ORDER — AMLODIPINE BESYLATE 10 MG/1
10 TABLET ORAL DAILY
Qty: 90 TABLET | Refills: 3 | Status: SHIPPED | OUTPATIENT
Start: 2020-01-08 | End: 2021-01-04

## 2020-01-13 ENCOUNTER — OFFICE VISIT (OUTPATIENT)
Dept: CARDIOLOGY | Facility: CLINIC | Age: 65
End: 2020-01-13
Attending: INTERNAL MEDICINE
Payer: OTHER GOVERNMENT

## 2020-01-13 VITALS
WEIGHT: 271 LBS | BODY MASS INDEX: 35.75 KG/M2 | DIASTOLIC BLOOD PRESSURE: 66 MMHG | SYSTOLIC BLOOD PRESSURE: 129 MMHG | HEART RATE: 52 BPM | OXYGEN SATURATION: 97 %

## 2020-01-13 DIAGNOSIS — I25.10 NONOBSTRUCTIVE ATHEROSCLEROSIS OF CORONARY ARTERY: Primary | ICD-10-CM

## 2020-01-13 DIAGNOSIS — E78.5 HYPERLIPIDEMIA LDL GOAL <70: ICD-10-CM

## 2020-01-13 DIAGNOSIS — I20.1 VASOSPASTIC ANGINA (H): ICD-10-CM

## 2020-01-13 DIAGNOSIS — I10 BENIGN ESSENTIAL HYPERTENSION: ICD-10-CM

## 2020-01-13 PROCEDURE — 99214 OFFICE O/P EST MOD 30 MIN: CPT | Performed by: NURSE PRACTITIONER
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
## 2020-01-13 NOTE — PATIENT INSTRUCTIONS
Medication Changes:  None     Recommendations:  1. Call if chest discomfort is getting more sever, lasting longer, or more frequent     Follow-up:  See Dr. Reddy for cardiology follow up at [facility]: March     Cardiology Scheduling~[phone]  Cardiology Clinic RNs~[phone] (Viviana Chung RN and Clotilde Lauren RN)
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
## 2020-01-20 ENCOUNTER — RECORDS - HEALTHEAST (OUTPATIENT)
Dept: LAB | Facility: CLINIC | Age: 65
End: 2020-01-20

## 2020-01-20 ENCOUNTER — TRANSFERRED RECORDS (OUTPATIENT)
Dept: HEALTH INFORMATION MANAGEMENT | Facility: CLINIC | Age: 65
End: 2020-01-20

## 2020-01-20 LAB
ALBUMIN SERPL-MCNC: 4 G/DL (ref 3.5–5)
ALP SERPL-CCNC: 93 U/L (ref 45–120)
ALT SERPL W P-5'-P-CCNC: 28 U/L (ref 0–45)
ANION GAP SERPL CALCULATED.3IONS-SCNC: 7 MMOL/L (ref 5–18)
AST SERPL W P-5'-P-CCNC: 22 U/L (ref 0–40)
BILIRUB SERPL-MCNC: 0.9 MG/DL (ref 0–1)
BUN SERPL-MCNC: 16 MG/DL (ref 8–22)
CALCIUM SERPL-MCNC: 9.3 MG/DL (ref 8.5–10.5)
CHLORIDE BLD-SCNC: 106 MMOL/L (ref 98–107)
CHOLEST SERPL-MCNC: 127 MG/DL
CO2 SERPL-SCNC: 30 MMOL/L (ref 22–31)
CREAT SERPL-MCNC: 0.94 MG/DL (ref 0.7–1.3)
FASTING STATUS PATIENT QL REPORTED: NO
GFR SERPL CREATININE-BSD FRML MDRD: >60 ML/MIN/1.73M2
GLUCOSE BLD-MCNC: 153 MG/DL (ref 70–125)
HDLC SERPL-MCNC: 36 MG/DL
LDLC SERPL CALC-MCNC: 73 MG/DL
POTASSIUM BLD-SCNC: 4.1 MMOL/L (ref 3.5–5)
PROT SERPL-MCNC: 7 G/DL (ref 6–8)
SODIUM SERPL-SCNC: 143 MMOL/L (ref 136–145)
TRIGL SERPL-MCNC: 92 MG/DL

## 2020-01-21 ENCOUNTER — TELEPHONE (OUTPATIENT)
Dept: ADMINISTRATIVE | Facility: CLINIC | Age: 65
End: 2020-01-21

## 2020-01-21 NOTE — TELEPHONE ENCOUNTER
Diabetes Education Scheduling Outreach #1:    Call to patient to schedule. Left message with phone number to call to schedule.    Plan for 2nd outreach attempt within 1 week.    Nelli Ibarra  [facility]  Diabetes and Nutrition Scheduling

## 2020-02-12 ENCOUNTER — ALLIED HEALTH/NURSE VISIT (OUTPATIENT)
Dept: EDUCATION SERVICES | Facility: CLINIC | Age: 65
End: 2020-02-12
Payer: OTHER GOVERNMENT

## 2020-02-12 DIAGNOSIS — E11.9 DIABETES MELLITUS, TYPE 2 (H): Primary | ICD-10-CM

## 2020-02-12 PROCEDURE — G0108 DIAB MANAGE TRN  PER INDIV: HCPCS

## 2020-02-12 NOTE — PATIENT INSTRUCTIONS
[email or web address]    Free meter   Lancet   Ask [facility] to enter prescriptions for test strips / lancets.     Keep up the great work!     Anabelle Venegas RD, RUSLAN, CDE  Diabetes Education  RUSLAN Gray RD, CDE   [phone] or email: [email or web address]

## 2020-02-12 NOTE — LETTER
"    2/12/2020         RE: Stiven Nguyễn  [address]  [facility] [address]        Hi Dr. Vicente VIRGEN only - Arya came in for education.  We had a good and long conversation about Diabetes / pathophysiology / diet etc.  He will start checking blood sugars and watching diet closer; he has made changes and there is room to make a bit more.  He will make another follow up in a few months once he gets some blood sugars.  I am not able to prescribe testing supplies from providers out of clinic ([facility] won't let me), but he will contact you with what he needs, [facility].  It think the old freestyle lite glucometer is what [facility] usually likes.       Thank you for referring your patient, Stiven Nguyễn, to the [facility]. Please see a copy of my visit note below.    Diabetes Self-Management Education & Support  Presents for: Individual review    SUBJECTIVE/OBJECTIVE:  Presents for: Individual review  Accompanied by: Self  Diabetes education in the past 24mo: No  Focus of Visit: Patient Unsure  Diabetes type: Type 2  Date of diagnosis: pt thinks 2-3 years ago.  A1c elevated back to 2015  Disease course: Getting harder to manage  How confident are you filling out medical forms by yourself:: Extremely  Diabetes management related comments/concerns: does not want medications.   Cultural Influences/Ethnic Background:  American    Diabetes Symptoms & Complications:   no signs/symptoms        Patient Problem List and Family Medical History reviewed for relevant medical history, current medical status, and diabetes risk factors.    Vitals:  There were no vitals taken for this visit.  Estimated body mass index is 35.75 kg/m  as calculated from the following:    Height as of 12/23/19: 1.854 m (6' 1\").    Weight as of 1/13/20: 122.9 kg (271 lb).   Last 3 BP:   BP Readings from Last 3 Encounters:   01/13/20 129/66   12/23/19 (!) 147/73   12/11/19 128/66 "       History   Smoking Status     Former Smoker   Smokeless Tobacco     Former User     Types: Snuff     Quit date: 7/7/2011       Labs:  Lab Results   Component Value Date    A1C 6.8 07/15/2019     Lab Results   Component Value Date     08/13/2019     Lab Results   Component Value Date    LDL 57 04/11/2019     HDL Cholesterol   Date Value Ref Range Status   04/11/2019 38 >=40 mg/dL Final   ]  GFR Estimate   Date Value Ref Range Status   08/13/2019 83 >60 mL/min/[1.73_m2] Final     Comment:     Non  GFR Calc  Starting 12/18/2018, serum creatinine based estimated GFR (eGFR) will be   calculated using the Chronic Kidney Disease Epidemiology Collaboration   (CKD-EPI) equation.       GFR Estimate If Black   Date Value Ref Range Status   08/13/2019 >90 >60 mL/min/[1.73_m2] Final     Comment:      GFR Calc  Starting 12/18/2018, serum creatinine based estimated GFR (eGFR) will be   calculated using the Chronic Kidney Disease Epidemiology Collaboration   (CKD-EPI) equation.       Lab Results   Component Value Date    CR 0.97 08/13/2019     No results found for: MICROALBUMIN    Healthy Eating:  Healthy Eating Assessed Today: Yes  Meal planning/habits: Smaller portions  Meals include: Lunch, Dinner, Breakfast, Evening Snack  Breakfast: 2 toast (19gm) + chocolate milk (26gm) + tea with honey + small orange (10gm) + cereal 45gm  Lunch: sandiwch / fruit   Dinner: low carb meat / vegetables.  Reduced pasta.   Snacks: ice cream (cut down portion size)   Beverages: Water, Milk    Being Active:  Being Active Assessed Today: Yes(busy at home; wood working in garage)    Monitoring:  Monitoring Assessed Today: Yes(reviewed; pt to call [facility] for meter coverage and free meter)      Taking Medications:  Taking Medication Assessed Today: Yes  Current Treatments: Diet    Problem Solving:   Not assessed at this visit   Reducing Risks:  Reducing Risks Assessed Today: No    Healthy Coping:  Healthy  Coping Assessed Today: Yes  Emotional response to diabetes: Ready to learn  Stage of change: ACTION (Actively working towards change)  Support resources: In-person Offerings  Patient Activation Measure Survey Score:  No flowsheet data found.    Diabetes knowledge and skills assessment:   Patient is knowledgeable in diabetes management concepts related to: Healthy Eating, Being Active and Monitoring  Patient needs further education on the following diabetes management concepts: Monitoring, Taking Medication, Problem Solving, Reducing Risks and Healthy Coping  Based on learning assessment above, most appropriate setting for further diabetes education would be: Individual setting.      INTERVENTIONS:    Education provided today on:  AADE Self-Care Behaviors:  Diabetes Pathophysiology  Healthy Eating: carbohydrate counting, consistency in amount, composition, and timing of food intake, weight reduction, portion control and label reading  Being Active: relationship to blood glucose  Monitoring: purpose, proper technique, log and interpret results, individual blood glucose targets and frequency of monitoring  Taking Medication: reviewed metformin as a first line treatment and how it works     Opportunities for ongoing education and support in diabetes-self management were discussed.  Pt verbalized understanding of concepts discussed and recommendations provided today.       Education Materials Provided:  [email or web address] Healthy Living with Diabetes Book and BG Log Sheet      ASSESSMENT:    Lab Results   Component Value Date    A1C 6.8 07/15/2019    A1C 7.4 04/11/2019    A1C 6.6 05/03/2016    A1C 7.2 02/23/2016    A1C 7.0 05/20/2015     Newest A1c at 7.1 or 7.2 (Entira)    Spent a long time discussing the pathophysiology of Diabetes and benefits of diet & activity changes.  Patient is not interested in medications at this time, but reviewed how metformin works.  Patient has made diet changes, but has a few items that are still  higher carbohydrate (chocolate milk chocolate/caramel ice cream etc).     PLAN  See Patient Instructions for co-developed, patient-stated behavior change goals.  AVS printed and provided to patient today. See Follow-Up section for recommended follow-up.    Ruthy Venegas RD, LD, CDE  Diabetes Education    Time Spent: 80 minutes  Encounter Type: Individual

## 2020-02-12 NOTE — PROGRESS NOTES
"Diabetes Self-Management Education & Support  Presents for: Individual review    SUBJECTIVE/OBJECTIVE:  Presents for: Individual review  Accompanied by: Self  Diabetes education in the past 24mo: No  Focus of Visit: Patient Unsure  Diabetes type: Type 2  Date of diagnosis: pt thinks 2-3 years ago.  A1c elevated back to 2015  Disease course: Getting harder to manage  How confident are you filling out medical forms by yourself:: Extremely  Diabetes management related comments/concerns: does not want medications.   Cultural Influences/Ethnic Background:  American    Diabetes Symptoms & Complications:   no signs/symptoms        Patient Problem List and Family Medical History reviewed for relevant medical history, current medical status, and diabetes risk factors.    Vitals:  There were no vitals taken for this visit.  Estimated body mass index is 35.75 kg/m  as calculated from the following:    Height as of 12/23/19: 1.854 m (6' 1\").    Weight as of 1/13/20: 122.9 kg (271 lb).   Last 3 BP:   BP Readings from Last 3 Encounters:   01/13/20 129/66   12/23/19 (!) 147/73   12/11/19 128/66       History   Smoking Status     Former Smoker   Smokeless Tobacco     Former User     Types: Snuff     Quit date: 7/7/2011       Labs:  Lab Results   Component Value Date    A1C 6.8 07/15/2019     Lab Results   Component Value Date     08/13/2019     Lab Results   Component Value Date    LDL 57 04/11/2019     HDL Cholesterol   Date Value Ref Range Status   04/11/2019 38 >=40 mg/dL Final   ]  GFR Estimate   Date Value Ref Range Status   08/13/2019 83 >60 mL/min/[1.73_m2] Final     Comment:     Non  GFR Calc  Starting 12/18/2018, serum creatinine based estimated GFR (eGFR) will be   calculated using the Chronic Kidney Disease Epidemiology Collaboration   (CKD-EPI) equation.       GFR Estimate If Black   Date Value Ref Range Status   08/13/2019 >90 >60 mL/min/[1.73_m2] Final     Comment:      GFR " Calc  Starting 12/18/2018, serum creatinine based estimated GFR (eGFR) will be   calculated using the Chronic Kidney Disease Epidemiology Collaboration   (CKD-EPI) equation.       Lab Results   Component Value Date    CR 0.97 08/13/2019     No results found for: MICROALBUMIN    Healthy Eating:  Healthy Eating Assessed Today: Yes  Meal planning/habits: Smaller portions  Meals include: Lunch, Dinner, Breakfast, Evening Snack  Breakfast: 2 toast (19gm) + chocolate milk (26gm) + tea with honey + small orange (10gm) + cereal 45gm  Lunch: sandiwch / fruit   Dinner: low carb meat / vegetables.  Reduced pasta.   Snacks: ice cream (cut down portion size)   Beverages: Water, Milk    Being Active:  Being Active Assessed Today: Yes(busy at home; wood working in garage)    Monitoring:  Monitoring Assessed Today: Yes(reviewed; pt to call  for meter coverage and free meter)      Taking Medications:  Taking Medication Assessed Today: Yes  Current Treatments: Diet    Problem Solving:   Not assessed at this visit   Reducing Risks:  Reducing Risks Assessed Today: No    Healthy Coping:  Healthy Coping Assessed Today: Yes  Emotional response to diabetes: Ready to learn  Stage of change: ACTION (Actively working towards change)  Support resources: In-person Offerings  Patient Activation Measure Survey Score:  No flowsheet data found.    Diabetes knowledge and skills assessment:   Patient is knowledgeable in diabetes management concepts related to: Healthy Eating, Being Active and Monitoring  Patient needs further education on the following diabetes management concepts: Monitoring, Taking Medication, Problem Solving, Reducing Risks and Healthy Coping  Based on learning assessment above, most appropriate setting for further diabetes education would be: Individual setting.      INTERVENTIONS:    Education provided today on:  AADE Self-Care Behaviors:  Diabetes Pathophysiology  Healthy Eating: carbohydrate counting, consistency in  amount, composition, and timing of food intake, weight reduction, portion control and label reading  Being Active: relationship to blood glucose  Monitoring: purpose, proper technique, log and interpret results, individual blood glucose targets and frequency of monitoring  Taking Medication: reviewed metformin as a first line treatment and how it works     Opportunities for ongoing education and support in diabetes-self management were discussed.  Pt verbalized understanding of concepts discussed and recommendations provided today.       Education Materials Provided:  [employer] Healthy Living with Diabetes Book and BG Log Sheet      ASSESSMENT:    Lab Results   Component Value Date    A1C 6.8 07/15/2019    A1C 7.4 04/11/2019    A1C 6.6 05/03/2016    A1C 7.2 02/23/2016    A1C 7.0 05/20/2015     Newest A1c at 7.1 or 7.2 ([place])    Spent a long time discussing the pathophysiology of Diabetes and benefits of diet & activity changes.  Patient is not interested in medications at this time, but reviewed how metformin works.  Patient has made diet changes, but has a few items that are still higher carbohydrate (chocolate milk chocolate/caramel ice cream etc).     PLAN  See Patient Instructions for co-developed, patient-stated behavior change goals.  AVS printed and provided to patient today. See Follow-Up section for recommended follow-up.    uRthy Venegas RD, LD, CDE  Diabetes Education    Time Spent: 80 minutes  Encounter Type: Individual

## 2020-02-19 NOTE — PROGRESS NOTES
"Pt was given a voucher for a FreeStyle meter and picked it up from [employer] yesterday. He is getting an error message 3 [per his instruction book 'incorrect test procedure'].        CDE called Arya back:  He was given a Freestyle Lite meter. We talked through step by step on the phone.   He is washing and drying hands, placing strip into meter correctly.  Has a little trouble getting a drop of blood, says \"I'm a sissy, I don't want to do it\" but later says \"I'll get used to it\".   Reviewed adjusting depth of poke, holding lancing device close to finger to get it right on the first try.     He is able to get blood and apply to strip and get a reading of 183 this AM about 30 minutes after breakfast.   He believes he was placing the blood on strip too soon, before getting message on meter to apply drop.     Arya expressed confidence with BG monitoring going forward.   Reminded check either pre-meal with goal of <130, or 2 hours post-meal with goal of <180. He voices understanding.     Advised return to  ed in 3 months.     Barbara Zarco RD, LD, CDE    "

## 2020-02-20 ENCOUNTER — PATIENT OUTREACH (OUTPATIENT)
Dept: EDUCATION SERVICES | Facility: CLINIC | Age: 65
End: 2020-02-20
Payer: OTHER GOVERNMENT

## 2020-02-20 DIAGNOSIS — R39.15 URINARY URGENCY: ICD-10-CM

## 2020-02-20 RX ORDER — OXYBUTYNIN CHLORIDE 5 MG/1
5 TABLET ORAL DAILY
Qty: 60 TABLET | Refills: 1 | Status: SHIPPED | OUTPATIENT
Start: 2020-02-20 | End: 2020-05-28

## 2020-02-20 NOTE — TELEPHONE ENCOUNTER
Requested Prescriptions   Pending Prescriptions Disp Refills     oxybutynin 5 MG PO tablet 60 tablet 3     Sig: Take 1 tablet (5 mg) by mouth daily       There is no refill protocol information for this order        Last Written Prescription Date:    Last Fill Quantity: ,  # refills:    Last office visit: 7/2/2019 with prescribing provider:  Dilshad Johnston Office Visit:   Next 5 appointments (look out 90 days)    Mar 25, 2020  2:30 PM CDT  Return Visit with Ike Reddy MD  [facility] ([facility]) [address] [place] [id number] [phone]

## 2020-03-01 ENCOUNTER — HEALTH MAINTENANCE LETTER (OUTPATIENT)
Age: 65
End: 2020-03-01

## 2020-03-25 ENCOUNTER — VIRTUAL VISIT (OUTPATIENT)
Dept: CARDIOLOGY | Facility: CLINIC | Age: 65
End: 2020-03-25
Attending: INTERNAL MEDICINE
Payer: OTHER GOVERNMENT

## 2020-03-25 VITALS
SYSTOLIC BLOOD PRESSURE: 141 MMHG | BODY MASS INDEX: 34.96 KG/M2 | DIASTOLIC BLOOD PRESSURE: 68 MMHG | HEART RATE: 59 BPM | WEIGHT: 265 LBS

## 2020-03-25 DIAGNOSIS — I20.1 CORONARY VASOSPASM (H): ICD-10-CM

## 2020-03-25 PROCEDURE — 99213 OFFICE O/P EST LOW 20 MIN: CPT | Mod: 95 | Performed by: INTERNAL MEDICINE
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
## 2020-05-28 DIAGNOSIS — R39.15 URINARY URGENCY: ICD-10-CM

## 2020-05-28 RX ORDER — OXYBUTYNIN CHLORIDE 5 MG/1
5 TABLET ORAL DAILY
Qty: 30 TABLET | Refills: 1 | Status: SHIPPED | OUTPATIENT
Start: 2020-05-28 | End: 2020-08-03

## 2020-05-28 NOTE — TELEPHONE ENCOUNTER
Requested Prescriptions   Pending Prescriptions Disp Refills     oxybutynin (DITROPAN) 5 MG tablet 60 tablet 1     Sig: Take 1 tablet (5 mg) by mouth daily       There is no refill protocol information for this order        Last Written Prescription Date:    Last Fill Quantity: ,  # refills:    Last office visit: 7/2/2019 with prescribing provider:  Dilshad Johnston Office Visit:

## 2020-07-31 DIAGNOSIS — R39.15 URINARY URGENCY: ICD-10-CM

## 2020-08-03 RX ORDER — OXYBUTYNIN CHLORIDE 5 MG/1
5 TABLET ORAL DAILY
Qty: 30 TABLET | Refills: 0 | Status: SHIPPED | OUTPATIENT
Start: 2020-08-03 | End: 2020-08-26

## 2020-08-26 ENCOUNTER — VIRTUAL VISIT (OUTPATIENT)
Dept: UROLOGY | Facility: CLINIC | Age: 65
End: 2020-08-26
Payer: OTHER GOVERNMENT

## 2020-08-26 DIAGNOSIS — R39.15 URINARY URGENCY: ICD-10-CM

## 2020-08-26 DIAGNOSIS — R33.9 BLADDER RETENTION: ICD-10-CM

## 2020-08-26 PROBLEM — E11.9 DIABETES MELLITUS, TYPE 2 (H): Status: ACTIVE | Noted: 2020-08-26

## 2020-08-26 PROCEDURE — 99213 OFFICE O/P EST LOW 20 MIN: CPT | Mod: 95 | Performed by: UROLOGY

## 2020-08-26 RX ORDER — TAMSULOSIN HYDROCHLORIDE 0.4 MG/1
0.4 CAPSULE ORAL DAILY
Qty: 90 CAPSULE | Refills: 3 | Status: SHIPPED | OUTPATIENT
Start: 2020-08-26 | End: 2021-03-22

## 2020-08-26 RX ORDER — OXYBUTYNIN CHLORIDE 5 MG/1
TABLET ORAL
Qty: 90 TABLET | Refills: 3 | Status: SHIPPED | OUTPATIENT
Start: 2020-08-26 | End: 2020-11-24

## 2020-08-26 NOTE — PROGRESS NOTES
Telephone visit    I spoke with Mr. Nguyễn this morning and he says that he is doing well.    He takes Ditropan (oxybutynin) 2.5 mg twice daily and this adequately controls his urinary urgency.  He is also taking tamsulosin 0.4 mg daily.    I renewed his prescriptions.    Erectile insufficiency is being managed by simply application of the constricting band without using the vacuum device.    I will see him back in 1 year in follow-up or sooner as needed.    Total time 15 minutes

## 2020-10-07 ENCOUNTER — OFFICE VISIT (OUTPATIENT)
Dept: ORTHOPEDICS | Facility: CLINIC | Age: 65
End: 2020-10-07
Payer: OTHER GOVERNMENT

## 2020-10-07 ENCOUNTER — ANCILLARY PROCEDURE (OUTPATIENT)
Dept: GENERAL RADIOLOGY | Facility: CLINIC | Age: 65
End: 2020-10-07
Attending: PEDIATRICS
Payer: OTHER GOVERNMENT

## 2020-10-07 VITALS
DIASTOLIC BLOOD PRESSURE: 82 MMHG | WEIGHT: 271 LBS | HEIGHT: 73 IN | SYSTOLIC BLOOD PRESSURE: 136 MMHG | BODY MASS INDEX: 35.92 KG/M2

## 2020-10-07 DIAGNOSIS — M75.101 ROTATOR CUFF SYNDROME OF RIGHT SHOULDER: ICD-10-CM

## 2020-10-07 DIAGNOSIS — G89.29 CHRONIC RIGHT SHOULDER PAIN: Primary | ICD-10-CM

## 2020-10-07 DIAGNOSIS — M25.511 CHRONIC RIGHT SHOULDER PAIN: ICD-10-CM

## 2020-10-07 DIAGNOSIS — G89.29 CHRONIC RIGHT SHOULDER PAIN: ICD-10-CM

## 2020-10-07 DIAGNOSIS — M25.511 CHRONIC RIGHT SHOULDER PAIN: Primary | ICD-10-CM

## 2020-10-07 PROCEDURE — 73030 X-RAY EXAM OF SHOULDER: CPT | Mod: RT | Performed by: RADIOLOGY

## 2020-10-07 PROCEDURE — 99203 OFFICE O/P NEW LOW 30 MIN: CPT | Performed by: PEDIATRICS

## 2020-10-07 ASSESSMENT — MIFFLIN-ST. JEOR: SCORE: 2078.13
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
## 2020-10-07 NOTE — PATIENT INSTRUCTIONS
Plan:  - Today's Plan of Care:  Rehab: Physical Therapy: [facility] - [phone]  Discussed activity restrictions    -We also discussed other future treatment options:  MRI of right shoulder    Follow Up: 6 - 8 weeks    If you have any further questions for your physician or physician s care team you can call [phone] and use option 3 to leave a voice message. Calls received during business hours will be returned same day.
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
## 2020-10-08 NOTE — RESULT ENCOUNTER NOTE
These results were discussed during office visit.    Sharon Diamond MD, CAQ  Primary Care Sports Medicine  [facility]

## 2020-10-13 ENCOUNTER — HOSPITAL ENCOUNTER (OUTPATIENT)
Dept: PHYSICAL THERAPY | Facility: CLINIC | Age: 65
Setting detail: THERAPIES SERIES
End: 2020-10-13
Attending: PEDIATRICS
Payer: OTHER GOVERNMENT

## 2020-10-13 DIAGNOSIS — G89.29 CHRONIC RIGHT SHOULDER PAIN: ICD-10-CM

## 2020-10-13 DIAGNOSIS — M25.511 CHRONIC RIGHT SHOULDER PAIN: ICD-10-CM

## 2020-10-13 DIAGNOSIS — M75.101 ROTATOR CUFF SYNDROME OF RIGHT SHOULDER: ICD-10-CM

## 2020-10-13 PROCEDURE — 97110 THERAPEUTIC EXERCISES: CPT | Mod: GP | Performed by: PHYSICAL THERAPIST

## 2020-10-13 PROCEDURE — 97161 PT EVAL LOW COMPLEX 20 MIN: CPT | Mod: GP | Performed by: PHYSICAL THERAPIST

## 2020-10-13 NOTE — PROGRESS NOTES
10/13/20 1000   General Information   Type of Visit Initial OP Ortho PT Evaluation   Start of Care Date 10/13/20   Referring Physician yara medrano   Patient/Family Goals Statement throwing the ball, no pain    Orders Evaluate and Treat   Date of Order 10/07/20   Certification Required? No   Medical Diagnosis chronic right shoulder pain, RC syndrome of right shoulder    Surgical/Medical history reviewed Yes   Precautions/Limitations no known precautions/limitations   General Information Comments PMH: heart problems, HTN, bladder control, diabetes   Body Part(s)   Body Part(s) Shoulder   Presentation and Etiology   Pertinent history of current problem (include personal factors and/or comorbidities that impact the POC) the right shoulder has bothered for several years, but recently the shoulder has bothered more in the past few weeks. He has been helping out around the house more since his wife had surgery so he hasn't been able to do as much espeically with overhead reaching. The injury itself started back in the - threw it out twice playing baseball, landed on with while getting launched, and several other injuries throughout the years. He bow hunts but this doesn't bother. Reaching overhead, behind his back or reaching behind him all hurts. The pain is mostly in his right shoulder but will radiate down into the hand when he aggravates it. Sleeping is difficult if he tries to sleep on the right side.    Impairments A. Pain;D. Decreased ROM;L. Tingling;M. Locking or catching   Functional Limitations perform activities of daily living;perform required work activities   Symptom Location right shoulder   How/Where did it occur Other  ( injury )   Onset date of current episode/exacerbation 01/01/86   Chronicity New   Pain rating (0-10 point scale) Best (/10);Worst (/10)   Best (/10) 2   Worst (/10) 9   Pain quality A. Sharp;C. Aching;G. Cramping   Frequency of pain/symptoms C. With activity    Pain/symptoms are: Worse during the night   Pain/symptoms exacerbated by G. Certain positions;H. Overhead reach;M. Other  (reaching behind)   Progression of symptoms since onset: Worsened   Prior Level of Function   Prior Level of Function-Mobility independent   Prior Level of Function-ADLs independent   Functional Level Prior Comment independent   Current Level of Function   Patient role/employment history F. Retired   Fall Risk Screen   Fall screen completed by PT   Have you fallen 2 or more times in the past year? No   Have you fallen and had an injury in the past year? No   Is patient a fall risk? No   Abuse Screen (yes response referral indicated)   Feels Unsafe at Home or Work/School no   Feels Threatened by Someone no   Does Anyone Try to Keep You From Having Contact with Others or Doing Things Outside Your Home? no   Physical Signs of Abuse Present no   Functional Scales   Functional Scales Other   Other Scales  SPADI   Shoulder Objective Findings   Side (if bilateral, select both right and left) Right   Observation right handed    Integumentary  no concerns   Posture right shoulder rounded more than left    Cervical Screen (ROM, quadrant) WNL B    Shoulder Flexibility Comments painful with adduction and elbow extension   Neer's Test + right   Lujan-Miguel Angel Test + R    [place]'s Test - R    Sulcus Test 2 cm + for clunk   Crossover Test + R    Shoulder Special Tests Comments empty can/full can/speeds - R    Palpation tender to palpation over the right biceps tendon and supraspinatus tendon    Accessory Motion/Joint Mobility AP hypermobile on the right at GH joint   Right Shoulder Flexion AROM 120   Right Shoulder Flexion PROM 160   Right Shoulder Abduction AROM 112   Right Shoulder Abduction PROM 150   Right Shoulder ER AROM 60 with elbow at side  T3 behind back   Right Shoulder ER PROM 45 in 90 degrees ABD   Right Shoulder IR AROM L1 but painful     Right Shoulder IR PROM 50 in 90 degrees ABD supine     Right Shoulder Flexion Strength 4/5 right,5/5 L    Right Shoulder Abduction Strength 4/5 R  5/5 L    Right Shoulder ER Strength 4-/5 right 5/5 L    Right Shoulder IR Strength 4+/5 R, 5/5 L    Right Shoulder Extension Strength 4+/5 R, 5/5 L    Planned Therapy Interventions   Planned Therapy Interventions joint mobilization;manual therapy;neuromuscular re-education;ROM;strengthening;stretching;transfer training   Planned Therapy Interventions Comment [email or web address]   Clinical Impression   Criteria for Skilled Therapeutic Interventions Met yes, treatment indicated   PT Diagnosis decreased right shoulder ROM    Influenced by the following impairments pain, poor posture, decreased right shoulder strength, decreased right shoulder ROM   Functional limitations due to impairments sleeping, lifting, reaching, pushing, pulling, pouring coffee   Clinical Presentation Stable/Uncomplicated   Clinical Presentation Rationale clinical decision making and chart review   Clinical Decision Making (Complexity) Low complexity   Therapy Frequency 1 time/week   Predicted Duration of Therapy Intervention (days/wks) 10 weeks   Risk & Benefits of therapy have been explained Yes   Patient, Family & other staff in agreement with plan of care Yes   Clinical Impression Comments Patient is a 65 year old male who present to physical therapy with acute on chronic right shoulder pain that flaired up in the past month or so.  Signs and symptoms consistent with right impingement syndrome with rotator cuff tendinitis. 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 Patient would benefit from skilled PT intervention to address impairments listed above.    Education Assessment   Preferred Learning Style Listening;Demonstration;Pictures/video   Barriers to Learning No barriers   ORTHO GOALS   PT Ortho Eval Goals 1;2;3;4   Ortho Goal 1   Goal Identifier 1   Goal Description Patient will be able to improve right shoulder  AROM flexion to 140 degrees without pain in order to put dishes away.    Target Date 11/10/20   Ortho Goal 2   Goal Identifier 2   Goal Description Patient will be able to don/doff jacket without increased right shoulder pain.    Target Date 11/10/20   Ortho Goal 3   Goal Identifier 3   Goal Description Patient will be independent in HEP in order to continue strengthening outside of PT>    Target Date 12/22/20   Ortho Goal 4   Goal Identifier 4   Goal Description Patient will be able to throw a ball with pain staying below a 3/10 in order to participate in recreational activities with grandchildren.    Target Date 12/22/20   Total Evaluation Time   PT Eval, Low Complexity Minutes (28785) 21       Shalonda Crook  PT, DPT       10/13/2020   [address] [address]  [email or web address]  Voicemail: [phone]

## 2020-10-19 ENCOUNTER — HOSPITAL ENCOUNTER (OUTPATIENT)
Dept: PHYSICAL THERAPY | Facility: CLINIC | Age: 65
Setting detail: THERAPIES SERIES
End: 2020-10-19
Attending: PEDIATRICS
Payer: OTHER GOVERNMENT

## 2020-10-19 PROCEDURE — 97110 THERAPEUTIC EXERCISES: CPT | Mod: GP | Performed by: PHYSICAL THERAPIST

## 2020-10-27 ENCOUNTER — HOSPITAL ENCOUNTER (OUTPATIENT)
Dept: PHYSICAL THERAPY | Facility: CLINIC | Age: 65
Setting detail: THERAPIES SERIES
End: 2020-10-27
Attending: PEDIATRICS
Payer: OTHER GOVERNMENT

## 2020-10-27 PROCEDURE — 97110 THERAPEUTIC EXERCISES: CPT | Mod: GP | Performed by: PHYSICAL THERAPIST

## 2020-11-06 ENCOUNTER — HOSPITAL ENCOUNTER (OUTPATIENT)
Dept: PHYSICAL THERAPY | Facility: CLINIC | Age: 65
Setting detail: THERAPIES SERIES
End: 2020-11-06
Attending: PEDIATRICS
Payer: OTHER GOVERNMENT

## 2020-11-06 PROCEDURE — 97110 THERAPEUTIC EXERCISES: CPT | Mod: GP | Performed by: PHYSICAL THERAPIST

## 2020-11-13 ENCOUNTER — HOSPITAL ENCOUNTER (OUTPATIENT)
Dept: PHYSICAL THERAPY | Facility: CLINIC | Age: 65
Setting detail: THERAPIES SERIES
End: 2020-11-13
Attending: PEDIATRICS
Payer: OTHER GOVERNMENT

## 2020-11-13 PROCEDURE — 97110 THERAPEUTIC EXERCISES: CPT | Mod: GP | Performed by: PHYSICAL THERAPIST

## 2020-11-18 ENCOUNTER — OFFICE VISIT (OUTPATIENT)
Dept: ORTHOPEDICS | Facility: CLINIC | Age: 65
End: 2020-11-18
Payer: OTHER GOVERNMENT

## 2020-11-18 VITALS
DIASTOLIC BLOOD PRESSURE: 78 MMHG | WEIGHT: 271 LBS | BODY MASS INDEX: 35.92 KG/M2 | SYSTOLIC BLOOD PRESSURE: 132 MMHG | HEIGHT: 73 IN

## 2020-11-18 DIAGNOSIS — M25.511 CHRONIC RIGHT SHOULDER PAIN: Primary | ICD-10-CM

## 2020-11-18 DIAGNOSIS — M75.101 ROTATOR CUFF SYNDROME OF RIGHT SHOULDER: ICD-10-CM

## 2020-11-18 DIAGNOSIS — G89.29 CHRONIC RIGHT SHOULDER PAIN: Primary | ICD-10-CM

## 2020-11-18 PROCEDURE — 99214 OFFICE O/P EST MOD 30 MIN: CPT | Performed by: PEDIATRICS

## 2020-11-18 ASSESSMENT — MIFFLIN-ST. JEOR: SCORE: 2078.13
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
## 2020-11-18 NOTE — PATIENT INSTRUCTIONS
We discussed these other possible diagnosis: rotator cuff tear    Plan:  - Today's Plan of Care:  MRI of the Right Shoulder - Call [phone] to schedule MRI  Continue Home Exercise Program    -We also discussed other future treatment options:  Consider Orthopedic Surgery Referral or Corticosteroid injection    Follow Up: In clinic with Dr. Diamnod after MRI (wait at least 1-2 days)    If you have any further questions for your physician or physician s care team you can call [phone] and use option 3 to leave a voice message. Calls received during business hours will be returned same day.

## 2020-11-23 ENCOUNTER — HOSPITAL ENCOUNTER (OUTPATIENT)
Dept: MRI IMAGING | Facility: CLINIC | Age: 65
Discharge: HOME | End: 2020-11-23
Attending: PEDIATRICS | Admitting: PEDIATRICS
Payer: OTHER GOVERNMENT

## 2020-11-23 DIAGNOSIS — M25.511 CHRONIC RIGHT SHOULDER PAIN: ICD-10-CM

## 2020-11-23 DIAGNOSIS — M75.101 ROTATOR CUFF SYNDROME OF RIGHT SHOULDER: ICD-10-CM

## 2020-11-23 DIAGNOSIS — G89.29 CHRONIC RIGHT SHOULDER PAIN: ICD-10-CM

## 2020-11-23 PROCEDURE — 73221 MRI JOINT UPR EXTREM W/O DYE: CPT | Mod: RT

## 2020-11-23 PROCEDURE — 73221 MRI JOINT UPR EXTREM W/O DYE: CPT | Mod: 26 | Performed by: RADIOLOGY

## 2020-11-24 ENCOUNTER — OFFICE VISIT (OUTPATIENT)
Dept: UROLOGY | Facility: CLINIC | Age: 65
End: 2020-11-24
Payer: OTHER GOVERNMENT

## 2020-11-24 VITALS
SYSTOLIC BLOOD PRESSURE: 125 MMHG | BODY MASS INDEX: 35.78 KG/M2 | HEIGHT: 73 IN | RESPIRATION RATE: 16 BRPM | HEART RATE: 49 BPM | TEMPERATURE: 97.4 F | DIASTOLIC BLOOD PRESSURE: 63 MMHG | WEIGHT: 270 LBS

## 2020-11-24 DIAGNOSIS — R39.15 URINARY URGENCY: ICD-10-CM

## 2020-11-24 PROCEDURE — 99213 OFFICE O/P EST LOW 20 MIN: CPT | Performed by: UROLOGY

## 2020-11-24 RX ORDER — OXYBUTYNIN CHLORIDE 5 MG/1
5 TABLET ORAL 3 TIMES DAILY
Qty: 270 TABLET | Refills: 3 | Status: SHIPPED | OUTPATIENT
Start: 2020-11-24 | End: 2020-11-24

## 2020-11-24 RX ORDER — OXYBUTYNIN CHLORIDE 5 MG/1
5 TABLET ORAL 3 TIMES DAILY
Qty: 270 TABLET | Refills: 3 | Status: SHIPPED | OUTPATIENT
Start: 2020-11-24 | End: 2021-03-22

## 2020-11-24 ASSESSMENT — MIFFLIN-ST. JEOR: SCORE: 2073.59

## 2020-11-24 NOTE — NURSING NOTE
"Initial /63 (BP Location: Left arm, Patient Position: Sitting, Cuff Size: Adult Large)   Pulse (!) 49   Temp 97.4  F (36.3  C) (Tympanic)   Resp 16   Ht 1.854 m (6' 1\")   Wt 122.5 kg (270 lb)   BMI 35.62 kg/m   Estimated body mass index is 35.62 kg/m  as calculated from the following:    Height as of this encounter: 1.854 m (6' 1\").    Weight as of this encounter: 122.5 kg (270 lb). .    Pt unable to leave urine specimen.  Emptied bladder prior to appt.    Bladder xyfu=279.    Khadra Casiano  [facility] RN  "

## 2020-11-24 NOTE — PROGRESS NOTES
Appointment source: Established Patient  Patient name: Stiven Nguyễn  Urology Staff: Mervin Yung MD    Subjective: This is a 65 year old year old male returning for follow up of urinary frequency and ED.    States that he has been having some difficulty with the action vacuum pump and I reviewed the technique which clarified his misunderstanding.    He has also been noting continued urinary urgency despite the 5 mg of oxybutynin taken daily.  I will increase this to 5 mg 3 times a day.    Objective: Void residual is 184 mL.    Assessment: Incomplete bladder emptying, urinary frequency and erectile dysfunction.    Plan: Continue using the erectile pump now that he has a better understanding of its deployment and increase the oxybutynin to 5 mg 3 times daily.    I will give him a call and follow-up in about 2 to 3 weeks.    Total time 15 minutes, counseling 15 minutes discussing management of urinary urgency and erectile dysfunction
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
## 2020-12-02 ENCOUNTER — VIRTUAL VISIT (OUTPATIENT)
Dept: ORTHOPEDICS | Facility: CLINIC | Age: 65
End: 2020-12-02
Payer: OTHER GOVERNMENT

## 2020-12-02 VITALS
SYSTOLIC BLOOD PRESSURE: 142 MMHG | DIASTOLIC BLOOD PRESSURE: 72 MMHG | HEIGHT: 73 IN | WEIGHT: 270 LBS | BODY MASS INDEX: 35.78 KG/M2

## 2020-12-02 DIAGNOSIS — M25.511 CHRONIC RIGHT SHOULDER PAIN: Primary | ICD-10-CM

## 2020-12-02 DIAGNOSIS — M75.101 ROTATOR CUFF SYNDROME OF RIGHT SHOULDER: ICD-10-CM

## 2020-12-02 DIAGNOSIS — M67.819 TENDINOSIS OF ROTATOR CUFF: ICD-10-CM

## 2020-12-02 DIAGNOSIS — G89.29 CHRONIC RIGHT SHOULDER PAIN: Primary | ICD-10-CM

## 2020-12-02 DIAGNOSIS — M75.111 NONTRAUMATIC INCOMPLETE TEAR OF RIGHT ROTATOR CUFF: ICD-10-CM

## 2020-12-02 PROCEDURE — 99213 OFFICE O/P EST LOW 20 MIN: CPT | Mod: 95 | Performed by: PEDIATRICS

## 2020-12-02 ASSESSMENT — MIFFLIN-ST. JEOR: SCORE: 2073.59

## 2020-12-02 ASSESSMENT — PAIN SCALES - GENERAL: PAINLEVEL: MILD PAIN (2)

## 2020-12-02 NOTE — PATIENT INSTRUCTIONS
Arya to follow up with Primary Care provider regarding elevated blood pressure.    Plan:  - Today's Plan of Care:  Referral to an Orthopedic Surgeon - someone should call you to coordinate    -We also discussed other future treatment options:  Consideration of injections (could consider US guided - subacromial v. Glenohumeral)  Continued Physical Therapy    Follow Up: as needed with me, if radiating arm and hand symptoms continue    If you have any further questions for your physician or physician s care team you can call [phone] and use option 3 to leave a voice message. Calls received during business hours will be returned same day.
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
## 2020-12-14 ENCOUNTER — HEALTH MAINTENANCE LETTER (OUTPATIENT)
Age: 65
End: 2020-12-14

## 2020-12-16 ENCOUNTER — VIRTUAL VISIT (OUTPATIENT)
Dept: UROLOGY | Facility: CLINIC | Age: 65
End: 2020-12-16
Payer: OTHER GOVERNMENT

## 2020-12-16 DIAGNOSIS — R35.0 URINARY FREQUENCY: Primary | ICD-10-CM

## 2020-12-16 PROCEDURE — 99212 OFFICE O/P EST SF 10 MIN: CPT | Mod: TEL | Performed by: UROLOGY

## 2020-12-16 NOTE — PROGRESS NOTES
Telephone visit    65-year-old male with history of urinary frequency and erectile dysfunction.    He increased his oxybutynin to 5 mg immediate release 3 times a day and is having good results.  Nocturia only once or twice in the evening and no significant daytime urgency.    His wife still recovering from orthopedic surgery so further efforts to correct his sexual dysfunction are on hold.    I will call him back in 3 months to check in.    Impression urinary frequency responding to oxybutynin and erectile dysfunction to be evaluated at a later time.    Total time 5 minutes

## 2021-01-04 ENCOUNTER — APPOINTMENT (OUTPATIENT)
Dept: CT IMAGING | Facility: CLINIC | Age: 66
End: 2021-01-04
Attending: EMERGENCY MEDICINE
Payer: OTHER GOVERNMENT

## 2021-01-04 ENCOUNTER — HOSPITAL ENCOUNTER (EMERGENCY)
Facility: CLINIC | Age: 66
Discharge: ANOTHER HOSPITAL | End: 2021-01-05
Attending: EMERGENCY MEDICINE | Admitting: EMERGENCY MEDICINE
Payer: OTHER GOVERNMENT

## 2021-01-04 DIAGNOSIS — R55 FAINTING SPELL: ICD-10-CM

## 2021-01-04 DIAGNOSIS — I20.1 CORONARY VASOSPASM (H): ICD-10-CM

## 2021-01-04 DIAGNOSIS — I21.4 NON-STEMI (NON-ST ELEVATED MYOCARDIAL INFARCTION) (H): ICD-10-CM

## 2021-01-04 LAB
ANION GAP SERPL CALCULATED.3IONS-SCNC: 2 MMOL/L (ref 3–14)
BASOPHILS # BLD AUTO: 0 10E9/L (ref 0–0.2)
BASOPHILS NFR BLD AUTO: 0.3 %
BUN SERPL-MCNC: 17 MG/DL (ref 7–30)
CALCIUM SERPL-MCNC: 8.7 MG/DL (ref 8.5–10.1)
CHLORIDE SERPL-SCNC: 111 MMOL/L (ref 94–109)
CO2 SERPL-SCNC: 29 MMOL/L (ref 20–32)
CREAT SERPL-MCNC: 0.84 MG/DL (ref 0.66–1.25)
DIFFERENTIAL METHOD BLD: ABNORMAL
EOSINOPHIL # BLD AUTO: 0.2 10E9/L (ref 0–0.7)
EOSINOPHIL NFR BLD AUTO: 3.6 %
ERYTHROCYTE [DISTWIDTH] IN BLOOD BY AUTOMATED COUNT: 12.7 % (ref 10–15)
GFR SERPL CREATININE-BSD FRML MDRD: >90 ML/MIN/{1.73_M2}
GLUCOSE SERPL-MCNC: 108 MG/DL (ref 70–99)
HBA1C MFR BLD: 6.5 % (ref 0–5.6)
HCT VFR BLD AUTO: 41.9 % (ref 40–53)
HGB BLD-MCNC: 14.5 G/DL (ref 13.3–17.7)
IMM GRANULOCYTES # BLD: 0 10E9/L (ref 0–0.4)
IMM GRANULOCYTES NFR BLD: 0.3 %
LYMPHOCYTES # BLD AUTO: 1.5 10E9/L (ref 0.8–5.3)
LYMPHOCYTES NFR BLD AUTO: 25.8 %
MCH RBC QN AUTO: 30.5 PG (ref 26.5–33)
MCHC RBC AUTO-ENTMCNC: 34.6 G/DL (ref 31.5–36.5)
MCV RBC AUTO: 88 FL (ref 78–100)
MONOCYTES # BLD AUTO: 0.6 10E9/L (ref 0–1.3)
MONOCYTES NFR BLD AUTO: 10.6 %
NEUTROPHILS # BLD AUTO: 3.5 10E9/L (ref 1.6–8.3)
NEUTROPHILS NFR BLD AUTO: 59.4 %
NRBC # BLD AUTO: 0 10*3/UL
NRBC BLD AUTO-RTO: 0 /100
PLATELET # BLD AUTO: 135 10E9/L (ref 150–450)
POTASSIUM SERPL-SCNC: 4 MMOL/L (ref 3.4–5.3)
RBC # BLD AUTO: 4.76 10E12/L (ref 4.4–5.9)
SODIUM SERPL-SCNC: 142 MMOL/L (ref 133–144)
TROPONIN I SERPL-MCNC: 0.13 UG/L (ref 0–0.04)
WBC # BLD AUTO: 5.9 10E9/L (ref 4–11)

## 2021-01-04 PROCEDURE — 70496 CT ANGIOGRAPHY HEAD: CPT

## 2021-01-04 PROCEDURE — 96374 THER/PROPH/DIAG INJ IV PUSH: CPT | Performed by: EMERGENCY MEDICINE

## 2021-01-04 PROCEDURE — 99285 EMERGENCY DEPT VISIT HI MDM: CPT | Mod: 25 | Performed by: EMERGENCY MEDICINE

## 2021-01-04 PROCEDURE — 258N000003 HC RX IP 258 OP 636: Performed by: EMERGENCY MEDICINE

## 2021-01-04 PROCEDURE — 250N000011 HC RX IP 250 OP 636: Performed by: EMERGENCY MEDICINE

## 2021-01-04 PROCEDURE — 85025 COMPLETE CBC W/AUTO DIFF WBC: CPT | Performed by: EMERGENCY MEDICINE

## 2021-01-04 PROCEDURE — 96375 TX/PRO/DX INJ NEW DRUG ADDON: CPT | Mod: 59 | Performed by: EMERGENCY MEDICINE

## 2021-01-04 PROCEDURE — 70450 CT HEAD/BRAIN W/O DYE: CPT | Mod: XS

## 2021-01-04 PROCEDURE — 84484 ASSAY OF TROPONIN QUANT: CPT | Performed by: EMERGENCY MEDICINE

## 2021-01-04 PROCEDURE — 93010 ELECTROCARDIOGRAM REPORT: CPT | Performed by: EMERGENCY MEDICINE

## 2021-01-04 PROCEDURE — 80048 BASIC METABOLIC PNL TOTAL CA: CPT | Performed by: EMERGENCY MEDICINE

## 2021-01-04 PROCEDURE — 93005 ELECTROCARDIOGRAM TRACING: CPT | Performed by: EMERGENCY MEDICINE

## 2021-01-04 PROCEDURE — 250N000009 HC RX 250: Performed by: EMERGENCY MEDICINE

## 2021-01-04 PROCEDURE — 96361 HYDRATE IV INFUSION ADD-ON: CPT | Performed by: EMERGENCY MEDICINE

## 2021-01-04 PROCEDURE — 83036 HEMOGLOBIN GLYCOSYLATED A1C: CPT | Performed by: EMERGENCY MEDICINE

## 2021-01-04 PROCEDURE — 85730 THROMBOPLASTIN TIME PARTIAL: CPT | Performed by: EMERGENCY MEDICINE

## 2021-01-04 RX ORDER — IOPAMIDOL 755 MG/ML
70 INJECTION, SOLUTION INTRAVASCULAR ONCE
Status: COMPLETED | OUTPATIENT
Start: 2021-01-04 | End: 2021-01-04

## 2021-01-04 RX ORDER — HEPARIN SODIUM 10000 [USP'U]/100ML
0-5000 INJECTION, SOLUTION INTRAVENOUS CONTINUOUS
Status: DISCONTINUED | OUTPATIENT
Start: 2021-01-04 | End: 2021-01-05 | Stop reason: HOSPADM

## 2021-01-04 RX ORDER — AMLODIPINE BESYLATE 10 MG/1
10 TABLET ORAL DAILY
Qty: 90 TABLET | Refills: 3 | Status: SHIPPED | OUTPATIENT
Start: 2021-01-04 | End: 2022-07-08

## 2021-01-04 RX ADMIN — IOPAMIDOL 70 ML: 755 INJECTION, SOLUTION INTRAVENOUS at 23:47

## 2021-01-04 RX ADMIN — SODIUM CHLORIDE 500 ML: 9 INJECTION, SOLUTION INTRAVENOUS at 23:00

## 2021-01-04 RX ADMIN — HEPARIN SODIUM 1200 UNITS/HR: 10000 INJECTION, SOLUTION INTRAVENOUS at 23:36

## 2021-01-04 RX ADMIN — SODIUM CHLORIDE 100 ML: 9 INJECTION, SOLUTION INTRAVENOUS at 23:47

## 2021-01-04 ASSESSMENT — ENCOUNTER SYMPTOMS
GASTROINTESTINAL NEGATIVE: 1
HEMATOLOGIC/LYMPHATIC NEGATIVE: 1
RESPIRATORY NEGATIVE: 1
PSYCHIATRIC NEGATIVE: 1
ALLERGIC/IMMUNOLOGIC NEGATIVE: 1
DIZZINESS: 1
EYES NEGATIVE: 1
ENDOCRINE NEGATIVE: 1
CARDIOVASCULAR NEGATIVE: 1
MUSCULOSKELETAL NEGATIVE: 1
CONSTITUTIONAL NEGATIVE: 1

## 2021-01-04 ASSESSMENT — MIFFLIN-ST. JEOR: SCORE: 2064.52

## 2021-01-05 ENCOUNTER — TRANSFERRED RECORDS (OUTPATIENT)
Dept: HEALTH INFORMATION MANAGEMENT | Facility: CLINIC | Age: 66
End: 2021-01-05

## 2021-01-05 VITALS
WEIGHT: 268 LBS | DIASTOLIC BLOOD PRESSURE: 97 MMHG | HEART RATE: 62 BPM | TEMPERATURE: 99.3 F | OXYGEN SATURATION: 96 % | SYSTOLIC BLOOD PRESSURE: 150 MMHG | BODY MASS INDEX: 35.52 KG/M2 | RESPIRATION RATE: 15 BRPM | HEIGHT: 73 IN

## 2021-01-05 LAB
APTT PPP: 30 SEC (ref 22–37)
LABORATORY COMMENT REPORT: NORMAL
SARS-COV-2 RNA SPEC QL NAA+PROBE: NEGATIVE
SPECIMEN SOURCE: NORMAL
TROPONIN I SERPL-MCNC: 0.12 UG/L (ref 0–0.04)

## 2021-01-05 PROCEDURE — 87635 SARS-COV-2 COVID-19 AMP PRB: CPT | Performed by: EMERGENCY MEDICINE

## 2021-01-05 NOTE — ED NOTES
DATE:  1/4/2021   TIME OF RECEIPT FROM LAB:  8384  LAB TEST:  troponin  LAB VALUE:  0.130  RESULTS GIVEN WITH READ-BACK TO (PROVIDER):  Ezequiel Dawson, *  TIME LAB VALUE REPORTED TO PROVIDER:   0245
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
## 2021-01-05 NOTE — ED NOTES
RN notified of patient odd behavior upon return to bed. Pt shaking arms, mumbling incoherantly, eyes open but not following commands. Pt attempting to call wife during this episode. Episode lasts 5 minutes with gradual return to normal behavior. No urinary incontinence or biting of tongue. Wife reports he has had these episodes, worse with stressful events ,for the past 40 years. Wife reports many dx but nothing that was definitive, and that they are not seizures. Pt does well with dimmed lights, closing his eyes and listening to wife talk through things with him. Pt later apologizes for his behavior. Pt was offered emotional support.

## 2021-01-05 NOTE — ED TRIAGE NOTES
"Pt with several episodes of dizziness and near syncope. Tonight pt did have positive loc. Pt states prior to episode he feels un centered and \"like you're looking though water\". No feeling of heart racing or needing to vomit. No recent illness. Pt also concerned that his BG have been 20-40 points higher than usual without any diet changes.     Pt wife Genna will be waiting in car and hopes for MD call at [phone]  "

## 2021-01-05 NOTE — ED NOTES
DATE:  1/4/2021   TIME OF RECEIPT FROM LAB:  9032  LAB TEST:  Troponin  LAB VALUE:  0.130  RESULTS GIVEN WITH READ-BACK TO (PROVIDER):  Ezequiel Dawson, *  TIME LAB VALUE REPORTED TO PROVIDER:   0277

## 2021-01-05 NOTE — ED NOTES
In room to do orthostatic BPs, pt states that he has to use the BR. Asked pt to use the urinal since he is here for syncope but he refuses. When he came out of the BR pt looked like his legs were going to go out from under him per another nurse. Got him back in bed and called his wife who is talking to him. MD notified and in room with pt.

## 2021-01-07 ENCOUNTER — TELEPHONE (OUTPATIENT)
Dept: CARDIOLOGY | Facility: CLINIC | Age: 66
End: 2021-01-07

## 2021-01-07 NOTE — TELEPHONE ENCOUNTER
Pt called stating he was in [facility] this week after syncope and fall, had angiogram.  They recommended he follow up with cards for heart monitor and possible tilt table test. Pt gave verbal consent to access [facility] records from [facility].  Pt does not want to continue care with Dr Reddy.  He does have appt with Dr Bermudez in late February.  Scheduled to have visit with Gayle 1/12/21 - wife needs to accompany pt.  DRISS LANGLEY RN on 1/7/2021 at 9:34 AM
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
## 2021-01-11 ENCOUNTER — PRE VISIT (OUTPATIENT)
Dept: CARDIOLOGY | Facility: CLINIC | Age: 66
End: 2021-01-11

## 2021-01-11 NOTE — TELEPHONE ENCOUNTER
Wellness Screening Tool    Symptom Screening:    Do you have one of the following NEW symptoms:      Fever (subjective or >100.0)?  No    New cough? No    Shortness of breath? No    Chills? No    New loss of taste or smell? No    Generalized body aches? No    New persistent headache? No    New sore throat? No    Nausea, vomiting or diarrhea? No    Within the past 2 weeks, have you been exposed to someone with a known positive illness below?      COVID - 19 (known or suspected) No    Chicken pox?  No    Measles? No    Pertussis? No    Have you had a positive COVID-19 diagnostic test (nasal swab test) in the last 14 days or are you currently   on self-quarantine restrictions (i.e.travel restriction, exposure, etc?) No        Patient notified of visitor restriction: Yes  Patient informed to wear a mask: Yes    Patient's appointment status: Patient will be seen in clinic as scheduled on 1/12/21

## 2021-01-12 ENCOUNTER — OFFICE VISIT (OUTPATIENT)
Dept: CARDIOLOGY | Facility: CLINIC | Age: 66
End: 2021-01-12
Payer: OTHER GOVERNMENT

## 2021-01-12 VITALS
HEART RATE: 61 BPM | TEMPERATURE: 98.4 F | BODY MASS INDEX: 34.7 KG/M2 | OXYGEN SATURATION: 96 % | SYSTOLIC BLOOD PRESSURE: 139 MMHG | DIASTOLIC BLOOD PRESSURE: 67 MMHG | WEIGHT: 263 LBS

## 2021-01-12 DIAGNOSIS — R40.4 TRANSIENT ALTERATION OF AWARENESS: ICD-10-CM

## 2021-01-12 DIAGNOSIS — R55 SYNCOPE, UNSPECIFIED SYNCOPE TYPE: Primary | ICD-10-CM

## 2021-01-12 DIAGNOSIS — I20.1 VASOSPASTIC ANGINA (H): ICD-10-CM

## 2021-01-12 DIAGNOSIS — I10 BENIGN ESSENTIAL HYPERTENSION: ICD-10-CM

## 2021-01-12 PROCEDURE — 99214 OFFICE O/P EST MOD 30 MIN: CPT | Performed by: PHYSICIAN ASSISTANT

## 2021-01-12 NOTE — PATIENT INSTRUCTIONS
"At your visit today we reviewed your recent hospitalization. Echocardiogram and angiogram looked OK. Labs OK     Medication Changes:    NONE today    Recommendations:    30 day Event Monitor  Check BP if having an \"off day\"    Follow-up:    See me for cardiology follow up to review monitor but CALL Cardiology nurses Clotilde & Viviana @ [phone] for any issues, questions or concerns in the interim.      To schedule a future appointment, we kindly ask that you call cardiology scheduling at [phone] three months prior to requested visit date.    Important Phone Numbers for [facility]):    [facility] Viviana & Clotilde: [phone]  Cardiology [phone]  [place] [facility] [phone]  [facility] [facility] [phone]  [facility]: [phone]        "
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
## 2021-01-13 ENCOUNTER — HOSPITAL ENCOUNTER (OUTPATIENT)
Dept: CARDIOLOGY | Facility: CLINIC | Age: 66
Discharge: HOME | End: 2021-01-13
Attending: PHYSICIAN ASSISTANT | Admitting: PHYSICIAN ASSISTANT
Payer: OTHER GOVERNMENT

## 2021-01-13 DIAGNOSIS — I10 BENIGN ESSENTIAL HYPERTENSION: ICD-10-CM

## 2021-01-13 PROCEDURE — 93272 ECG/REVIEW INTERPRET ONLY: CPT | Performed by: INTERNAL MEDICINE

## 2021-01-13 PROCEDURE — 93270 REMOTE 30 DAY ECG REV/REPORT: CPT

## 2021-01-25 ENCOUNTER — TELEPHONE (OUTPATIENT)
Dept: CARDIOLOGY | Facility: CLINIC | Age: 66
End: 2021-01-25
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
## 2021-02-08 NOTE — IP AVS SNAPSHOT
[facility] [address]    [place] [address]    Phone:  [phone]                                       After Visit Summary   11/8/2017    Stiven Nguyễn    MRN: [id number]           After Visit Summary Signature Page     I have received my discharge instructions, and my questions have been answered. I have discussed any challenges I see with this plan with the nurse or doctor.    ..........................................................................................................................................  Patient/Patient Representative Signature      ..........................................................................................................................................  Patient Representative Print Name and Relationship to Patient    ..................................................               ................................................  Date                                            Time    ..........................................................................................................................................  Reviewed by Signature/Title    ...................................................              ..............................................  Date                                                            Time           show
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
## 2021-02-25 ENCOUNTER — OFFICE VISIT (OUTPATIENT)
Dept: CARDIOLOGY | Facility: CLINIC | Age: 66
End: 2021-02-25
Attending: PHYSICIAN ASSISTANT
Payer: OTHER GOVERNMENT

## 2021-02-25 VITALS
BODY MASS INDEX: 34.43 KG/M2 | DIASTOLIC BLOOD PRESSURE: 71 MMHG | HEART RATE: 50 BPM | TEMPERATURE: 96 F | OXYGEN SATURATION: 98 % | SYSTOLIC BLOOD PRESSURE: 132 MMHG | WEIGHT: 261 LBS

## 2021-02-25 DIAGNOSIS — I10 BENIGN ESSENTIAL HYPERTENSION: ICD-10-CM

## 2021-02-25 PROCEDURE — 99213 OFFICE O/P EST LOW 20 MIN: CPT | Performed by: PHYSICIAN ASSISTANT
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
## 2021-02-25 NOTE — PATIENT INSTRUCTIONS
At your visit today we reviewed our event monitor showing no bad rhythms to attribute to your fainting spell     1. Your event monitor showed mostly normal rhythm. During your fainting spell on , heart rate was OK and no bad rhythms to account for this  2. Your monitor also showed some ASYMPTOMATIC fast rhythms from top and bottom of heart.     Medication Changes:    1. We could consider low dose beta blocker (like metoprolol)     Recommendations:    1. GET DOWN when feeling dizzy/lightheaded!    Follow-up:    See Dr. Reddy  for cardiology follow up in SPRING but CALL Cardiology nurses Clotilde & Viviana @ [phone] for any issues, questions or concerns in the interim.      To schedule a future appointment, we kindly ask that you call cardiology scheduling at [phone] three months prior to requested visit date.    Important Phone Numbers for [facility]):    [facility] Viviana & Clotilde: [phone]  Cardiology [phone]  [place] [phone]  [facility] [phone]  [facility]: [phone]
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
## 2021-02-26 NOTE — PROGRESS NOTES
Outpatient Physical Therapy Discharge Note     Patient: Stiven Nguyễn  : 1955    Beginning/End Dates of Reporting Period:  10/13/20 to 20    Referring Provider: Sharon Sr Diagnosis: decreased right shoulder ROM      Client Self Report: Shoulder hurts worse compared to last week, pain goes all the way down to his hand.    Objective Measurements:  Objective Measure: pain levels     Objective Measure: R Shoulder AROM  Details: Flexion full, no pain      Goals:  Goal Identifier 1   Goal Description Patient will be able to improve right shoulder AROM flexion to 140 degrees without pain in order to put dishes away.    Target Date 11/10/20   Date Met      Progress:     Goal Identifier 2   Goal Description Patient will be able to don/doff jacket without increased right shoulder pain.    Target Date 11/10/20   Date Met      Progress:     Goal Identifier 3   Goal Description Patient will be independent in HEP in order to continue strengthening outside of PT>    Target Date 20   Date Met      Progress:     Goal Identifier 4   Goal Description Patient will be able to throw a ball with pain staying below a 3/10 in order to participate in recreational activities with grandchildren.    Target Date 20   Date Met      Progress:       Progress towards Goals:   Progress this reporting period: All information listed above was at patient's last attended PT visit. At her last attended session, patient's pain was worsening. PT was continuing to be recommended to work on strengthening and ROM; however, pt has failed to schedule f/u visits within 30 days from last visit thus is being d/c from therapy at this time. Objective measures are all taken from last visit. Current status is unknown at this time.    Plan:  Discharge from therapy.    Discharge:    Reason for Discharge: Patient has failed to schedule further appointments.    Equipment Issued: none    Discharge Plan:  Not completed since  patient failed to schedule further appointments.    Shalonda Crook  PT, DPT       2/26/2021   [address] [address]  [email or web address]  Voicemail: [phone]

## 2021-03-22 ENCOUNTER — VIRTUAL VISIT (OUTPATIENT)
Dept: UROLOGY | Facility: CLINIC | Age: 66
End: 2021-03-22
Payer: OTHER GOVERNMENT

## 2021-03-22 DIAGNOSIS — R33.9 BLADDER RETENTION: ICD-10-CM

## 2021-03-22 DIAGNOSIS — R39.15 URINARY URGENCY: ICD-10-CM

## 2021-03-22 PROCEDURE — 99213 OFFICE O/P EST LOW 20 MIN: CPT | Mod: 95 | Performed by: UROLOGY

## 2021-03-22 RX ORDER — TAMSULOSIN HYDROCHLORIDE 0.4 MG/1
0.4 CAPSULE ORAL DAILY
Qty: 90 CAPSULE | Refills: 3 | Status: SHIPPED | OUTPATIENT
Start: 2021-03-22 | End: 2022-03-25

## 2021-03-22 RX ORDER — OXYBUTYNIN CHLORIDE 5 MG/1
5 TABLET ORAL 3 TIMES DAILY
Qty: 270 TABLET | Refills: 3 | Status: SHIPPED | OUTPATIENT
Start: 2021-03-22 | End: 2022-07-05

## 2021-04-07 ENCOUNTER — TRANSFERRED RECORDS (OUTPATIENT)
Dept: HEALTH INFORMATION MANAGEMENT | Facility: CLINIC | Age: 66
End: 2021-04-07

## 2021-04-07 ENCOUNTER — RECORDS - HEALTHEAST (OUTPATIENT)
Dept: LAB | Facility: CLINIC | Age: 66
End: 2021-04-07

## 2021-04-07 LAB
ALBUMIN SERPL-MCNC: 4.1 G/DL (ref 3.5–5)
ALP SERPL-CCNC: 85 U/L (ref 45–120)
ALT SERPL W P-5'-P-CCNC: 24 U/L (ref 0–45)
ALT SERPL-CCNC: 24 U/L (ref 0–45)
ANION GAP SERPL CALCULATED.3IONS-SCNC: 8 MMOL/L (ref 5–18)
AST SERPL W P-5'-P-CCNC: 18 U/L (ref 0–40)
AST SERPL-CCNC: 18 U/L (ref 0–40)
BILIRUB SERPL-MCNC: 0.8 MG/DL (ref 0–1)
BUN SERPL-MCNC: 15 MG/DL (ref 8–22)
CALCIUM SERPL-MCNC: 9.2 MG/DL (ref 8.5–10.5)
CHLORIDE BLD-SCNC: 107 MMOL/L (ref 98–107)
CHOLEST SERPL-MCNC: 121 MG/DL
CHOLEST SERPL-MCNC: 121 MG/DL
CO2 SERPL-SCNC: 27 MMOL/L (ref 22–31)
CREAT SERPL-MCNC: 0.91 MG/DL (ref 0.7–1.3)
CREAT SERPL-MCNC: 0.91 MG/DL (ref 0.7–1.3)
FASTING STATUS PATIENT QL REPORTED: NO
GFR SERPL CREATININE-BSD FRML MDRD: >60 ML/MIN/1.73M2
GFR SERPL CREATININE-BSD FRML MDRD: >60 ML/MIN/1.73M2
GLUCOSE BLD-MCNC: 141 MG/DL (ref 70–125)
GLUCOSE SERPL-MCNC: 141 MG/DL (ref 70–125)
HBA1C MFR BLD: 6.1 % (ref 4.2–6.1)
HDLC SERPL-MCNC: 35 MG/DL
HDLC SERPL-MCNC: 35 MG/DL
LDLC SERPL CALC-MCNC: 65 MG/DL
LDLC SERPL CALC-MCNC: 65 MG/DL
POTASSIUM BLD-SCNC: 4 MMOL/L (ref 3.5–5)
POTASSIUM SERPL-SCNC: 4 MMOL/L (ref 3.5–5)
PROT SERPL-MCNC: 6.7 G/DL (ref 6–8)
SODIUM SERPL-SCNC: 142 MMOL/L (ref 136–145)
TRIGL SERPL-MCNC: 106 MG/DL
TRIGL SERPL-MCNC: 106 MG/DL
TSH SERPL DL<=0.005 MIU/L-ACNC: 1.14 UIU/ML (ref 0.3–5)
TSH SERPL-ACNC: 1.14 ULU/ML (ref 0.3–5)

## 2021-04-10 ENCOUNTER — HOSPITAL ENCOUNTER (EMERGENCY)
Facility: CLINIC | Age: 66
Discharge: HOME | End: 2021-04-10
Attending: NURSE PRACTITIONER | Admitting: NURSE PRACTITIONER
Payer: OTHER GOVERNMENT

## 2021-04-10 ENCOUNTER — APPOINTMENT (OUTPATIENT)
Dept: CT IMAGING | Facility: CLINIC | Age: 66
End: 2021-04-10
Attending: NURSE PRACTITIONER
Payer: OTHER GOVERNMENT

## 2021-04-10 VITALS
OXYGEN SATURATION: 97 % | BODY MASS INDEX: 34.83 KG/M2 | WEIGHT: 264 LBS | SYSTOLIC BLOOD PRESSURE: 158 MMHG | HEART RATE: 57 BPM | DIASTOLIC BLOOD PRESSURE: 87 MMHG | TEMPERATURE: 98.1 F | RESPIRATION RATE: 16 BRPM

## 2021-04-10 DIAGNOSIS — K42.9 UMBILICAL HERNIA WITHOUT OBSTRUCTION AND WITHOUT GANGRENE: ICD-10-CM

## 2021-04-10 DIAGNOSIS — K40.20 BILATERAL INGUINAL HERNIA WITHOUT OBSTRUCTION OR GANGRENE, RECURRENCE NOT SPECIFIED: ICD-10-CM

## 2021-04-10 LAB
BASOPHILS # BLD AUTO: 0 10E9/L (ref 0–0.2)
BASOPHILS NFR BLD AUTO: 0.6 %
DIFFERENTIAL METHOD BLD: NORMAL
EOSINOPHIL # BLD AUTO: 0.2 10E9/L (ref 0–0.7)
EOSINOPHIL NFR BLD AUTO: 2.7 %
ERYTHROCYTE [DISTWIDTH] IN BLOOD BY AUTOMATED COUNT: 12.9 % (ref 10–15)
HCT VFR BLD AUTO: 44.6 % (ref 40–53)
HGB BLD-MCNC: 15.4 G/DL (ref 13.3–17.7)
IMM GRANULOCYTES # BLD: 0 10E9/L (ref 0–0.4)
IMM GRANULOCYTES NFR BLD: 0.4 %
LYMPHOCYTES # BLD AUTO: 1.4 10E9/L (ref 0.8–5.3)
LYMPHOCYTES NFR BLD AUTO: 21 %
MCH RBC QN AUTO: 30.6 PG (ref 26.5–33)
MCHC RBC AUTO-ENTMCNC: 34.5 G/DL (ref 31.5–36.5)
MCV RBC AUTO: 89 FL (ref 78–100)
MONOCYTES # BLD AUTO: 0.7 10E9/L (ref 0–1.3)
MONOCYTES NFR BLD AUTO: 10.3 %
NEUTROPHILS # BLD AUTO: 4.4 10E9/L (ref 1.6–8.3)
NEUTROPHILS NFR BLD AUTO: 65 %
NRBC # BLD AUTO: 0 10*3/UL
NRBC BLD AUTO-RTO: 0 /100
PLATELET # BLD AUTO: 161 10E9/L (ref 150–450)
RBC # BLD AUTO: 5.03 10E12/L (ref 4.4–5.9)
WBC # BLD AUTO: 6.7 10E9/L (ref 4–11)

## 2021-04-10 PROCEDURE — 99285 EMERGENCY DEPT VISIT HI MDM: CPT | Performed by: NURSE PRACTITIONER

## 2021-04-10 PROCEDURE — 96376 TX/PRO/DX INJ SAME DRUG ADON: CPT | Performed by: NURSE PRACTITIONER

## 2021-04-10 PROCEDURE — 250N000011 HC RX IP 250 OP 636: Performed by: NURSE PRACTITIONER

## 2021-04-10 PROCEDURE — 96374 THER/PROPH/DIAG INJ IV PUSH: CPT | Mod: 59 | Performed by: NURSE PRACTITIONER

## 2021-04-10 PROCEDURE — 250N000009 HC RX 250: Performed by: NURSE PRACTITIONER

## 2021-04-10 PROCEDURE — 74177 CT ABD & PELVIS W/CONTRAST: CPT

## 2021-04-10 PROCEDURE — 99285 EMERGENCY DEPT VISIT HI MDM: CPT | Mod: 25 | Performed by: NURSE PRACTITIONER

## 2021-04-10 PROCEDURE — 85025 COMPLETE CBC W/AUTO DIFF WBC: CPT | Performed by: NURSE PRACTITIONER

## 2021-04-10 RX ORDER — ONDANSETRON 2 MG/ML
4 INJECTION INTRAMUSCULAR; INTRAVENOUS
Status: DISCONTINUED | OUTPATIENT
Start: 2021-04-10 | End: 2021-04-11 | Stop reason: HOSPADM

## 2021-04-10 RX ORDER — IOPAMIDOL 755 MG/ML
100 INJECTION, SOLUTION INTRAVASCULAR ONCE
Status: COMPLETED | OUTPATIENT
Start: 2021-04-10 | End: 2021-04-10

## 2021-04-10 RX ORDER — OXYCODONE AND ACETAMINOPHEN 5; 325 MG/1; MG/1
1 TABLET ORAL ONCE
Status: DISCONTINUED | OUTPATIENT
Start: 2021-04-10 | End: 2021-04-10

## 2021-04-10 RX ORDER — HYDROMORPHONE HYDROCHLORIDE 1 MG/ML
0.5 INJECTION, SOLUTION INTRAMUSCULAR; INTRAVENOUS; SUBCUTANEOUS ONCE
Status: COMPLETED | OUTPATIENT
Start: 2021-04-10 | End: 2021-04-10

## 2021-04-10 RX ADMIN — HYDROMORPHONE HYDROCHLORIDE 0.5 MG: 1 INJECTION, SOLUTION INTRAMUSCULAR; INTRAVENOUS; SUBCUTANEOUS at 22:04

## 2021-04-10 RX ADMIN — SODIUM CHLORIDE 72 ML: 9 INJECTION, SOLUTION INTRAVENOUS at 21:22

## 2021-04-10 RX ADMIN — IOPAMIDOL 100 ML: 755 INJECTION, SOLUTION INTRAVENOUS at 21:22

## 2021-04-10 RX ADMIN — HYDROMORPHONE HYDROCHLORIDE 0.5 MG: 1 INJECTION, SOLUTION INTRAMUSCULAR; INTRAVENOUS; SUBCUTANEOUS at 19:37

## 2021-04-10 ASSESSMENT — ENCOUNTER SYMPTOMS
WEAKNESS: 0
HEADACHES: 0
ABDOMINAL DISTENTION: 0
NAUSEA: 0
DYSURIA: 0
LIGHT-HEADEDNESS: 0
NUMBNESS: 0
FATIGUE: 0
EYE REDNESS: 0
FEVER: 0
JOINT SWELLING: 0
CHILLS: 0
EYE DISCHARGE: 0
VOMITING: 0
ABDOMINAL PAIN: 0
DIZZINESS: 0
MYALGIAS: 0
SHORTNESS OF BREATH: 0
COUGH: 0
ARTHRALGIAS: 0
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
## 2021-04-18 ENCOUNTER — HEALTH MAINTENANCE LETTER (OUTPATIENT)
Age: 66
End: 2021-04-18

## 2021-04-22 ENCOUNTER — OFFICE VISIT (OUTPATIENT)
Dept: SURGERY | Facility: CLINIC | Age: 66
End: 2021-04-22
Attending: NURSE PRACTITIONER
Payer: OTHER GOVERNMENT

## 2021-04-22 VITALS
HEIGHT: 73 IN | DIASTOLIC BLOOD PRESSURE: 64 MMHG | WEIGHT: 263.89 LBS | TEMPERATURE: 97.8 F | HEART RATE: 54 BPM | SYSTOLIC BLOOD PRESSURE: 130 MMHG | BODY MASS INDEX: 34.97 KG/M2

## 2021-04-22 DIAGNOSIS — K42.9 UMBILICAL HERNIA WITHOUT OBSTRUCTION AND WITHOUT GANGRENE: ICD-10-CM

## 2021-04-22 DIAGNOSIS — Z11.59 ENCOUNTER FOR SCREENING FOR OTHER VIRAL DISEASES: ICD-10-CM

## 2021-04-22 DIAGNOSIS — K40.20 BILATERAL INGUINAL HERNIA WITHOUT OBSTRUCTION OR GANGRENE, RECURRENCE NOT SPECIFIED: ICD-10-CM

## 2021-04-22 PROCEDURE — 99244 OFF/OP CNSLTJ NEW/EST MOD 40: CPT | Performed by: SURGERY

## 2021-04-22 ASSESSMENT — MIFFLIN-ST. JEOR: SCORE: 2040.88
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
## 2021-04-22 NOTE — NURSING NOTE
"Initial /64 (BP Location: Right arm, Patient Position: Sitting, Cuff Size: Adult Large)   Pulse 54   Temp 97.8  F (36.6  C) (Oral)   Ht 1.854 m (6' 1\")   Wt 119.7 kg (263 lb 14.3 oz)   BMI 34.82 kg/m   Estimated body mass index is 34.82 kg/m  as calculated from the following:    Height as of this encounter: 1.854 m (6' 1\").    Weight as of this encounter: 119.7 kg (263 lb 14.3 oz). .    Jo Harding MA    "

## 2021-04-26 ENCOUNTER — VIRTUAL VISIT (OUTPATIENT)
Dept: PHARMACY | Facility: PHYSICIAN GROUP | Age: 66
End: 2021-04-26
Payer: OTHER GOVERNMENT

## 2021-04-26 DIAGNOSIS — K40.20 BILATERAL INGUINAL HERNIA WITHOUT OBSTRUCTION OR GANGRENE, RECURRENCE NOT SPECIFIED: ICD-10-CM

## 2021-04-26 DIAGNOSIS — N20.0 KIDNEY STONES: ICD-10-CM

## 2021-04-26 DIAGNOSIS — E78.5 HYPERLIPIDEMIA LDL GOAL <70: ICD-10-CM

## 2021-04-26 DIAGNOSIS — N40.1 HYPERTROPHY OF PROSTATE WITH URINARY OBSTRUCTION: ICD-10-CM

## 2021-04-26 DIAGNOSIS — N52.9 ERECTILE DYSFUNCTION, UNSPECIFIED ERECTILE DYSFUNCTION TYPE: ICD-10-CM

## 2021-04-26 DIAGNOSIS — I21.4 NSTEMI (NON-ST ELEVATED MYOCARDIAL INFARCTION) (H): ICD-10-CM

## 2021-04-26 DIAGNOSIS — J30.2 SEASONAL ALLERGIC RHINITIS, UNSPECIFIED TRIGGER: ICD-10-CM

## 2021-04-26 DIAGNOSIS — I20.1 VASOSPASTIC ANGINA (H): ICD-10-CM

## 2021-04-26 DIAGNOSIS — N13.8 HYPERTROPHY OF PROSTATE WITH URINARY OBSTRUCTION: ICD-10-CM

## 2021-04-26 DIAGNOSIS — E11.9 TYPE 2 DIABETES MELLITUS WITHOUT COMPLICATION, WITHOUT LONG-TERM CURRENT USE OF INSULIN (H): Primary | ICD-10-CM

## 2021-04-26 DIAGNOSIS — I10 BENIGN ESSENTIAL HYPERTENSION: ICD-10-CM

## 2021-04-26 DIAGNOSIS — R52 PAIN: ICD-10-CM

## 2021-04-26 PROCEDURE — 99607 MTMS BY PHARM ADDL 15 MIN: CPT | Performed by: PHARMACIST

## 2021-04-26 PROCEDURE — 99605 MTMS BY PHARM NP 15 MIN: CPT | Performed by: PHARMACIST

## 2021-04-26 NOTE — PROGRESS NOTES
Medication Therapy Management (MTM) Encounter    ASSESSMENT:                            Medication Adherence/Access: No issues identified    Type 2 Diabetes: Stable. Patient is not meeting A1c goal of < 7%.  Patient's insurance would not cover a CGM monitor because he is not on multiple injections of insulin every day.  Patient would benefit from checking fasting blood sugars and 2 hours postprandial of largest meal of the day.  Patient prefers not to take medications for diabetes, discussed goals of a.m. fasting below 130 and 2-hour postprandial goal of below 180.  If needed we could also consider a referral to diabetic educator for freestyle ruchi pro which would give us 2 weeks of blood sugar information.  Encourage patient to continue working on diet and increasing exercise as able to.  Doing a great job so far.    Hypertension/Hx DVT and PE/ Hx MI/ Vasospastic Angina:  Hypertension: Stable. Patient is meeting blood pressure goal of < 130/80mmHg.  Angina: Stable.  Patient will not be able to take medication for erectile dysfunction if he is on isosorbide.  Would be beneficial to consider alternative treatment options for isosorbide.  Beta-blocker not an option due to low heart rate.  Could consider Ranexa to replace isosorbide.  Will discuss with patient's cardiologist if that is an option.    Hyperlipidemia: Stable.  Patient is not on high intensity statin which is indicated based on 2019 ACC/AHA guidelines for lipid management given history of MI.  Due to LDL far below 70 would not recommend increasing his dose of statin.      Allergic Rhinitis: Fexofenadine likely not needed.  Patient doesn't recall issues with allergies, and hasn't had any symptoms in last few years.  He can try going off it for a few weeks and monitor for allergy symptoms. Can use PRN if preferred.  He would like to reduce meds if able to.    Hernia: Stable.  Pt should continue to take omeprazole until 3-4 months after hernia surgery as  precaution.  Could consider trial off omprazole at that point.  Would recommend he discuss this with Dr. Zhang after his surgery    Kidney Stones/Urinary leakage: Stable.    ED: Patient would benefit from being able to take sildenafil as needed, however cannot currently take it due to concurrent use of isosorbide and significant contraindication due to severe hypotension with combination.  If patient is able to change to an alternative to isosorbide for angina, we could consider adding in sildenafil.  Tadalafil would not be a good option because it is contraindicated within 48 hours of use of all nitroglycerin medication, and sildenafil just cannot be taken within 24 hours.     Pain: Stable    PLAN:                            For Dr. Bermudez to consider:  1.  Recommend stopping isosorbide and starting Ranexa 500 mg twice daily.  If needed could increase to 1000 mg twice daily if having increase in angina.    For Dr. Zhang to consider:  2.  Continue checking blood sugar readings in the morning before eating food.  A couple days a week instead of doing a morning fasting blood sugar reading I would recommend checking your blood sugar 2 hours after one meal a day.  Our goal for the morning fasting blood sugar reading is to keep this below 130 mg/dL, and our goal for 2 hours after eating is to have your blood sugar below 180 mg/dL.    3.  You can stop fexofenadine and monitor for increase in symptoms of allergies.  It is okay to use fexofenadine as needed, or if you do notice an increase in allergy symptoms you could restart fexofenadine and take it daily    Follow-up: As needed    SUBJECTIVE/OBJECTIVE:                          Stiven Nguyễn is a 66 year old male called for a transitions of care visit. He was discharged from [facility] ED on 4/10/2021 for umbilical hernia.      Reason for visit: Initial medication review.  Patient was referred to me to talia continuous glucose monitors.    Allergies/ADRs: Reviewed  in chart  Tobacco: He reports that he has quit smoking. He quit smokeless tobacco use about 9 years ago.  His smokeless tobacco use included snuff.  Past Medical History: Reviewed in chart      Medication Adherence/Access: no issues reported    Type 2 Diabetes:  Currently taking no medications. He is using Relion monitor. Patient is not experiencing side effects.  Patient was testing his BG every other day, or every few days.  He stopped , due to seeing BG in 200's.     Blood sugar monitorin-1 time(s) daily. Ranges (patient reported):   AM fastin, 205, and the rest of the days it is 130-150's  Symptoms of low blood sugar? shaky, dizzy, blurred vision, Frequency of lows- rarely  Symptoms of high blood sugar? none  Eye exam: due  Foot exam: up to date  Diet/Exercise:   AM- eggs, cottage cheese, toast and cup of milk  Dinner: a type of meat and a veggie. Not eating pastas, carbs or other sweets.  Aspirin: Taking 81mg daily and denies side effects  Statin: Yes: Atorvastatin 10 mg   ACEi/ARB: Yes: lisinopril 40 mg.   A1c:  6.1 -     Hemoglobin A1C   Date Value Ref Range Status   2021 6.5 (H) 0 - 5.6 % Final     Comment:     Normal <5.7% Prediabetes 5.7-6.4%  Diabetes 6.5% or higher - adopted from ADA   consensus guidelines.     07/15/2019 6.8 (A) 4.2 - 6.1 % Final   2019 7.4 (A) 4.2 - 6.1 % Final     Hypertension/Hx DVT and PE/ Hx MI/ Vasospastic Angina: Current medications include Amlodipine 10 mg daily, Lisinopril 40 mg daily, and Isosorbide mononitrate 45 mg daily.  He also has nitroglycerin, and doesn't use frequently.  He only needs it when the chest pain is really bothersome, and usually is tolerable.  Patient would really like to be back on a medication for ED, but has been unable to due to taking isosorbide.  Atenolol stopped due to bradycardia.  Patient does not self-monitor blood pressure.  Patient reports no current medication side effects.  BP Readings from Last 3 Encounters:    04/22/21 130/64   04/10/21 (!) 158/87   02/25/21 132/71     Hyperlipidemia: Current therapy includes atorvastatin 10 mg daily.  Patient reports no significant myalgias or other side effects.    Recent Labs   Lab Test 04/11/19 11/09/17  0516 05/21/15  0531 05/21/15  0531   CHOL 110 113   < > 169   HDL 38 38*   < > 36*   LDL 57 61   < > 108   TRIG 77 72   < > 123   CHOLHDLRATIO  --   --   --  4.7    < > = values in this interval not displayed.     Allergic Rhinitis: Current medications include fexofenadine 180 mg once daily.  Pt doesn't really know why he is on Fexofenadine.  Denies issues with sinus congestion, sneezing, itchy eyes, or any other allergy symptoms.  Patient feels that current therapy is not needed.  Patient does use a CPAP machine, but typically has issues with sinus dryness versus congestion after using it.    Hernia: Current medications include: Prilosec (omeprazole) 20 mg once daily. Pt reports no current symptoms.  Patient feels that current regimen is effective. Right now not having hernia pain.  He has a pre-op coming up for hernia repair.      Kidney Stones/Urinary leakage : Patient taking tamsulosin 0.4 mg daily for kidney stones, and Oxybutynin 5 mg three times daily.    The oxybutynin works really well, but does cause some dry mouth.  Occassionally,  He did try the ER oxybutynin and it wasn't effective.  It has been a very long time since he had one.  He does have a 2 mm kidney stone currently, but not currently causing him pain at this     ED: Patient was on cialis in the past for ED, but has been unable to take it due to contraindication with isosorbide.  He hasn't been able to have intercourse with his wife, and it is a big quality of life issue.  This would be a big goal for him to be able to take something for ED again.    Pain: If patient has aches or pains or headaches, he will use acetaminophen.  Works well when needed.    Today's Vitals: There were no vitals taken for this visit. -  telephone encounter  ----------------  Post Discharge Medication Reconciliation Status: discharge medications reconciled and changed, per note/orders.    I spent 63 minutes with this patient today. I offer these suggestions for consideration by Dr. Bermudez and Dr. Zhang. A copy of the visit note was provided to the patient's primary care provider.    The patient was mailed a summary of these recommendations.     Collette RiversD  Medication Therapy Management Pharmacist  Pager: [phone]  Telemedicine Visit Details  Type of service:  Telephone visit  Start Time: 10:00 AM  End Time: 11:03 AM  Originating Location (patient location): [place]  Distant Location (provider location):  [facility]      Medication Therapy Recommendations  Diabetes mellitus, type 2 (H)    Current Medication: blood glucose monitoring (NO BRAND SPECIFIED) meter device kit   Rationale: Does not understand instructions - Adherence - Adherence   Recommendation: Provide Education   Status: Patient Agreed - Adherence/Education         Seasonal allergic rhinitis, unspecified trigger    Current Medication: fexofenadine (ALLEGRA) 180 MG tablet   Rationale: No medical indication at this time - Unnecessary medication therapy - Indication   Recommendation: Discontinue Medication   Status: Accepted per CPA         Vasospastic angina (H)    Current Medication: isosorbide mononitrate (IMDUR) 30 MG 24 hr tablet   Rationale: Medication interaction   Status: Contact Provider - Awaiting Response   Note: Recommend stopping isosorbide and starting Ranexa 500 mg twice daily.  If needed could increase to 1000 mg twice daily if having increase in angina.  Recommend changing to Ranexa so that patient would be able to use sildenafil again for erectile dysfunction

## 2021-04-26 NOTE — PATIENT INSTRUCTIONS
Recommendations from today's MTM visit:                                                    MTM (medication therapy management) is a service provided by a clinical pharmacist designed to help you get the most of out of your medicines.   Today we reviewed what your medicines are for, how to know if they are working, that your medicines are safe and how to make your medicine regimen as easy as possible.        Please discuss with Dr. Bermudez at your next appointment:  1.  Recommend stopping isosorbide and starting Ranexa 500 mg twice daily.  If needed could increase to 1000 mg twice daily if having increase in chest pains.  -If Ranexa is not covered by her insurance or you're unable to get it for your regular pharmacy, it looks like you could have a [facility] pharmacy transferred the prescription and use a amazingtunes coupon to bring the cost down.  On the amazingtunes website it looks like with a coupon for Ranexa would be about $30 per month if he were to get it at [facility].    2.  Continue checking blood sugar readings in the morning before eating food.  A couple days a week instead of doing a morning fasting blood sugar reading I would recommend checking your blood sugar 2 hours after one meal a day.  Our goal for the morning fasting blood sugar reading is to keep this below 130 mg/dL, and our goal for 2 hours after eating is to have your blood sugar below 180 mg/dL.      3.  You can stop fexofenadine and monitor for increase in symptoms of allergies.  It is okay to use fexofenadine as needed, or if you do notice an increase in allergy symptoms you could restart fexofenadine and take it daily.    Follow-up: As needed.  Please give me a call on my pager to let me know how your appointment with the cardiologist went, and if he agreed with changing the isosorbide to Ranexa.  If this change is made and the Ranexa is working, then we can talk to Dr. Zhang about starting a medication for sexual function (like sildenafil which is generic for  Viagra)      It was great to speak with you today.  I value your experience and would be very thankful for your time with providing feedback on our clinic survey. You may receive a survey via email or text message in the next few days.     To schedule another MTM appointment, please call the clinic directly or you may call the MTM scheduling line at [phone] or toll-free at [phone].     My Clinical Pharmacist's contact information:                                                      Please feel free to contact me with any questions or concerns you have.      Amanda Suh, PharmD  Medication Therapy Management Pharmacist  Pager: [phone]

## 2021-04-26 NOTE — PROGRESS NOTES
"Medication Therapy Management (MTM) Encounter    ASSESSMENT:                            Medication Adherence/Access: {adherencechoices:023748}    ***: ***  ***: ***  ***: ***  ***: ***    PLAN:                            ***    Follow-up: No follow-ups on file.      SUBJECTIVE/OBJECTIVE:                          Stiven Nguyễn is a 66 year old male called for a transitions of care visit. He was discharged from [facility] ED on 4/10/2o21 for umbilical hernia. {mtmvisitdetails:238658}     Reason for visit: ***.    Allergies/ADRs: {1/2/3/4/5:139880}  Tobacco: He reports that he has quit smoking. He quit smokeless tobacco use about 9 years ago.  His smokeless tobacco use included snuff.  Alcohol: {ALCOHOL CONSUMPTION HX:839853}  Caffeine: {CAFFEINE INTAKE:824336}  Activity: ***  Past Medical History: {1/2/3/4/5:482684}  {Social and Goals:938773}    Medication Adherence/Access: {fumedadherence:409602}    {DM Type:150067::\"Type 2 Diabetes\"}:  Currently taking ***. {sideeffects:499313}  Blood sugar monitoring: {MTM self monitorin}. Ranges {Pt report:713980}: {bgranges:610571}  Symptoms of low blood sugar? {HYPOGLYCEMIA SYMPTOMS:663580::\"none\"}  Symptoms of high blood sugar? {diabetessymptoms:341895}  Eye exam: {up to date:286151}  Foot exam: {up to date:717413}  Diet/Exercise: ***  Aspirin: {ASAyesno:059175}  Statin: {YES (EXPLAIN)/NO:619320}   ACEi/ARB: {YES (EXPLAIN)/NO:111967}.   Urine Albumin: No results found for: UMALCR   Lab Results   Component Value Date    A1C 6.5 2021    A1C 6.8 07/15/2019    A1C 7.4 2019    A1C 6.6 2016    A1C 7.2 2016     {IMMUNIZATION REMINDER LOOK IN NAVIGATOR:260291}    Hypertension: Current medications include ***.  Patient {HTNDoes/doesnot:128075}.  Patient reports { :391017}.  BP Readings from Last 3 Encounters:   21 130/64   04/10/21 (!) 158/87   21 132/71     Hyperlipidemia: Current therapy includes {LIPID TREATMENT:810146}.  Patient " "reports {mtmlipidsideeffect:419624}  {lipidascvd:784082}  Recent Labs   Lab Test 04/11/19 11/09/17  0516 05/21/15  0531 05/21/15  0531   CHOL 110 113   < > 169   HDL 38 38*   < > 36*   LDL 57 61   < > 108   TRIG 77 72   < > 123   CHOLHDLRATIO  --   --   --  4.7    < > = values in this interval not displayed.       GERD: Current medications include: {gerdmedsmtm1:289677} {FREQUENCY5:340085}. {gerd2:569877} Patient feels that current regimen {IS NOT/IS:078219::\"is not\"} effective.    BPH: ***  ***: ***  ***: ***  ***: ***  ***: ***    Today's Vitals: There were no vitals taken for this visit.  ----------------  {CHESTER?:278853}    I spent {[facility] total time 3:517316} with this patient today{MTMpartdbillingquestion:193805}. { :398314}. A copy of the visit note was provided to the patient's {[facility] chart:123168} provider.    The patient {GIVEN/NOT GIVEN:355557::\"was given\"} a summary of these recommendations. {covisit:713707}    ***    Telemedicine Visit Details  Type of service:  {telemedvisitmtm:671864::\"Telephone visit\"}  Start Time: {video/phone visit start time:152948}  End Time: {video/phone visit end time:152948}  Originating Location (patient location): Home  Distant Location (provider location):  [facility]    {Click at end of visit to add-in MTP:894118}      "

## 2021-04-26 NOTE — Clinical Note
Hi Dr. Bermudez,  I am a pharmacist that works in the PCP clinic with Dr. Zhang.  You have an appointment with Mr. Nguyễn tomorrow, and I wanted to ask your thoughts on possibly changing his isosorbide to Ranexa.  He hasn't been able to take a med for ED (sildenafil), due to the contraindication between isosorbide and PDE-5 inhibitors.  If Ranexa was appropriate for him, and is effective, that would allow Dr. Zhang to start sildenafil.  He would still need to separate from SL nitroglycerin.  He said it's been a big quality of life issue for him since he stopped his PDE-5.  I was wondering if you could consider changing isosorbide to Ranexa at your appointment tomorrow.      Thank you,  Amanda Suh, PharmD  Medication Therapy Management Pharmacist  Pager: [phone]

## 2021-04-27 ENCOUNTER — OFFICE VISIT (OUTPATIENT)
Dept: CARDIOLOGY | Facility: CLINIC | Age: 66
End: 2021-04-27
Payer: OTHER GOVERNMENT

## 2021-04-27 ENCOUNTER — TELEPHONE (OUTPATIENT)
Dept: CARDIOLOGY | Facility: CLINIC | Age: 66
End: 2021-04-27

## 2021-04-27 VITALS
SYSTOLIC BLOOD PRESSURE: 141 MMHG | OXYGEN SATURATION: 98 % | WEIGHT: 266 LBS | DIASTOLIC BLOOD PRESSURE: 70 MMHG | BODY MASS INDEX: 35.09 KG/M2 | HEART RATE: 43 BPM

## 2021-04-27 DIAGNOSIS — I20.1 CORONARY VASOSPASM (H): ICD-10-CM

## 2021-04-27 DIAGNOSIS — R55 SYNCOPE, UNSPECIFIED SYNCOPE TYPE: Primary | ICD-10-CM

## 2021-04-27 PROCEDURE — 99214 OFFICE O/P EST MOD 30 MIN: CPT | Performed by: INTERNAL MEDICINE
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
## 2021-04-27 NOTE — TELEPHONE ENCOUNTER
1. Cardiac MRI   2. Refer to Manuel, if not, then Ely at the .   3. See me if MRI abnormal, other wise follow up with Rene and Dr Reddy.     ADDENDUM:  cMRI scheduled 5/20/21 at 10:30    Consult with Dr Jackson Wednesday 7/14/21 at 10:30am  At [facility] [address] 3rd floor  [facility]- 3rd Floor  [address] [place] [address]    Reviewed prep with pt and wife, send instructions via [employer] as well.  DRISS LANGLEY RN on 4/27/2021 at 2:53 PM       Getting Ready for Your MRI or MRA  Where should I go?    [facility] Imaging Department located on the 3rd floor, [address].   What are these tests?  MRI (magnetic resonance imaging) uses a strong magnet and radio waves to look inside the body. An MRA (magnetic resonance angiogram) does the same thing, but it lets us look at your blood vessels.   A computer turns the radio waves into pictures showing cross sections of the body, much like slices of bread. This helps us see any problems more clearly.   You may receive fluid (called  contrast ) before or during your scan. The fluid helps us see the pictures better. We give the fluid through an IV (small needle in your arm).   How do I get ready for my exam?  Take your medicines as usual, unless your doctor tells you not to. Bring a list of your current medicines to your exam (including vitamins, minerals and over-the-counter drugs). Also bring the results of similar scans you may have had.  Please remove any body piercings and hair extensions before you arrive.   Follow the orders below.    ? You will have contrast for this exam.   ? IV contrast: The day before your exam, drink extra fluids--at least six 8-ounce glasses (unless your doctor tells you to restrict your fluids).   ? For heart exams:    24 hours before the exam:  - Stop all caffeine (coffee, tea, soda pop, chocolate, aspirin, etc.).  - Stop any medicines that contain theophylline or dipyridamole.     Do not eat, drink or smoke for two hours before the test. Your exam may last up to two hours.  What else do I need to know?  The MRI machine uses a strong magnet. Please wear clothes without metal (snaps, zippers). A sweatsuit works well, or we may give you a hospital gown.  You will remove watches, jewelry, hairpins, wallets, dentures, partial dental plates and hearing aids. You may wear contact lenses, and you may be able to wear your rings. We have a safe place to keep your personal items, but it is safer to leave them at home.  What happens during the exam?  Most tests take 30 to 60 minutes. The tests are painless. You can talk to us through a two-way speaker during your exam.    We may inject fluid ( contrast ) into one of your veins. This is not a dye and does not contain iodine. It will help us see the pictures better.    You will lie on an exam table. The table slides into a lighted tunnel that s open at both ends. The tunnel is 4 feet long.     You may hear loud thumping or hammering. If so, it may last up to 15 minutes. We will give you earplugs or headphones with music. You may bring your own CDs.     We will take several sets of pictures. You will need to lie very still. You may relax and breathe normally. In some cases, we may ask you to hold your breath for up to 30 seconds.  Are these tests safe?  There are no side effects from these tests, but they aren t safe for everyone. You cannot have an MRI or MRA if you have certain implants, a defibrillator or pieces of metal in your eye. If you have a pacemaker, call the Diagnostic Imaging Department to see if it is safe for you to have this exam.  We will make sure it is safe for you to have these tests. You will fill out a safety checklist before the exam. If you have any concerns, you may discuss them with your doctor or radiologist (X-ray doctor).  When will I know the results?  Your family doctor (or the doctor who ordered the test) will give you the  results. They should be ready within five days. You or your insurer will then receive two bills: one from the hospital and one from the radiologist.

## 2021-04-30 ENCOUNTER — HOSPITAL ENCOUNTER (EMERGENCY)
Facility: CLINIC | Age: 66
Discharge: HOME | End: 2021-05-01
Attending: EMERGENCY MEDICINE | Admitting: EMERGENCY MEDICINE
Payer: OTHER GOVERNMENT

## 2021-04-30 DIAGNOSIS — R10.84 ABDOMINAL PAIN, GENERALIZED: ICD-10-CM

## 2021-04-30 PROCEDURE — 99285 EMERGENCY DEPT VISIT HI MDM: CPT | Mod: 25 | Performed by: EMERGENCY MEDICINE

## 2021-04-30 PROCEDURE — 93005 ELECTROCARDIOGRAM TRACING: CPT | Performed by: EMERGENCY MEDICINE

## 2021-04-30 PROCEDURE — 93010 ELECTROCARDIOGRAM REPORT: CPT | Performed by: EMERGENCY MEDICINE

## 2021-05-01 ENCOUNTER — APPOINTMENT (OUTPATIENT)
Dept: CT IMAGING | Facility: CLINIC | Age: 66
End: 2021-05-01
Attending: EMERGENCY MEDICINE
Payer: OTHER GOVERNMENT

## 2021-05-01 VITALS
RESPIRATION RATE: 11 BRPM | TEMPERATURE: 98.5 F | DIASTOLIC BLOOD PRESSURE: 82 MMHG | SYSTOLIC BLOOD PRESSURE: 144 MMHG | HEART RATE: 36 BPM | OXYGEN SATURATION: 97 %

## 2021-05-01 LAB
ALBUMIN SERPL-MCNC: 3.8 G/DL (ref 3.4–5)
ALP SERPL-CCNC: 91 U/L (ref 40–150)
ALT SERPL W P-5'-P-CCNC: 33 U/L (ref 0–70)
ANION GAP SERPL CALCULATED.3IONS-SCNC: 6 MMOL/L (ref 3–14)
AST SERPL W P-5'-P-CCNC: 20 U/L (ref 0–45)
BASOPHILS # BLD AUTO: 0.1 10E9/L (ref 0–0.2)
BASOPHILS NFR BLD AUTO: 0.8 %
BILIRUB SERPL-MCNC: 0.5 MG/DL (ref 0.2–1.3)
BUN SERPL-MCNC: 16 MG/DL (ref 7–30)
CALCIUM SERPL-MCNC: 8.6 MG/DL (ref 8.5–10.1)
CHLORIDE SERPL-SCNC: 109 MMOL/L (ref 94–109)
CO2 SERPL-SCNC: 26 MMOL/L (ref 20–32)
CREAT SERPL-MCNC: 0.92 MG/DL (ref 0.66–1.25)
DIFFERENTIAL METHOD BLD: NORMAL
EOSINOPHIL # BLD AUTO: 0.3 10E9/L (ref 0–0.7)
EOSINOPHIL NFR BLD AUTO: 4 %
ERYTHROCYTE [DISTWIDTH] IN BLOOD BY AUTOMATED COUNT: 12.4 % (ref 10–15)
GFR SERPL CREATININE-BSD FRML MDRD: 87 ML/MIN/{1.73_M2}
GLUCOSE SERPL-MCNC: 124 MG/DL (ref 70–99)
HCT VFR BLD AUTO: 43.4 % (ref 40–53)
HGB BLD-MCNC: 14.8 G/DL (ref 13.3–17.7)
IMM GRANULOCYTES # BLD: 0.1 10E9/L (ref 0–0.4)
IMM GRANULOCYTES NFR BLD: 0.8 %
LACTATE BLD-SCNC: 0.6 MMOL/L (ref 0.7–2)
LIPASE SERPL-CCNC: 137 U/L (ref 73–393)
LYMPHOCYTES # BLD AUTO: 1.6 10E9/L (ref 0.8–5.3)
LYMPHOCYTES NFR BLD AUTO: 26.1 %
MCH RBC QN AUTO: 29.4 PG (ref 26.5–33)
MCHC RBC AUTO-ENTMCNC: 34.1 G/DL (ref 31.5–36.5)
MCV RBC AUTO: 86 FL (ref 78–100)
MONOCYTES # BLD AUTO: 0.7 10E9/L (ref 0–1.3)
MONOCYTES NFR BLD AUTO: 11.2 %
NEUTROPHILS # BLD AUTO: 3.5 10E9/L (ref 1.6–8.3)
NEUTROPHILS NFR BLD AUTO: 57.1 %
NRBC # BLD AUTO: 0 10*3/UL
NRBC BLD AUTO-RTO: 0 /100
PLATELET # BLD AUTO: 163 10E9/L (ref 150–450)
POTASSIUM SERPL-SCNC: 3.7 MMOL/L (ref 3.4–5.3)
PROT SERPL-MCNC: 7.5 G/DL (ref 6.8–8.8)
RBC # BLD AUTO: 5.03 10E12/L (ref 4.4–5.9)
SODIUM SERPL-SCNC: 141 MMOL/L (ref 133–144)
WBC # BLD AUTO: 6.2 10E9/L (ref 4–11)

## 2021-05-01 PROCEDURE — 250N000009 HC RX 250: Performed by: EMERGENCY MEDICINE

## 2021-05-01 PROCEDURE — 250N000011 HC RX IP 250 OP 636: Performed by: EMERGENCY MEDICINE

## 2021-05-01 PROCEDURE — 83690 ASSAY OF LIPASE: CPT | Performed by: EMERGENCY MEDICINE

## 2021-05-01 PROCEDURE — 96374 THER/PROPH/DIAG INJ IV PUSH: CPT | Mod: 59 | Performed by: EMERGENCY MEDICINE

## 2021-05-01 PROCEDURE — 83605 ASSAY OF LACTIC ACID: CPT | Performed by: EMERGENCY MEDICINE

## 2021-05-01 PROCEDURE — 85025 COMPLETE CBC W/AUTO DIFF WBC: CPT | Performed by: EMERGENCY MEDICINE

## 2021-05-01 PROCEDURE — 74177 CT ABD & PELVIS W/CONTRAST: CPT

## 2021-05-01 PROCEDURE — 80053 COMPREHEN METABOLIC PANEL: CPT | Performed by: EMERGENCY MEDICINE

## 2021-05-01 RX ORDER — HYDROMORPHONE HYDROCHLORIDE 1 MG/ML
0.2 INJECTION, SOLUTION INTRAMUSCULAR; INTRAVENOUS; SUBCUTANEOUS
Status: DISCONTINUED | OUTPATIENT
Start: 2021-05-01 | End: 2021-05-01 | Stop reason: HOSPADM

## 2021-05-01 RX ORDER — IOPAMIDOL 755 MG/ML
100 INJECTION, SOLUTION INTRAVASCULAR ONCE
Status: COMPLETED | OUTPATIENT
Start: 2021-05-01 | End: 2021-05-01

## 2021-05-01 RX ADMIN — SODIUM CHLORIDE 72 ML: 9 INJECTION, SOLUTION INTRAVENOUS at 01:06

## 2021-05-01 RX ADMIN — IOPAMIDOL 100 ML: 755 INJECTION, SOLUTION INTRAVENOUS at 01:07

## 2021-05-01 RX ADMIN — HYDROMORPHONE HYDROCHLORIDE 0.2 MG: 1 INJECTION, SOLUTION INTRAMUSCULAR; INTRAVENOUS; SUBCUTANEOUS at 00:28
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
## 2021-05-01 NOTE — ED NOTES
Back from CT, pain is tolerable after dilaudid. Pt stood at bedside to urinate, denies feeling lightheaded. Fresh ice put in ice pack on pt abdomen, pt says it really helps reduce pain. Pt voided 400 ml clear light yellow urine.

## 2021-05-01 NOTE — DISCHARGE INSTRUCTIONS
We have not found a good cause for your abdominal pain, as all tests so far have not shown us the reason you're in pain.      Therefore, it is very important for you to follow up here or at your regular doctor's office tomorrow, in 24 hours, so that we can re-check your pain and see how you are doing.      Please come back sooner if you have worsening abdominal pain, intractable vomiting, fevers/chills, increasing malaise, or for any other worsening of your condition as you see fit.

## 2021-05-01 NOTE — ED NOTES
Pt is having abdominal pain that goes from mid low abdomen up to epigastric area. Pain is constant, different from umbilical hernia surgery. Pt sat up to use urinal and had near syncopal episode.

## 2021-05-05 ENCOUNTER — TRANSFERRED RECORDS (OUTPATIENT)
Dept: HEALTH INFORMATION MANAGEMENT | Facility: CLINIC | Age: 66
End: 2021-05-05

## 2021-05-06 ENCOUNTER — ANESTHESIA EVENT (OUTPATIENT)
Dept: SURGERY | Facility: CLINIC | Age: 66
End: 2021-05-06
Payer: OTHER GOVERNMENT

## 2021-05-06 DIAGNOSIS — Z11.59 ENCOUNTER FOR SCREENING FOR OTHER VIRAL DISEASES: ICD-10-CM

## 2021-05-06 LAB
LABORATORY COMMENT REPORT: NORMAL
SARS-COV-2 RNA RESP QL NAA+PROBE: NEGATIVE
SARS-COV-2 RNA RESP QL NAA+PROBE: NORMAL
SPECIMEN SOURCE: NORMAL
SPECIMEN SOURCE: NORMAL

## 2021-05-06 PROCEDURE — U0003 INFECTIOUS AGENT DETECTION BY NUCLEIC ACID (DNA OR RNA); SEVERE ACUTE RESPIRATORY SYNDROME CORONAVIRUS 2 (SARS-COV-2) (CORONAVIRUS DISEASE [COVID-19]), AMPLIFIED PROBE TECHNIQUE, MAKING USE OF HIGH THROUGHPUT TECHNOLOGIES AS DESCRIBED BY CMS-2020-01-R: HCPCS | Performed by: SURGERY

## 2021-05-06 PROCEDURE — U0005 INFEC AGEN DETEC AMPLI PROBE: HCPCS | Performed by: SURGERY

## 2021-05-06 ASSESSMENT — LIFESTYLE VARIABLES: TOBACCO_USE: 1
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
## 2021-05-10 ENCOUNTER — HOSPITAL ENCOUNTER (OUTPATIENT)
Facility: CLINIC | Age: 66
Discharge: HOME | End: 2021-05-10
Attending: SURGERY | Admitting: SURGERY
Payer: OTHER GOVERNMENT

## 2021-05-10 ENCOUNTER — ANESTHESIA (OUTPATIENT)
Dept: SURGERY | Facility: CLINIC | Age: 66
End: 2021-05-10
Payer: OTHER GOVERNMENT

## 2021-05-10 VITALS
RESPIRATION RATE: 14 BRPM | TEMPERATURE: 98 F | OXYGEN SATURATION: 96 % | DIASTOLIC BLOOD PRESSURE: 76 MMHG | BODY MASS INDEX: 35.78 KG/M2 | HEIGHT: 73 IN | HEART RATE: 79 BPM | WEIGHT: 270 LBS | SYSTOLIC BLOOD PRESSURE: 143 MMHG

## 2021-05-10 DIAGNOSIS — K42.9 UMBILICAL HERNIA WITHOUT OBSTRUCTION AND WITHOUT GANGRENE: ICD-10-CM

## 2021-05-10 DIAGNOSIS — K40.20 BILATERAL INGUINAL HERNIA WITHOUT OBSTRUCTION OR GANGRENE, RECURRENCE NOT SPECIFIED: ICD-10-CM

## 2021-05-10 PROCEDURE — 271N000001 HC OR GENERAL SUPPLY NON-STERILE: Performed by: SURGERY

## 2021-05-10 PROCEDURE — 49652 PR LAP VENT/ABD HERNIA REPAIR: CPT | Performed by: SURGERY

## 2021-05-10 PROCEDURE — 999N000141 HC STATISTIC PRE-PROCEDURE NURSING ASSESSMENT: Performed by: SURGERY

## 2021-05-10 PROCEDURE — 250N000009 HC RX 250: Performed by: NURSE ANESTHETIST, CERTIFIED REGISTERED

## 2021-05-10 PROCEDURE — 250N000025 HC SEVOFLURANE, PER MIN: Performed by: SURGERY

## 2021-05-10 PROCEDURE — C1781 MESH (IMPLANTABLE): HCPCS | Performed by: SURGERY

## 2021-05-10 PROCEDURE — 250N000013 HC RX MED GY IP 250 OP 250 PS 637: Performed by: NURSE ANESTHETIST, CERTIFIED REGISTERED

## 2021-05-10 PROCEDURE — 49650 LAP ING HERNIA REPAIR INIT: CPT | Mod: 50 | Performed by: SURGERY

## 2021-05-10 PROCEDURE — 250N000011 HC RX IP 250 OP 636: Performed by: NURSE ANESTHETIST, CERTIFIED REGISTERED

## 2021-05-10 PROCEDURE — 360N000077 HC SURGERY LEVEL 4, PER MIN: Performed by: SURGERY

## 2021-05-10 PROCEDURE — 710N000009 HC RECOVERY PHASE 1, LEVEL 1, PER MIN: Performed by: SURGERY

## 2021-05-10 PROCEDURE — 250N000011 HC RX IP 250 OP 636: Performed by: SURGERY

## 2021-05-10 PROCEDURE — 370N000017 HC ANESTHESIA TECHNICAL FEE, PER MIN: Performed by: SURGERY

## 2021-05-10 PROCEDURE — 272N000001 HC OR GENERAL SUPPLY STERILE: Performed by: SURGERY

## 2021-05-10 PROCEDURE — 258N000003 HC RX IP 258 OP 636: Performed by: NURSE ANESTHETIST, CERTIFIED REGISTERED

## 2021-05-10 PROCEDURE — 250N000009 HC RX 250: Performed by: SURGERY

## 2021-05-10 PROCEDURE — 710N000012 HC RECOVERY PHASE 2, PER MINUTE: Performed by: SURGERY

## 2021-05-10 DEVICE — MESH DEXTILE ANATOMICAL 15CM X 10CM LG RIGHT DXT1510AR: Type: IMPLANTABLE DEVICE | Site: INGUINAL | Status: FUNCTIONAL

## 2021-05-10 DEVICE — MESH DEXTILE ANATOMICAL 15CM X 10CM LG LEFT DXT1510AL: Type: IMPLANTABLE DEVICE | Site: INGUINAL | Status: FUNCTIONAL

## 2021-05-10 DEVICE — MESH SYMBOTEX COMPOSITE STEX ROUND 9CM SYM9: Type: IMPLANTABLE DEVICE | Site: UMBILICAL | Status: FUNCTIONAL

## 2021-05-10 RX ORDER — FENTANYL CITRATE 50 UG/ML
25-50 INJECTION, SOLUTION INTRAMUSCULAR; INTRAVENOUS
Status: DISCONTINUED | OUTPATIENT
Start: 2021-05-10 | End: 2021-05-10 | Stop reason: HOSPADM

## 2021-05-10 RX ORDER — NALOXONE HYDROCHLORIDE 0.4 MG/ML
0.2 INJECTION, SOLUTION INTRAMUSCULAR; INTRAVENOUS; SUBCUTANEOUS
Status: DISCONTINUED | OUTPATIENT
Start: 2021-05-10 | End: 2021-05-10 | Stop reason: HOSPADM

## 2021-05-10 RX ORDER — DEXAMETHASONE SODIUM PHOSPHATE 4 MG/ML
INJECTION, SOLUTION INTRA-ARTICULAR; INTRALESIONAL; INTRAMUSCULAR; INTRAVENOUS; SOFT TISSUE PRN
Status: DISCONTINUED | OUTPATIENT
Start: 2021-05-10 | End: 2021-05-10

## 2021-05-10 RX ORDER — SODIUM CHLORIDE, SODIUM LACTATE, POTASSIUM CHLORIDE, CALCIUM CHLORIDE 600; 310; 30; 20 MG/100ML; MG/100ML; MG/100ML; MG/100ML
INJECTION, SOLUTION INTRAVENOUS CONTINUOUS
Status: DISCONTINUED | OUTPATIENT
Start: 2021-05-10 | End: 2021-05-10 | Stop reason: HOSPADM

## 2021-05-10 RX ORDER — LIDOCAINE 40 MG/G
CREAM TOPICAL
Status: DISCONTINUED | OUTPATIENT
Start: 2021-05-10 | End: 2021-05-10 | Stop reason: HOSPADM

## 2021-05-10 RX ORDER — ONDANSETRON 2 MG/ML
4 INJECTION INTRAMUSCULAR; INTRAVENOUS EVERY 30 MIN PRN
Status: DISCONTINUED | OUTPATIENT
Start: 2021-05-10 | End: 2021-05-10 | Stop reason: HOSPADM

## 2021-05-10 RX ORDER — ACETAMINOPHEN 325 MG/1
975 TABLET ORAL ONCE
Status: COMPLETED | OUTPATIENT
Start: 2021-05-10 | End: 2021-05-10

## 2021-05-10 RX ORDER — GABAPENTIN 300 MG/1
300 CAPSULE ORAL ONCE
Status: COMPLETED | OUTPATIENT
Start: 2021-05-10 | End: 2021-05-10

## 2021-05-10 RX ORDER — CEFAZOLIN SODIUM IN 0.9 % NACL 3 G/100 ML
3 INTRAVENOUS SOLUTION, PIGGYBACK (ML) INTRAVENOUS
Status: COMPLETED | OUTPATIENT
Start: 2021-05-10 | End: 2021-05-10

## 2021-05-10 RX ORDER — PROPOFOL 10 MG/ML
INJECTION, EMULSION INTRAVENOUS PRN
Status: DISCONTINUED | OUTPATIENT
Start: 2021-05-10 | End: 2021-05-10

## 2021-05-10 RX ORDER — DEXAMETHASONE SODIUM PHOSPHATE 4 MG/ML
4 INJECTION, SOLUTION INTRA-ARTICULAR; INTRALESIONAL; INTRAMUSCULAR; INTRAVENOUS; SOFT TISSUE
Status: DISCONTINUED | OUTPATIENT
Start: 2021-05-10 | End: 2021-05-10 | Stop reason: HOSPADM

## 2021-05-10 RX ORDER — OXYCODONE HYDROCHLORIDE 5 MG/1
5 TABLET ORAL EVERY 6 HOURS PRN
Qty: 12 TABLET | Refills: 0 | Status: SHIPPED | OUTPATIENT
Start: 2021-05-10 | End: 2021-05-13

## 2021-05-10 RX ORDER — IBUPROFEN 600 MG/1
600 TABLET, FILM COATED ORAL
Status: DISCONTINUED | OUTPATIENT
Start: 2021-05-10 | End: 2021-05-10 | Stop reason: HOSPADM

## 2021-05-10 RX ORDER — LIDOCAINE HYDROCHLORIDE 10 MG/ML
INJECTION, SOLUTION INFILTRATION; PERINEURAL PRN
Status: DISCONTINUED | OUTPATIENT
Start: 2021-05-10 | End: 2021-05-10

## 2021-05-10 RX ORDER — LIDOCAINE HYDROCHLORIDE AND EPINEPHRINE 10; 10 MG/ML; UG/ML
INJECTION, SOLUTION INFILTRATION; PERINEURAL PRN
Status: DISCONTINUED | OUTPATIENT
Start: 2021-05-10 | End: 2021-05-10 | Stop reason: HOSPADM

## 2021-05-10 RX ORDER — NALOXONE HYDROCHLORIDE 0.4 MG/ML
0.4 INJECTION, SOLUTION INTRAMUSCULAR; INTRAVENOUS; SUBCUTANEOUS
Status: DISCONTINUED | OUTPATIENT
Start: 2021-05-10 | End: 2021-05-10 | Stop reason: HOSPADM

## 2021-05-10 RX ORDER — ONDANSETRON 4 MG/1
4 TABLET, ORALLY DISINTEGRATING ORAL EVERY 30 MIN PRN
Status: DISCONTINUED | OUTPATIENT
Start: 2021-05-10 | End: 2021-05-10 | Stop reason: HOSPADM

## 2021-05-10 RX ORDER — DOCUSATE SODIUM 100 MG/1
100 CAPSULE, LIQUID FILLED ORAL 2 TIMES DAILY
Refills: 0 | COMMUNITY
Start: 2021-05-10 | End: 2021-09-22

## 2021-05-10 RX ORDER — KETOROLAC TROMETHAMINE 30 MG/ML
INJECTION, SOLUTION INTRAMUSCULAR; INTRAVENOUS PRN
Status: DISCONTINUED | OUTPATIENT
Start: 2021-05-10 | End: 2021-05-10

## 2021-05-10 RX ORDER — EPHEDRINE SULFATE 50 MG/ML
INJECTION, SOLUTION INTRAVENOUS PRN
Status: DISCONTINUED | OUTPATIENT
Start: 2021-05-10 | End: 2021-05-10

## 2021-05-10 RX ORDER — MEPERIDINE HYDROCHLORIDE 25 MG/ML
12.5 INJECTION INTRAMUSCULAR; INTRAVENOUS; SUBCUTANEOUS
Status: DISCONTINUED | OUTPATIENT
Start: 2021-05-10 | End: 2021-05-10 | Stop reason: HOSPADM

## 2021-05-10 RX ORDER — GLYCOPYRROLATE 0.2 MG/ML
INJECTION, SOLUTION INTRAMUSCULAR; INTRAVENOUS PRN
Status: DISCONTINUED | OUTPATIENT
Start: 2021-05-10 | End: 2021-05-10

## 2021-05-10 RX ORDER — BUPIVACAINE HYDROCHLORIDE 5 MG/ML
INJECTION, SOLUTION PERINEURAL PRN
Status: DISCONTINUED | OUTPATIENT
Start: 2021-05-10 | End: 2021-05-10 | Stop reason: HOSPADM

## 2021-05-10 RX ORDER — OXYCODONE HYDROCHLORIDE 5 MG/1
5 TABLET ORAL
Status: DISCONTINUED | OUTPATIENT
Start: 2021-05-10 | End: 2021-05-10 | Stop reason: HOSPADM

## 2021-05-10 RX ORDER — OXYCODONE HYDROCHLORIDE 5 MG/1
10 TABLET ORAL EVERY 4 HOURS PRN
Status: DISCONTINUED | OUTPATIENT
Start: 2021-05-10 | End: 2021-05-10 | Stop reason: HOSPADM

## 2021-05-10 RX ORDER — CEFAZOLIN SODIUM 1 G/50ML
1 INJECTION, SOLUTION INTRAVENOUS SEE ADMIN INSTRUCTIONS
Status: DISCONTINUED | OUTPATIENT
Start: 2021-05-10 | End: 2021-05-10 | Stop reason: HOSPADM

## 2021-05-10 RX ORDER — MEPERIDINE HYDROCHLORIDE 50 MG/ML
INJECTION INTRAMUSCULAR; INTRAVENOUS; SUBCUTANEOUS PRN
Status: DISCONTINUED | OUTPATIENT
Start: 2021-05-10 | End: 2021-05-10

## 2021-05-10 RX ORDER — FENTANYL CITRATE 50 UG/ML
INJECTION, SOLUTION INTRAMUSCULAR; INTRAVENOUS PRN
Status: DISCONTINUED | OUTPATIENT
Start: 2021-05-10 | End: 2021-05-10

## 2021-05-10 RX ADMIN — GLYCOPYRROLATE 0.1 MG: 0.2 INJECTION, SOLUTION INTRAMUSCULAR; INTRAVENOUS at 12:33

## 2021-05-10 RX ADMIN — DEXAMETHASONE SODIUM PHOSPHATE 4 MG: 4 INJECTION, SOLUTION INTRA-ARTICULAR; INTRALESIONAL; INTRAMUSCULAR; INTRAVENOUS; SOFT TISSUE at 12:36

## 2021-05-10 RX ADMIN — FENTANYL CITRATE 150 MCG: 50 INJECTION, SOLUTION INTRAMUSCULAR; INTRAVENOUS at 12:36

## 2021-05-10 RX ADMIN — HYDROMORPHONE HYDROCHLORIDE 1 MG: 1 INJECTION, SOLUTION INTRAMUSCULAR; INTRAVENOUS; SUBCUTANEOUS at 13:06

## 2021-05-10 RX ADMIN — FENTANYL CITRATE 50 MCG: 50 INJECTION, SOLUTION INTRAMUSCULAR; INTRAVENOUS at 12:44

## 2021-05-10 RX ADMIN — SODIUM CHLORIDE, POTASSIUM CHLORIDE, SODIUM LACTATE AND CALCIUM CHLORIDE: 600; 310; 30; 20 INJECTION, SOLUTION INTRAVENOUS at 12:18

## 2021-05-10 RX ADMIN — FENTANYL CITRATE 50 MCG: 50 INJECTION, SOLUTION INTRAMUSCULAR; INTRAVENOUS at 12:33

## 2021-05-10 RX ADMIN — ROCURONIUM BROMIDE 50 MG: 10 INJECTION INTRAVENOUS at 12:36

## 2021-05-10 RX ADMIN — GLYCOPYRROLATE 0.1 MG: 0.2 INJECTION, SOLUTION INTRAMUSCULAR; INTRAVENOUS at 12:36

## 2021-05-10 RX ADMIN — GABAPENTIN 300 MG: 300 CAPSULE ORAL at 12:17

## 2021-05-10 RX ADMIN — MEPERIDINE HYDROCHLORIDE 25 MG: 50 INJECTION, SOLUTION INTRAMUSCULAR; INTRAVENOUS; SUBCUTANEOUS at 12:44

## 2021-05-10 RX ADMIN — EPHEDRINE SULFATE 5 MG: 50 INJECTION, SOLUTION INTRAVENOUS at 14:16

## 2021-05-10 RX ADMIN — KETOROLAC TROMETHAMINE 30 MG: 30 INJECTION, SOLUTION INTRAMUSCULAR at 14:35

## 2021-05-10 RX ADMIN — LIDOCAINE HYDROCHLORIDE 1 ML: 10 INJECTION, SOLUTION EPIDURAL; INFILTRATION; INTRACAUDAL; PERINEURAL at 12:18

## 2021-05-10 RX ADMIN — LIDOCAINE HYDROCHLORIDE 100 MG: 10 INJECTION, SOLUTION INFILTRATION; PERINEURAL at 12:36

## 2021-05-10 RX ADMIN — Medication 3 G: at 12:33

## 2021-05-10 RX ADMIN — MIDAZOLAM 2 MG: 1 INJECTION INTRAMUSCULAR; INTRAVENOUS at 12:33

## 2021-05-10 RX ADMIN — ACETAMINOPHEN 975 MG: 325 TABLET, FILM COATED ORAL at 12:16

## 2021-05-10 RX ADMIN — MEPERIDINE HYDROCHLORIDE 25 MG: 50 INJECTION, SOLUTION INTRAMUSCULAR; INTRAVENOUS; SUBCUTANEOUS at 13:06

## 2021-05-10 RX ADMIN — EPHEDRINE SULFATE 5 MG: 50 INJECTION, SOLUTION INTRAVENOUS at 14:19

## 2021-05-10 RX ADMIN — PROPOFOL 200 MG: 10 INJECTION, EMULSION INTRAVENOUS at 12:36

## 2021-05-10 ASSESSMENT — MIFFLIN-ST. JEOR: SCORE: 2068.59

## 2021-05-10 NOTE — BRIEF OP NOTE
[facility]    Brief Operative Note    Pre-operative diagnosis: Umbilical hernia without obstruction and without gangrene [K42.9]  Bilateral inguinal hernia without obstruction or gangrene, recurrence not specified [K40.20]  Post-operative diagnosis Same as pre-operative diagnosis    Procedure: Procedure(s):  HERNIORRHAPHY, UMBILICAL, LAPAROSCOPIC with mesh  HERNIORRHAPHY, INGUINAL, LAPAROSCOPIC with mesh  Surgeon: Surgeon(s) and Role:     * González Liriano DO - Primary     * Dimitry Guan PA-C - Assisting  Anesthesia: General   Estimated blood loss: Minimal  Drains: None  Specimens: * No specimens in log *  Findings:   Bilateral indirect inguinal hernias, Cirrhotic appearing liver, Incarcerated umbilical hernia.  Complications: None.  Implants:   Implant Name Type Inv. Item Serial No.  Lot No. LRB No. Used Action   MESH DEXTILE ANATOMICAL 15CM X 10CM LG RIGHT [id number] Mesh MESH DEXTILE ANATOMICAL 15CM X 10CM LG RIGHT [id number]  [employer]OVID[employer]EN [id number] Right 1 Implanted   MESH DEXTILE ANATOMICAL 15CM X 10CM LG LEFT [id number] Mesh MESH DEXTILE ANATOMICAL 15CM X 10CM LG LEFT [id number]  [employer]OVID[employer]EN [id number] Left 1 Implanted   MESH SYMBOTEX COMPOSITE STEX ROUND 9CM SYM9 Mesh MESH SYMBOTEX COMPOSITE STEX ROUND 9CM SYM9  COVIDIEN [id number]  1 Implanted       González Liriano DO on 5/10/2021 at 2:44 PM

## 2021-05-10 NOTE — ANESTHESIA CARE TRANSFER NOTE
Patient: Stiven Nguyễn    Procedure(s):  HERNIORRHAPHY, UMBILICAL, LAPAROSCOPIC with mesh  HERNIORRHAPHY, INGUINAL, LAPAROSCOPIC with mesh    Diagnosis: Umbilical hernia without obstruction and without gangrene [K42.9]  Bilateral inguinal hernia without obstruction or gangrene, recurrence not specified [K40.20]  Diagnosis Additional Information: No value filed.    Anesthesia Type:   General     Note:    Oropharynx: oral airway in place and spontaneously breathing  Level of Consciousness: drowsy  Oxygen Supplementation: face mask    Independent Airway: airway patency satisfactory and stable  Dentition: dentition unchanged  Vital Signs Stable: post-procedure vital signs reviewed and stable  Report to RN Given: handoff report given  Patient transferred to: PACU    Handoff Report: Identifed the Patient, Identified the Reponsible Provider, Reviewed the pertinent medical history, Discussed the surgical course, Reviewed Intra-OP anesthesia mangement and issues during anesthesia, Set expectations for post-procedure period and Allowed opportunity for questions and acknowledgement of understanding      Vitals: (Last set prior to Anesthesia Care Transfer)  CRNA VITALS  5/10/2021 1429 - 5/10/2021 1504      5/10/2021             Pulse:  97    SpO2:  91 %    Resp Rate (observed):  (!) 3        Electronically Signed By: HAZEL Oliver CRNA  May 10, 2021  3:04 PM

## 2021-05-10 NOTE — ANESTHESIA POSTPROCEDURE EVALUATION
Patient: Stiven Nguyễn    Procedure(s):  HERNIORRHAPHY, UMBILICAL, LAPAROSCOPIC with mesh  HERNIORRHAPHY, INGUINAL, LAPAROSCOPIC with mesh    Diagnosis:Umbilical hernia without obstruction and without gangrene [K42.9]  Bilateral inguinal hernia without obstruction or gangrene, recurrence not specified [K40.20]  Diagnosis Additional Information: No value filed.    Anesthesia Type:  General    Note:  Disposition: Outpatient   Postop Pain Control: Uneventful            Sign Out: Well controlled pain   PONV: No   Neuro/Psych: Uneventful            Sign Out: Acceptable/Baseline neuro status   Airway/Respiratory: Uneventful            Sign Out: Acceptable/Baseline resp. status   CV/Hemodynamics: Uneventful            Sign Out: Acceptable CV status; No obvious hypovolemia; No obvious fluid overload   Other NRE: NONE   DID A NON-ROUTINE EVENT OCCUR? No           Last vitals:  Vitals:    05/10/21 1530 05/10/21 1540 05/10/21 1555   BP: (!) 171/98 (!) 151/90 132/82   Pulse: 106 94 92   Resp: 19 9    Temp:  36.7  C (98  F)    SpO2: 90% 94% 92%       Last vitals prior to Anesthesia Care Transfer:  CRNA VITALS  5/10/2021 1429 - 5/10/2021 1529      5/10/2021             Pulse:  97    SpO2:  91 %    Resp Rate (observed):  (!) 3          Electronically Signed By: Milagros Wharton CRNA, APRN CRNA  May 10, 2021  4:07 PM

## 2021-05-10 NOTE — DISCHARGE INSTRUCTIONS
Same Day Surgery Discharge Instructions  Special Precautions After Surgery - Adult    1. It is not unusual to feel lightheaded or faint, up to 24 hours after surgery or while taking pain medication.  If you have these symptoms; sit for a few minutes before standing and have someone assist you when getting up.  2. You should rest and relax for the next 24 hours and must have someone stay with you for at least 24 hours after your discharge.  3. DO NOT DRIVE any vehicle or operate mechanical equipment for 24 hours following the end of your surgery.  DO NOT DRIVE while taking narcotic pain medications that have been prescribed by your physician.  If you had a limb operated on, you must be able to use it fully to drive.  4. DO NOT drink alcoholic beverages for 24 hours following surgery or while taking prescription pain medication.  5. Drink clear liquids (apple juice, ginger ale, broth, 7-Up, etc.).  Progress to your regular diet as you feel able.  6. Any questions call your physician and do not make important decisions for 24 hours.        Breakthrough Bleeding    How/why does it occur?   There are many causes of breakthrough bleeding onto your dressing. The two most common causes are increased activity and increased NSAID use.     How is it treated?   The treatment for breakthrough dressing bleeding depends on the cause. For simple problems such as a saturated dressing, you may need to reinforce the dressing with more gauze and tape and put slight pressure on the site.  Call your healthcare provider if something else is causing bleeding.        Nausea and Vomiting  What are nausea and vomiting?   Nausea is the queasy feeling you usually have before you vomit. Vomiting is the forceful emptying (throwing up) of the stomach's contents through the mouth.   What causes nausea and vomiting?   Nausea and vomiting are symptoms that may occur with many conditions, such as:   Anesthesia medications   side  effect of narcotic medicines  exposure to unpleasant odors or sights   stress and anxiety     How is it treated?   At first you should rest your stomach for a few hours by eating nothing solid and sipping only clear liquids. A little later you can eat soft bland foods that are easy to digest.   It is important to drink small amounts (1 to 4 ounces) often so that you do not become dehydrated. Gradually drink larger amounts of the clear fluids. If you vomit, wait an hour, then start over with a smaller amount of fluid.   Eat slowly and avoid foods that are acidic, spicy, fatty, or fibrous (such as meats, coarse grains, and raw vegetables). Also avoid extremely hot or cold food. In addition, avoid dairy products if you have diarrhea. You may start eating your normal diet again in 3 days or so, when all signs of illness have passed.   Rest as much as possible. Sit or lie down with your head propped up. Do not lie flat for at least 2 hours after eating. Nausea and vomiting usually last only a short period of time. If you have cramping or pain in your belly you can try putting a heating pad set at low or a covered hot water bottle on your belly. Never set a heating pad on high because you could get burned.   If you have been vomiting for more than a day or have had diarrhea for over 3 days, you may need to have an exam by your provider, including a check for dehydration. If you are very dehydrated, you may need to be given fluids intravenously (IV). In children and older adults dehydration can quickly become life threatening.   When should I call my healthcare provider?   Talk with your provider if you are unable to keep fluids down for more than 12 hours or if you have any of the following symptoms with nausea and vomiting:   severe headache or neck ache, or stiff neck   severe abdominal pain   diarrhea and vomiting that last more than 24 hours   blood in the vomited material that may look red, brown, or black, or like  coffee grounds   bloody diarrhea   very forceful vomiting   signs of dehydration such as dry mouth, excessive thirst, little or no urination, severe weakness, dizziness, or lightheadedness.   If you have nausea and pain in the jaw, arm, shoulder, chest, or back; sweating; shortness of breath; or lightheadedness; call 911 for emergency care.     Post-operative Infection  What is a wound infection?    watch for signs of infection. Signs that the wound/incision is infected include:   Pus or cloudy fluid draining from the incision.   A pimple or yellow crust forming on the incision   The scab is increasing in size.   Increasing redness occurs around the incisions  A red streak is spreading from the wound toward the heart.   The incision has become extremely tender and/or hot   The lymph node draining that area of skin may become large and tender.   You may develop a fever over 100?F (37.8?C).     What is the cause?   Most skin infections follow breaks in the skin (for example, surgery,  from cuts, puncture wounds, animal bites, splinters, thorns, or burns). Bacteria (especially staphylococcus or streptococcus) then invade the wound and cause the infection.    Deeper wounds (like surgical incisions) are much more likely to become infected than superficial wounds (for example, scrapes).     What is the treatment?   Call your doctor's clinic if you feel you have the beginnings of an infection   Antibiotics You will probably need antibiotics prescribed by your healthcare provider. This medicine will kill the germs that are causing the wound infection. Try not to forget any of the doses.  Even if you feel better in a few days, take the antibiotic until it is completely gone to keep the infection from flaring up again.    Fever and pain relief Take acetaminophen or ibuprofen if you develop a fever over 102?F (39?C)    How can I help prevent infections?   Wash around all new incisions vigorously with soap and water for 5 to 10  minutes to remove dirt and bacteria.      When should I call my healthcare provider?   Call IMMEDIATELY if:   The redness keeps spreading.   The wound becomes extremely painful.   Call during office hours if:   The fever is not gone 48 hours after you start taking an antibiotic.   The wound infection does not look better 3 days after your start taking an antibiotic.   The wound isn't completely healed within 10 days.   You have other questions or concerns.     __________________________________________________________________________________________________________________________________  IMPORTANT NUMBERS:    [facility] Main Number:  [phone], [phone]  Pharmacy:  [phone]  Same Day Surgery:  [phone], Monday - Friday until 8:30 p.m.  Urgent Care:  [phone]  Emergency Room:  [phone]      [facility]:  [phone]                                                                             [facility]:  [phone] option 1  [facility]:  [phone]     [facility]:  [phone]   [facility]:  [phone]   Home Medical Equipment: [phone]  [facility]:  [phone]    HOME CARE FOLLOWING INGUINAL/FEMORAL HERNIA REPAIR      DIET:  No restrictions.  Increased fluid intake is recommended. While taking pain medications, increase dietary fiber or add a fiber supplementation like Metamucil or Citrucel to help prevent constipation - a possible side effect of pain medications.    NAUSEA:  If nauseated from the anesthetic/pain meds; rest in bed, get up cautiously with assistance, and drink clear liquids (juice, tea, broth).    ACTIVITY:  Light Activity -- you may immediately be up and about as tolerated.  Driving -- you may drive when comfortable and off narcotic pain medications.  Light Work -- resume when comfortable off pain medications.  (If you can drive, you probably can work.)  Strenuous Work/Activity -- limit lifting to  20 pounds for 3 weeks.  Active Sports (running, biking, etc.) -- cautiously resume after 4 weeks.    INCISIONAL CARE:    If you have a dressing in place, keep clean and dry for 48 hours; you may replace the gauze if it becomes soiled.    After 48 hours you may remove the dressing and shower.  Do not submerse incision in water for 1 week.    If you have a Dermabond dressing (a type of skin glue), you may shower immediately.    Sutures will absorb and need not be removed.    If present, leave the steri-strips (white paper tapes) in place for 14 days after surgery.    If present, leave Dermabond glue in place until it wears/flakes off.    Expect a variable amount of swelling/black and blue discoloration that may involve the penis/scrotum or labia.    Some numbness around the incision is common.    A lump/ridge under the incision is normal and will gradually resolve.    DISCOMFORT:  Local anesthetic placed at surgery should provide relief for 4-8 hours.  Begin taking pain pills before discomfort is severe.  Take the pain medication with some food, when possible, to minimize side effects.  Intermittent use of ice packs to the hernia repair site may help during the first 1-3 weeks after surgery.  Expect gradual improvement.    Over-the-counter anti-inflammatory medications (i.e. Ibuprofen/Advil/Motrin or Naprosyn/Aleve) may be used per package instructions in addition to or while tapering off the narcotic pain medications to decrease swelling and sensitivity at the repair site.  DO NOT TAKE these Anti-inflammatory medications if your primary physician has advised against doing so, or if you have acid reflux, ulcer, or bleeding disorder, or take blood-thinner medications.  Call your primary physician or the surgery office if you have medication questions.    RETURN APPOINTMENT:  Schedule a follow-up visit 2-3 weeks post-op if one has not been scheduled for you already.  Office Phone:  [phone]     CONTACT US IF THE  FOLLOWING DEVELOPS:   1. A fever that is above 101     2. If there is a large amount of drainage, bleeding, or swelling.   3. Severe pain that is not relieved by your prescription.   4. Drainage that is thick, cloudy, yellow, green or white.   5. Any other questions not answered by  Frequently Asked Questions  sheet.      FREQUENTLY ASKED QUESTIONS:    Q:  How should my incision look?    A:  Normally your incision will appear slightly swollen with light redness directly along the incision itself as it heals.  It may feel like a bump or ridge as the healing/scarring happens, and over time (3-4 months) this bump or ridge feeling should slowly go away.  In general, clear or pink watery drainage can be normal at first as your incision heals, but should decrease over time.    Q:  How do I know if my incision is infected?  A:  Look at your incision for signs of infection, like redness around the incision spreading to surrounding skin, or drainage of cloudy or foul-smelling drainage.  If you feel warm, check your temperature to see if you are running a fever.    **If any of these things occur, please notify the nurse at our office.  We may need you to come into the office for an incision check.      Q:  How do I take care of my incision?  A:  If you have a dressing in place - Starting the day after surgery, replace the dressing 1-2 times a day until there is no further drainage from the incision.  At that time, a dressing is no longer needed.  Try to minimize tape on the skin if irritation is occurring at the tape sites.  If you have significant irritation from tape on the skin, please call the office to discuss other method of dressing your incision.    Small pieces of tape called  steri-strips  may be present directly overlying your incision; these may be removed 10 days after surgery unless otherwise specified by your surgeon.  If these tapes start to loosen at the ends, you may trim them back until they fall off or are  removed.    A:  If you had  Dermabond  tissue glue used as a dressing (this causes your incision to look shiny with a clear covering over it) - This type of dressing wears off with time and does not require more dressings over the top unless it is draining around the glue as it wears off.  Do not apply ointments or lotions over the incisions until the glue has completely worn off.    Q:  There is a piece of tape or a sticky  lead  still on my skin.  Can I remove this?  A:  Sometimes the sticky  leads  used for monitoring during surgery or for evaluation in the emergency department are not all removed while you are in the hospital.  These sometimes have a tab or metal dot on them.  You can easily remove these on your own, like taking off a band-aid.  If there is a gel substance under the  lead , simply wipe/clean it off with a washcloth or paper towel.      Q:  What can I do to minimize constipation (very hard stools, or lack of stools)?  A:  Stay well hydrated.  Increase your dietary fiber intake or take a fiber supplement -with plenty of water.  Walk around frequently.  You may consider an over-the-counter stool-softener.  Your Pharmacist can assist you with choosing one that is stocked at your pharmacy.  Constipation is also one of the most common side effects of pain medication.  If you are using pain medication, be pro-active and try to PREVENT problems with constipation by taking the steps above BEFORE constipation becomes a problem.    Q:  What do I do if I need more pain medications?  A:  Call the office to receive refills.  Be aware that certain pain meds cannot be called into a pharmacy and actually require a paper prescription.  A change may be made in your pain med as you progress thru your recovery period or if you have side effects to certain meds.    --Pain meds are NOT refilled after 5pm on weekdays, and NOT AT ALL on the weekends, so please look ahead to prevent problems.      Q:  Why am I having a  hard time sleeping now that I am at home?  A:  Many medications you receive while you are in the hospital can impact your sleep for a number of days after your surgery/hospitalization.  Decreased level of activity and naps during the day may also make sleeping at night difficult.  Try to minimize day-time naps, and get up frequently during the day to walk around your home during your recovery time.  Sleep aides may be of some help, but are not recommended for long-term use.      Q:  I am having some back discomfort.  What should I do?  A:  This may be related to certain positioning that was required for your surgery, extended periods of time in bed, or other changes in your overall activity level.  You may try ice, heat, acetaminophen, or ibuprofen to treat this temporarily.  Note that many pain medications have acetaminophen in them and would state this on the prescription bottle.  Be sure not to exceed the maximum of 4000mg per day of acetaminophen.     **If the pain you are having does not resolve, is severe, or is a flare of back pain you have had on other occasions prior to surgery, please contact your primary physician for further recommendations or for an appointment to be examined at their office.    Q:  Why am I having headaches?  A:  Headaches can be caused by many things:  caffeine withdrawal, use of pain meds, dehydration, high blood pressure, lack of sleep, over-activity/exhaustion, flare-up of usual migraine headaches.  If you feel this is related to muscle tension (a band-like feeling around the head, or a pressure at the low-back of the head) you may try ice or heat to this area.  You may need to drink more fluids (try electrolyte drink like Gatorade), rest, or take your usual migraine medications.   **If your headaches do not resolve, worsen, are accompanied by other symptoms, or if your blood pressure is high, please call your primary physician for recommendation and/or examination.    Q:  I am  unable to urinate.  What do I do?  A:  A small percentage of people can have difficulty urinating initially after surgery.  This includes being able to urinate only a very small amount at a time and feeling discomfort or pressure in the very low abdomen.  This is called  urinary retention , and is actually an urgent situation.  Proceed to your nearest Emergency department for evaluation (not an Urgent Care Center).  Sometimes the bladder does not work correctly after certain medications you receive during surgery, or related to certain procedures.  You may need to have a catheter placed until your bladder recovers.  When planning to go to an Emergency department, it may help to call the ER to let them know you are coming in for this problem after a surgery.  This may help you get in quicker to be evaluated.  **If you have symptoms of a urinary tract infection, please contact your primary physician for the proper evaluation and treatment.        If you have other questions, please call the office Monday thru Friday between 8am and 5pm to discuss with the nurse.  #[phone]    There is a surgeon ON CALL on weekday evenings and over the weekend in case of urgent need only, and may be contacted at the same number.    If you are having an emergency, call 911 or proceed to your nearest emergency department.

## 2021-05-10 NOTE — OP NOTE
Operative Note     Pre-Operative Diagnosis: Bilateral Inguinal Hernia; Umbilical hernia    Post-Operative Diagnosis: Same     Procedure:   1.  Laparoscopic Bilateral Inguinal Hernia Repair with Mesh   2. Laparoscopic umbilical hernia repair    Surgeon: González Liriano DO    Assistant: Dimitry Gaun PA-C (needed for retraction, suction, running laparoscope)     Anesthesia: General Endotracheal Anesthesia, Local Anesthesia (1% Lidocaine with epinephrine, 0.25% Marcaine)     EBL: 3cc     Operative Date: 05/10/21      Indications: Stiven Nguyễn presents with pain related to an enlarging bulge of the Bilateral groin and umbilicus, and is found to have areducible, tender Bilateral inguinal hernia on examination and partially reducible umbilical hernia. He was counseled about the indications, risks, benefits and alternatives to surgery, and his questions were addressed. He understands and wishes to proceed.      Procedure: After informed consent was obtained, the patient was brought to the operating room and placed supine on the operating room table. Bilateral lower extremity sequential compression devices were placed and functioning prior to induction of anesthesia, which was then administered and included an endotracheal tube. A urinary catheter was not deemed necessary initially as the patient urinated just prior to transport to the operating room. The abdominal wall was prepped and draped in the standard fashion.     Intra-abdominal access was obtained at the umbilicus.  A supraumbilical incision was made and dissection carried out to the anterior abdominal wall.  The umbilical stalk was encircled and transected.  The defect was inferior to the true umbilical stalk.  A 12 mm port was placed under direct visualization and pneumoperitoneum administered. Exploration of the abdominal cavity confirmed a small indirect bilateral inguinal hernia and an umbilical hernia with omental fat incarcerated.  The liver appeared  mildly nodular consistent with pre-operative CT. There were no other concerning findings on exploration. Additional working ports were placed in the bilateral upper quadrant abdomen - and each measured 5mm. The patient was positioned in the Trendelenberg positon. The peritoneum was scored anterior to the hernia on the right initially, and the preperitoneal space dissected carefully. The hernia sac was carefully dissected free from the spermatic cord contents, taking care to avoid injuring the spermatic cord vessels and vas deferens. Once dissection appeared adequate, a Dextile mesh prosthesis was delivered intra-abdominal and situated in the preperitoneal space. Transfixion was then accomplished at the pubic tubercle  using the absorbatacker, and the repair was reassessed and appeared satisfactory.    Attention was then turned to the left side which was similarly dissected.  The hernia sac was carefully dissected free from the spermatic cord contents, taking care to avoid injuring the spermatic cord vessels and vas deferens. Once dissection appeared adequate, a Dextile mesh prosthesis was delivered intra-abdominal and situated in the preperitoneal space. Transfixion was then accomplished at the pubic tubercle  using the absorbatacker, and the repair was reassessed and appeared satisfactory.    The peritoneal defects were closed with the absorbatacker.   An additional 5mm working port was placed in the left upper quadrant under direct visualization. A 9 cm Symobatex mesh was then brought onto the field, placed through the 12 port with a single centering suture in place.  The abdomen was desufflated and the 12 mm port was incorporated into the umbilical defect and cleared circumferentially.  The hernia and port site were then closed with 0-0 Vicryl suture.  The abdomen was reinsufflated.  The centering stitch was grasped with the suture passer and approximated to the anterior abdominal wall with the coated portion  facing the viscera.  The absorbatacker was used to secure the mesh.  The mesh lay flat and securely to the abdominal fascia.  The centering suture was tied.    Each of the trochars was then removed, and an instrument count (including laparotomy pads, sponges and needles) was performed and found to be correct.      The umbilical stalk was secured to the anterior rectus fascia with 3-0 Vicryl. Subcutaneous closure was accomplished in layers using 3-0 Vicryl, and all the skin incisions were closed using 4-0 Monocryl. The wounds were cleansed and dried, and dressed with sterile glue.      The patient tolerated the procedure well, was allowed to recover from anesthesia, extubated without incident and transferred to the Recovery Room in stable condition after verifying the location of both testes within the scrotum at the conclusion of the operation.     González Liriano DO on 5/10/2021 at 4:26 PM

## 2021-05-14 NOTE — TELEPHONE ENCOUNTER
Action    Action Taken 5-14: Requested EKGs 1-7-21, 1-6-21 from   5-14: Requested from :    Echo    Coronary Angio   1-5-21 1-5-21 5-19: sent EKG to scanning, resolved echo and angio in PACS

## 2021-05-17 ENCOUNTER — OFFICE VISIT (OUTPATIENT)
Dept: SURGERY | Facility: CLINIC | Age: 66
End: 2021-05-17
Payer: OTHER GOVERNMENT

## 2021-05-17 ENCOUNTER — TELEPHONE (OUTPATIENT)
Dept: SURGERY | Facility: CLINIC | Age: 66
End: 2021-05-17

## 2021-05-17 ENCOUNTER — HOSPITAL ENCOUNTER (OUTPATIENT)
Dept: CT IMAGING | Facility: CLINIC | Age: 66
Discharge: HOME | End: 2021-05-17
Attending: SURGERY | Admitting: SURGERY
Payer: OTHER GOVERNMENT

## 2021-05-17 VITALS — SYSTOLIC BLOOD PRESSURE: 147 MMHG | DIASTOLIC BLOOD PRESSURE: 72 MMHG | TEMPERATURE: 97.6 F | HEART RATE: 52 BPM

## 2021-05-17 DIAGNOSIS — Z98.890 S/P LAPAROSCOPIC HERNIA REPAIR: ICD-10-CM

## 2021-05-17 DIAGNOSIS — Z98.890 S/P LAPAROSCOPIC HERNIA REPAIR: Primary | ICD-10-CM

## 2021-05-17 DIAGNOSIS — Z87.19 S/P LAPAROSCOPIC HERNIA REPAIR: ICD-10-CM

## 2021-05-17 DIAGNOSIS — Z87.19 S/P LAPAROSCOPIC HERNIA REPAIR: Primary | ICD-10-CM

## 2021-05-17 LAB
ALBUMIN SERPL-MCNC: 3.7 G/DL (ref 3.4–5)
ALP SERPL-CCNC: 91 U/L (ref 40–150)
ALT SERPL W P-5'-P-CCNC: 30 U/L (ref 0–70)
ANION GAP SERPL CALCULATED.3IONS-SCNC: 3 MMOL/L (ref 3–14)
AST SERPL W P-5'-P-CCNC: 18 U/L (ref 0–45)
BASOPHILS # BLD AUTO: 0 10E9/L (ref 0–0.2)
BASOPHILS NFR BLD AUTO: 0.4 %
BILIRUB SERPL-MCNC: 0.5 MG/DL (ref 0.2–1.3)
BUN SERPL-MCNC: 18 MG/DL (ref 7–30)
CALCIUM SERPL-MCNC: 9 MG/DL (ref 8.5–10.1)
CHLORIDE SERPL-SCNC: 107 MMOL/L (ref 94–109)
CO2 SERPL-SCNC: 27 MMOL/L (ref 20–32)
CREAT SERPL-MCNC: 0.85 MG/DL (ref 0.66–1.25)
DIFFERENTIAL METHOD BLD: ABNORMAL
EOSINOPHIL # BLD AUTO: 0.3 10E9/L (ref 0–0.7)
EOSINOPHIL NFR BLD AUTO: 6 %
ERYTHROCYTE [DISTWIDTH] IN BLOOD BY AUTOMATED COUNT: 12.3 % (ref 10–15)
GFR SERPL CREATININE-BSD FRML MDRD: >90 ML/MIN/{1.73_M2}
GLUCOSE SERPL-MCNC: 121 MG/DL (ref 70–99)
HCT VFR BLD AUTO: 42.5 % (ref 40–53)
HGB BLD-MCNC: 14.4 G/DL (ref 13.3–17.7)
LYMPHOCYTES # BLD AUTO: 1 10E9/L (ref 0.8–5.3)
LYMPHOCYTES NFR BLD AUTO: 18.4 %
MCH RBC QN AUTO: 29.9 PG (ref 26.5–33)
MCHC RBC AUTO-ENTMCNC: 33.9 G/DL (ref 31.5–36.5)
MCV RBC AUTO: 88 FL (ref 78–100)
MONOCYTES # BLD AUTO: 0.7 10E9/L (ref 0–1.3)
MONOCYTES NFR BLD AUTO: 11.7 %
NEUTROPHILS # BLD AUTO: 3.6 10E9/L (ref 1.6–8.3)
NEUTROPHILS NFR BLD AUTO: 63.5 %
PLATELET # BLD AUTO: 140 10E9/L (ref 150–450)
POTASSIUM SERPL-SCNC: 3.8 MMOL/L (ref 3.4–5.3)
PROT SERPL-MCNC: 7.4 G/DL (ref 6.8–8.8)
RBC # BLD AUTO: 4.82 10E12/L (ref 4.4–5.9)
SODIUM SERPL-SCNC: 137 MMOL/L (ref 133–144)
WBC # BLD AUTO: 5.7 10E9/L (ref 4–11)

## 2021-05-17 PROCEDURE — 85025 COMPLETE CBC W/AUTO DIFF WBC: CPT | Performed by: SURGERY

## 2021-05-17 PROCEDURE — 80053 COMPREHEN METABOLIC PANEL: CPT | Performed by: SURGERY

## 2021-05-17 PROCEDURE — 36415 COLL VENOUS BLD VENIPUNCTURE: CPT | Performed by: SURGERY

## 2021-05-17 PROCEDURE — 99024 POSTOP FOLLOW-UP VISIT: CPT | Performed by: SURGERY

## 2021-05-17 PROCEDURE — 250N000011 HC RX IP 250 OP 636: Performed by: RADIOLOGY

## 2021-05-17 PROCEDURE — 74177 CT ABD & PELVIS W/CONTRAST: CPT

## 2021-05-17 PROCEDURE — 250N000009 HC RX 250: Performed by: RADIOLOGY

## 2021-05-17 RX ORDER — IOPAMIDOL 755 MG/ML
100 INJECTION, SOLUTION INTRAVASCULAR ONCE
Status: COMPLETED | OUTPATIENT
Start: 2021-05-17 | End: 2021-05-17

## 2021-05-17 RX ADMIN — SODIUM CHLORIDE 73 ML: 9 INJECTION, SOLUTION INTRAVENOUS at 12:09

## 2021-05-17 RX ADMIN — IOPAMIDOL 100 ML: 755 INJECTION, SOLUTION INTRAVENOUS at 12:08

## 2021-05-17 NOTE — PROGRESS NOTES
General Surgery Post Op    Pt returns for follow up visit s/p laparoscopic umbilical hernia and bilateral inguinal hernia repairs with mesh    Patient has been doing poorly.  He states initially pain was severe, but controlled.  It began to wane however over last 24 hours he did not take any pain medications and had significant increase in pain.  Pain worse with movement.  Better with rest.  He has had normal bowel function and voiding normally per patient.  He has continued his Flomax and oxybutynin for urinary retention/incontinence.  He feels bloated.  He has been eating normally since surgery.  Last bowel movement was this morning and normal for him.  Passing flatus without issue.  Wife notes a rash on abdomen yesterday.  He feels his abdomen is hyperaesthetic.    Physical exam: Vitals: BP (!) 147/72 (BP Location: Right arm, Patient Position: Sitting, Cuff Size: Adult Large)   Pulse 52   Temp 97.6  F (36.4  C) (Tympanic)   BMI= There is no height or weight on file to calculate BMI.    Exam:  Constitutional: alert, moderate distress and over weight, intermittently smiling  Cardiovascular: negative  Respiratory: negative  Gastrointestinal: BS+, TTP around umbilicus.  No tenderness on either flank.  Voluntary guarding.    Contact dermatitis rash abdomen, particularly centered at port sites.      Assessment:     ICD-10-CM    1. S/P laparoscopic hernia repair  Z98.890 CBC with platelets and differential    Z87.19 Comprehensive metabolic panel (BMP + Alb, Alk Phos, ALT, AST, Total. Bili, TP)     CT Abdomen Pelvis w Contrast     Lab Results   Component Value Date    WBC 5.7 05/17/2021     Lab Results   Component Value Date    RBC 4.82 05/17/2021     Lab Results   Component Value Date    HGB 14.4 05/17/2021     Lab Results   Component Value Date    HCT 42.5 05/17/2021     Lab Results   Component Value Date    MCV 88 05/17/2021     Lab Results   Component Value Date    MCH 29.9 05/17/2021     Lab Results   Component  Value Date    MCHC 33.9 05/17/2021     Lab Results   Component Value Date    RDW 12.3 05/17/2021     Lab Results   Component Value Date     05/17/2021     CT Abdomen/Pelvis:FINDINGS:   LOWER CHEST: The visualized lung bases are clear.     HEPATOBILIARY: Unremarkable     PANCREAS: Unremarkable     SPLEEN: Unremarkable     ADRENAL GLANDS: Unremarkable     KIDNEYS/BLADDER: 1 mm nonobstructing stone at the anterior interpolar  region of the right kidney. No hydronephrosis bilaterally.     BOWEL: There are postsurgical changes consistent with an anterior  abdominal wall hernia repair. There is some hazy fat stranding in the  subcutaneous fat and mesenteric fat in the area of the hernia repair.  This most likely represents inflammatory changes post surgery. No  fluid collections to indicate an abscess are seen. There is no free  air. The bowel appears grossly unremarkable.                                                                      IMPRESSION:   1.  Postoperative inflammatory changes as above following anterior  abdominal wall hernia repair. No other definite abnormality noted on  CT.     2. 1 mm nonobstructing stone in the right kidney.    Plan: Mr. Nguyễn presents 1 week after laparoscopic umbilical and bilateral inguinal hernia repairs.  He has acute pain, worse with movement.  From our discussion, patient initially thought he was improved/better and was only taking Motrin/tylenol on alternating basis.  He had worsening of his pain overnight.  Bladder scan performed in office less than 200, patient states he voided just prior to office visit.  His vitals are normal.  Given his exam, prudent to rule out occult injury or mesh complication.  Will obtain stat labs and CT scan of abdomen/pelvis with contrast..  Patient did have evidence of chronic urinary retention on operation requiring aburto placement.  Discussed this could be normal post operative pain.      Reviewed labs and CT results with patient.  No  acute cause identified.  Discussed potential options for pain management with patient. He wishes to proceed with scheduled tylenol/motrin.  I will phone follow-up with him 3 days.  If symptoms worsen, recommend return to office versus ER.  For rash, recommended OTC benadryl.  Patient and wife comfortable with plan.    González Liriano, DO on 5/17/2021 at 11:00 AM

## 2021-05-17 NOTE — TELEPHONE ENCOUNTER
Called pt and made appt for pt to be seen today at 10:30am.   Kalyn DAWKINS RN BSN PHN  [facility]

## 2021-05-17 NOTE — TELEPHONE ENCOUNTER
Reason for Call:  Other     Detailed comments: Pt had umbilical hernia repair on 05/10. States stomach has been rock hard for the last 3 days, entire abdomen is tender to touch (not just incision). Also experiencing a lot of gas. - Please contact pt     Phone Number Patient can be reached at: Home number on file [phone] (home)    Best Time: Any    Can we leave a detailed message on this number? YES (permission given to speak with wife, Genna)    Call taken on 5/17/2021 at 8:39 AM by Denise Behrendt

## 2021-05-17 NOTE — NURSING NOTE
"Initial BP (!) 147/72 (BP Location: Right arm, Patient Position: Sitting, Cuff Size: Adult Large)   Pulse 52   Temp 97.6  F (36.4  C) (Tympanic)  Estimated body mass index is 35.62 kg/m  as calculated from the following:    Height as of 5/10/21: 1.854 m (6' 1\").    Weight as of 5/10/21: 122.5 kg (270 lb). .    Leela Sales CMA    "

## 2021-05-17 NOTE — LETTER
5/17/2021         RE: Stiven Nguyễn  [id number]  [facility] [place] [address]        Dear Colleague,    Thank you for referring your patient, Stiven Nguyễn, to the [facility]. Please see a copy of my visit note below.    General Surgery Post Op    Pt returns for follow up visit s/p laparoscopic umbilical hernia and bilateral inguinal hernia repairs with mesh    Patient has been doing poorly.  He states initially pain was severe, but controlled.  It began to wane however over last 24 hours he did not take any pain medications and had significant increase in pain.  Pain worse with movement.  Better with rest.  He has had normal bowel function and voiding normally per patient.  He has continued his Flomax and oxybutynin for urinary retention/incontinence.  He feels bloated.  He has been eating normally since surgery.  Last bowel movement was this morning and normal for him.  Passing flatus without issue.  Wife notes a rash on abdomen yesterday.  He feels his abdomen is hyperaesthetic.    Physical exam: Vitals: BP (!) 147/72 (BP Location: Right arm, Patient Position: Sitting, Cuff Size: Adult Large)   Pulse 52   Temp 97.6  F (36.4  C) (Tympanic)   BMI= There is no height or weight on file to calculate BMI.    Exam:  Constitutional: alert, moderate distress and over weight, intermittently smiling  Cardiovascular: negative  Respiratory: negative  Gastrointestinal: BS+, TTP around umbilicus.  No tenderness on either flank.  Voluntary guarding.    Contact dermatitis rash abdomen, particularly centered at port sites.      Assessment:     ICD-10-CM    1. S/P laparoscopic hernia repair  Z98.890 CBC with platelets and differential    Z87.19 Comprehensive metabolic panel (BMP + Alb, Alk Phos, ALT, AST, Total. Bili, TP)     CT Abdomen Pelvis w Contrast     Lab Results   Component Value Date    WBC 5.7 05/17/2021     Lab Results   Component Value Date    RBC 4.82 05/17/2021     Lab Results    Component Value Date    HGB 14.4 05/17/2021     Lab Results   Component Value Date    HCT 42.5 05/17/2021     Lab Results   Component Value Date    MCV 88 05/17/2021     Lab Results   Component Value Date    MCH 29.9 05/17/2021     Lab Results   Component Value Date    MCHC 33.9 05/17/2021     Lab Results   Component Value Date    RDW 12.3 05/17/2021     Lab Results   Component Value Date     05/17/2021     CT Abdomen/Pelvis:FINDINGS:   LOWER CHEST: The visualized lung bases are clear.     HEPATOBILIARY: Unremarkable     PANCREAS: Unremarkable     SPLEEN: Unremarkable     ADRENAL GLANDS: Unremarkable     KIDNEYS/BLADDER: 1 mm nonobstructing stone at the anterior interpolar  region of the right kidney. No hydronephrosis bilaterally.     BOWEL: There are postsurgical changes consistent with an anterior  abdominal wall hernia repair. There is some hazy fat stranding in the  subcutaneous fat and mesenteric fat in the area of the hernia repair.  This most likely represents inflammatory changes post surgery. No  fluid collections to indicate an abscess are seen. There is no free  air. The bowel appears grossly unremarkable.                                                                      IMPRESSION:   1.  Postoperative inflammatory changes as above following anterior  abdominal wall hernia repair. No other definite abnormality noted on  CT.     2. 1 mm nonobstructing stone in the right kidney.    Plan: Mr. Nguyễn presents 1 week after laparoscopic umbilical and bilateral inguinal hernia repairs.  He has acute pain, worse with movement.  From our discussion, patient initially thought he was improved/better and was only taking Motrin/tylenol on alternating basis.  He had worsening of his pain overnight.  Bladder scan performed in office less than 200, patient states he voided just prior to office visit.  His vitals are normal.  Given his exam, prudent to rule out occult injury or mesh complication.  Will obtain  stat labs and CT scan of abdomen/pelvis with contrast..  Patient did have evidence of chronic urinary retention on operation requiring aburto placement.  Discussed this could be normal post operative pain.      Reviewed labs and CT results with patient.  No acute cause identified.  Discussed potential options for pain management with patient. He wishes to proceed with scheduled tylenol/motrin.  I will phone follow-up with him 3 days.  If symptoms worsen, recommend return to office versus ER.  For rash, recommended OTC benadryl.  Patient and wife comfortable with plan.    González Liriano,  on 5/17/2021 at 11:00 AM        Again, thank you for allowing me to participate in the care of your patient.        Sincerely,        González Liriano, DO

## 2021-05-17 NOTE — TELEPHONE ENCOUNTER
Pt had umbilical surgery 5/10/21. From Wednesday to now his abdomen is sore and warm to the touch, red, hard, and bloated. He stated he is full of gas and burping like he never has done before. Thursday was last pain pill. He has been doing ibuprofen and tylenol. Pt has had bowel movements normally. Abdomen is painful to the touch and swollen. He stated that even his shirt touching it hurts. He stated that the pain is awful. Please advise if pt can be seen in clinic or go to ED.   Kalyn DAWKINS RN BSN PHN  [facility]

## 2021-05-20 ENCOUNTER — HOSPITAL ENCOUNTER (OUTPATIENT)
Dept: CARDIOLOGY | Facility: CLINIC | Age: 66
Discharge: HOME | End: 2021-05-20
Attending: INTERNAL MEDICINE | Admitting: INTERNAL MEDICINE
Payer: OTHER GOVERNMENT

## 2021-05-20 DIAGNOSIS — R55 SYNCOPE, UNSPECIFIED SYNCOPE TYPE: ICD-10-CM

## 2021-05-20 PROCEDURE — A9585 GADOBUTROL INJECTION: HCPCS | Performed by: INTERNAL MEDICINE

## 2021-05-20 PROCEDURE — 255N000002 HC RX 255 OP 636: Performed by: INTERNAL MEDICINE

## 2021-05-20 PROCEDURE — 75561 CARDIAC MRI FOR MORPH W/DYE: CPT

## 2021-05-20 PROCEDURE — 75561 CARDIAC MRI FOR MORPH W/DYE: CPT | Mod: 26 | Performed by: INTERNAL MEDICINE

## 2021-05-20 RX ORDER — GADOBUTROL 604.72 MG/ML
15 INJECTION INTRAVENOUS ONCE
Status: COMPLETED | OUTPATIENT
Start: 2021-05-20 | End: 2021-05-20

## 2021-05-20 RX ADMIN — GADOBUTROL 15 ML: 604.72 INJECTION INTRAVENOUS at 12:37

## 2021-05-21 ENCOUNTER — MYC MEDICAL ADVICE (OUTPATIENT)
Dept: SURGERY | Facility: CLINIC | Age: 66
End: 2021-05-21

## 2021-05-21 DIAGNOSIS — R21 RASH AND NONSPECIFIC SKIN ERUPTION: Primary | ICD-10-CM

## 2021-05-21 RX ORDER — PREDNISONE 10 MG/1
20 TABLET ORAL DAILY
Qty: 10 TABLET | Refills: 0 | Status: SHIPPED | OUTPATIENT
Start: 2021-05-21 | End: 2021-08-11

## 2021-05-21 NOTE — TELEPHONE ENCOUNTER
Spoke with provider and ordered prednisone 20 mg daily for 5 days per [facility]OR[facility]. Also advised pt to take his allegra in the am with the prednisone and then he could take 25 to 50 mg of benadryl at night. Advised to be aware of drowsiness from the benadryl. Also advised for worsening symptoms to go to ED to be evaluated. Pt verbalized understanding.  Kalyn DAWKINS RN BSN PHN  [facility]

## 2021-05-21 NOTE — TELEPHONE ENCOUNTER
Pt called back and he stated Wednesday he drove his wife and when he got home that pain was worse. After riding in care yesterday after MRI the pain was worse again. Pain is ok when he is just sitting at home. He stated the rash however is worse on his abdomen and is still under his arm around belly to under other arm. Pt reported bumps all over the rash. He stated it burns. Pt stated he cannot have covers on him at all when he is sleeping. Pt stated he cannot keep going with the rash. He has taken benadryl and it is not touching the rash. Please review and advise.   Kalyn DAWKINS RN BSN PHN  [facility]

## 2021-05-22 ENCOUNTER — ANCILLARY PROCEDURE (OUTPATIENT)
Dept: ULTRASOUND IMAGING | Facility: CLINIC | Age: 66
End: 2021-05-22
Attending: EMERGENCY MEDICINE
Payer: OTHER GOVERNMENT

## 2021-05-22 ENCOUNTER — HOSPITAL ENCOUNTER (EMERGENCY)
Facility: CLINIC | Age: 66
Discharge: HOME | End: 2021-05-22
Attending: EMERGENCY MEDICINE | Admitting: EMERGENCY MEDICINE
Payer: OTHER GOVERNMENT

## 2021-05-22 VITALS
DIASTOLIC BLOOD PRESSURE: 95 MMHG | RESPIRATION RATE: 18 BRPM | OXYGEN SATURATION: 96 % | WEIGHT: 265 LBS | HEART RATE: 73 BPM | BODY MASS INDEX: 34.96 KG/M2 | SYSTOLIC BLOOD PRESSURE: 186 MMHG | TEMPERATURE: 97.9 F

## 2021-05-22 DIAGNOSIS — R10.32 LEFT GROIN PAIN: ICD-10-CM

## 2021-05-22 PROCEDURE — 76705 ECHO EXAM OF ABDOMEN: CPT | Performed by: EMERGENCY MEDICINE

## 2021-05-22 PROCEDURE — 99284 EMERGENCY DEPT VISIT MOD MDM: CPT | Mod: 25 | Performed by: EMERGENCY MEDICINE

## 2021-05-22 PROCEDURE — 76705 ECHO EXAM OF ABDOMEN: CPT | Mod: 26 | Performed by: EMERGENCY MEDICINE

## 2021-05-22 PROCEDURE — 250N000013 HC RX MED GY IP 250 OP 250 PS 637: Performed by: EMERGENCY MEDICINE

## 2021-05-22 RX ORDER — ACETAMINOPHEN 500 MG
1000 TABLET ORAL ONCE
Status: COMPLETED | OUTPATIENT
Start: 2021-05-22 | End: 2021-05-22

## 2021-05-22 RX ADMIN — ACETAMINOPHEN 1000 MG: 500 TABLET, FILM COATED ORAL at 22:49

## 2021-05-23 NOTE — DISCHARGE INSTRUCTIONS
Use acetaminophen and ibuprofen as needed for pain.  Apply ice for 10 to 15 minutes at a time every 1-2 hours while awake.  Return to the emergency department for painful rash, fevers, repeated vomiting, worsening symptoms, or other concerns.  Otherwise follow-up with surgery clinic on Tuesday.

## 2021-05-23 NOTE — ED TRIAGE NOTES
Pain above left testicle up to belly button.  Patient had umbilical and bilateral testicular hernia repair on May 10th.  Patient's wife dropped a watermelon in the sink today and patient jerked in reaction and now having pain.
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
## 2021-05-25 ENCOUNTER — RECORDS - HEALTHEAST (OUTPATIENT)
Dept: ADMINISTRATIVE | Facility: CLINIC | Age: 66
End: 2021-05-25

## 2021-05-25 ENCOUNTER — OFFICE VISIT (OUTPATIENT)
Dept: SURGERY | Facility: CLINIC | Age: 66
End: 2021-05-25
Payer: OTHER GOVERNMENT

## 2021-05-25 VITALS
BODY MASS INDEX: 35.12 KG/M2 | WEIGHT: 264.99 LBS | SYSTOLIC BLOOD PRESSURE: 135 MMHG | HEART RATE: 60 BPM | TEMPERATURE: 97.9 F | DIASTOLIC BLOOD PRESSURE: 79 MMHG | HEIGHT: 73 IN

## 2021-05-25 DIAGNOSIS — Z98.890 S/P LAPAROSCOPIC HERNIA REPAIR: Primary | ICD-10-CM

## 2021-05-25 DIAGNOSIS — Z87.19 S/P LAPAROSCOPIC HERNIA REPAIR: Primary | ICD-10-CM

## 2021-05-25 PROCEDURE — 99024 POSTOP FOLLOW-UP VISIT: CPT | Performed by: SURGERY

## 2021-05-25 ASSESSMENT — MIFFLIN-ST. JEOR: SCORE: 2045.88

## 2021-05-25 NOTE — NURSING NOTE
"Initial /79 (BP Location: Right arm, Patient Position: Sitting, Cuff Size: Adult Regular)   Pulse 60   Temp 97.9  F (36.6  C) (Tympanic)   Ht 1.854 m (6' 1\")   Wt 120.2 kg (264 lb 15.9 oz)   BMI 34.96 kg/m   Estimated body mass index is 34.96 kg/m  as calculated from the following:    Height as of this encounter: 1.854 m (6' 1\").    Weight as of this encounter: 120.2 kg (264 lb 15.9 oz). .    Jo Harding MA    "

## 2021-05-25 NOTE — LETTER
"    5/25/2021         RE: Stiven Nguyễn  [address]  [facility] [address]        Dear Colleague,    Thank you for referring your patient, Stiven Nguyễn, to the [facility]. Please see a copy of my visit note below.    General Surgery Post Op    Pt returns for follow up visit s/p laparoscopic ventral and bilateral inguinal hernias on 5/10/21.    Patient has been doing well now.  He is tolerating diet.  His recovery was complicated by difficult to control pain and what seems like delayed hypersensitivity to glue versus chlorhexidine vs his abdominal binder. He is improving on steroids.  He is no longer taking pain medications.    Physical exam: Vitals: /79 (BP Location: Right arm, Patient Position: Sitting, Cuff Size: Adult Regular)   Pulse 60   Temp 97.9  F (36.6  C) (Tympanic)   Ht 1.854 m (6' 1\")   Wt 120.2 kg (264 lb 15.9 oz)   BMI 34.96 kg/m    BMI= Body mass index is 34.96 kg/m .    Exam:  Constitutional: healthy, alert and no distress  Cardiovascular: negative  Respiratory: negative  Gastrointestinal: Abdomen soft, non-tender. BS normal. No masses, organomegaly  Incisions C/D/I.      Assessment:     ICD-10-CM    1. S/P laparoscopic hernia repair  Z98.890     Z87.19         Plan: Stiven Nguyễn was seen for follow-up after laparoscopic hernia repairs.  Patient is doing well and recovering without issue at this time.  We discussed routine post-operative care of wounds including avoiding sun exposure to wounds to limit scarring, no need for overlying ointments, and weight restrictions going forward.  Patient was instructed to call with any questions or concerns.  Stiven Nguyễn can follow-up on an as needed basis.      Discussed with allergy about potential referral.  [employer] message sent to patient.      González Liriano DO on 5/27/2021 at 2:49 PM          Again, thank you for allowing me to participate in the care of your patient.  "       Sincerely,        González Liriano, DO

## 2021-05-27 NOTE — PROGRESS NOTES
"General Surgery Post Op    Pt returns for follow up visit s/p laparoscopic ventral and bilateral inguinal hernias on 5/10/21.    Patient has been doing well now.  He is tolerating diet.  His recovery was complicated by difficult to control pain and what seems like delayed hypersensitivity to glue versus chlorhexidine vs his abdominal binder. He is improving on steroids.  He is no longer taking pain medications.    Physical exam: Vitals: /79 (BP Location: Right arm, Patient Position: Sitting, Cuff Size: Adult Regular)   Pulse 60   Temp 97.9  F (36.6  C) (Tympanic)   Ht 1.854 m (6' 1\")   Wt 120.2 kg (264 lb 15.9 oz)   BMI 34.96 kg/m    BMI= Body mass index is 34.96 kg/m .    Exam:  Constitutional: healthy, alert and no distress  Cardiovascular: negative  Respiratory: negative  Gastrointestinal: Abdomen soft, non-tender. BS normal. No masses, organomegaly  Incisions C/D/I.      Assessment:     ICD-10-CM    1. S/P laparoscopic hernia repair  Z98.890     Z87.19         Plan: Stiven Nguyễn was seen for follow-up after laparoscopic hernia repairs.  Patient is doing well and recovering without issue at this time.  We discussed routine post-operative care of wounds including avoiding sun exposure to wounds to limit scarring, no need for overlying ointments, and weight restrictions going forward.  Patient was instructed to call with any questions or concerns.  Stiven Nguyễn can follow-up on an as needed basis.      Discussed with allergy about potential referral.  [employer] message sent to patient.      González Liriano DO on 5/27/2021 at 2:49 PM      "

## 2021-06-01 ENCOUNTER — RECORDS - HEALTHEAST (OUTPATIENT)
Dept: ADMINISTRATIVE | Facility: CLINIC | Age: 66
End: 2021-06-01

## 2021-06-17 ENCOUNTER — TELEPHONE (OUTPATIENT)
Dept: CARDIOLOGY | Facility: CLINIC | Age: 66
End: 2021-06-17

## 2021-06-17 NOTE — TELEPHONE ENCOUNTER
"Pt needs to reschedule July 14th appointment with Dr. Jackson.    appt cancelled.    CARDIOLOGY EVAL ADULT REFERRAL HAS BEEN CANCELLED; can be found in the \"finalized requests\" tab of the pt's appointment desk. PLEASE REINSTATE (right click on request and select \"reinstate\") AND LINK TO APPOINTMENT WHEN SCHEDULING.     "

## 2021-06-29 ENCOUNTER — OFFICE VISIT (OUTPATIENT)
Dept: SURGERY | Facility: CLINIC | Age: 66
End: 2021-06-29
Payer: OTHER GOVERNMENT

## 2021-06-29 VITALS
TEMPERATURE: 98 F | HEART RATE: 59 BPM | DIASTOLIC BLOOD PRESSURE: 78 MMHG | HEIGHT: 73 IN | WEIGHT: 264.99 LBS | SYSTOLIC BLOOD PRESSURE: 146 MMHG | BODY MASS INDEX: 35.12 KG/M2

## 2021-06-29 DIAGNOSIS — Z87.19 S/P LAPAROSCOPIC HERNIA REPAIR: Primary | ICD-10-CM

## 2021-06-29 DIAGNOSIS — Z98.890 S/P LAPAROSCOPIC HERNIA REPAIR: Primary | ICD-10-CM

## 2021-06-29 PROCEDURE — 99024 POSTOP FOLLOW-UP VISIT: CPT | Performed by: SURGERY

## 2021-06-29 ASSESSMENT — MIFFLIN-ST. JEOR: SCORE: 2045.88

## 2021-06-29 NOTE — NURSING NOTE
"Initial BP (!) 146/78 (BP Location: Right arm, Patient Position: Sitting, Cuff Size: Adult Large)   Pulse 59   Temp 98  F (36.7  C) (Tympanic)   Ht 1.854 m (6' 1\")   Wt 120.2 kg (264 lb 15.9 oz)   BMI 34.96 kg/m   Estimated body mass index is 34.96 kg/m  as calculated from the following:    Height as of this encounter: 1.854 m (6' 1\").    Weight as of this encounter: 120.2 kg (264 lb 15.9 oz). .    Jo Harding MA    "

## 2021-06-29 NOTE — LETTER
"    6/29/2021         RE: Stiven Nguyễn  [address]  [place] [address]        Dear Colleague,    Thank you for referring your patient, Stiven Nguyễn, to the [facility]. Please see a copy of my visit note below.    General Surgery Post Op    Pt returns for follow up visit s/p bilateral inguinal hernia and ventral hernia repair on 5/10/21.    Patient has been doing well, tolerating diet. Bowels moving well. He admits intermittent suprapubic pain which doubles him over for 10-15 seconds.  He has had 4 such episodes, each occurring after physical exertion in his yard.  No new bulges.  No issues with incisions.  Rash has completely resolved.    Physical exam: Vitals: BP (!) 146/78 (BP Location: Right arm, Patient Position: Sitting, Cuff Size: Adult Large)   Pulse 59   Temp 98  F (36.7  C) (Tympanic)   Ht 1.854 m (6' 1\")   Wt 120.2 kg (264 lb 15.9 oz)   BMI 34.96 kg/m    BMI= Body mass index is 34.96 kg/m .    Exam:  Constitutional: healthy, alert and no distress  Cardiovascular: negative  Respiratory: negative  Gastrointestinal: Abdomen soft, non-tender. BS normal. No masses, organomegaly  Incisions C/D/I.  No recurrent hernia at umbilicus or bilateral groins    Assessment:     ICD-10-CM    1. S/P laparoscopic hernia repair  Z98.890     Z87.19      Plan: Mr. Nguyễn presents with intermittent suprapubic pain following hernia surgery.  His exam is completely benign and incisions are well healed.  Given the infrequent nature and short duration, I am uncertain of the etiology of his pain  I discussed this could be related to his surgery as many muscles insert onto the pubic tubercle.  I expect this to improve with activity and time.  We discussed this at length and reassurance was provided.  I do not think imaging is warranted nor will be beneficial at this time.  He can follow-up on an as needed basis.  All questions were answered.    González Liriano, DO on 6/29/2021 at " 8:39 AM          Again, thank you for allowing me to participate in the care of your patient.        Sincerely,        González Liriano, DO

## 2021-06-29 NOTE — PROGRESS NOTES
"General Surgery Post Op    Pt returns for follow up visit s/p bilateral inguinal hernia and ventral hernia repair on 5/10/21.    Patient has been doing well, tolerating diet. Bowels moving well. He admits intermittent suprapubic pain which doubles him over for 10-15 seconds.  He has had 4 such episodes, each occurring after physical exertion in his yard.  No new bulges.  No issues with incisions.  Rash has completely resolved.    Physical exam: Vitals: BP (!) 146/78 (BP Location: Right arm, Patient Position: Sitting, Cuff Size: Adult Large)   Pulse 59   Temp 98  F (36.7  C) (Tympanic)   Ht 1.854 m (6' 1\")   Wt 120.2 kg (264 lb 15.9 oz)   BMI 34.96 kg/m    BMI= Body mass index is 34.96 kg/m .    Exam:  Constitutional: healthy, alert and no distress  Cardiovascular: negative  Respiratory: negative  Gastrointestinal: Abdomen soft, non-tender. BS normal. No masses, organomegaly  Incisions C/D/I.  No recurrent hernia at umbilicus or bilateral groins    Assessment:     ICD-10-CM    1. S/P laparoscopic hernia repair  Z98.890     Z87.19      Plan: Mr. Nguyễn presents with intermittent suprapubic pain following hernia surgery.  His exam is completely benign and incisions are well healed.  Given the infrequent nature and short duration, I am uncertain of the etiology of his pain  I discussed this could be related to his surgery as many muscles insert onto the pubic tubercle.  I expect this to improve with activity and time.  We discussed this at length and reassurance was provided.  I do not think imaging is warranted nor will be beneficial at this time.  He can follow-up on an as needed basis.  All questions were answered.    González Liriano, DO on 6/29/2021 at 8:39 AM      "

## 2021-07-14 ENCOUNTER — PRE VISIT (OUTPATIENT)
Dept: CARDIOLOGY | Facility: CLINIC | Age: 66
End: 2021-07-14

## 2021-08-04 DIAGNOSIS — Z11.59 ENCOUNTER FOR SCREENING FOR OTHER VIRAL DISEASES: ICD-10-CM

## 2021-08-08 ASSESSMENT — ENCOUNTER SYMPTOMS
DISTURBANCES IN COORDINATION: 1
LOSS OF CONSCIOUSNESS: 1
HYPOTENSION: 0
TREMORS: 0
TINGLING: 0
WEAKNESS: 0
SPEECH CHANGE: 0
SYNCOPE: 1
ORTHOPNEA: 0
DIZZINESS: 1
HEADACHES: 0
SEIZURES: 0
NUMBNESS: 0
LIGHT-HEADEDNESS: 1
PARALYSIS: 0
HYPERTENSION: 1
SLEEP DISTURBANCES DUE TO BREATHING: 0
LEG PAIN: 0
MEMORY LOSS: 1
EXERCISE INTOLERANCE: 0

## 2021-08-11 ENCOUNTER — OFFICE VISIT (OUTPATIENT)
Dept: CARDIOLOGY | Facility: CLINIC | Age: 66
End: 2021-08-11
Attending: INTERNAL MEDICINE
Payer: OTHER GOVERNMENT

## 2021-08-11 VITALS — HEIGHT: 73 IN | BODY MASS INDEX: 34.03 KG/M2 | WEIGHT: 256.8 LBS | OXYGEN SATURATION: 97 %

## 2021-08-11 DIAGNOSIS — R55 SYNCOPE, UNSPECIFIED SYNCOPE TYPE: Primary | ICD-10-CM

## 2021-08-11 PROCEDURE — 93005 ELECTROCARDIOGRAM TRACING: CPT

## 2021-08-11 PROCEDURE — 99215 OFFICE O/P EST HI 40 MIN: CPT | Performed by: INTERNAL MEDICINE

## 2021-08-11 PROCEDURE — G0463 HOSPITAL OUTPT CLINIC VISIT: HCPCS | Mod: 25

## 2021-08-11 RX ORDER — LIDOCAINE 40 MG/G
CREAM TOPICAL
Status: CANCELLED | OUTPATIENT
Start: 2021-08-11

## 2021-08-11 RX ORDER — SODIUM CHLORIDE 9 MG/ML
INJECTION, SOLUTION INTRAVENOUS CONTINUOUS
Status: CANCELLED | OUTPATIENT
Start: 2021-08-11

## 2021-08-11 ASSESSMENT — MIFFLIN-ST. JEOR: SCORE: 2008.72

## 2021-08-11 ASSESSMENT — PAIN SCALES - GENERAL: PAINLEVEL: NO PAIN (0)
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
## 2021-08-11 NOTE — NURSING NOTE
Chief Complaint   Patient presents with     New Patient     syncope/OH      Vitals were taken and medications where reconciled. EKG was performed   ALFONSO Gao  3:53 PM

## 2021-08-11 NOTE — PATIENT INSTRUCTIONS
You were seen in the Electrophysiology Clinic today by: Dr. Jackson    Plan:       Labs/Tests Needed:    Tilt Table Test w/ EEG      Follow up visit:    6 months with Dr. Jackson        You are scheduled for a Tilt Table/Autonomic study with Dr Jackson      You will need to undergo a COVID-19 PCR swab test within 4 days of procedure. You will receive a phone call with more information. If you do not hear from the COVID scheduling team, please call: [phone] to schedule.    Below are instructions, if you have questions please contact our office.     1. You should arrive at the [facility] at _______  ([address], [place]: [phone]).    2. Report to the GOLD waiting room. Your procedure will be done in the Catheterization Lab.     3. Wear comfortable clothing. (sweat/yoga pants, t-shirt). Please allow 3-4 hours for this procedure to be completed.     4. Do not eat or drink ANYTHING for 6 hours prior to arrival.     5. The morning of your procedure, you may take any scheduled medications with a SIP of water- unless you were instructed differently by your physician.   Do not take any vitamins, minerals or herbal supplements.     6. You may drive yourself to and from this procedure.       If you have further questions, please utilize [employer] to contact us.   If your question concerns the above instructions, contact:  Allison Pike RN   Electrophysiology Nurse Coordinator.  [phone]    If your question concerns the schedule/appointment times, contact:  [facility]   [phone]        Your Care Team:  EP Cardiology   Telephone Number     Nurse Line [phone]     For scheduling appts or procedures:    Diann Jain   [phone]   For the Device Clinic (Pacemakers, ICDs, Loop Recorders)    During business hours: [phone]  After business hours:   [phone]- select option 4 and ask for job code [id number].     On-call cardiologist for after  hours or on weekends: [phone], option #4, and ask to speak to the on-call cardiologist.     Cardiovascular Clinic:   [address] [place] [address]      As always, Thank you for trusting us with your health care needs!

## 2021-08-12 PROBLEM — R55 SYNCOPE, UNSPECIFIED SYNCOPE TYPE: Status: ACTIVE | Noted: 2021-08-12

## 2021-08-12 LAB
ATRIAL RATE - MUSE: 59 BPM
DIASTOLIC BLOOD PRESSURE - MUSE: NORMAL MMHG
INTERPRETATION ECG - MUSE: NORMAL
P AXIS - MUSE: 61 DEGREES
PR INTERVAL - MUSE: 154 MS
QRS DURATION - MUSE: 76 MS
QT - MUSE: 434 MS
QTC - MUSE: 429 MS
R AXIS - MUSE: 7 DEGREES
SYSTOLIC BLOOD PRESSURE - MUSE: NORMAL MMHG
T AXIS - MUSE: 11 DEGREES
VENTRICULAR RATE- MUSE: 59 BPM

## 2021-08-22 ENCOUNTER — LAB (OUTPATIENT)
Dept: FAMILY MEDICINE | Facility: CLINIC | Age: 66
End: 2021-08-22
Attending: ORTHOPAEDIC SURGERY
Payer: OTHER GOVERNMENT

## 2021-08-22 DIAGNOSIS — Z11.59 ENCOUNTER FOR SCREENING FOR OTHER VIRAL DISEASES: ICD-10-CM

## 2021-08-22 PROCEDURE — U0003 INFECTIOUS AGENT DETECTION BY NUCLEIC ACID (DNA OR RNA); SEVERE ACUTE RESPIRATORY SYNDROME CORONAVIRUS 2 (SARS-COV-2) (CORONAVIRUS DISEASE [COVID-19]), AMPLIFIED PROBE TECHNIQUE, MAKING USE OF HIGH THROUGHPUT TECHNOLOGIES AS DESCRIBED BY CMS-2020-01-R: HCPCS

## 2021-08-22 PROCEDURE — U0005 INFEC AGEN DETEC AMPLI PROBE: HCPCS

## 2021-08-23 ENCOUNTER — LAB REQUISITION (OUTPATIENT)
Dept: LAB | Facility: CLINIC | Age: 66
End: 2021-08-23

## 2021-08-23 ENCOUNTER — TRANSFERRED RECORDS (OUTPATIENT)
Dept: HEALTH INFORMATION MANAGEMENT | Facility: CLINIC | Age: 66
End: 2021-08-23

## 2021-08-23 DIAGNOSIS — K76.0 FATTY (CHANGE OF) LIVER, NOT ELSEWHERE CLASSIFIED: ICD-10-CM

## 2021-08-23 DIAGNOSIS — Z01.818 ENCOUNTER FOR OTHER PREPROCEDURAL EXAMINATION: ICD-10-CM

## 2021-08-23 LAB
ANION GAP SERPL CALCULATED.3IONS-SCNC: 11 MMOL/L (ref 5–18)
BUN SERPL-MCNC: 14 MG/DL (ref 8–22)
CALCIUM SERPL-MCNC: 9.5 MG/DL (ref 8.5–10.5)
CHLORIDE BLD-SCNC: 107 MMOL/L (ref 98–107)
CO2 SERPL-SCNC: 25 MMOL/L (ref 22–31)
CREAT SERPL-MCNC: 0.87 MG/DL (ref 0.7–1.3)
GFR SERPL CREATININE-BSD FRML MDRD: >90 ML/MIN/1.73M2
GLUCOSE BLD-MCNC: 131 MG/DL (ref 70–125)
HBA1C MFR BLD: 5.9 % (ref 4.2–6.1)
POTASSIUM BLD-SCNC: 4.3 MMOL/L (ref 3.5–5)
SARS-COV-2 RNA RESP QL NAA+PROBE: NEGATIVE
SODIUM SERPL-SCNC: 143 MMOL/L (ref 136–145)

## 2021-08-23 PROCEDURE — 86803 HEPATITIS C AB TEST: CPT | Performed by: FAMILY MEDICINE

## 2021-08-23 PROCEDURE — 80048 BASIC METABOLIC PNL TOTAL CA: CPT | Performed by: FAMILY MEDICINE

## 2021-08-24 ENCOUNTER — ANESTHESIA EVENT (OUTPATIENT)
Dept: SURGERY | Facility: CLINIC | Age: 66
End: 2021-08-24
Payer: OTHER GOVERNMENT

## 2021-08-24 LAB — HCV AB SERPL QL IA: NEGATIVE

## 2021-08-24 ASSESSMENT — LIFESTYLE VARIABLES: TOBACCO_USE: 1
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
## 2021-08-25 ENCOUNTER — ANESTHESIA (OUTPATIENT)
Dept: SURGERY | Facility: CLINIC | Age: 66
End: 2021-08-25
Payer: OTHER GOVERNMENT

## 2021-08-25 ENCOUNTER — HOSPITAL ENCOUNTER (OUTPATIENT)
Facility: CLINIC | Age: 66
Discharge: HOME | End: 2021-08-25
Attending: ORTHOPAEDIC SURGERY | Admitting: ORTHOPAEDIC SURGERY
Payer: OTHER GOVERNMENT

## 2021-08-25 VITALS
OXYGEN SATURATION: 94 % | TEMPERATURE: 98 F | HEART RATE: 62 BPM | RESPIRATION RATE: 14 BRPM | SYSTOLIC BLOOD PRESSURE: 126 MMHG | WEIGHT: 256 LBS | DIASTOLIC BLOOD PRESSURE: 72 MMHG | BODY MASS INDEX: 33.78 KG/M2

## 2021-08-25 DIAGNOSIS — M25.511 CHRONIC RIGHT SHOULDER PAIN: Primary | ICD-10-CM

## 2021-08-25 DIAGNOSIS — G89.29 CHRONIC RIGHT SHOULDER PAIN: Primary | ICD-10-CM

## 2021-08-25 LAB — GLUCOSE BLDC GLUCOMTR-MCNC: 101 MG/DL (ref 70–99)

## 2021-08-25 PROCEDURE — 258N000003 HC RX IP 258 OP 636: Performed by: NURSE ANESTHETIST, CERTIFIED REGISTERED

## 2021-08-25 PROCEDURE — 272N000001 HC OR GENERAL SUPPLY STERILE: Performed by: ORTHOPAEDIC SURGERY

## 2021-08-25 PROCEDURE — 250N000009 HC RX 250: Performed by: NURSE ANESTHETIST, CERTIFIED REGISTERED

## 2021-08-25 PROCEDURE — 360N000077 HC SURGERY LEVEL 4, PER MIN: Performed by: ORTHOPAEDIC SURGERY

## 2021-08-25 PROCEDURE — 271N000001 HC OR GENERAL SUPPLY NON-STERILE: Performed by: ORTHOPAEDIC SURGERY

## 2021-08-25 PROCEDURE — 250N000013 HC RX MED GY IP 250 OP 250 PS 637: Performed by: NURSE ANESTHETIST, CERTIFIED REGISTERED

## 2021-08-25 PROCEDURE — 250N000011 HC RX IP 250 OP 636: Performed by: NURSE ANESTHETIST, CERTIFIED REGISTERED

## 2021-08-25 PROCEDURE — 250N000009 HC RX 250

## 2021-08-25 PROCEDURE — 710N000009 HC RECOVERY PHASE 1, LEVEL 1, PER MIN: Performed by: ORTHOPAEDIC SURGERY

## 2021-08-25 PROCEDURE — 250N000025 HC SEVOFLURANE, PER MIN: Performed by: ORTHOPAEDIC SURGERY

## 2021-08-25 PROCEDURE — 710N000012 HC RECOVERY PHASE 2, PER MINUTE: Performed by: ORTHOPAEDIC SURGERY

## 2021-08-25 PROCEDURE — 370N000017 HC ANESTHESIA TECHNICAL FEE, PER MIN: Performed by: ORTHOPAEDIC SURGERY

## 2021-08-25 PROCEDURE — 999N000141 HC STATISTIC PRE-PROCEDURE NURSING ASSESSMENT: Performed by: ORTHOPAEDIC SURGERY

## 2021-08-25 PROCEDURE — 250N000011 HC RX IP 250 OP 636: Performed by: PHYSICIAN ASSISTANT

## 2021-08-25 RX ORDER — ONDANSETRON 2 MG/ML
4 INJECTION INTRAMUSCULAR; INTRAVENOUS EVERY 30 MIN PRN
Status: DISCONTINUED | OUTPATIENT
Start: 2021-08-25 | End: 2021-08-25 | Stop reason: HOSPADM

## 2021-08-25 RX ORDER — SODIUM CHLORIDE, SODIUM LACTATE, POTASSIUM CHLORIDE, CALCIUM CHLORIDE 600; 310; 30; 20 MG/100ML; MG/100ML; MG/100ML; MG/100ML
INJECTION, SOLUTION INTRAVENOUS CONTINUOUS
Status: DISCONTINUED | OUTPATIENT
Start: 2021-08-25 | End: 2021-08-25 | Stop reason: HOSPADM

## 2021-08-25 RX ORDER — KETAMINE HYDROCHLORIDE 10 MG/ML
INJECTION, SOLUTION INTRAMUSCULAR; INTRAVENOUS PRN
Status: DISCONTINUED | OUTPATIENT
Start: 2021-08-25 | End: 2021-08-25

## 2021-08-25 RX ORDER — FENTANYL CITRATE 50 UG/ML
50 INJECTION, SOLUTION INTRAMUSCULAR; INTRAVENOUS EVERY 5 MIN PRN
Status: DISCONTINUED | OUTPATIENT
Start: 2021-08-25 | End: 2021-08-25 | Stop reason: HOSPADM

## 2021-08-25 RX ORDER — OXYCODONE HYDROCHLORIDE 5 MG/1
5 TABLET ORAL EVERY 4 HOURS PRN
Status: DISCONTINUED | OUTPATIENT
Start: 2021-08-25 | End: 2021-08-25 | Stop reason: HOSPADM

## 2021-08-25 RX ORDER — EPHEDRINE SULFATE 50 MG/ML
INJECTION, SOLUTION INTRAVENOUS PRN
Status: DISCONTINUED | OUTPATIENT
Start: 2021-08-25 | End: 2021-08-25

## 2021-08-25 RX ORDER — CEFAZOLIN SODIUM 2 G/100ML
2 INJECTION, SOLUTION INTRAVENOUS SEE ADMIN INSTRUCTIONS
Status: DISCONTINUED | OUTPATIENT
Start: 2021-08-25 | End: 2021-08-25 | Stop reason: HOSPADM

## 2021-08-25 RX ORDER — OXYCODONE HYDROCHLORIDE 5 MG/1
5 TABLET ORAL
Status: CANCELLED | OUTPATIENT
Start: 2021-08-25

## 2021-08-25 RX ORDER — ACETAMINOPHEN 325 MG/1
975 TABLET ORAL ONCE
Status: DISCONTINUED | OUTPATIENT
Start: 2021-08-25 | End: 2021-08-25 | Stop reason: HOSPADM

## 2021-08-25 RX ORDER — CELECOXIB 200 MG/1
200 CAPSULE ORAL ONCE
Status: COMPLETED | OUTPATIENT
Start: 2021-08-25 | End: 2021-08-25

## 2021-08-25 RX ORDER — MEPERIDINE HYDROCHLORIDE 25 MG/ML
12.5 INJECTION INTRAMUSCULAR; INTRAVENOUS; SUBCUTANEOUS
Status: DISCONTINUED | OUTPATIENT
Start: 2021-08-25 | End: 2021-08-25 | Stop reason: HOSPADM

## 2021-08-25 RX ORDER — GLYCOPYRROLATE 0.2 MG/ML
INJECTION, SOLUTION INTRAMUSCULAR; INTRAVENOUS PRN
Status: DISCONTINUED | OUTPATIENT
Start: 2021-08-25 | End: 2021-08-25

## 2021-08-25 RX ORDER — DEXAMETHASONE SODIUM PHOSPHATE 4 MG/ML
INJECTION, SOLUTION INTRA-ARTICULAR; INTRALESIONAL; INTRAMUSCULAR; INTRAVENOUS; SOFT TISSUE PRN
Status: DISCONTINUED | OUTPATIENT
Start: 2021-08-25 | End: 2021-08-25

## 2021-08-25 RX ORDER — HYDROXYZINE HYDROCHLORIDE 50 MG/1
50 TABLET, FILM COATED ORAL EVERY 6 HOURS PRN
Status: DISCONTINUED | OUTPATIENT
Start: 2021-08-25 | End: 2021-08-25 | Stop reason: HOSPADM

## 2021-08-25 RX ORDER — ONDANSETRON 2 MG/ML
INJECTION INTRAMUSCULAR; INTRAVENOUS PRN
Status: DISCONTINUED | OUTPATIENT
Start: 2021-08-25 | End: 2021-08-25

## 2021-08-25 RX ORDER — PROPOFOL 10 MG/ML
INJECTION, EMULSION INTRAVENOUS PRN
Status: DISCONTINUED | OUTPATIENT
Start: 2021-08-25 | End: 2021-08-25

## 2021-08-25 RX ORDER — ROPIVACAINE HYDROCHLORIDE 5 MG/ML
INJECTION, SOLUTION EPIDURAL; INFILTRATION; PERINEURAL PRN
Status: DISCONTINUED | OUTPATIENT
Start: 2021-08-25 | End: 2021-08-25

## 2021-08-25 RX ORDER — FENTANYL CITRATE 50 UG/ML
INJECTION, SOLUTION INTRAMUSCULAR; INTRAVENOUS PRN
Status: DISCONTINUED | OUTPATIENT
Start: 2021-08-25 | End: 2021-08-25

## 2021-08-25 RX ORDER — LIDOCAINE HYDROCHLORIDE 10 MG/ML
INJECTION, SOLUTION INFILTRATION; PERINEURAL PRN
Status: DISCONTINUED | OUTPATIENT
Start: 2021-08-25 | End: 2021-08-25

## 2021-08-25 RX ORDER — HYDROXYZINE HYDROCHLORIDE 25 MG/1
25 TABLET, FILM COATED ORAL EVERY 6 HOURS PRN
Status: DISCONTINUED | OUTPATIENT
Start: 2021-08-25 | End: 2021-08-25 | Stop reason: HOSPADM

## 2021-08-25 RX ORDER — LIDOCAINE HCL/EPINEPHRINE/PF 2%-1:200K
VIAL (ML) INJECTION PRN
Status: DISCONTINUED | OUTPATIENT
Start: 2021-08-25 | End: 2021-08-25

## 2021-08-25 RX ORDER — HYDROXYZINE PAMOATE 25 MG/1
25 CAPSULE ORAL 4 TIMES DAILY PRN
Qty: 30 CAPSULE | Refills: 0 | Status: SHIPPED | OUTPATIENT
Start: 2021-08-25 | End: 2021-09-22

## 2021-08-25 RX ORDER — ONDANSETRON 4 MG/1
4 TABLET, ORALLY DISINTEGRATING ORAL EVERY 30 MIN PRN
Status: DISCONTINUED | OUTPATIENT
Start: 2021-08-25 | End: 2021-08-25 | Stop reason: HOSPADM

## 2021-08-25 RX ORDER — OXYCODONE HYDROCHLORIDE 5 MG/1
5-10 TABLET ORAL EVERY 6 HOURS PRN
Qty: 30 TABLET | Refills: 0 | Status: SHIPPED | OUTPATIENT
Start: 2021-08-25 | End: 2021-09-22

## 2021-08-25 RX ORDER — LIDOCAINE 40 MG/G
CREAM TOPICAL
Status: DISCONTINUED | OUTPATIENT
Start: 2021-08-25 | End: 2021-08-25 | Stop reason: HOSPADM

## 2021-08-25 RX ORDER — CEFAZOLIN SODIUM 2 G/100ML
2 INJECTION, SOLUTION INTRAVENOUS
Status: COMPLETED | OUTPATIENT
Start: 2021-08-25 | End: 2021-08-25

## 2021-08-25 RX ORDER — ACETAMINOPHEN 325 MG/1
975 TABLET ORAL ONCE
Status: COMPLETED | OUTPATIENT
Start: 2021-08-25 | End: 2021-08-25

## 2021-08-25 RX ORDER — MAGNESIUM SULFATE HEPTAHYDRATE 40 MG/ML
2 INJECTION, SOLUTION INTRAVENOUS ONCE
Status: COMPLETED | OUTPATIENT
Start: 2021-08-25 | End: 2021-08-25

## 2021-08-25 RX ORDER — FENTANYL CITRATE 50 UG/ML
50 INJECTION, SOLUTION INTRAMUSCULAR; INTRAVENOUS
Status: DISCONTINUED | OUTPATIENT
Start: 2021-08-25 | End: 2021-08-25 | Stop reason: HOSPADM

## 2021-08-25 RX ADMIN — Medication 50 MCG: at 13:42

## 2021-08-25 RX ADMIN — ACETAMINOPHEN 975 MG: 325 TABLET, FILM COATED ORAL at 12:25

## 2021-08-25 RX ADMIN — SODIUM CHLORIDE, POTASSIUM CHLORIDE, SODIUM LACTATE AND CALCIUM CHLORIDE 100 ML: 600; 310; 30; 20 INJECTION, SOLUTION INTRAVENOUS at 12:39

## 2021-08-25 RX ADMIN — PROPOFOL 200 MG: 10 INJECTION, EMULSION INTRAVENOUS at 13:54

## 2021-08-25 RX ADMIN — CEFAZOLIN SODIUM 2 G: 2 INJECTION, SOLUTION INTRAVENOUS at 14:00

## 2021-08-25 RX ADMIN — MIDAZOLAM 2 MG: 1 INJECTION INTRAMUSCULAR; INTRAVENOUS at 13:25

## 2021-08-25 RX ADMIN — KETAMINE HYDROCHLORIDE 30 MG: 10 INJECTION INTRAMUSCULAR; INTRAVENOUS at 13:54

## 2021-08-25 RX ADMIN — DEXAMETHASONE SODIUM PHOSPHATE 4 MG: 4 INJECTION, SOLUTION INTRA-ARTICULAR; INTRALESIONAL; INTRAMUSCULAR; INTRAVENOUS; SOFT TISSUE at 13:42

## 2021-08-25 RX ADMIN — MIDAZOLAM 2 MG: 1 INJECTION INTRAMUSCULAR; INTRAVENOUS at 13:50

## 2021-08-25 RX ADMIN — CELECOXIB 200 MG: 200 CAPSULE ORAL at 12:25

## 2021-08-25 RX ADMIN — EPHEDRINE SULFATE 10 MG: 50 INJECTION, SOLUTION INTRAVENOUS at 14:14

## 2021-08-25 RX ADMIN — SODIUM CHLORIDE, POTASSIUM CHLORIDE, SODIUM LACTATE AND CALCIUM CHLORIDE: 600; 310; 30; 20 INJECTION, SOLUTION INTRAVENOUS at 14:52

## 2021-08-25 RX ADMIN — LIDOCAINE HYDROCHLORIDE 100 MG: 10 INJECTION, SOLUTION INFILTRATION; PERINEURAL at 13:54

## 2021-08-25 RX ADMIN — SUGAMMADEX 200 MG: 100 INJECTION, SOLUTION INTRAVENOUS at 14:49

## 2021-08-25 RX ADMIN — LIDOCAINE HYDROCHLORIDE,EPINEPHRINE BITARTRATE 5 ML: 20; .005 INJECTION, SOLUTION EPIDURAL; INFILTRATION; INTRACAUDAL; PERINEURAL at 13:40

## 2021-08-25 RX ADMIN — LIDOCAINE HYDROCHLORIDE 1 ML: 10 INJECTION, SOLUTION EPIDURAL; INFILTRATION; INTRACAUDAL; PERINEURAL at 12:39

## 2021-08-25 RX ADMIN — DEXAMETHASONE SODIUM PHOSPHATE 8 MG: 4 INJECTION, SOLUTION INTRA-ARTICULAR; INTRALESIONAL; INTRAMUSCULAR; INTRAVENOUS; SOFT TISSUE at 13:54

## 2021-08-25 RX ADMIN — ROCURONIUM BROMIDE 30 MG: 10 INJECTION INTRAVENOUS at 13:54

## 2021-08-25 RX ADMIN — ONDANSETRON 4 MG: 2 INJECTION INTRAMUSCULAR; INTRAVENOUS at 14:49

## 2021-08-25 RX ADMIN — ROPIVACAINE HYDROCHLORIDE 30 ML: 5 INJECTION, SOLUTION EPIDURAL; INFILTRATION; PERINEURAL at 13:42

## 2021-08-25 RX ADMIN — MAGNESIUM SULFATE HEPTAHYDRATE 2 G: 40 INJECTION, SOLUTION INTRAVENOUS at 14:04

## 2021-08-25 RX ADMIN — FENTANYL CITRATE 100 MCG: 50 INJECTION, SOLUTION INTRAMUSCULAR; INTRAVENOUS at 13:25

## 2021-08-25 RX ADMIN — GLYCOPYRROLATE 0.2 MG: 0.2 INJECTION, SOLUTION INTRAMUSCULAR; INTRAVENOUS at 13:54

## 2021-08-25 NOTE — PROCEDURES
8/25/2021     PREOPERATIVE DIAGNOSIS:    1. Right shoulder pain      POSTOPERATIVE DIAGNOSIS:    1. Right shoulder glenohumeral chondromalacia   2. Right shoulder biceps tendonitis   3. Right shoulder cuff tendonopathy    4. Right shoulder subacromial bursitis      PROCEDURE:    1. Right shoulder arthroscopic glenohumeral debridement and biceps tenotomy   2. Right shoulder arthroscopic rotator cuff debridement   3. Right shoulder arthroscopic subacromial decompression    SURGEON: Anand Lopez MD      ASSISTANT: Radha Muniz PA-c.  The presence of a PA was necessary for positioning retraction, and safe progression of the case      ANESTHESIA:  General with interscalene block      BLOOD LOSS: 50cc      COMPLICATIONS: None apparent      DISPOSITION: Stable to PACU      IMPLANTS: None      INDICATIONS: Arya is a 66 year old male with a history of progressive right shoulder pain.  Worked up revealed a partial vs full thickness cuff tear of the anterior distal supraspinatus.  Unfortunately, he failed non op management including therapy and injections.  After discussing treatment options, he elected to proceed with a right shoulder rotator cuff repair to minimize pain and optimize function, understanding the risks of stiffness, ongoing pain, retear, infection, damage to vessels or nerves, and risks inherent to anesthesia.      PROCEDURE: Arya was met in the preoperative holding area where the right shoulder was marked.  He had an interscalene block placed by anesthesia which he tolerated well.  He was then transferred to the operating theater.  After smooth induction of general anesthesia, he was positioned in a beach chair position with all bony prominences padded and head in a neutral position.  A timeout was performed verifying the correct patient, surgery, and location.  He received preoperative antibiotics.  The right shoulder was then prepped and draped in a standard sterile fashion.      We then made a standard  posterior lateral portal spot soft spot followed by an anterior portal through the rotator interval under spinal needle visualization. Diagnostic arthroscopy was carried out which showed diffuse synovitis throughout the glenohumeral joint.  The upper rolled border of the subscapularis was intact.  The biceps tendon showed longitudinal fraying throughout the mid substance and there is significant degeneration of the attachment at the superior labrum.    There is a large degenerative anterior labral tear with grade 3-4 chondral wear with the anterior portion of the glenoid.  There is also approximately a 15/15 millimeter area of grade 3-4 chondral wear over the humeral head.  There is some interstitial tearing of the cuff, although the insertion of the cuff on the greater tuberosity was intact.  Following the diagnostic arthroscopy, we used a combination of a shaver and ablator to perform a biceps tenotomy  debride the degenerative superior and anterior labral tissue back to a smooth, stable, balanced rim.  We then used the ablator to debride unstable chondral edges on the humeral head.  We evaluated the rotator cuff.  Although the cuff did not appear perfectly healthy and there are some interstitial tearing, the insertional cuff was intact on footprint and we elected not to perform any rotator cuff repair.      We then turned our attention to the subacromial space. We entered this posterolaterally. We then made a lateral portal under spinal needle visualization.  He had significant bursal tissue in the subacromial space A soft tissue subacromial bursectomy was carried out, taking care to leave the CA ligament intact.  From the bursal side, there was fraying of the rotator cuff although no full-thickness cuff tear.  We used the ablator to clean up the frayed cuff edges.  The arm was brought through range of motion and the rotator cuff was noted to be moving concentrically with the humeral head.  Therefore, all  instruments were removed.  Portals were closed with interrupted nylon sutures      POSTOPERATIVE PLAN:    1. Charge home today when meets same day discharge criteria   2. Keep sling on at all times. May remove for bathing and pendulums.   3. Return to clinic in 2 weeks for wound check. We'll start physical therapy at 2 weeks postoperatively.      GENE KELSEY MD

## 2021-08-25 NOTE — ANESTHESIA PROCEDURE NOTES
Airway       Patient location during procedure: OR       Procedure Start/Stop Times: 8/25/2021 1:57 PM  Staff -        CRNA: Angie Blancas APRN CRNA       Performed By: CRNA  Consent for Airway        Urgency: elective  Indications and Patient Condition       Indications for airway management: bernie-procedural and airway protection       Induction type:intravenous       Mask difficulty assessment: 1 - vent by mask    Final Airway Details       Final airway type: endotracheal airway       Successful airway: ETT - single  Endotracheal Airway Details        ETT size (mm): 7.5       Cuffed: yes       Successful intubation technique: video laryngoscopy       VL Blade Size: Lawrence 3       Grade View of Cords: 1       Adjucts: stylet       Position: Left       Measured from: gums/teeth       Secured at (cm): 24       Bite block used: None    Post intubation assessment        Placement verified by: capnometry, equal breath sounds and chest rise        Number of attempts at approach: 1       Number of other approaches attempted: 0       Secured with: silk tape       Ease of procedure: easy       Dentition: Intact

## 2021-08-25 NOTE — ANESTHESIA CARE TRANSFER NOTE
Patient: Stiven Nguyễn    Procedure(s):  Shoulder Arthroscopy with glenohumeral debridement and subacromial decompression    Diagnosis: Rotator cuff tear [M75.100]  Diagnosis Additional Information: No value filed.    Anesthesia Type:   General     Note:    Oropharynx: oropharynx clear of all foreign objects  Level of Consciousness: drowsy  Oxygen Supplementation: face mask    Independent Airway: airway patency satisfactory and stable  Dentition: dentition unchanged  Vital Signs Stable: post-procedure vital signs reviewed and stable  Report to RN Given: handoff report given  Patient transferred to: PACU    Handoff Report: Identifed the Patient, Identified the Reponsible Provider, Reviewed the pertinent medical history, Discussed the surgical course, Reviewed Intra-OP anesthesia mangement and issues during anesthesia, Set expectations for post-procedure period and Allowed opportunity for questions and acknowledgement of understanding      Vitals:  Vitals Value Taken Time   BP     Temp     Pulse     Resp     SpO2         Electronically Signed By: HAZEL Armstrong CRNA  August 25, 2021  3:03 PM

## 2021-08-25 NOTE — DISCHARGE INSTRUCTIONS
Same Day Surgery Discharge Instructions  Special Precautions After Surgery - Adult    1. It is not unusual to feel lightheaded or faint, up to 24 hours after surgery or while taking pain medication.  If you have these symptoms; sit for a few minutes before standing and have someone assist you when getting up.  2. You should rest and relax for the next 24 hours and must have someone stay with you for at least 24 hours after your discharge.  3. DO NOT DRIVE any vehicle or operate mechanical equipment for 24 hours following the end of your surgery.  DO NOT DRIVE while taking narcotic pain medications that have been prescribed by your physician.  If you had a limb operated on, you must be able to use it fully to drive.  4. DO NOT drink alcoholic beverages for 24 hours following surgery or while taking prescription pain medication.  5. Drink clear liquids (apple juice, ginger ale, broth, 7-Up, etc.).  Progress to your regular diet as you feel able.  6. Any questions call your physician and do not make important decisions for 24 hours.      __________________________________________________________________________________________________________________________________  IMPORTANT NUMBERS:    [facility] [place] Number:  [phone], [phone]  Pharmacy:  [phone]  Same Day Surgery:  [phone], Monday - Friday until 8:30 p.m.    [facility]:  [phone]

## 2021-08-25 NOTE — ANESTHESIA PROCEDURE NOTES
Brachial plexus Procedure Note  Pre-Procedure   Staff -        CRNA: Alexsander Thorne APRN CRNA       Other Anesthesia Staff: Max Adam       Performed By: CRNA and MARE       Location: pre-op       Pre-Anesthestic Checklist: patient identified, IV checked, site marked, risks and benefits discussed, informed consent, monitors and equipment checked, pre-op evaluation, at physician/surgeon's request and post-op pain management  Timeout:       Correct Patient: Yes        Correct Procedure: Yes        Correct Site: Yes        Correct Position: Yes        Correct Laterality: Yes        Site Marked: Yes  Procedure Documentation  Procedure: Brachial plexus       Laterality: right       Patient Position: supine       Patient Prep/Sterile Barriers: sterile gloves, mask, patient draped       Skin prep: Chloraprep       Needle Type: insulated and short bevel       Needle Gauge: 21.        Needle Length (Inches): 4        Ultrasound guided       1. Ultrasound was used to identify targeted nerve, plexus, vascular marker, or fascial plane and place a needle adjacent to it in real-time.       2. Ultrasound was used to visualize the spread of anesthetic in close proximity to the above referenced structure.       3. A permanent image is entered into the patient's record.       4. The visualized anatomic structures appeared normal.       5. There were no apparent abnormal pathologic findings.       Nerve Stim: Initial Level 1 mA.  Lowest motor response 0.4 mA.    Assessment/Narrative         The placement was negative for: blood aspirated and painful injection       Paresthesias: No.      Bolus given via needle. No blood aspirated via catheter.        Secured via.        Insertion/Infusion Method: Single Shot       Complications: none       Injection made incrementally with aspirations every 5 mL.

## 2021-08-25 NOTE — ANESTHESIA POSTPROCEDURE EVALUATION
Patient: Stiven Nguyễn    Procedure(s):  Shoulder Arthroscopy with glenohumeral debridement and subacromial decompression    Diagnosis:Rotator cuff tear [M75.100]  Diagnosis Additional Information: No value filed.    Anesthesia Type:  General    Note:  Disposition: Outpatient   Postop Pain Control: Uneventful            Sign Out: Well controlled pain   PONV: No   Neuro/Psych: Uneventful            Sign Out: Acceptable/Baseline neuro status   Airway/Respiratory: Uneventful            Sign Out: Acceptable/Baseline resp. status   CV/Hemodynamics: Uneventful            Sign Out: Acceptable CV status; No obvious hypovolemia; No obvious fluid overload   Other NRE: NONE   DID A NON-ROUTINE EVENT OCCUR? No           Last vitals:  Vitals Value Taken Time   /69 08/25/21 1545   Temp 36.6  C (97.8  F) 08/25/21 1530   Pulse 57 08/25/21 1546   Resp 15 08/25/21 1546   SpO2 95 % 08/25/21 1546   Vitals shown include unvalidated device data.    Electronically Signed By: HAZEL Armstrong CRNA  August 25, 2021  3:47 PM

## 2021-09-06 DIAGNOSIS — Z11.59 ENCOUNTER FOR SCREENING FOR OTHER VIRAL DISEASES: ICD-10-CM

## 2021-09-09 ENCOUNTER — TRANSCRIBE ORDERS (OUTPATIENT)
Dept: OTHER | Age: 66
End: 2021-09-09

## 2021-09-09 DIAGNOSIS — S49.91XA SHOULDER INJURY, RIGHT, INITIAL ENCOUNTER: ICD-10-CM

## 2021-09-09 DIAGNOSIS — M75.21 BICEPS TENDONITIS, RIGHT: Primary | ICD-10-CM

## 2021-09-10 ENCOUNTER — APPOINTMENT (OUTPATIENT)
Dept: CT IMAGING | Facility: CLINIC | Age: 66
End: 2021-09-10
Attending: FAMILY MEDICINE
Payer: OTHER GOVERNMENT

## 2021-09-10 ENCOUNTER — HOSPITAL ENCOUNTER (EMERGENCY)
Facility: CLINIC | Age: 66
Discharge: HOME | End: 2021-09-11
Attending: FAMILY MEDICINE | Admitting: FAMILY MEDICINE
Payer: OTHER GOVERNMENT

## 2021-09-10 DIAGNOSIS — I26.94 MULTIPLE SUBSEGMENTAL PULMONARY EMBOLI WITHOUT ACUTE COR PULMONALE (H): ICD-10-CM

## 2021-09-10 LAB
ALBUMIN SERPL-MCNC: 3.5 G/DL (ref 3.4–5)
ALP SERPL-CCNC: 85 U/L (ref 40–150)
ALT SERPL W P-5'-P-CCNC: 26 U/L (ref 0–70)
ANION GAP SERPL CALCULATED.3IONS-SCNC: 4 MMOL/L (ref 3–14)
AST SERPL W P-5'-P-CCNC: 18 U/L (ref 0–45)
BASOPHILS # BLD AUTO: 0 10E3/UL (ref 0–0.2)
BASOPHILS NFR BLD AUTO: 1 %
BILIRUB SERPL-MCNC: 0.8 MG/DL (ref 0.2–1.3)
BUN SERPL-MCNC: 13 MG/DL (ref 7–30)
CALCIUM SERPL-MCNC: 8.5 MG/DL (ref 8.5–10.1)
CHLORIDE BLD-SCNC: 109 MMOL/L (ref 94–109)
CO2 SERPL-SCNC: 27 MMOL/L (ref 20–32)
CREAT SERPL-MCNC: 0.93 MG/DL (ref 0.66–1.25)
D DIMER PPP FEU-MCNC: 2.68 UG/ML FEU (ref 0–0.5)
EOSINOPHIL # BLD AUTO: 0.2 10E3/UL (ref 0–0.7)
EOSINOPHIL NFR BLD AUTO: 4 %
ERYTHROCYTE [DISTWIDTH] IN BLOOD BY AUTOMATED COUNT: 12.6 % (ref 10–15)
GFR SERPL CREATININE-BSD FRML MDRD: 86 ML/MIN/1.73M2
GLUCOSE BLD-MCNC: 143 MG/DL (ref 70–99)
HCT VFR BLD AUTO: 39.1 % (ref 40–53)
HGB BLD-MCNC: 13.6 G/DL (ref 13.3–17.7)
IMM GRANULOCYTES # BLD: 0 10E3/UL
IMM GRANULOCYTES NFR BLD: 0 %
LYMPHOCYTES # BLD AUTO: 0.8 10E3/UL (ref 0.8–5.3)
LYMPHOCYTES NFR BLD AUTO: 16 %
MCH RBC QN AUTO: 29.8 PG (ref 26.5–33)
MCHC RBC AUTO-ENTMCNC: 34.8 G/DL (ref 31.5–36.5)
MCV RBC AUTO: 86 FL (ref 78–100)
MONOCYTES # BLD AUTO: 0.5 10E3/UL (ref 0–1.3)
MONOCYTES NFR BLD AUTO: 11 %
NEUTROPHILS # BLD AUTO: 3.2 10E3/UL (ref 1.6–8.3)
NEUTROPHILS NFR BLD AUTO: 68 %
NRBC # BLD AUTO: 0 10E3/UL
NRBC BLD AUTO-RTO: 0 /100
PLATELET # BLD AUTO: 182 10E3/UL (ref 150–450)
POTASSIUM BLD-SCNC: 4 MMOL/L (ref 3.4–5.3)
PROT SERPL-MCNC: 6.9 G/DL (ref 6.8–8.8)
RADIOLOGIST FLAGS: ABNORMAL
RBC # BLD AUTO: 4.57 10E6/UL (ref 4.4–5.9)
SARS-COV-2 RNA RESP QL NAA+PROBE: NEGATIVE
SODIUM SERPL-SCNC: 140 MMOL/L (ref 133–144)
TROPONIN I SERPL-MCNC: 0.07 UG/L (ref 0–0.04)
TROPONIN I SERPL-MCNC: 0.14 UG/L (ref 0–0.04)
WBC # BLD AUTO: 4.7 10E3/UL (ref 4–11)

## 2021-09-10 PROCEDURE — 85004 AUTOMATED DIFF WBC COUNT: CPT | Performed by: FAMILY MEDICINE

## 2021-09-10 PROCEDURE — 99285 EMERGENCY DEPT VISIT HI MDM: CPT | Mod: 25 | Performed by: FAMILY MEDICINE

## 2021-09-10 PROCEDURE — 93308 TTE F-UP OR LMTD: CPT | Mod: 26 | Performed by: FAMILY MEDICINE

## 2021-09-10 PROCEDURE — 71275 CT ANGIOGRAPHY CHEST: CPT

## 2021-09-10 PROCEDURE — 84484 ASSAY OF TROPONIN QUANT: CPT | Performed by: FAMILY MEDICINE

## 2021-09-10 PROCEDURE — 250N000011 HC RX IP 250 OP 636: Performed by: FAMILY MEDICINE

## 2021-09-10 PROCEDURE — 87635 SARS-COV-2 COVID-19 AMP PRB: CPT | Performed by: FAMILY MEDICINE

## 2021-09-10 PROCEDURE — 250N000009 HC RX 250: Performed by: FAMILY MEDICINE

## 2021-09-10 PROCEDURE — 250N000013 HC RX MED GY IP 250 OP 250 PS 637: Performed by: FAMILY MEDICINE

## 2021-09-10 PROCEDURE — 36415 COLL VENOUS BLD VENIPUNCTURE: CPT | Performed by: FAMILY MEDICINE

## 2021-09-10 PROCEDURE — C9803 HOPD COVID-19 SPEC COLLECT: HCPCS | Performed by: FAMILY MEDICINE

## 2021-09-10 PROCEDURE — 80053 COMPREHEN METABOLIC PANEL: CPT | Performed by: FAMILY MEDICINE

## 2021-09-10 PROCEDURE — 93010 ELECTROCARDIOGRAM REPORT: CPT | Performed by: FAMILY MEDICINE

## 2021-09-10 PROCEDURE — 93005 ELECTROCARDIOGRAM TRACING: CPT | Performed by: FAMILY MEDICINE

## 2021-09-10 PROCEDURE — 85379 FIBRIN DEGRADATION QUANT: CPT | Performed by: FAMILY MEDICINE

## 2021-09-10 PROCEDURE — 93308 TTE F-UP OR LMTD: CPT | Performed by: FAMILY MEDICINE

## 2021-09-10 RX ORDER — TAMSULOSIN HYDROCHLORIDE 0.4 MG/1
0.4 CAPSULE ORAL ONCE
Status: COMPLETED | OUTPATIENT
Start: 2021-09-10 | End: 2021-09-10

## 2021-09-10 RX ORDER — ACETAMINOPHEN 500 MG
1000 TABLET ORAL ONCE
Status: COMPLETED | OUTPATIENT
Start: 2021-09-10 | End: 2021-09-10

## 2021-09-10 RX ORDER — CYCLOBENZAPRINE HCL 10 MG
10 TABLET ORAL EVERY 8 HOURS PRN
Status: DISCONTINUED | OUTPATIENT
Start: 2021-09-10 | End: 2021-09-11 | Stop reason: HOSPADM

## 2021-09-10 RX ORDER — ACETAMINOPHEN 500 MG
1000 TABLET ORAL EVERY 6 HOURS PRN
Status: DISCONTINUED | OUTPATIENT
Start: 2021-09-10 | End: 2021-09-11 | Stop reason: HOSPADM

## 2021-09-10 RX ORDER — ATORVASTATIN CALCIUM 10 MG/1
10 TABLET, FILM COATED ORAL EVERY EVENING
Status: DISCONTINUED | OUTPATIENT
Start: 2021-09-10 | End: 2021-09-11 | Stop reason: HOSPADM

## 2021-09-10 RX ORDER — FEXOFENADINE HCL 180 MG/1
180 TABLET ORAL DAILY
Status: DISCONTINUED | OUTPATIENT
Start: 2021-09-10 | End: 2021-09-11 | Stop reason: HOSPADM

## 2021-09-10 RX ORDER — OXYBUTYNIN CHLORIDE 5 MG/1
5 TABLET ORAL 3 TIMES DAILY
Status: DISCONTINUED | OUTPATIENT
Start: 2021-09-10 | End: 2021-09-11 | Stop reason: HOSPADM

## 2021-09-10 RX ORDER — IOPAMIDOL 755 MG/ML
92 INJECTION, SOLUTION INTRAVASCULAR ONCE
Status: COMPLETED | OUTPATIENT
Start: 2021-09-10 | End: 2021-09-10

## 2021-09-10 RX ADMIN — OXYBUTYNIN CHLORIDE 5 MG: 5 TABLET ORAL at 23:00

## 2021-09-10 RX ADMIN — TAMSULOSIN HYDROCHLORIDE 0.4 MG: 0.4 CAPSULE ORAL at 22:59

## 2021-09-10 RX ADMIN — FEXOFENADINE HYDROCHLORIDE 180 MG: 180 TABLET ORAL at 23:00

## 2021-09-10 RX ADMIN — SODIUM CHLORIDE 100 ML: 9 INJECTION, SOLUTION INTRAVENOUS at 14:25

## 2021-09-10 RX ADMIN — ATORVASTATIN CALCIUM 10 MG: 10 TABLET, FILM COATED ORAL at 23:00

## 2021-09-10 RX ADMIN — CYCLOBENZAPRINE 10 MG: 10 TABLET, FILM COATED ORAL at 23:58

## 2021-09-10 RX ADMIN — IOPAMIDOL 92 ML: 755 INJECTION, SOLUTION INTRAVENOUS at 14:25

## 2021-09-10 RX ADMIN — IBUPROFEN 600 MG: 400 TABLET ORAL at 13:12

## 2021-09-10 RX ADMIN — ACETAMINOPHEN 1000 MG: 500 TABLET, FILM COATED ORAL at 17:23

## 2021-09-10 RX ADMIN — RIVAROXABAN 15 MG: 15 TABLET, FILM COATED ORAL at 15:38
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
## 2021-09-10 NOTE — ED NOTES
While in patient room writer witness patient having a 2-3 second syncopal episode.  A few minutes later patient had a near syncopal episode with confusion and patient appeared to be anxious/scared for a few seconds.  Patient alert & oriented within a minute of both episodes.  Patient's wife stated that patient has been having these two different types of episodes for approximately 4 years.  Provider notified.

## 2021-09-10 NOTE — ED NOTES
DATE:  9/10/2021   TIME OF RECEIPT FROM LAB:  8652  LAB TEST:  Troponin  LAB VALUE:  0.137  RESULTS GIVEN WITH READ-BACK TO (PROVIDER):  Jamie Gale MD  TIME LAB VALUE REPORTED TO PROVIDER:   [id number]

## 2021-09-11 VITALS
OXYGEN SATURATION: 94 % | TEMPERATURE: 98.8 F | HEART RATE: 63 BPM | SYSTOLIC BLOOD PRESSURE: 123 MMHG | DIASTOLIC BLOOD PRESSURE: 83 MMHG | RESPIRATION RATE: 13 BRPM

## 2021-09-11 PROCEDURE — 250N000013 HC RX MED GY IP 250 OP 250 PS 637: Performed by: FAMILY MEDICINE

## 2021-09-11 RX ADMIN — RIVAROXABAN 15 MG: 15 TABLET, FILM COATED ORAL at 03:32

## 2021-09-11 RX ADMIN — ACETAMINOPHEN 1000 MG: 500 TABLET, FILM COATED ORAL at 03:36

## 2021-09-11 NOTE — ED PROVIDER NOTES
Emergency Department Patient Sign-out       Brief HPI:  This is a 66 year old male signed out to me by Dr. Gale .  See initial ED Provider note for details of the presentation.            Significant Events prior to my assuming care: here with new bilateral PE. He does have elevated troponin levels but they are consistent with past. SOB started last evening, CP started this morning. Felt like prior PE, not on anticoagulation prior to this. EKG is baseline. Patient to be observed overnight in ED and if feeling well will discharge on rivaroxaban.       Exam:   Patient Vitals for the past 24 hrs:   BP Pulse Resp SpO2   09/10/21 1700 (!) 148/83 (!) 46 -- 94 %   09/10/21 1645 (!) 149/84 (!) 48 -- 95 %   09/10/21 1630 (!) 150/84 (!) 45 -- 98 %   09/10/21 1615 (!) 149/84 (!) 43 -- 93 %   09/10/21 1600 (!) 154/85 (!) 48 -- 95 %   09/10/21 1545 (!) 159/95 (!) 48 -- 97 %   09/10/21 1539 -- -- -- 96 %   09/10/21 1538 -- -- -- 96 %   09/10/21 1537 (!) 153/84 (!) 47 -- --   09/10/21 1315 -- -- -- 95 %   09/10/21 1245 -- 56 21 96 %   09/10/21 1240 -- 61 21 93 %   09/10/21 1230 -- 57 28 93 %   09/10/21 1215 -- 59 24 95 %           ED RESULTS:   Results for orders placed or performed during the hospital encounter of 09/10/21 (from the past 24 hour(s))   POC US ECHO LIMITED     Status: None    Collection Time: 09/10/21 12:26 PM    [facility] Procedure Note      Limited Bedside ED Cardiac Ultrasound:    PROCEDURE: PERFORMED BY: Dr. Jamie Gale MD  INDICATIONS/SYMPTOM:  Chest Pain and Shortness of Breath  PROBE: Cardiac phased array probe  BODY LOCATION: Chest  FINDINGS:   The ultrasound was performed utilizing the subcostal, parasternal long axis and parasternal short axis views.  Cardiac contractility:  Present  Gross estimation of cardiac kinesis: normal  Pericardial Effusion:  None  RV:LV ratio: LV > RV  IVC:    Diameter:  IVC diameter expiration (IVCe) 2 cm                                                    IVC diameter inspiration (IVCi) 2 cm                                                       Collapsibility:  IVC collapses < 50% with inspiration  INTERPRETATION:    Chamber size and motion were grossly normal with LV > RV, normal cardiac kinesis.  No pericardial effusion was found.  IVC visualized and findings indicate normovolemia.  IMAGE DOCUMENTATION: Images were archived to hard drive.         CBC with platelets, differential     Status: Abnormal    Collection Time: 09/10/21 12:46 PM    Narrative    The following orders were created for panel order CBC with platelets, differential.  Procedure                               Abnormality         Status                     ---------                               -----------         ------                     CBC with platelets and d...[[id number]]  Abnormal            Final result                 Please view results for these tests on the individual orders.   Comprehensive metabolic panel     Status: Abnormal    Collection Time: 09/10/21 12:46 PM   Result Value Ref Range    Sodium 140 133 - 144 mmol/L    Potassium 4.0 3.4 - 5.3 mmol/L    Chloride 109 94 - 109 mmol/L    Carbon Dioxide (CO2) 27 20 - 32 mmol/L    Anion Gap 4 3 - 14 mmol/L    Urea Nitrogen 13 7 - 30 mg/dL    Creatinine 0.93 0.66 - 1.25 mg/dL    Calcium 8.5 8.5 - 10.1 mg/dL    Glucose 143 (H) 70 - 99 mg/dL    Alkaline Phosphatase 85 40 - 150 U/L    AST 18 0 - 45 U/L    ALT 26 0 - 70 U/L    Protein Total 6.9 6.8 - 8.8 g/dL    Albumin 3.5 3.4 - 5.0 g/dL    Bilirubin Total 0.8 0.2 - 1.3 mg/dL    GFR Estimate 86 >60 mL/min/1.73m2   Troponin I     Status: Abnormal    Collection Time: 09/10/21 12:46 PM   Result Value Ref Range    Troponin I 0.137 (HH) 0.000 - 0.045 ug/L   D dimer quantitative     Status: Abnormal    Collection Time: 09/10/21 12:46 PM   Result Value Ref Range    D-Dimer Quantitative 2.68 (H) 0.00 - 0.50 ug/mL FEU    Narrative    This D-dimer assay is intended for use in conjunction  with a clinical pretest probability assessment model to exclude pulmonary embolism (PE) and deep venous thrombosis (DVT) in outpatients suspected of PE or DVT. The cut-off value is 0.50 ug/mL FEU.   CBC with platelets and differential     Status: Abnormal    Collection Time: 09/10/21 12:46 PM   Result Value Ref Range    WBC Count 4.7 4.0 - 11.0 10e3/uL    RBC Count 4.57 4.40 - 5.90 10e6/uL    Hemoglobin 13.6 13.3 - 17.7 g/dL    Hematocrit 39.1 (L) 40.0 - 53.0 %    MCV 86 78 - 100 fL    MCH 29.8 26.5 - 33.0 pg    MCHC 34.8 31.5 - 36.5 g/dL    RDW 12.6 10.0 - 15.0 %    Platelet Count 182 150 - 450 10e3/uL    % Neutrophils 68 %    % Lymphocytes 16 %    % Monocytes 11 %    % Eosinophils 4 %    % Basophils 1 %    % Immature Granulocytes 0 %    NRBCs per 100 WBC 0 <1 /100    Absolute Neutrophils 3.2 1.6 - 8.3 10e3/uL    Absolute Lymphocytes 0.8 0.8 - 5.3 10e3/uL    Absolute Monocytes 0.5 0.0 - 1.3 10e3/uL    Absolute Eosinophils 0.2 0.0 - 0.7 10e3/uL    Absolute Basophils 0.0 0.0 - 0.2 10e3/uL    Absolute Immature Granulocytes 0.0 <=0.0 10e3/uL    Absolute NRBCs 0.0 10e3/uL   CT Chest Pulmonary Embolism w Contrast     Status: Abnormal    Collection Time: 09/10/21  2:36 PM   Result Value Ref Range    Radiologist flags Pulmonary embolism (AA)     Narrative    CT CHEST PULMONARY EMBOLISM WITH CONTRAST 9/10/2021 2:36 PM    CLINICAL HISTORY: Chest pain.  TECHNIQUE: CT angiogram chest during arterial phase injection IV  contrast. 2D and 3D MIP reconstructions were performed by the CT  technologist. Dose reduction techniques were used.     CONTRAST: 92 mL Isovue 370    COMPARISON: Chest x-ray 8/13/2019. CT chest 9/20/2016.    FINDINGS:  ANGIOGRAM CHEST: Pulmonary arteries are normal in caliber, but there  are bilateral lower lobe pulmonary emboli. Thoracic aorta is negative  for dissection. No CT evidence of right heart strain.    LUNGS AND PLEURA: A few tiny calcified granulomas are noted in the  lungs bilaterally. No new or  enlarging lung lesions. No infiltrate or  consolidation. No significant pleural fluid.    MEDIASTINUM/AXILLAE: Heart is normal in size. No pericardial fluid.  The esophagus is unremarkable. Thyroid gland appears normal in size.  No enlarged lymph nodes.    UPPER ABDOMEN: Normal.    MUSCULOSKELETAL: Normal.      Impression    IMPRESSION:  1.  Acute bilateral lower lobe pulmonary emboli. No definite evidence  of elevated right heart pressures based on imaging. Thoracic aorta is  unremarkable.    2.  Evidence of old granulomatous disease. No change since prior CT.  Lungs are otherwise clear. No enlarged nodes.    [Critical Result: Pulmonary embolism]    Finding was identified on 9/10/2021 2:38 PM.     Dr. Suh was contacted by me on 9/10/2021 2:45 PM and verbalized  understanding of the critical result.     KATHRYN STONER MD         SYSTEM ID:  [id number]   Troponin I (second draw)     Status: Abnormal    Collection Time: 09/10/21  3:38 PM   Result Value Ref Range    Troponin I 0.073 (H) 0.000 - 0.045 ug/L       ED MEDICATIONS:   Medications   rivaroxaban ANTICOAGULANT (XARELTO) tablet 15 mg (has no administration in time range)   cyclobenzaprine (FLEXERIL) tablet 10 mg (has no administration in time range)   oxybutynin (DITROPAN) tablet 5 mg (has no administration in time range)   tamsulosin (FLOMAX) capsule 0.4 mg (has no administration in time range)   acetaminophen (TYLENOL) tablet 1,000 mg (has no administration in time range)   atorvastatin (LIPITOR) tablet 10 mg (has no administration in time range)   fexofenadine (ALLEGRA) tablet 180 mg (has no administration in time range)   ibuprofen (ADVIL/MOTRIN) tablet 600 mg (600 mg Oral Given 9/10/21 1312)   iopamidol (ISOVUE-370) solution 92 mL (92 mLs Intravenous Given 9/10/21 1425)   sodium chloride 0.9 % bag 500mL for CT scan flush use (100 mLs Intravenous Given 9/10/21 1425)   rivaroxaban ANTICOAGULANT (XARELTO) tablet 15 mg (15 mg Oral Given 9/10/21 1538)    acetaminophen (TYLENOL) tablet 1,000 mg (1,000 mg Oral Given 9/10/21 1723)         Impression:    ICD-10-CM    1. Multiple subsegmental pulmonary emboli without acute cor pulmonale (H)  I26.94        Plan:    Observe over night with frequent rechecks. Likely discharge home in the AM if feeling well.    In the morning on recheck the patient is feeling well, denies any chest pain currently, breathing comfortably overnight.  He feels comfortable going home and I think this is reasonable at this time.  He is discharged with instructions to start taking rivaroxaban, return if worse, otherwise follow-up in clinic in the next week or 2 for recheck.  The patient is in agreement with this plan.      MD Albert Gonzalez, Wong Loza MD  09/11/21 0601

## 2021-09-11 NOTE — PROGRESS NOTES
"[facility]  Transfer Triage Note    Date of call: 09/10/21  Time of call: 7:02 PM    Is pandemic COVID-19 a concern? NO    Reason for transfer: Further diagnostic work up, management, and consultation for specialized care   Diagnosis: Bilateral PE with troponin leak    Outside Records: Available  Additional records requested to be faxed to [phone].    Stability of Patient: Patient is vitally stable, with no critical labs, and will likely remain stable throughout the transfer process  ICU: No    Expected Time of Arrival for Transfer: Not Accepted for now    Arrival Location:  N/A    Recommendations for Management and Stabilization: Feedback was provided to improve patient care, however transfer was not felt to be warranted at this time.    Additional Comments: Pt is a 66 year old with hx of intermittent syncopal \"spells\" with good outpatient management with both cardiology and neurology, PTSD, and CAD, who presents with acute onset lightheadedness, dyspnea, and chest pressure.  Troponin was initially elevated, and given history, syncope, and known CAD there was concern for underlying ACS and initially the plan was to transfer to cardiology at [facility].  However, subsequent work up revealed bilateral  PE on CTA, troponin was trending down, and his clinical picture seems more consistent with a PE (without right sided strain) than ACS.  Discussed capacity barriers, and a likely diagnosis which would not warrant a higher level of care at this time.  However, there was the possibility of more than one problem.  Plan is to  admit for monitoring locally, and if clinical course becomes less consistent with PE (such as ongoing symptoms, EKG changes, concerning Echo) we can re-evaluate transfer.  Will keep on our triage list in the meantime to facilitate transfer if he does not improve.      Dudley Higginbotham MD    "

## 2021-09-11 NOTE — DISCHARGE INSTRUCTIONS
Return to the Emergency Room if the following occurs:     Severely worsened breathing, recurrent episodes of chest pain, fainting, or for any concern at anytime.    Or, follow-up with the following provider as we discussed:     Return to your primary doctor next week for reevaluation.    Medications discussed:    Xarelto prescriptions provided. Start using 15 mg twice a day for 7 days and then switch to 20 mg once a day.  Stop aspirin daily when on Xarelto.    If you received pain-relieving or sedating medication during your time in the ER, avoid alcohol, driving automobiles, or working with machinery.  Also, a responsible adult must stay with you.        Call the Nurse Advice Line at [phone] or [phone] for any concern at anytime.

## 2021-09-16 ENCOUNTER — HOSPITAL ENCOUNTER (OUTPATIENT)
Dept: PHYSICAL THERAPY | Facility: CLINIC | Age: 66
Setting detail: THERAPIES SERIES
End: 2021-09-16
Attending: PHYSICIAN ASSISTANT
Payer: OTHER GOVERNMENT

## 2021-09-16 DIAGNOSIS — M75.21 BICEPS TENDONITIS, RIGHT: ICD-10-CM

## 2021-09-16 DIAGNOSIS — S49.91XA SHOULDER INJURY, RIGHT, INITIAL ENCOUNTER: ICD-10-CM

## 2021-09-16 PROCEDURE — 97110 THERAPEUTIC EXERCISES: CPT | Mod: GP | Performed by: PHYSICAL THERAPIST

## 2021-09-16 PROCEDURE — 97161 PT EVAL LOW COMPLEX 20 MIN: CPT | Mod: GP | Performed by: PHYSICAL THERAPIST

## 2021-09-16 NOTE — PROGRESS NOTES
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 No   Medical Diagnosis Shoulder injury, right, initial encounter, right biceps tendonitis   (S/P right shoulder arthroscopy )   Surgical/Medical history reviewed Yes   Body Part(s)   Body Part(s) Shoulder   Presentation and Etiology   Pertinent history of current problem (include personal factors and/or comorbidities that impact the POC) Patient is a 66 year old gentleman presenting today status/post Right shoulder arthroscopic glenohumeral debridement and biceps tenotomy, Right shoulder arthroscopic rotator cuff debridement,Right shoulder arthroscopic subacromial decompression on 8/25/2021. Patient reports that pain has been his limiting factor, but since removing the sling last week he has been trying to use his arm more for day to day tasks. Primarily limited in shoulder flexion and abduction. Patient had physical therapy prior to surgery with some success. Patient's condition is complicated by hospital admission 9/10/2021for pulmonary embolism that he now taking blood thinners for.   Impairments A. Pain;E. Decreased flexibility;D. Decreased ROM   Functional Limitations perform activities of daily living;perform desired leisure / sports activities   Symptom Location right shoulder    How/Where did it occur Other  (surgery )   Onset date of current episode/exacerbation 08/25/21   Chronicity New   Pain rating (0-10 point scale) Best (/10);Worst (/10)   Best (/10) 3   Worst (/10) 10   Pain quality A. Sharp;C. Aching;F. Stabbing   Frequency of pain/symptoms A. Constant   Pain/symptoms are: Worse in the morning   Pain/symptoms exacerbated by C. Lifting;F. Nothing;H. Overhead reach   Pain/symptoms eased by D. Nothing "   Progression of symptoms since onset: Improved   Current Level of Function   Current Community Support Family/friend caregiver   Patient role/employment history F. Retired   [facility]   Fall Risk Screen   Fall screen completed by PT   Have you fallen 2 or more times in the past year? No   Have you fallen and had an injury in the past year? No   Abuse Screen (yes response referral indicated)   Feels Unsafe at Home or Work/School no   Feels Threatened by Someone no   Does Anyone Try to Keep You From Having Contact with Others or Doing Things Outside Your Home? no   Physical Signs of Abuse Present no   Shoulder Objective Findings   Side (if bilateral, select both right and left) Right   Observation Arrives without sling    Posture mild forward head/shoulders    Right Shoulder Flexion AROM 80   Right Shoulder Flexion PROM 108   Right Shoulder Abduction AROM 43   Right Shoulder Abduction PROM 60   Right Shoulder ER AROM neutral    Right Shoulder ER PROM 70   Right Shoulder IR PROM 65   Right Shoulder Flexion Strength NT pain    Right Shoulder Abduction Strength NT pain    Right Shoulder ER Strength NT pain    Right Shoulder IR Strength NT pain    Right Shoulder Extension Strength NT pain    Planned Therapy Interventions   Planned Therapy Interventions manual therapy;joint mobilization;ROM;strengthening;stretching   Planned Modality Interventions   Planned Modality Interventions Cryotherapy   Clinical Impression   Criteria for Skilled Therapeutic Interventions Met yes, treatment indicated   PT Diagnosis Right shoulder pain    Influenced by the following impairments pain, impaired ROM, decreased strength   Functional limitations due to impairments lift, carrying objects, performing adls    Clinical Presentation Stable/Uncomplicated   Clinical Presentation Rationale clinical reasoning    Clinical Decision Making (Complexity) Low complexity   Therapy Frequency 2 times/Week   Predicted Duration of  Therapy Intervention (days/wks) 10   Risk & Benefits of therapy have been explained Yes   Patient, Family & other staff in agreement with plan of care Yes   Education Assessment   Preferred Learning Style Listening;Demonstration;Pictures/video   Barriers to Learning No barriers   ORTHO GOALS   PT Ortho Eval Goals 1;2;3   Ortho Goal 1   Goal Identifier 1   Goal Description Patient will demonstrate 130 degrees of AROM in order to reach into cupboard    Target Date 10/14/21   Ortho Goal 2   Goal Identifier 2   Goal Description Patient will demonstrate adequate ROM in order to reach back and put on shirt with no more than 2/10 pain    Target Date 10/28/21   Ortho Goal 3   Goal Identifier 3   Goal Description 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

## 2021-09-21 ENCOUNTER — HOSPITAL ENCOUNTER (OUTPATIENT)
Dept: PHYSICAL THERAPY | Facility: CLINIC | Age: 66
Setting detail: THERAPIES SERIES
End: 2021-09-21
Attending: PHYSICIAN ASSISTANT
Payer: OTHER GOVERNMENT

## 2021-09-21 PROCEDURE — 97110 THERAPEUTIC EXERCISES: CPT | Mod: GP | Performed by: PHYSICAL THERAPIST

## 2021-09-22 ENCOUNTER — TELEPHONE (OUTPATIENT)
Dept: CARDIOLOGY | Facility: CLINIC | Age: 66
End: 2021-09-22

## 2021-09-22 ENCOUNTER — TRANSCRIBE ORDERS (OUTPATIENT)
Dept: OTHER | Age: 66
End: 2021-09-22

## 2021-09-22 DIAGNOSIS — I26.99 PULMONARY EMBOLISM (H): Primary | ICD-10-CM

## 2021-09-22 NOTE — TELEPHONE ENCOUNTER
Manuel, MD Stephanie Moncada Dana L RN  Caller: Unspecified (Today, 10:32 AM)  I would think that whoever has been treating his embolism should determine when safe   DB       Called pt, advised of message above from Dr Jackson. Pt verbalizes understandings. Pt states he will get the OK from his hematologist and will call Diann when he's ready to reschedule his Tilt Table Test with Dr Jackson.     Lazara Brown, RN on 9/22/2021 at 4:07 PM

## 2021-09-22 NOTE — TELEPHONE ENCOUNTER
Patient was cancelled due to an embolism and he's wanting to know when he can get back on the schedule. Who determines when it's safe.     Diann Jain  Periop Electrophysiology   [phone]

## 2021-09-28 ENCOUNTER — HOSPITAL ENCOUNTER (OUTPATIENT)
Dept: PHYSICAL THERAPY | Facility: CLINIC | Age: 66
Setting detail: THERAPIES SERIES
End: 2021-09-28
Attending: PHYSICIAN ASSISTANT
Payer: OTHER GOVERNMENT

## 2021-09-28 PROCEDURE — 97110 THERAPEUTIC EXERCISES: CPT | Mod: GP | Performed by: PHYSICAL THERAPIST

## 2021-09-28 NOTE — PROGRESS NOTES
Dr John Nguyễn   PHYSICAL THERAPY PROGRESS NOTE  09/28/21 1300   Signing Clinician's Name / Credentials   Signing clinician's name / credentials Kris Hoenk, PT   Session Number   Session Number 3    Authorization status     Progress Note/Recertification   Progress Note Due Date 11/11/21   Adult Goals   PT Ortho Eval Goals 1;2;3   Ortho Goal 1   Goal Identifier 1   Goal Description Patient will demonstrate 130 degrees of AROM in order to reach into cupboard    Target Date 10/14/21   Ortho Goal 2   Goal Identifier 2   Goal Description Patient will demonstrate adequate ROM in order to reach back and put on shirt with no more than 2/10 pain    Target Date 10/28/21   Ortho Goal 3   Goal Identifier 3   Goal Description 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    Target Date 11/25/21   Subjective Report   Subjective Report shld flex ex really bothered him, pt demo what he was doing and was not an exercise given to him, post SAD, bicep tenotomy 8/25/21, shoudler click and those are painful   Objective Measures   Objective Measures Objective Measure 1   Objective Measure 1   Objective Measure PROM fleixon 150* pain end range, abd 90* - occas click and sharp pain, ER 60* at 45abd   Details AROM flexion 90* before pain, abd 80*, ER WFL   Treatment Interventions   Interventions Therapeutic Procedure/Exercise   Therapeutic Procedure/exercise   Therapeutic Procedures: strength, endurance, ROM, flexibillity minutes (23946) 25   Skilled Intervention progress ROM and strength ex   Patient Response see details   Treatment Detail pulleys warm up, bent over shld exten x15 with scap set cue, rows x 15, horiz abd x12 - fatigues, flexion painfree range to 90* x10, SL ER x25  , PROM L shld, flexion, scaption, ER, abd painful and shoulder gets click beyond 90*   Plan   Home program [id number]   Plan for next session 1x/wk x 6wks, continue RC and scab stab strength   Total Session  Time   Timed Code Treatment Minutes 25   Total Treatment Time (sum of timed and untimed services) 25   AMBULATORY CLINICS ONLY-MEDICAL AND TREATMENT DIAGNOSIS   PT Diagnosis Right shoulder pain    [facility]  Kris Hoenk  PT  [employer]  [address] [address]  [email or web address]   Office: [phone]  Voicemail: [phone]

## 2021-09-29 ENCOUNTER — VIRTUAL VISIT (OUTPATIENT)
Dept: ONCOLOGY | Facility: CLINIC | Age: 66
End: 2021-09-29
Payer: OTHER GOVERNMENT

## 2021-09-29 ENCOUNTER — PRE VISIT (OUTPATIENT)
Dept: ONCOLOGY | Facility: CLINIC | Age: 66
End: 2021-09-29

## 2021-09-29 DIAGNOSIS — Z86.718 PERSONAL HISTORY OF DVT (DEEP VEIN THROMBOSIS): Primary | ICD-10-CM

## 2021-09-29 DIAGNOSIS — I26.99 ACUTE PULMONARY EMBOLISM WITHOUT ACUTE COR PULMONALE, UNSPECIFIED PULMONARY EMBOLISM TYPE (H): ICD-10-CM

## 2021-09-29 PROCEDURE — 99204 OFFICE O/P NEW MOD 45 MIN: CPT | Mod: 95 | Performed by: INTERNAL MEDICINE
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
## 2021-09-29 NOTE — NURSING NOTE
Stiven Nguyễn 66 year old male presents today to discuss on going management of new dx of pulmonary embolism.    Arleen Welsh, Virtual Facilitator

## 2021-09-30 ENCOUNTER — HOSPITAL ENCOUNTER (OUTPATIENT)
Dept: ULTRASOUND IMAGING | Facility: CLINIC | Age: 66
Discharge: HOME | End: 2021-09-30
Attending: INTERNAL MEDICINE | Admitting: INTERNAL MEDICINE
Payer: OTHER GOVERNMENT

## 2021-09-30 DIAGNOSIS — Z86.718 PERSONAL HISTORY OF DVT (DEEP VEIN THROMBOSIS): ICD-10-CM

## 2021-09-30 DIAGNOSIS — I26.99 ACUTE PULMONARY EMBOLISM WITHOUT ACUTE COR PULMONALE, UNSPECIFIED PULMONARY EMBOLISM TYPE (H): ICD-10-CM

## 2021-09-30 PROCEDURE — 93970 EXTREMITY STUDY: CPT

## 2021-10-02 ENCOUNTER — HEALTH MAINTENANCE LETTER (OUTPATIENT)
Age: 66
End: 2021-10-02

## 2021-10-05 ENCOUNTER — HOSPITAL ENCOUNTER (OUTPATIENT)
Dept: PHYSICAL THERAPY | Facility: CLINIC | Age: 66
Setting detail: THERAPIES SERIES
End: 2021-10-05
Attending: PHYSICIAN ASSISTANT
Payer: OTHER GOVERNMENT

## 2021-10-05 PROCEDURE — 97110 THERAPEUTIC EXERCISES: CPT | Mod: GP | Performed by: PHYSICAL THERAPIST

## 2021-10-09 ENCOUNTER — HOSPITAL ENCOUNTER (EMERGENCY)
Facility: CLINIC | Age: 66
Discharge: HOME | End: 2021-10-10
Attending: EMERGENCY MEDICINE | Admitting: EMERGENCY MEDICINE
Payer: OTHER GOVERNMENT

## 2021-10-09 DIAGNOSIS — R07.89 CHEST WALL TENDERNESS: ICD-10-CM

## 2021-10-09 PROCEDURE — 99282 EMERGENCY DEPT VISIT SF MDM: CPT | Performed by: EMERGENCY MEDICINE

## 2021-10-09 ASSESSMENT — MIFFLIN-ST. JEOR: SCORE: 2041.38

## 2021-10-10 VITALS
BODY MASS INDEX: 34.99 KG/M2 | HEIGHT: 73 IN | WEIGHT: 264 LBS | DIASTOLIC BLOOD PRESSURE: 88 MMHG | TEMPERATURE: 97.1 F | HEART RATE: 55 BPM | OXYGEN SATURATION: 96 % | RESPIRATION RATE: 18 BRPM | SYSTOLIC BLOOD PRESSURE: 148 MMHG

## 2021-10-10 ASSESSMENT — ENCOUNTER SYMPTOMS
FEVER: 0
BRUISES/BLEEDS EASILY: 0
APPETITE CHANGE: 0
ABDOMINAL PAIN: 0
LIGHT-HEADEDNESS: 0
VOMITING: 0
DIARRHEA: 0
HEADACHES: 0
DIAPHORESIS: 0
NUMBNESS: 0
WEAKNESS: 0
BACK PAIN: 0
COUGH: 1
FATIGUE: 0
PALPITATIONS: 0
NAUSEA: 0
SORE THROAT: 0
SHORTNESS OF BREATH: 0
CHEST TIGHTNESS: 0
WOUND: 0

## 2021-10-10 NOTE — DISCHARGE INSTRUCTIONS
The exact cause of your pain today appears to be due to tenderness of your xiphoid process of your sternum.  It does not currently appear to be dangerous however, if you develop any new or concerning symptoms specifically chest pain that lasts more than 10 minutes, sweating, nausea, or trouble breathing, please return to the emergency department as soon as possible to be reevaluated

## 2021-10-10 NOTE — ED NOTES
Pt states he is having substernal pain, he feels a bump there and it hurts more when he presses on it. He does have a hx of PE, last one about a month ago. Is on Xarelto. He does have a cough and a little short of breath but he feels like it is related to the PE.
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
## 2021-10-13 ENCOUNTER — HOSPITAL ENCOUNTER (OUTPATIENT)
Dept: PHYSICAL THERAPY | Facility: CLINIC | Age: 66
Setting detail: THERAPIES SERIES
End: 2021-10-13
Attending: PHYSICIAN ASSISTANT
Payer: OTHER GOVERNMENT

## 2021-10-13 PROCEDURE — 97110 THERAPEUTIC EXERCISES: CPT | Mod: GP | Performed by: PHYSICAL THERAPIST

## 2021-10-20 ENCOUNTER — HOSPITAL ENCOUNTER (OUTPATIENT)
Dept: PHYSICAL THERAPY | Facility: CLINIC | Age: 66
Setting detail: THERAPIES SERIES
End: 2021-10-20
Attending: PHYSICIAN ASSISTANT
Payer: OTHER GOVERNMENT

## 2021-10-20 PROCEDURE — 97110 THERAPEUTIC EXERCISES: CPT | Mod: GP | Performed by: PHYSICAL THERAPIST

## 2021-10-25 ENCOUNTER — LAB (OUTPATIENT)
Dept: LAB | Facility: CLINIC | Age: 66
End: 2021-10-25
Payer: OTHER GOVERNMENT

## 2021-10-25 VITALS — SYSTOLIC BLOOD PRESSURE: 130 MMHG | DIASTOLIC BLOOD PRESSURE: 62 MMHG

## 2021-10-25 DIAGNOSIS — Z11.59 ENCOUNTER FOR SCREENING FOR OTHER VIRAL DISEASES: ICD-10-CM

## 2021-10-25 PROCEDURE — U0005 INFEC AGEN DETEC AMPLI PROBE: HCPCS

## 2021-10-25 PROCEDURE — U0003 INFECTIOUS AGENT DETECTION BY NUCLEIC ACID (DNA OR RNA); SEVERE ACUTE RESPIRATORY SYNDROME CORONAVIRUS 2 (SARS-COV-2) (CORONAVIRUS DISEASE [COVID-19]), AMPLIFIED PROBE TECHNIQUE, MAKING USE OF HIGH THROUGHPUT TECHNOLOGIES AS DESCRIBED BY CMS-2020-01-R: HCPCS

## 2021-10-26 LAB — SARS-COV-2 RNA RESP QL NAA+PROBE: NEGATIVE

## 2021-10-28 ENCOUNTER — TELEPHONE (OUTPATIENT)
Dept: CARDIOLOGY | Facility: CLINIC | Age: 66
End: 2021-10-28

## 2021-10-29 ENCOUNTER — APPOINTMENT (OUTPATIENT)
Dept: GENERAL RADIOLOGY | Facility: CLINIC | Age: 66
End: 2021-10-29
Attending: INTERNAL MEDICINE
Payer: OTHER GOVERNMENT

## 2021-10-29 ENCOUNTER — HOSPITAL ENCOUNTER (INPATIENT)
Dept: NEUROLOGY | Facility: CLINIC | Age: 66
End: 2021-10-29
Attending: INTERNAL MEDICINE | Admitting: INTERNAL MEDICINE
Payer: OTHER GOVERNMENT

## 2021-10-29 ENCOUNTER — HOSPITAL ENCOUNTER (OUTPATIENT)
Facility: CLINIC | Age: 66
Discharge: HOME | End: 2021-10-29
Attending: INTERNAL MEDICINE | Admitting: INTERNAL MEDICINE
Payer: OTHER GOVERNMENT

## 2021-10-29 ENCOUNTER — APPOINTMENT (OUTPATIENT)
Dept: MEDSURG UNIT | Facility: CLINIC | Age: 66
End: 2021-10-29
Attending: INTERNAL MEDICINE
Payer: OTHER GOVERNMENT

## 2021-10-29 VITALS
SYSTOLIC BLOOD PRESSURE: 147 MMHG | HEART RATE: 84 BPM | BODY MASS INDEX: 35.12 KG/M2 | RESPIRATION RATE: 16 BRPM | HEIGHT: 73 IN | TEMPERATURE: 97.2 F | DIASTOLIC BLOOD PRESSURE: 86 MMHG | WEIGHT: 265 LBS | OXYGEN SATURATION: 95 %

## 2021-10-29 DIAGNOSIS — G90.01 CAROTID SINUS HYPERSENSITIVITY: Primary | ICD-10-CM

## 2021-10-29 DIAGNOSIS — R55 SYNCOPE, UNSPECIFIED SYNCOPE TYPE: ICD-10-CM

## 2021-10-29 DIAGNOSIS — R55 SYNCOPE, UNSPECIFIED SYNCOPE TYPE: Primary | ICD-10-CM

## 2021-10-29 LAB
ANION GAP SERPL CALCULATED.3IONS-SCNC: 3 MMOL/L (ref 3–14)
BUN SERPL-MCNC: 15 MG/DL (ref 7–30)
CALCIUM SERPL-MCNC: 9 MG/DL (ref 8.5–10.1)
CHLORIDE BLD-SCNC: 110 MMOL/L (ref 94–109)
CO2 SERPL-SCNC: 27 MMOL/L (ref 20–32)
CREAT SERPL-MCNC: 0.86 MG/DL (ref 0.66–1.25)
ERYTHROCYTE [DISTWIDTH] IN BLOOD BY AUTOMATED COUNT: 13 % (ref 10–15)
GFR SERPL CREATININE-BSD FRML MDRD: >90 ML/MIN/1.73M2
GLUCOSE BLD-MCNC: 114 MG/DL (ref 70–99)
HCT VFR BLD AUTO: 44.7 % (ref 40–53)
HGB BLD-MCNC: 15.5 G/DL (ref 13.3–17.7)
MCH RBC QN AUTO: 30.5 PG (ref 26.5–33)
MCHC RBC AUTO-ENTMCNC: 34.7 G/DL (ref 31.5–36.5)
MCV RBC AUTO: 88 FL (ref 78–100)
PLATELET # BLD AUTO: 151 10E3/UL (ref 150–450)
POTASSIUM BLD-SCNC: 3.7 MMOL/L (ref 3.4–5.3)
RBC # BLD AUTO: 5.09 10E6/UL (ref 4.4–5.9)
SODIUM SERPL-SCNC: 140 MMOL/L (ref 133–144)
WBC # BLD AUTO: 5.9 10E3/UL (ref 4–11)

## 2021-10-29 PROCEDURE — 250N000011 HC RX IP 250 OP 636: Performed by: INTERNAL MEDICINE

## 2021-10-29 PROCEDURE — 999N000133 HC STATISTIC PP CARE STAGE 2

## 2021-10-29 PROCEDURE — 250N000009 HC RX 250: Performed by: INTERNAL MEDICINE

## 2021-10-29 PROCEDURE — 272N000001 HC OR GENERAL SUPPLY STERILE: Performed by: INTERNAL MEDICINE

## 2021-10-29 PROCEDURE — 258N000003 HC RX IP 258 OP 636: Performed by: INTERNAL MEDICINE

## 2021-10-29 PROCEDURE — 93005 ELECTROCARDIOGRAM TRACING: CPT

## 2021-10-29 PROCEDURE — 999N000142 HC STATISTIC PROCEDURE PREP ONLY

## 2021-10-29 PROCEDURE — C1894 INTRO/SHEATH, NON-LASER: HCPCS | Performed by: INTERNAL MEDICINE

## 2021-10-29 PROCEDURE — 999N000054 HC STATISTIC EKG NON-CHARGEABLE

## 2021-10-29 PROCEDURE — 33208 INSRT HEART PM ATRIAL & VENT: CPT | Mod: KX | Performed by: INTERNAL MEDICINE

## 2021-10-29 PROCEDURE — 99152 MOD SED SAME PHYS/QHP 5/>YRS: CPT | Performed by: INTERNAL MEDICINE

## 2021-10-29 PROCEDURE — 85014 HEMATOCRIT: CPT | Performed by: NURSE PRACTITIONER

## 2021-10-29 PROCEDURE — 93010 ELECTROCARDIOGRAM REPORT: CPT | Mod: 59 | Performed by: INTERNAL MEDICINE

## 2021-10-29 PROCEDURE — 99220 PR INITIAL OBSERVATION CARE,LEVEL III: CPT | Performed by: INTERNAL MEDICINE

## 2021-10-29 PROCEDURE — 71045 X-RAY EXAM CHEST 1 VIEW: CPT | Mod: 26 | Performed by: STUDENT IN AN ORGANIZED HEALTH CARE EDUCATION/TRAINING PROGRAM

## 2021-10-29 PROCEDURE — 95713 VEEG 2-12 HR CONT MNTR: CPT

## 2021-10-29 PROCEDURE — C1785 PMKR, DUAL, RATE-RESP: HCPCS | Performed by: INTERNAL MEDICINE

## 2021-10-29 PROCEDURE — 999N000065 XR CHEST PORT 1 VIEW

## 2021-10-29 PROCEDURE — 80048 BASIC METABOLIC PNL TOTAL CA: CPT | Performed by: NURSE PRACTITIONER

## 2021-10-29 PROCEDURE — 99153 MOD SED SAME PHYS/QHP EA: CPT | Performed by: INTERNAL MEDICINE

## 2021-10-29 PROCEDURE — 36415 COLL VENOUS BLD VENIPUNCTURE: CPT | Performed by: NURSE PRACTITIONER

## 2021-10-29 PROCEDURE — 93660 TILT TABLE EVALUATION: CPT | Performed by: INTERNAL MEDICINE

## 2021-10-29 PROCEDURE — 95718 EEG PHYS/QHP 2-12 HR W/VEEG: CPT | Performed by: PSYCHIATRY & NEUROLOGY

## 2021-10-29 PROCEDURE — 95921 AUTONOMIC NRV PARASYM INERVJ: CPT | Performed by: INTERNAL MEDICINE

## 2021-10-29 PROCEDURE — C1898 LEAD, PMKR, OTHER THAN TRANS: HCPCS | Performed by: INTERNAL MEDICINE

## 2021-10-29 DEVICE — IMP LEAD PACING BIPOLAR CAPSUREFIX NOVUS 58CM 5076-58: Type: IMPLANTABLE DEVICE | Status: FUNCTIONAL

## 2021-10-29 DEVICE — PACEMAKER AZURE MRI XT DR: Type: IMPLANTABLE DEVICE | Status: FUNCTIONAL

## 2021-10-29 DEVICE — IMP LEAD PACING BIPOLAR CAPSUREFIX NOVUS 52CM 5076-52: Type: IMPLANTABLE DEVICE | Status: FUNCTIONAL

## 2021-10-29 RX ORDER — LIDOCAINE 40 MG/G
CREAM TOPICAL
Status: DISCONTINUED | OUTPATIENT
Start: 2021-10-29 | End: 2021-10-29 | Stop reason: HOSPADM

## 2021-10-29 RX ORDER — FENTANYL CITRATE 50 UG/ML
25-50 INJECTION, SOLUTION INTRAMUSCULAR; INTRAVENOUS
Status: DISCONTINUED | OUTPATIENT
Start: 2021-10-29 | End: 2021-10-29 | Stop reason: HOSPADM

## 2021-10-29 RX ORDER — CEFAZOLIN SODIUM IN 0.9 % NACL 3 G/100 ML
3 INTRAVENOUS SOLUTION, PIGGYBACK (ML) INTRAVENOUS
Status: DISCONTINUED | OUTPATIENT
Start: 2021-10-29 | End: 2021-10-29 | Stop reason: HOSPADM

## 2021-10-29 RX ORDER — NALOXONE HYDROCHLORIDE 0.4 MG/ML
0.2 INJECTION, SOLUTION INTRAMUSCULAR; INTRAVENOUS; SUBCUTANEOUS
Status: DISCONTINUED | OUTPATIENT
Start: 2021-10-29 | End: 2021-10-29 | Stop reason: HOSPADM

## 2021-10-29 RX ORDER — NALOXONE HYDROCHLORIDE 0.4 MG/ML
0.4 INJECTION, SOLUTION INTRAMUSCULAR; INTRAVENOUS; SUBCUTANEOUS
Status: DISCONTINUED | OUTPATIENT
Start: 2021-10-29 | End: 2021-10-29 | Stop reason: HOSPADM

## 2021-10-29 RX ORDER — LIDOCAINE HYDROCHLORIDE 20 MG/ML
INJECTION, SOLUTION INFILTRATION; PERINEURAL
Status: DISCONTINUED | OUTPATIENT
Start: 2021-10-29 | End: 2021-10-29 | Stop reason: HOSPADM

## 2021-10-29 RX ORDER — FENTANYL CITRATE 50 UG/ML
INJECTION, SOLUTION INTRAMUSCULAR; INTRAVENOUS
Status: DISCONTINUED | OUTPATIENT
Start: 2021-10-29 | End: 2021-10-29 | Stop reason: HOSPADM

## 2021-10-29 RX ORDER — SODIUM CHLORIDE 9 MG/ML
INJECTION, SOLUTION INTRAVENOUS CONTINUOUS
Status: DISCONTINUED | OUTPATIENT
Start: 2021-10-29 | End: 2021-10-29 | Stop reason: HOSPADM

## 2021-10-29 RX ORDER — OXYCODONE AND ACETAMINOPHEN 5; 325 MG/1; MG/1
1 TABLET ORAL EVERY 4 HOURS PRN
Status: DISCONTINUED | OUTPATIENT
Start: 2021-10-29 | End: 2021-10-29 | Stop reason: HOSPADM

## 2021-10-29 RX ORDER — IOPAMIDOL 755 MG/ML
INJECTION, SOLUTION INTRAVASCULAR
Status: DISCONTINUED | OUTPATIENT
Start: 2021-10-29 | End: 2021-10-29 | Stop reason: HOSPADM

## 2021-10-29 RX ORDER — CEPHALEXIN 500 MG/1
500 TABLET ORAL 3 TIMES DAILY
Qty: 15 TABLET | Refills: 0 | Status: SHIPPED | OUTPATIENT
Start: 2021-10-29 | End: 2021-11-03

## 2021-10-29 RX ORDER — ATROPINE SULFATE 0.1 MG/ML
0.5 INJECTION INTRAVENOUS EVERY 5 MIN PRN
Status: DISCONTINUED | OUTPATIENT
Start: 2021-10-29 | End: 2021-10-29 | Stop reason: HOSPADM

## 2021-10-29 RX ORDER — FLUMAZENIL 0.1 MG/ML
0.2 INJECTION, SOLUTION INTRAVENOUS
Status: DISCONTINUED | OUTPATIENT
Start: 2021-10-29 | End: 2021-10-29 | Stop reason: HOSPADM

## 2021-10-29 RX ORDER — CEFAZOLIN SODIUM IN 0.9 % NACL 3 G/100 ML
INTRAVENOUS SOLUTION, PIGGYBACK (ML) INTRAVENOUS CONTINUOUS PRN
Status: COMPLETED | OUTPATIENT
Start: 2021-10-29 | End: 2021-10-29

## 2021-10-29 RX ADMIN — SODIUM CHLORIDE 600 ML: 9 INJECTION, SOLUTION INTRAVENOUS at 09:24

## 2021-10-29 RX ADMIN — SODIUM CHLORIDE: 9 INJECTION, SOLUTION INTRAVENOUS at 09:25

## 2021-10-29 ASSESSMENT — MIFFLIN-ST. JEOR: SCORE: 2045.91

## 2021-10-29 NOTE — PROGRESS NOTES
Plan for pacemaker placement this afternoon.  Labs sent.  Chest prepped.  EKG done.  Awaiting consent, otherwise prep complete.

## 2021-10-29 NOTE — PROGRESS NOTES
Patient arrived on 2A for tilt table study with EEG.  IV placed, bolus infusing.  EEG placement in progress.  Awaiting consent.  Once both are complete, prep complete.

## 2021-10-29 NOTE — DISCHARGE INSTRUCTIONS
Home Care after a Pacemaker Implant    Wound care:  Keep your incision (surgery wound) dry for 3 days.  After 3 days, you may remove the outer bandage.  Keep the strips of tape on.  They will be removed at your clinic visit.  Check for signs of infection each day.  These include increased redness, swelling, drainage, bleeding or a fever over 101 F (38.3 C).  Call us immediately if you see any of these signs.  If there are no signs of infection, you may shower in 3 days.  Do not submerge the incision (in a bath tub, hot tub, or swimming pool) until fully healed.    Pain:   You may have mild to moderate pain for 3 to 5 days.  Take acetaminophen (Tylenol) or ibuprofen (Advil) for the pain.  Call us if the pain is severe or lasts more than 5 days.    Activity:  After 24 hours, slowly return to your normal activities.   Healing will take 4 to 6 weeks.  No driving for 3 days  Avoid climbing a ladder alone.  It is best to stay within 4 feet of the ground.  Avoid anything that may cause rough contact or a hard hit to your chest.  This includes football, hockey, and other contact sports.  For at least 4 weeks:  Do not raise your affected arm above your shoulder.  Do not use your affected arm to push, pull, or lift anything over 10 pounds.  Avoid repetitive upper body activities for 6 weeks (ie: golf, swimming, and weight lifting)    Follow Up Visits:  Return to the clinic in 7 to 10 days to have your device and wound checked.      Telling others about your device:  Before you have any medical tests or treatments, tell the doctors, dentists, and other care providers about your device.  There are a few tests and treatments that may interfere with your device.  (These include MRI, radiation therapy, electrocautery, and others.)  Your care team may need to take special steps to keep you safe.  Before you leave the hospital, you will receive a temporary ID card.  A permanent card will be mailed to you about 6 to 8 weeks later.   Always carry the ID card with you.  It has important details about your device.  You should also get a MedicAlert ID.  Please ask us for a MedicAlert brochure, or go to [email or web address].    Safety near electrical equipment:  All of these are safe to use when in good repair:    Microwaves    Radios    Cordless phone    Remote controls    Small electrical tools  Cell phones: Keep cell phones at least 6 inches from your device.  Do not carry the phone in a pocket near your device.  Security medrano: It is okay to walk through security medrano at the airports and department stores.  Tell airport security that you have a pacemaker.  They should keep the screening wand at least 6 inches from your device.  Full-body scanners are safe.  Avoid the following:    ***MRI tests in the hospital unless you have a MRI safe pacemaker.           Arc welding, chain saws and high-powered industrial or commercial tools.    Power lines, power plants and large power generators.    Electric body fat scales.    Magnetic mattress pads or pillow.    Questions?  Please call [facility].    Device Nurse:          Business Hours:  [phone]                       After Hours:  [phone]   Choose option 4, then ask for the on-call device nurse at job code [id number].    Your next device clinic appointment is scheduled on:     November 8, 2021 at 3:30 pm.            [facility]  [facility] [address]  [address] [place] [address]  [id number]

## 2021-10-29 NOTE — PROGRESS NOTES
3 C arrival    Pt arrived to 3 C via litter accompanied by RN and wife. Pt  very intoxicated from medications . Pt alert but emotional. Pt has been in SR, VSS.  Pt's left arm in sling and chest dressing CDI. Pt voided upon arrival and fed. CXR obtained. MD came and talked with pt and his wife. Will monitor and await  Notice on discharge.

## 2021-10-30 NOTE — PROGRESS NOTES
D/I/A:  Patient is tolerating liquids and foods, ambulating, urinating, puncture sites are stable ( no bleeding and no hematoma) and patient has a .  A+O x4 and making needs known.  CCL access sites C/D/I; no bleeding or hematoma; CMS intact.  VSS SR/ paced on monitor.  IV access removed.  Education completed and outlined in AVS or handout: medications reviewed with patient.  Questions answered prior to discharge.  Belongings returned to patient at discharge.    P: Discharged to self care.  Patient to follow up with appts as per discharge instruction.Prescription sent to be filled at home pharmacy with pt.

## 2021-10-30 NOTE — PROGRESS NOTES
Status    Pt up ambulating on unit. Pt's mentation and drug intoxification now  cleared.  Pt has voided multiple times. Pt denies any c/o pain. Pt's left arm in sling, CMS intact. Pacemaker site CDI. Currently awaiting CXR to be read. PIV removed.      CXR read and pt approved to discharge

## 2021-10-31 LAB
ATRIAL RATE - MUSE: 52 BPM
DIASTOLIC BLOOD PRESSURE - MUSE: NORMAL MMHG
INTERPRETATION ECG - MUSE: NORMAL
P AXIS - MUSE: 57 DEGREES
PR INTERVAL - MUSE: 150 MS
QRS DURATION - MUSE: 102 MS
QT - MUSE: 458 MS
QTC - MUSE: 425 MS
R AXIS - MUSE: -3 DEGREES
SYSTOLIC BLOOD PRESSURE - MUSE: NORMAL MMHG
T AXIS - MUSE: 10 DEGREES
VENTRICULAR RATE- MUSE: 52 BPM

## 2021-11-03 ENCOUNTER — HOSPITAL ENCOUNTER (OUTPATIENT)
Dept: PHYSICAL THERAPY | Facility: CLINIC | Age: 66
Setting detail: THERAPIES SERIES
End: 2021-11-03
Attending: PHYSICIAN ASSISTANT
Payer: OTHER GOVERNMENT

## 2021-11-03 PROCEDURE — 97110 THERAPEUTIC EXERCISES: CPT | Mod: GP | Performed by: PHYSICAL THERAPIST

## 2021-11-08 ENCOUNTER — ANCILLARY PROCEDURE (OUTPATIENT)
Dept: CARDIOLOGY | Facility: CLINIC | Age: 66
End: 2021-11-08
Attending: INTERNAL MEDICINE
Payer: OTHER GOVERNMENT

## 2021-11-08 DIAGNOSIS — R55 SYNCOPE, UNSPECIFIED SYNCOPE TYPE: ICD-10-CM

## 2021-11-08 LAB
MDC_IDC_EPISODE_DTM: NORMAL
MDC_IDC_EPISODE_DURATION: 1 S
MDC_IDC_EPISODE_ID: 1
MDC_IDC_EPISODE_TYPE: NORMAL
MDC_IDC_LEAD_IMPLANT_DT: NORMAL
MDC_IDC_LEAD_IMPLANT_DT: NORMAL
MDC_IDC_LEAD_LOCATION: NORMAL
MDC_IDC_LEAD_LOCATION: NORMAL
MDC_IDC_LEAD_LOCATION_DETAIL_1: NORMAL
MDC_IDC_LEAD_LOCATION_DETAIL_1: NORMAL
MDC_IDC_LEAD_MFG: NORMAL
MDC_IDC_LEAD_MFG: NORMAL
MDC_IDC_LEAD_MODEL: NORMAL
MDC_IDC_LEAD_MODEL: NORMAL
MDC_IDC_LEAD_POLARITY_TYPE: NORMAL
MDC_IDC_LEAD_POLARITY_TYPE: NORMAL
MDC_IDC_LEAD_SERIAL: NORMAL
MDC_IDC_LEAD_SERIAL: NORMAL
MDC_IDC_LEAD_SPECIAL_FUNCTION: NORMAL
MDC_IDC_LEAD_SPECIAL_FUNCTION: NORMAL
MDC_IDC_MSMT_BATTERY_DTM: NORMAL
MDC_IDC_MSMT_BATTERY_REMAINING_LONGEVITY: 141 MO
MDC_IDC_MSMT_BATTERY_RRT_TRIGGER: 2.62
MDC_IDC_MSMT_BATTERY_STATUS: NORMAL
MDC_IDC_MSMT_BATTERY_VOLTAGE: 3.21 V
MDC_IDC_MSMT_LEADCHNL_RA_IMPEDANCE_VALUE: 361 OHM
MDC_IDC_MSMT_LEADCHNL_RA_IMPEDANCE_VALUE: 494 OHM
MDC_IDC_MSMT_LEADCHNL_RA_PACING_THRESHOLD_AMPLITUDE: 0.75 V
MDC_IDC_MSMT_LEADCHNL_RA_PACING_THRESHOLD_PULSEWIDTH: 0.4 MS
MDC_IDC_MSMT_LEADCHNL_RA_SENSING_INTR_AMPL: 3 MV
MDC_IDC_MSMT_LEADCHNL_RV_IMPEDANCE_VALUE: 494 OHM
MDC_IDC_MSMT_LEADCHNL_RV_IMPEDANCE_VALUE: 589 OHM
MDC_IDC_MSMT_LEADCHNL_RV_PACING_THRESHOLD_AMPLITUDE: 0.75 V
MDC_IDC_MSMT_LEADCHNL_RV_PACING_THRESHOLD_PULSEWIDTH: 0.4 MS
MDC_IDC_MSMT_LEADCHNL_RV_SENSING_INTR_AMPL: 22.5 MV
MDC_IDC_PG_IMPLANT_DTM: NORMAL
MDC_IDC_PG_MFG: NORMAL
MDC_IDC_PG_MODEL: NORMAL
MDC_IDC_PG_SERIAL: NORMAL
MDC_IDC_PG_TYPE: NORMAL
MDC_IDC_SESS_CLINIC_NAME: NORMAL
MDC_IDC_SESS_DTM: NORMAL
MDC_IDC_SESS_TYPE: NORMAL
MDC_IDC_SET_BRADY_AT_MODE_SWITCH_RATE: 171 {BEATS}/MIN
MDC_IDC_SET_BRADY_HYSTRATE: NORMAL
MDC_IDC_SET_BRADY_LOWRATE: 70 {BEATS}/MIN
MDC_IDC_SET_BRADY_MAX_SENSOR_RATE: 130 {BEATS}/MIN
MDC_IDC_SET_BRADY_MAX_TRACKING_RATE: 130 {BEATS}/MIN
MDC_IDC_SET_BRADY_MODE: NORMAL
MDC_IDC_SET_BRADY_NIGHT_RATE: 60 {BEATS}/MIN
MDC_IDC_SET_BRADY_PAV_DELAY_LOW: 180 MS
MDC_IDC_SET_BRADY_SAV_DELAY_LOW: 150 MS
MDC_IDC_SET_LEADCHNL_RA_PACING_AMPLITUDE: 3.5 V
MDC_IDC_SET_LEADCHNL_RA_PACING_ANODE_ELECTRODE_1: NORMAL
MDC_IDC_SET_LEADCHNL_RA_PACING_ANODE_LOCATION_1: NORMAL
MDC_IDC_SET_LEADCHNL_RA_PACING_CAPTURE_MODE: NORMAL
MDC_IDC_SET_LEADCHNL_RA_PACING_CATHODE_ELECTRODE_1: NORMAL
MDC_IDC_SET_LEADCHNL_RA_PACING_CATHODE_LOCATION_1: NORMAL
MDC_IDC_SET_LEADCHNL_RA_PACING_POLARITY: NORMAL
MDC_IDC_SET_LEADCHNL_RA_PACING_PULSEWIDTH: 0.4 MS
MDC_IDC_SET_LEADCHNL_RA_SENSING_ANODE_ELECTRODE_1: NORMAL
MDC_IDC_SET_LEADCHNL_RA_SENSING_ANODE_LOCATION_1: NORMAL
MDC_IDC_SET_LEADCHNL_RA_SENSING_CATHODE_ELECTRODE_1: NORMAL
MDC_IDC_SET_LEADCHNL_RA_SENSING_CATHODE_LOCATION_1: NORMAL
MDC_IDC_SET_LEADCHNL_RA_SENSING_POLARITY: NORMAL
MDC_IDC_SET_LEADCHNL_RA_SENSING_SENSITIVITY: 0.3 MV
MDC_IDC_SET_LEADCHNL_RV_PACING_AMPLITUDE: 3.5 V
MDC_IDC_SET_LEADCHNL_RV_PACING_ANODE_ELECTRODE_1: NORMAL
MDC_IDC_SET_LEADCHNL_RV_PACING_ANODE_LOCATION_1: NORMAL
MDC_IDC_SET_LEADCHNL_RV_PACING_CAPTURE_MODE: NORMAL
MDC_IDC_SET_LEADCHNL_RV_PACING_CATHODE_ELECTRODE_1: NORMAL
MDC_IDC_SET_LEADCHNL_RV_PACING_CATHODE_LOCATION_1: NORMAL
MDC_IDC_SET_LEADCHNL_RV_PACING_POLARITY: NORMAL
MDC_IDC_SET_LEADCHNL_RV_PACING_PULSEWIDTH: 0.4 MS
MDC_IDC_SET_LEADCHNL_RV_SENSING_ANODE_ELECTRODE_1: NORMAL
MDC_IDC_SET_LEADCHNL_RV_SENSING_ANODE_LOCATION_1: NORMAL
MDC_IDC_SET_LEADCHNL_RV_SENSING_CATHODE_ELECTRODE_1: NORMAL
MDC_IDC_SET_LEADCHNL_RV_SENSING_CATHODE_LOCATION_1: NORMAL
MDC_IDC_SET_LEADCHNL_RV_SENSING_POLARITY: NORMAL
MDC_IDC_SET_LEADCHNL_RV_SENSING_SENSITIVITY: 0.9 MV
MDC_IDC_SET_ZONE_DETECTION_INTERVAL: 350 MS
MDC_IDC_SET_ZONE_DETECTION_INTERVAL: 400 MS
MDC_IDC_SET_ZONE_TYPE: NORMAL
MDC_IDC_STAT_AT_BURDEN_PERCENT: 0 %
MDC_IDC_STAT_AT_DTM_END: NORMAL
MDC_IDC_STAT_AT_DTM_START: NORMAL
MDC_IDC_STAT_BRADY_AP_VP_PERCENT: 0.08 %
MDC_IDC_STAT_BRADY_AP_VS_PERCENT: 93.38 %
MDC_IDC_STAT_BRADY_AS_VP_PERCENT: 0.01 %
MDC_IDC_STAT_BRADY_AS_VS_PERCENT: 6.53 %
MDC_IDC_STAT_BRADY_DTM_END: NORMAL
MDC_IDC_STAT_BRADY_DTM_START: NORMAL
MDC_IDC_STAT_BRADY_RA_PERCENT_PACED: 93.61 %
MDC_IDC_STAT_BRADY_RV_PERCENT_PACED: 0.09 %
MDC_IDC_STAT_EPISODE_RECENT_COUNT: 0
MDC_IDC_STAT_EPISODE_RECENT_COUNT: 0
MDC_IDC_STAT_EPISODE_RECENT_COUNT: 1
MDC_IDC_STAT_EPISODE_RECENT_COUNT_DTM_END: NORMAL
MDC_IDC_STAT_EPISODE_RECENT_COUNT_DTM_START: NORMAL
MDC_IDC_STAT_EPISODE_TOTAL_COUNT: 0
MDC_IDC_STAT_EPISODE_TOTAL_COUNT: 0
MDC_IDC_STAT_EPISODE_TOTAL_COUNT: 1
MDC_IDC_STAT_EPISODE_TOTAL_COUNT_DTM_END: NORMAL
MDC_IDC_STAT_EPISODE_TOTAL_COUNT_DTM_START: NORMAL
MDC_IDC_STAT_EPISODE_TYPE: NORMAL

## 2021-11-08 PROCEDURE — 93280 PM DEVICE PROGR EVAL DUAL: CPT | Performed by: INTERNAL MEDICINE

## 2021-11-08 NOTE — PATIENT INSTRUCTIONS
It was a pleasure to see you in clinic today. Please do not hesitate to call with any questions or concerns. We look forward to seeing you in clinic at your next device check in 3 months.     DAQUAN BarrowN, RN, PHN  Electrophysiology Nurse Specialist  [facility]  During business hours call:  [phone]  After business hours please call: [phone]- select option #4 and ask for job code [phone].

## 2021-11-17 ENCOUNTER — HOSPITAL ENCOUNTER (OUTPATIENT)
Dept: PHYSICAL THERAPY | Facility: CLINIC | Age: 66
Setting detail: THERAPIES SERIES
End: 2021-11-17
Attending: PHYSICIAN ASSISTANT
Payer: OTHER GOVERNMENT

## 2021-11-17 PROCEDURE — 97110 THERAPEUTIC EXERCISES: CPT | Mod: GP | Performed by: PHYSICAL THERAPIST

## 2021-12-01 ENCOUNTER — HOSPITAL ENCOUNTER (OUTPATIENT)
Dept: PHYSICAL THERAPY | Facility: CLINIC | Age: 66
Setting detail: THERAPIES SERIES
End: 2021-12-01
Attending: PHYSICIAN ASSISTANT
Payer: OTHER GOVERNMENT

## 2021-12-01 PROCEDURE — 97110 THERAPEUTIC EXERCISES: CPT | Mod: GP | Performed by: PHYSICAL THERAPIST

## 2021-12-01 NOTE — PROGRESS NOTES
Stiven Nguyễn   PHYSICAL THERAPY PROGRESS/DISCHARGE  12/01/21 0900   Signing Clinician's Name / Credentials   Signing clinician's name / credentials Kris Hoenk, PT   Session Number   Session Number 9 seen from 9/16/21 to 12/1/21   Authorization status     Progress Note/Recertification   Progress Note Completed Date 12/01/21   Adult Goals   PT Ortho Eval Goals 1;2;3   Ortho Goal 1   Goal Identifier 1   Goal Description Patient will demonstrate 130 degrees of AROM in order to reach into cupboard    Target Date 10/14/21   Date Met 11/17/21   Ortho Goal 2   Goal Identifier 2   Goal Description Patient will demonstrate adequate ROM in order to reach back and put on shirt with no more than 2/10 pain    Goal Progress can do those tasks, not full motion like L   Target Date 10/28/21   Date Met 11/17/21   Ortho Goal 3   Goal Identifier 3   Goal Description Patient will  be able to lift and 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    Goal Progress hasn't been i his shop, tore apart pallets   Target Date 11/25/21   Subjective Report   Subjective Report tore apart some pallets, did not hurt while doing it, little achy after, felt good to do some work, has not been in his shop, better than what he had before surgery, really wants to be able to bow hunt/pull bow in spring   Objective Measures   Objective Measures Objective Measure 1   Objective Measure 1   Details full AROM R shld except IR behind back to L4-5, with assist to L1, MMT 5/5 for all   Treatment Interventions   Interventions Therapeutic Procedure/Exercise   Therapeutic Procedure/exercise   Therapeutic Procedures: strength, endurance, ROM, flexibillity minutes (07535) 25   Skilled Intervention progress ROM and strength ex   Patient Response see details   Treatment Detail single hand blk TB  ER x20,  grey TB rows x20, hi/low chop x20, cont indep HEP 3x/wk , also doing pulldowns   Progress gave black and grey TB for home   Plan   Home program [id number]    Updates to plan of care  MD dec 2   Plan for next session MD tomorrow, pt able to and feels confident in continuing on his own   Comments   Comments 8/25 DOS   Total Session Time   Timed Code Treatment Minutes 25   Total Treatment Time (sum of timed and untimed services) 25   AMBULATORY CLINICS ONLY-MEDICAL AND TREATMENT DIAGNOSIS   PT Diagnosis Right shoulder pain    [facility]  Kris Hoenk  PT  [facility]  [address] [place] [address]  [email or web address]   Office: [phone]  Voicemail: [phone]

## 2022-01-07 ENCOUNTER — HOSPITAL ENCOUNTER (OUTPATIENT)
Facility: CLINIC | Age: 67
Setting detail: OBSERVATION
Discharge: HOME | End: 2022-01-08
Attending: EMERGENCY MEDICINE | Admitting: EMERGENCY MEDICINE
Payer: MEDICARE

## 2022-01-07 ENCOUNTER — ANCILLARY PROCEDURE (OUTPATIENT)
Dept: CARDIOLOGY | Facility: CLINIC | Age: 67
End: 2022-01-07
Attending: INTERNAL MEDICINE
Payer: MEDICARE

## 2022-01-07 DIAGNOSIS — R55 SYNCOPE, UNSPECIFIED SYNCOPE TYPE: ICD-10-CM

## 2022-01-07 DIAGNOSIS — Z11.52 ENCOUNTER FOR SCREENING LABORATORY TESTING FOR SEVERE ACUTE RESPIRATORY SYNDROME CORONAVIRUS 2 (SARS-COV-2): ICD-10-CM

## 2022-01-07 LAB
ALBUMIN SERPL-MCNC: 3.5 G/DL (ref 3.4–5)
ALP SERPL-CCNC: 97 U/L (ref 40–150)
ALT SERPL W P-5'-P-CCNC: 38 U/L (ref 0–70)
ANION GAP SERPL CALCULATED.3IONS-SCNC: 3 MMOL/L (ref 3–14)
AST SERPL W P-5'-P-CCNC: 22 U/L (ref 0–45)
BASOPHILS # BLD AUTO: 0 10E3/UL (ref 0–0.2)
BASOPHILS NFR BLD AUTO: 1 %
BILIRUB SERPL-MCNC: 0.6 MG/DL (ref 0.2–1.3)
BUN SERPL-MCNC: 14 MG/DL (ref 7–30)
CALCIUM SERPL-MCNC: 8.7 MG/DL (ref 8.5–10.1)
CHLORIDE BLD-SCNC: 111 MMOL/L (ref 94–109)
CO2 SERPL-SCNC: 28 MMOL/L (ref 20–32)
CREAT SERPL-MCNC: 0.8 MG/DL (ref 0.66–1.25)
D DIMER PPP FEU-MCNC: 0.28 UG/ML FEU (ref 0–0.5)
EOSINOPHIL # BLD AUTO: 0.2 10E3/UL (ref 0–0.7)
EOSINOPHIL NFR BLD AUTO: 4 %
ERYTHROCYTE [DISTWIDTH] IN BLOOD BY AUTOMATED COUNT: 13.2 % (ref 10–15)
GFR SERPL CREATININE-BSD FRML MDRD: >90 ML/MIN/1.73M2
GLUCOSE BLD-MCNC: 104 MG/DL (ref 70–99)
HCT VFR BLD AUTO: 43.8 % (ref 40–53)
HGB BLD-MCNC: 15.2 G/DL (ref 13.3–17.7)
IMM GRANULOCYTES # BLD: 0 10E3/UL
IMM GRANULOCYTES NFR BLD: 0 %
INR PPP: 1.32 (ref 0.85–1.15)
LACTATE SERPL-SCNC: 0.7 MMOL/L (ref 0.7–2)
LYMPHOCYTES # BLD AUTO: 1.1 10E3/UL (ref 0.8–5.3)
LYMPHOCYTES NFR BLD AUTO: 21 %
MCH RBC QN AUTO: 30 PG (ref 26.5–33)
MCHC RBC AUTO-ENTMCNC: 34.7 G/DL (ref 31.5–36.5)
MCV RBC AUTO: 87 FL (ref 78–100)
MDC_IDC_EPISODE_DTM: NORMAL
MDC_IDC_EPISODE_DURATION: 0 S
MDC_IDC_EPISODE_DURATION: 0 S
MDC_IDC_EPISODE_DURATION: 1 S
MDC_IDC_EPISODE_DURATION: 32 S
MDC_IDC_EPISODE_DURATION: 44 S
MDC_IDC_EPISODE_DURATION: 7 S
MDC_IDC_EPISODE_DURATION: 8 S
MDC_IDC_EPISODE_DURATION: 9 S
MDC_IDC_EPISODE_ID: 10
MDC_IDC_EPISODE_ID: 11
MDC_IDC_EPISODE_ID: 12
MDC_IDC_EPISODE_ID: 2
MDC_IDC_EPISODE_ID: 3
MDC_IDC_EPISODE_ID: 4
MDC_IDC_EPISODE_ID: 5
MDC_IDC_EPISODE_ID: 6
MDC_IDC_EPISODE_ID: 7
MDC_IDC_EPISODE_ID: 8
MDC_IDC_EPISODE_ID: 9
MDC_IDC_EPISODE_TYPE: NORMAL
MDC_IDC_LEAD_IMPLANT_DT: NORMAL
MDC_IDC_LEAD_IMPLANT_DT: NORMAL
MDC_IDC_LEAD_LOCATION: NORMAL
MDC_IDC_LEAD_LOCATION: NORMAL
MDC_IDC_LEAD_LOCATION_DETAIL_1: NORMAL
MDC_IDC_LEAD_LOCATION_DETAIL_1: NORMAL
MDC_IDC_LEAD_MFG: NORMAL
MDC_IDC_LEAD_MFG: NORMAL
MDC_IDC_LEAD_MODEL: NORMAL
MDC_IDC_LEAD_MODEL: NORMAL
MDC_IDC_LEAD_POLARITY_TYPE: NORMAL
MDC_IDC_LEAD_POLARITY_TYPE: NORMAL
MDC_IDC_LEAD_SERIAL: NORMAL
MDC_IDC_LEAD_SERIAL: NORMAL
MDC_IDC_LEAD_SPECIAL_FUNCTION: NORMAL
MDC_IDC_LEAD_SPECIAL_FUNCTION: NORMAL
MDC_IDC_MSMT_BATTERY_DTM: NORMAL
MDC_IDC_MSMT_BATTERY_REMAINING_LONGEVITY: 138 MO
MDC_IDC_MSMT_BATTERY_RRT_TRIGGER: 2.62
MDC_IDC_MSMT_BATTERY_STATUS: NORMAL
MDC_IDC_MSMT_BATTERY_VOLTAGE: 3.17 V
MDC_IDC_MSMT_LEADCHNL_RA_IMPEDANCE_VALUE: 342 OHM
MDC_IDC_MSMT_LEADCHNL_RA_IMPEDANCE_VALUE: 475 OHM
MDC_IDC_MSMT_LEADCHNL_RA_PACING_THRESHOLD_AMPLITUDE: 0.62 V
MDC_IDC_MSMT_LEADCHNL_RA_PACING_THRESHOLD_PULSEWIDTH: 0.4 MS
MDC_IDC_MSMT_LEADCHNL_RA_SENSING_INTR_AMPL: 4 MV
MDC_IDC_MSMT_LEADCHNL_RA_SENSING_INTR_AMPL: 4 MV
MDC_IDC_MSMT_LEADCHNL_RV_IMPEDANCE_VALUE: 418 OHM
MDC_IDC_MSMT_LEADCHNL_RV_IMPEDANCE_VALUE: 494 OHM
MDC_IDC_MSMT_LEADCHNL_RV_PACING_THRESHOLD_AMPLITUDE: 0.75 V
MDC_IDC_MSMT_LEADCHNL_RV_PACING_THRESHOLD_PULSEWIDTH: 0.4 MS
MDC_IDC_MSMT_LEADCHNL_RV_SENSING_INTR_AMPL: 20.12 MV
MDC_IDC_MSMT_LEADCHNL_RV_SENSING_INTR_AMPL: 20.12 MV
MDC_IDC_PG_IMPLANT_DTM: NORMAL
MDC_IDC_PG_MFG: NORMAL
MDC_IDC_PG_MODEL: NORMAL
MDC_IDC_PG_SERIAL: NORMAL
MDC_IDC_PG_TYPE: NORMAL
MDC_IDC_SESS_CLINIC_NAME: NORMAL
MDC_IDC_SESS_DTM: NORMAL
MDC_IDC_SESS_TYPE: NORMAL
MDC_IDC_SET_BRADY_AT_MODE_SWITCH_RATE: 171 {BEATS}/MIN
MDC_IDC_SET_BRADY_HYSTRATE: NORMAL
MDC_IDC_SET_BRADY_LOWRATE: 70 {BEATS}/MIN
MDC_IDC_SET_BRADY_MAX_SENSOR_RATE: 130 {BEATS}/MIN
MDC_IDC_SET_BRADY_MAX_TRACKING_RATE: 130 {BEATS}/MIN
MDC_IDC_SET_BRADY_MODE: NORMAL
MDC_IDC_SET_BRADY_NIGHT_RATE: 60 {BEATS}/MIN
MDC_IDC_SET_BRADY_PAV_DELAY_LOW: 180 MS
MDC_IDC_SET_BRADY_SAV_DELAY_LOW: 150 MS
MDC_IDC_SET_LEADCHNL_RA_PACING_AMPLITUDE: 3.5 V
MDC_IDC_SET_LEADCHNL_RA_PACING_ANODE_ELECTRODE_1: NORMAL
MDC_IDC_SET_LEADCHNL_RA_PACING_ANODE_LOCATION_1: NORMAL
MDC_IDC_SET_LEADCHNL_RA_PACING_CAPTURE_MODE: NORMAL
MDC_IDC_SET_LEADCHNL_RA_PACING_CATHODE_ELECTRODE_1: NORMAL
MDC_IDC_SET_LEADCHNL_RA_PACING_CATHODE_LOCATION_1: NORMAL
MDC_IDC_SET_LEADCHNL_RA_PACING_POLARITY: NORMAL
MDC_IDC_SET_LEADCHNL_RA_PACING_PULSEWIDTH: 0.4 MS
MDC_IDC_SET_LEADCHNL_RA_SENSING_ANODE_ELECTRODE_1: NORMAL
MDC_IDC_SET_LEADCHNL_RA_SENSING_ANODE_LOCATION_1: NORMAL
MDC_IDC_SET_LEADCHNL_RA_SENSING_CATHODE_ELECTRODE_1: NORMAL
MDC_IDC_SET_LEADCHNL_RA_SENSING_CATHODE_LOCATION_1: NORMAL
MDC_IDC_SET_LEADCHNL_RA_SENSING_POLARITY: NORMAL
MDC_IDC_SET_LEADCHNL_RA_SENSING_SENSITIVITY: 0.3 MV
MDC_IDC_SET_LEADCHNL_RV_PACING_AMPLITUDE: 3.5 V
MDC_IDC_SET_LEADCHNL_RV_PACING_ANODE_ELECTRODE_1: NORMAL
MDC_IDC_SET_LEADCHNL_RV_PACING_ANODE_LOCATION_1: NORMAL
MDC_IDC_SET_LEADCHNL_RV_PACING_CAPTURE_MODE: NORMAL
MDC_IDC_SET_LEADCHNL_RV_PACING_CATHODE_ELECTRODE_1: NORMAL
MDC_IDC_SET_LEADCHNL_RV_PACING_CATHODE_LOCATION_1: NORMAL
MDC_IDC_SET_LEADCHNL_RV_PACING_POLARITY: NORMAL
MDC_IDC_SET_LEADCHNL_RV_PACING_PULSEWIDTH: 0.4 MS
MDC_IDC_SET_LEADCHNL_RV_SENSING_ANODE_ELECTRODE_1: NORMAL
MDC_IDC_SET_LEADCHNL_RV_SENSING_ANODE_LOCATION_1: NORMAL
MDC_IDC_SET_LEADCHNL_RV_SENSING_CATHODE_ELECTRODE_1: NORMAL
MDC_IDC_SET_LEADCHNL_RV_SENSING_CATHODE_LOCATION_1: NORMAL
MDC_IDC_SET_LEADCHNL_RV_SENSING_POLARITY: NORMAL
MDC_IDC_SET_LEADCHNL_RV_SENSING_SENSITIVITY: 0.9 MV
MDC_IDC_SET_ZONE_DETECTION_INTERVAL: 350 MS
MDC_IDC_SET_ZONE_DETECTION_INTERVAL: 400 MS
MDC_IDC_SET_ZONE_TYPE: NORMAL
MDC_IDC_STAT_AT_BURDEN_PERCENT: 0 %
MDC_IDC_STAT_AT_DTM_END: NORMAL
MDC_IDC_STAT_AT_DTM_START: NORMAL
MDC_IDC_STAT_BRADY_AP_VP_PERCENT: 0.07 %
MDC_IDC_STAT_BRADY_AP_VS_PERCENT: 91.76 %
MDC_IDC_STAT_BRADY_AS_VP_PERCENT: 0 %
MDC_IDC_STAT_BRADY_AS_VS_PERCENT: 8.17 %
MDC_IDC_STAT_BRADY_DTM_END: NORMAL
MDC_IDC_STAT_BRADY_DTM_START: NORMAL
MDC_IDC_STAT_BRADY_RA_PERCENT_PACED: 91.88 %
MDC_IDC_STAT_BRADY_RV_PERCENT_PACED: 0.07 %
MDC_IDC_STAT_EPISODE_RECENT_COUNT: 0
MDC_IDC_STAT_EPISODE_RECENT_COUNT: 5
MDC_IDC_STAT_EPISODE_RECENT_COUNT: 6
MDC_IDC_STAT_EPISODE_RECENT_COUNT_DTM_END: NORMAL
MDC_IDC_STAT_EPISODE_RECENT_COUNT_DTM_START: NORMAL
MDC_IDC_STAT_EPISODE_TOTAL_COUNT: 0
MDC_IDC_STAT_EPISODE_TOTAL_COUNT: 2
MDC_IDC_STAT_EPISODE_TOTAL_COUNT: 7
MDC_IDC_STAT_EPISODE_TOTAL_COUNT_DTM_END: NORMAL
MDC_IDC_STAT_EPISODE_TOTAL_COUNT_DTM_START: NORMAL
MDC_IDC_STAT_EPISODE_TYPE: NORMAL
MONOCYTES # BLD AUTO: 0.6 10E3/UL (ref 0–1.3)
MONOCYTES NFR BLD AUTO: 11 %
NEUTROPHILS # BLD AUTO: 3.4 10E3/UL (ref 1.6–8.3)
NEUTROPHILS NFR BLD AUTO: 63 %
NRBC # BLD AUTO: 0 10E3/UL
NRBC BLD AUTO-RTO: 0 /100
PLATELET # BLD AUTO: 165 10E3/UL (ref 150–450)
POTASSIUM BLD-SCNC: 3.8 MMOL/L (ref 3.4–5.3)
PROT SERPL-MCNC: 7.8 G/DL (ref 6.8–8.8)
RBC # BLD AUTO: 5.06 10E6/UL (ref 4.4–5.9)
SARS-COV-2 RNA RESP QL NAA+PROBE: NEGATIVE
SODIUM SERPL-SCNC: 142 MMOL/L (ref 133–144)
TROPONIN I SERPL HS-MCNC: 156 NG/L
TROPONIN I SERPL HS-MCNC: 161 NG/L
WBC # BLD AUTO: 5.4 10E3/UL (ref 4–11)

## 2022-01-07 PROCEDURE — 87635 SARS-COV-2 COVID-19 AMP PRB: CPT | Performed by: EMERGENCY MEDICINE

## 2022-01-07 PROCEDURE — 93010 ELECTROCARDIOGRAM REPORT: CPT | Performed by: EMERGENCY MEDICINE

## 2022-01-07 PROCEDURE — 85025 COMPLETE CBC W/AUTO DIFF WBC: CPT | Performed by: EMERGENCY MEDICINE

## 2022-01-07 PROCEDURE — 85379 FIBRIN DEGRADATION QUANT: CPT | Performed by: EMERGENCY MEDICINE

## 2022-01-07 PROCEDURE — 99220 PR INITIAL OBSERVATION CARE,LEVEL III: CPT | Mod: 25 | Performed by: EMERGENCY MEDICINE

## 2022-01-07 PROCEDURE — G0378 HOSPITAL OBSERVATION PER HR: HCPCS

## 2022-01-07 PROCEDURE — 83605 ASSAY OF LACTIC ACID: CPT | Performed by: EMERGENCY MEDICINE

## 2022-01-07 PROCEDURE — 93005 ELECTROCARDIOGRAM TRACING: CPT | Performed by: EMERGENCY MEDICINE

## 2022-01-07 PROCEDURE — 85610 PROTHROMBIN TIME: CPT | Performed by: EMERGENCY MEDICINE

## 2022-01-07 PROCEDURE — C9803 HOPD COVID-19 SPEC COLLECT: HCPCS | Performed by: EMERGENCY MEDICINE

## 2022-01-07 PROCEDURE — 258N000003 HC RX IP 258 OP 636: Performed by: EMERGENCY MEDICINE

## 2022-01-07 PROCEDURE — 93296 REM INTERROG EVL PM/IDS: CPT

## 2022-01-07 PROCEDURE — 99285 EMERGENCY DEPT VISIT HI MDM: CPT | Mod: 25 | Performed by: EMERGENCY MEDICINE

## 2022-01-07 PROCEDURE — 36415 COLL VENOUS BLD VENIPUNCTURE: CPT | Performed by: EMERGENCY MEDICINE

## 2022-01-07 PROCEDURE — 99285 EMERGENCY DEPT VISIT HI MDM: CPT | Mod: 25

## 2022-01-07 PROCEDURE — 84484 ASSAY OF TROPONIN QUANT: CPT | Performed by: EMERGENCY MEDICINE

## 2022-01-07 PROCEDURE — 80053 COMPREHEN METABOLIC PANEL: CPT | Performed by: EMERGENCY MEDICINE

## 2022-01-07 PROCEDURE — 96360 HYDRATION IV INFUSION INIT: CPT | Performed by: EMERGENCY MEDICINE

## 2022-01-07 PROCEDURE — 93294 REM INTERROG EVL PM/LDLS PM: CPT | Performed by: INTERNAL MEDICINE

## 2022-01-07 RX ADMIN — SODIUM CHLORIDE 500 ML: 9 INJECTION, SOLUTION INTRAVENOUS at 18:08

## 2022-01-07 ASSESSMENT — ENCOUNTER SYMPTOMS
LIGHT-HEADEDNESS: 1
SORE THROAT: 0
SEIZURES: 0
DIARRHEA: 0
FLANK PAIN: 0
NUMBNESS: 0
EYE DISCHARGE: 0
MYALGIAS: 0
WEAKNESS: 0
FEVER: 0
PALPITATIONS: 0
NERVOUS/ANXIOUS: 0
COUGH: 0
NECK STIFFNESS: 0
HEADACHES: 0
CHILLS: 0
CHEST TIGHTNESS: 0
SHORTNESS OF BREATH: 1
AGITATION: 0
VOMITING: 0
CONFUSION: 0
BACK PAIN: 0
ABDOMINAL PAIN: 0
BLOOD IN STOOL: 0
FREQUENCY: 0
NAUSEA: 0
EYE REDNESS: 0
DYSURIA: 0

## 2022-01-07 ASSESSMENT — MIFFLIN-ST. JEOR: SCORE: 2023.23
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
## 2022-01-07 NOTE — ED TRIAGE NOTES
EMS contacted for syncopal episode. Patient went to  wife was having some trouble breathing. Went inside and went to get up and fell on to the cough. After he got home, he was trying to get his info for pacemaker out and went onto bed. Almost had syncope x 2 with EMS present. Pt not fall to ground, did not hit head. Is on blood thinners. Pacemaker placed in October. Wife stated he was not feeling well last night.

## 2022-01-08 VITALS
SYSTOLIC BLOOD PRESSURE: 152 MMHG | RESPIRATION RATE: 22 BRPM | TEMPERATURE: 97.2 F | BODY MASS INDEX: 34.46 KG/M2 | WEIGHT: 260 LBS | HEART RATE: 60 BPM | HEIGHT: 73 IN | DIASTOLIC BLOOD PRESSURE: 87 MMHG | OXYGEN SATURATION: 96 %

## 2022-01-08 PROCEDURE — G0378 HOSPITAL OBSERVATION PER HR: HCPCS

## 2022-01-08 PROCEDURE — 99217 PR OBSERVATION CARE DISCHARGE: CPT | Performed by: EMERGENCY MEDICINE

## 2022-01-08 NOTE — ED PROVIDER NOTES
"ED Observation Discharge Summary  [facility] Emergency Department  Discharge Date: 1/8/2022    Stiven Nguyễn MRN: [id number]   Age: 66 year old YOB: 1955     Interval History   About the same today.  Vitals signs stable.  Tolerating medications and treatment plan without significant side effects or problems.  Eating and voiding well.  No new concerns today.  No concerning dysrhythmias overnight. Tolerating oral intake. No chest pain or difficulty breathing.    Physical Exam   BP (!) 153/86   Pulse 61   Temp 97.2  F (36.2  C) (Tympanic)   Resp 15   Ht 1.854 m (6' 1\")   Wt 117.9 kg (260 lb)   SpO2 96%   BMI 34.30 kg/m    Physical Exam  Vitals and nursing note reviewed.   Constitutional:       Appearance: He is well-developed. He is not diaphoretic.   HENT:      Head: Normocephalic and atraumatic.      Right Ear: External ear normal.      Left Ear: External ear normal.      Nose: Nose normal.   Eyes:      General: No scleral icterus.     Conjunctiva/sclera: Conjunctivae normal.   Cardiovascular:      Rate and Rhythm: Normal rate and regular rhythm.   Pulmonary:      Effort: Pulmonary effort is normal. No respiratory distress.      Breath sounds: No stridor.   Musculoskeletal:      Cervical back: Normal range of motion.   Skin:     General: Skin is warm and dry.   Neurological:      Mental Status: He is alert and oriented to person, place, and time.   Psychiatric:         Behavior: Behavior normal.         Results   All laboratory and imaging data in the past 24 hours reviewed   EKG results:   Performed yesterday        Atrial paced rhythm without signs of ischemia      -     Observation Course   Patient admitted to the ED Observation Unit with syncopal episodes.  Feeling well now.  He has been observed overnight on telemetry without significant dysrhythmia identified.  In the morning he is safe to discharge home, he wants to go home.  He has follow-up scheduled with cardiology.  " The patient is in agreement with this plan.     Services consulted during the observation course: Cardiac. Notable consultant recommendations include: Overnight telemetry with discharge in the morning    Through the patient's time in the ED Observation Unit, all diagnostic and therapeutic goals were met. See earlier notes for specific goals. Nursing notes reviewed. After counseling on the diagnosis, work-up, treatment plan, and importance of follow-up, the patient was discharged. Patient to follow-up with cardiology in 1 month. Patient to return to the ED if any urgent or potentially life-threatening concerns.     Discharge Diagnoses:   Final diagnoses:   Syncope, unspecified syncope type       Current Discharge Medication List      CONTINUE these medications which have NOT CHANGED    Details   Acetaminophen (TYLENOL PO) Take 1,000 mg by mouth every 8 hours as needed for mild pain or fever       amLODIPine (NORVASC) 10 MG tablet Take 1 tablet (10 mg) by mouth daily  Qty: 90 tablet, Refills: 3    Associated Diagnoses: Coronary vasospasm (H)      atorvastatin (LIPITOR) 10 MG tablet Take 1 tablet by mouth every evening      blood glucose monitoring (NO BRAND SPECIFIED) meter device kit Use to test blood sugar 1-2 times daily or as directed.      fexofenadine (ALLEGRA) 180 MG tablet Take 1 tablet by mouth every evening      isosorbide mononitrate (IMDUR) 30 MG 24 hr tablet Take 45 mg by mouth daily       lisinopril (PRINIVIL,ZESTRIL) 40 MG tablet Take 40 mg by mouth daily      nitroGLYcerin (NITROSTAT) 0.4 MG sublingual tablet For chest pain place 1 tablet under the tongue every 5 minutes for 3 doses. If symptoms persist 5 minutes after 1st dose call 911.  Qty: 90 tablet, Refills: 3    Associated Diagnoses: Coronary vasospasm (H)      omeprazole (PRILOSEC) 20 MG DR capsule Take 20 mg by mouth daily      oxybutynin (DITROPAN) 5 MG tablet Take 1 tablet (5 mg) by mouth 3 times daily  Qty: 270 tablet, Refills: 3     Associated Diagnoses: Urinary urgency      rivaroxaban ANTICOAGULANT (XARELTO) 20 MG TABS tablet Take 1 tablet (20 mg) by mouth daily (with dinner)  Qty: 90 tablet, Refills: 0      tamsulosin (FLOMAX) 0.4 MG capsule Take 1 capsule (0.4 mg) by mouth daily  Qty: 90 capsule, Refills: 3    Associated Diagnoses: Bladder retention             --  Wong Price MD  [facility] EMERGENCY DEPT  1/8/2022       Wong Price MD  01/08/22 0635

## 2022-01-08 NOTE — ED PROVIDER NOTES
"ED Observation History and Physical  [facility] Emergency Department  Observation Initiation Date: Jan 7, 2022    Stiven Nguyễn MRN: [id number]   Age: 66 year old YOB: 1955     History     Chief Complaint   Patient presents with     Syncope     HPI   Patient is a 66-year-old male with a history of pacemaker placement October 2021 who presented with 2 separate syncopal episodes today.  He apparently felt well this morning but reported some fatigue last night.  He reports that he had been very active yesterday and attributes his fatigue to over exertion.  Patient's first episode occurred when he was walking to his daughter's house from his car and he felt some shortness of breath.  He then seemed out of it but did not actually pass out.  When he was at home he called to get his pacemaker interrogated and then again felt lightheaded and this time had a true syncopal episode.  He now is feeling fairly well.  He had a reported blood pressure of 85 systolically during ambulance transport.    I have reviewed the Medications, Allergies, Past Medical and Surgical History, and Social History in the Epic system.    Review of Systems  A complete review of systems was performed with pertinent positives and negatives noted in the HPI, and all other systems negative.     Physical Exam   BP (!) 136/90   Pulse 68   Temp 97.2  F (36.2  C) (Tympanic)   Resp 18   Ht 1.854 m (6' 1\")   Wt 117.9 kg (260 lb)   SpO2 95%   BMI 34.30 kg/m    Physical Exam  Vitals and nursing note reviewed.   Constitutional:       General: He is not in acute distress.     Appearance: He is well-developed. He is not diaphoretic.   HENT:      Head: Normocephalic and atraumatic.   Eyes:      General: No scleral icterus.     Pupils: Pupils are equal, round, and reactive to light.   Cardiovascular:      Rate and Rhythm: Normal rate and regular rhythm.      Heart sounds: Normal heart sounds. No murmur heard.      Pulmonary:      " Effort: No respiratory distress.      Breath sounds: No stridor. No wheezing or rales.   Abdominal:      General: Bowel sounds are normal.      Palpations: Abdomen is soft.      Tenderness: There is no abdominal tenderness.   Musculoskeletal:         General: No tenderness.      Cervical back: Normal range of motion and neck supple.   Skin:     General: Skin is warm and dry.      Coloration: Skin is not pale.      Findings: No erythema or rash.   Neurological:      Mental Status: He is alert and oriented to person, place, and time.      Sensory: No sensory deficit.      Coordination: Coordination normal.         Results   All laboratory data reviewed   All cardiac studies reviewed by me.       Assessments & Plan (with Medical Decision Making)   Stiven Nguyễn is a 66 year old male admitted to the ED Observation Unit with 2 separate syncopal episodes.  His pacemaker was interrogated and identified tachycardic rhythms in December.  Nursing notes reviewed.     Services consulted in the ED or consults planned during the observation course: Cardiology.  Notable consultant recommendations include: Continue telemetry to identify for potential arrhythmia.  Also close monitoring of his vital signs.  Patient's care plan was discussed with Tor Price who will follow him while he is in the emergency department.        Observation goals to be met before discharge home:  PRIMARY DIAGNOSIS: SYNCOPE/TIA  OUTPATIENT/OBSERVATION GOALS TO BE MET BEFORE DISCHARGE:  1. Orthostatic performed: Yes:      2. Diagnostic testing complete & at baseline neurologic testing: Yes    3. Cleared by consultants (if involved): Yes    4. Interpretation of cardiac rhythm per telemetry tech: NSR without tachy or bradycardia dysrhythmia    5. Tolerating adequate PO diet and medications: Yes    6. Return to near baseline physical activity or neurologic status: Yes    Discharge Planner Nurse   Safe discharge environment identified: Yes  Barriers to  discharge: No       Entered by: Giacomo Epps 01/08/2022 6:47 PM     Please review provider order for any additional goals.   Nurse to notify provider when observation goals have been met and patient is ready for discharge.    --  Giacomo Epps MD  [facility] EMERGENCY DEPT  1/7/2022      Giacomo Epps MD  01/08/22 1847

## 2022-01-08 NOTE — ED NOTES
Pt here with spouse pt had several episodes of syncope today. Pt recently had pacemaker placed, spouse was on the phone with pacemaker interrogator when pt had syncopal episode. Pt received  mL per EMS en route. Pt reports low BP per EMS, pt hypertensive here. Pt denies pain. Spouse states pt did fall 2 weeks ago and was concerned about bleeding in his back, pt denies bruising or pain of back. Pt a/o.

## 2022-01-08 NOTE — DISCHARGE INSTRUCTIONS
Follow-up with device clinic and cardiology on February 9 as scheduled    Eat well, drink well, and rest    Return if increasing discomfort, shortness of breath, or additional syncopal episodes.

## 2022-01-17 ENCOUNTER — TRANSFERRED RECORDS (OUTPATIENT)
Dept: HEALTH INFORMATION MANAGEMENT | Facility: CLINIC | Age: 67
End: 2022-01-17
Payer: MEDICARE

## 2022-01-17 LAB — HBA1C MFR BLD: 6.4 % (ref 4.2–6.1)

## 2022-01-22 ENCOUNTER — HEALTH MAINTENANCE LETTER (OUTPATIENT)
Age: 67
End: 2022-01-22

## 2022-02-09 ENCOUNTER — OFFICE VISIT (OUTPATIENT)
Dept: CARDIOLOGY | Facility: CLINIC | Age: 67
End: 2022-02-09
Attending: INTERNAL MEDICINE
Payer: MEDICARE

## 2022-02-09 VITALS
BODY MASS INDEX: 34.51 KG/M2 | WEIGHT: 260.4 LBS | OXYGEN SATURATION: 97 % | SYSTOLIC BLOOD PRESSURE: 145 MMHG | HEART RATE: 86 BPM | HEIGHT: 73 IN | DIASTOLIC BLOOD PRESSURE: 79 MMHG

## 2022-02-09 DIAGNOSIS — G90.01 CAROTID SINUS SYNDROME: Primary | ICD-10-CM

## 2022-02-09 DIAGNOSIS — I21.4 NSTEMI (NON-ST ELEVATED MYOCARDIAL INFARCTION) (H): ICD-10-CM

## 2022-02-09 DIAGNOSIS — R55 SYNCOPE, UNSPECIFIED SYNCOPE TYPE: ICD-10-CM

## 2022-02-09 LAB
MDC_IDC_EPISODE_DTM: NORMAL
MDC_IDC_EPISODE_DURATION: 0 S
MDC_IDC_EPISODE_DURATION: 1 S
MDC_IDC_EPISODE_DURATION: 14 S
MDC_IDC_EPISODE_DURATION: 2 S
MDC_IDC_EPISODE_DURATION: 24 S
MDC_IDC_EPISODE_DURATION: 28 S
MDC_IDC_EPISODE_DURATION: 6 S
MDC_IDC_EPISODE_ID: 13
MDC_IDC_EPISODE_ID: 14
MDC_IDC_EPISODE_ID: 15
MDC_IDC_EPISODE_ID: 16
MDC_IDC_EPISODE_ID: 17
MDC_IDC_EPISODE_ID: 18
MDC_IDC_EPISODE_ID: 19
MDC_IDC_EPISODE_ID: 20
MDC_IDC_EPISODE_ID: 21
MDC_IDC_EPISODE_ID: 22
MDC_IDC_EPISODE_ID: 23
MDC_IDC_EPISODE_TYPE: NORMAL
MDC_IDC_LEAD_IMPLANT_DT: NORMAL
MDC_IDC_LEAD_IMPLANT_DT: NORMAL
MDC_IDC_LEAD_LOCATION: NORMAL
MDC_IDC_LEAD_LOCATION: NORMAL
MDC_IDC_LEAD_LOCATION_DETAIL_1: NORMAL
MDC_IDC_LEAD_LOCATION_DETAIL_1: NORMAL
MDC_IDC_LEAD_MFG: NORMAL
MDC_IDC_LEAD_MFG: NORMAL
MDC_IDC_LEAD_MODEL: NORMAL
MDC_IDC_LEAD_MODEL: NORMAL
MDC_IDC_LEAD_POLARITY_TYPE: NORMAL
MDC_IDC_LEAD_POLARITY_TYPE: NORMAL
MDC_IDC_LEAD_SERIAL: NORMAL
MDC_IDC_LEAD_SERIAL: NORMAL
MDC_IDC_LEAD_SPECIAL_FUNCTION: NORMAL
MDC_IDC_LEAD_SPECIAL_FUNCTION: NORMAL
MDC_IDC_MSMT_BATTERY_DTM: NORMAL
MDC_IDC_MSMT_BATTERY_REMAINING_LONGEVITY: 138 MO
MDC_IDC_MSMT_BATTERY_RRT_TRIGGER: 2.62
MDC_IDC_MSMT_BATTERY_STATUS: NORMAL
MDC_IDC_MSMT_BATTERY_VOLTAGE: 3.15 V
MDC_IDC_MSMT_LEADCHNL_RA_IMPEDANCE_VALUE: 361 OHM
MDC_IDC_MSMT_LEADCHNL_RA_IMPEDANCE_VALUE: 494 OHM
MDC_IDC_MSMT_LEADCHNL_RA_PACING_THRESHOLD_AMPLITUDE: 0.75 V
MDC_IDC_MSMT_LEADCHNL_RA_PACING_THRESHOLD_PULSEWIDTH: 0.4 MS
MDC_IDC_MSMT_LEADCHNL_RA_SENSING_INTR_AMPL: 3.6 MV
MDC_IDC_MSMT_LEADCHNL_RV_IMPEDANCE_VALUE: 437 OHM
MDC_IDC_MSMT_LEADCHNL_RV_IMPEDANCE_VALUE: 513 OHM
MDC_IDC_MSMT_LEADCHNL_RV_PACING_THRESHOLD_AMPLITUDE: 0.75 V
MDC_IDC_MSMT_LEADCHNL_RV_PACING_THRESHOLD_PULSEWIDTH: 0.4 MS
MDC_IDC_MSMT_LEADCHNL_RV_SENSING_INTR_AMPL: 22.4 MV
MDC_IDC_PG_IMPLANT_DTM: NORMAL
MDC_IDC_PG_MFG: NORMAL
MDC_IDC_PG_MODEL: NORMAL
MDC_IDC_PG_SERIAL: NORMAL
MDC_IDC_PG_TYPE: NORMAL
MDC_IDC_SESS_CLINIC_NAME: NORMAL
MDC_IDC_SESS_DTM: NORMAL
MDC_IDC_SESS_TYPE: NORMAL
MDC_IDC_SET_BRADY_AT_MODE_SWITCH_RATE: 171 {BEATS}/MIN
MDC_IDC_SET_BRADY_HYSTRATE: NORMAL
MDC_IDC_SET_BRADY_LOWRATE: 70 {BEATS}/MIN
MDC_IDC_SET_BRADY_MAX_SENSOR_RATE: 130 {BEATS}/MIN
MDC_IDC_SET_BRADY_MAX_TRACKING_RATE: 130 {BEATS}/MIN
MDC_IDC_SET_BRADY_MODE: NORMAL
MDC_IDC_SET_BRADY_NIGHT_RATE: 60 {BEATS}/MIN
MDC_IDC_SET_BRADY_PAV_DELAY_LOW: 180 MS
MDC_IDC_SET_BRADY_SAV_DELAY_LOW: 150 MS
MDC_IDC_SET_LEADCHNL_RA_PACING_AMPLITUDE: 3.5 V
MDC_IDC_SET_LEADCHNL_RA_PACING_ANODE_ELECTRODE_1: NORMAL
MDC_IDC_SET_LEADCHNL_RA_PACING_ANODE_LOCATION_1: NORMAL
MDC_IDC_SET_LEADCHNL_RA_PACING_CAPTURE_MODE: NORMAL
MDC_IDC_SET_LEADCHNL_RA_PACING_CATHODE_ELECTRODE_1: NORMAL
MDC_IDC_SET_LEADCHNL_RA_PACING_CATHODE_LOCATION_1: NORMAL
MDC_IDC_SET_LEADCHNL_RA_PACING_POLARITY: NORMAL
MDC_IDC_SET_LEADCHNL_RA_PACING_PULSEWIDTH: 0.4 MS
MDC_IDC_SET_LEADCHNL_RA_SENSING_ANODE_ELECTRODE_1: NORMAL
MDC_IDC_SET_LEADCHNL_RA_SENSING_ANODE_LOCATION_1: NORMAL
MDC_IDC_SET_LEADCHNL_RA_SENSING_CATHODE_ELECTRODE_1: NORMAL
MDC_IDC_SET_LEADCHNL_RA_SENSING_CATHODE_LOCATION_1: NORMAL
MDC_IDC_SET_LEADCHNL_RA_SENSING_POLARITY: NORMAL
MDC_IDC_SET_LEADCHNL_RA_SENSING_SENSITIVITY: 0.3 MV
MDC_IDC_SET_LEADCHNL_RV_PACING_AMPLITUDE: 3.5 V
MDC_IDC_SET_LEADCHNL_RV_PACING_ANODE_ELECTRODE_1: NORMAL
MDC_IDC_SET_LEADCHNL_RV_PACING_ANODE_LOCATION_1: NORMAL
MDC_IDC_SET_LEADCHNL_RV_PACING_CAPTURE_MODE: NORMAL
MDC_IDC_SET_LEADCHNL_RV_PACING_CATHODE_ELECTRODE_1: NORMAL
MDC_IDC_SET_LEADCHNL_RV_PACING_CATHODE_LOCATION_1: NORMAL
MDC_IDC_SET_LEADCHNL_RV_PACING_POLARITY: NORMAL
MDC_IDC_SET_LEADCHNL_RV_PACING_PULSEWIDTH: 0.4 MS
MDC_IDC_SET_LEADCHNL_RV_SENSING_ANODE_ELECTRODE_1: NORMAL
MDC_IDC_SET_LEADCHNL_RV_SENSING_ANODE_LOCATION_1: NORMAL
MDC_IDC_SET_LEADCHNL_RV_SENSING_CATHODE_ELECTRODE_1: NORMAL
MDC_IDC_SET_LEADCHNL_RV_SENSING_CATHODE_LOCATION_1: NORMAL
MDC_IDC_SET_LEADCHNL_RV_SENSING_POLARITY: NORMAL
MDC_IDC_SET_LEADCHNL_RV_SENSING_SENSITIVITY: 0.9 MV
MDC_IDC_SET_ZONE_DETECTION_INTERVAL: 350 MS
MDC_IDC_SET_ZONE_DETECTION_INTERVAL: 400 MS
MDC_IDC_SET_ZONE_TYPE: NORMAL
MDC_IDC_STAT_AT_BURDEN_PERCENT: 0 %
MDC_IDC_STAT_AT_DTM_END: NORMAL
MDC_IDC_STAT_AT_DTM_START: NORMAL
MDC_IDC_STAT_BRADY_AP_VP_PERCENT: 0.07 %
MDC_IDC_STAT_BRADY_AP_VS_PERCENT: 92.07 %
MDC_IDC_STAT_BRADY_AS_VP_PERCENT: 0 %
MDC_IDC_STAT_BRADY_AS_VS_PERCENT: 7.86 %
MDC_IDC_STAT_BRADY_DTM_END: NORMAL
MDC_IDC_STAT_BRADY_DTM_START: NORMAL
MDC_IDC_STAT_BRADY_RA_PERCENT_PACED: 92.18 %
MDC_IDC_STAT_BRADY_RV_PERCENT_PACED: 0.07 %
MDC_IDC_STAT_EPISODE_RECENT_COUNT: 0
MDC_IDC_STAT_EPISODE_RECENT_COUNT: 0
MDC_IDC_STAT_EPISODE_RECENT_COUNT: 13
MDC_IDC_STAT_EPISODE_RECENT_COUNT_DTM_END: NORMAL
MDC_IDC_STAT_EPISODE_RECENT_COUNT_DTM_START: NORMAL
MDC_IDC_STAT_EPISODE_TOTAL_COUNT: 0
MDC_IDC_STAT_EPISODE_TOTAL_COUNT: 0
MDC_IDC_STAT_EPISODE_TOTAL_COUNT: 14
MDC_IDC_STAT_EPISODE_TOTAL_COUNT_DTM_END: NORMAL
MDC_IDC_STAT_EPISODE_TOTAL_COUNT_DTM_START: NORMAL
MDC_IDC_STAT_EPISODE_TYPE: NORMAL

## 2022-02-09 PROCEDURE — G0463 HOSPITAL OUTPT CLINIC VISIT: HCPCS | Mod: 25

## 2022-02-09 PROCEDURE — 93280 PM DEVICE PROGR EVAL DUAL: CPT | Performed by: INTERNAL MEDICINE

## 2022-02-09 PROCEDURE — 99215 OFFICE O/P EST HI 40 MIN: CPT | Mod: 25 | Performed by: INTERNAL MEDICINE

## 2022-02-09 RX ORDER — METOPROLOL SUCCINATE 50 MG/1
50 TABLET, EXTENDED RELEASE ORAL DAILY
Qty: 90 TABLET | Refills: 3 | Status: SHIPPED | OUTPATIENT
Start: 2022-02-09 | End: 2022-05-05

## 2022-02-09 RX ORDER — FLECAINIDE ACETATE 100 MG/1
100 TABLET ORAL 2 TIMES DAILY
Qty: 180 TABLET | Refills: 3 | Status: SHIPPED | OUTPATIENT
Start: 2022-02-09 | End: 2022-05-05

## 2022-02-09 ASSESSMENT — MIFFLIN-ST. JEOR: SCORE: 2021.79

## 2022-02-09 ASSESSMENT — PAIN SCALES - GENERAL: PAINLEVEL: NO PAIN (0)

## 2022-02-09 NOTE — NURSING NOTE
Chief Complaint   Patient presents with     Follow Up     3 mo f/u post tilt w/eeg and ppm implant for Carotid Sinus Syn. poss SND     Vitals were taken and medications reconciled.    Colton Gupta, EMT  1:30 PM

## 2022-02-09 NOTE — PATIENT INSTRUCTIONS
You were seen in the Electrophysiology Clinic today by: Dr Jackson    Plan:     Medication Changes:     DECREASE - Isosorbide 15mg once daily    START- Flecainide 100mg twice a day    START- Metoprolol XL 50mg once daily      Labs/Tests Needed:    Lexiscan       Follow up visit:    2-3 months after Lexiscan     Send remote pacemaker transmission in 2 months        Prep for Lexiscan (agata-nuc stress test)   Report to the  [facility] Waiting Room at the [facility]. The hospital is located at [address] [place] [address] [place].     - This test can take up to 3 hours.   - NPO 3 hours prior, Water is ok and encouraged   - NO alcohol, smoking, caffeine, or decaf products 12 hours prior   -TAKE ALL medications as prescribed, hold the following medications if they pertain to you:  If you take Aggrenox or dipyridamole (Persantine, Permole), stop taking it 48 hours before your test.  If you take Viagra, Cialis or Levitra, stop taking it 48 hours before your test.  If you take theophylline or aminophylline, stop taking it 12 hours before your test.  For patients with diabetes:If you take insulin, call your diabetes care team. Ask if you should take a 1/2 dose the morning of your test.  If you take diabetes medicine by mouth, don t take it on the morning of your test. Bring it with you to take after the test. (If you have questions, call your diabetes care team.)    Do not take nitrates on the day of your test. Do not wear your Nitro-Patch        Your Care Team:  EP Cardiology   Telephone Number     Nurse Line  Lazara Brown RN  [phone]     For scheduling appts or procedures:    Diann Jain   [phone]   For the Device Clinic (Pacemakers, ICDs, Loop Recorders)    During business hours: [phone]  After business hours:   [phone]- select option 4 and ask for job code [phone].     On-call cardiologist for after hours or on weekends: [phone], option #4, and ask to speak to the on-call  cardiologist.     Cardiovascular Clinic:   [address] [facility]. [place] [id number]      As always, Thank you for trusting us with your health care needs!

## 2022-02-09 NOTE — PATIENT INSTRUCTIONS
It was a pleasure to see you in clinic today.  Please do not hesitate to call with any questions or concerns.  You are scheduled for a remote transmission on 5/10/22.  We look forward to seeing you in clinic at your next device check in 12 months.    Dimitry Gonzalez, RN  Electrophysiology Nurse Clinician  [facility]    During Business Hours Please Call:  [phone]  After Hours Please Call:  [phone] - select option #4 and ask for job code [phone]
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
## 2022-02-18 ENCOUNTER — HOSPITAL ENCOUNTER (EMERGENCY)
Facility: CLINIC | Age: 67
Discharge: HOME | End: 2022-02-18
Attending: STUDENT IN AN ORGANIZED HEALTH CARE EDUCATION/TRAINING PROGRAM | Admitting: STUDENT IN AN ORGANIZED HEALTH CARE EDUCATION/TRAINING PROGRAM
Payer: MEDICARE

## 2022-02-18 ENCOUNTER — NURSE TRIAGE (OUTPATIENT)
Dept: CARDIOLOGY | Facility: CLINIC | Age: 67
End: 2022-02-18
Payer: MEDICARE

## 2022-02-18 ENCOUNTER — APPOINTMENT (OUTPATIENT)
Dept: GENERAL RADIOLOGY | Facility: CLINIC | Age: 67
End: 2022-02-18
Attending: STUDENT IN AN ORGANIZED HEALTH CARE EDUCATION/TRAINING PROGRAM
Payer: MEDICARE

## 2022-02-18 VITALS
WEIGHT: 260 LBS | SYSTOLIC BLOOD PRESSURE: 149 MMHG | TEMPERATURE: 97.3 F | HEIGHT: 73 IN | BODY MASS INDEX: 34.46 KG/M2 | HEART RATE: 75 BPM | RESPIRATION RATE: 23 BRPM | OXYGEN SATURATION: 97 % | DIASTOLIC BLOOD PRESSURE: 115 MMHG

## 2022-02-18 DIAGNOSIS — M89.8X1 PAIN OF LEFT SCAPULA: ICD-10-CM

## 2022-02-18 PROBLEM — Z79.01 LONG TERM CURRENT USE OF ANTICOAGULANT THERAPY: Status: ACTIVE | Noted: 2022-02-18

## 2022-02-18 PROBLEM — H90.3 SENSORINEURAL HEARING LOSS (SNHL) OF BOTH EARS: Status: ACTIVE | Noted: 2022-02-18

## 2022-02-18 PROBLEM — F07.81 POSTCONCUSSION SYNDROME: Status: ACTIVE | Noted: 2022-02-18

## 2022-02-18 PROBLEM — Z86.718 HISTORY OF DVT (DEEP VEIN THROMBOSIS): Status: ACTIVE | Noted: 2022-02-18

## 2022-02-18 PROBLEM — I26.99 PULMONARY EMBOLISM (H): Status: ACTIVE | Noted: 2022-02-18

## 2022-02-18 PROBLEM — Z87.442 HISTORY OF NEPHROLITHIASIS: Status: ACTIVE | Noted: 2022-02-18

## 2022-02-18 PROBLEM — K76.0 FATTY LIVER: Status: ACTIVE | Noted: 2022-02-18

## 2022-02-18 PROBLEM — Z87.820 HISTORY OF TRAUMATIC BRAIN INJURY: Status: ACTIVE | Noted: 2022-02-18

## 2022-02-18 PROBLEM — F43.10 POSTTRAUMATIC STRESS DISORDER: Status: ACTIVE | Noted: 2022-02-18

## 2022-02-18 PROBLEM — Z95.0 CARDIAC PACEMAKER IN SITU: Status: ACTIVE | Noted: 2022-02-18

## 2022-02-18 PROBLEM — H52.4 PRESBYOPIA: Status: ACTIVE | Noted: 2022-02-18

## 2022-02-18 PROBLEM — K21.9 GASTRO-ESOPHAGEAL REFLUX DISEASE WITHOUT ESOPHAGITIS: Status: ACTIVE | Noted: 2022-02-18

## 2022-02-18 PROBLEM — Z86.711 HISTORY OF PULMONARY EMBOLISM: Status: ACTIVE | Noted: 2022-02-18

## 2022-02-18 LAB
ALBUMIN SERPL-MCNC: 3.9 G/DL (ref 3.4–5)
ALP SERPL-CCNC: 99 U/L (ref 40–150)
ALT SERPL W P-5'-P-CCNC: 35 U/L (ref 0–70)
ANION GAP SERPL CALCULATED.3IONS-SCNC: 6 MMOL/L (ref 3–14)
AST SERPL W P-5'-P-CCNC: 18 U/L (ref 0–45)
BASOPHILS # BLD AUTO: 0 10E3/UL (ref 0–0.2)
BASOPHILS NFR BLD AUTO: 1 %
BILIRUB SERPL-MCNC: 0.5 MG/DL (ref 0.2–1.3)
BUN SERPL-MCNC: 17 MG/DL (ref 7–30)
CALCIUM SERPL-MCNC: 9 MG/DL (ref 8.5–10.1)
CHLORIDE BLD-SCNC: 108 MMOL/L (ref 94–109)
CO2 SERPL-SCNC: 24 MMOL/L (ref 20–32)
CREAT SERPL-MCNC: 0.94 MG/DL (ref 0.66–1.25)
D DIMER PPP FEU-MCNC: <0.27 UG/ML FEU (ref 0–0.5)
EOSINOPHIL # BLD AUTO: 0.2 10E3/UL (ref 0–0.7)
EOSINOPHIL NFR BLD AUTO: 4 %
ERYTHROCYTE [DISTWIDTH] IN BLOOD BY AUTOMATED COUNT: 12.8 % (ref 10–15)
GFR SERPL CREATININE-BSD FRML MDRD: 90 ML/MIN/1.73M2
GLUCOSE BLD-MCNC: 136 MG/DL (ref 70–99)
HCT VFR BLD AUTO: 47.1 % (ref 40–53)
HGB BLD-MCNC: 15.7 G/DL (ref 13.3–17.7)
HOLD SPECIMEN: NORMAL
IMM GRANULOCYTES # BLD: 0 10E3/UL
IMM GRANULOCYTES NFR BLD: 0 %
INR PPP: 1.33 (ref 0.85–1.15)
LYMPHOCYTES # BLD AUTO: 1.4 10E3/UL (ref 0.8–5.3)
LYMPHOCYTES NFR BLD AUTO: 23 %
MCH RBC QN AUTO: 29.7 PG (ref 26.5–33)
MCHC RBC AUTO-ENTMCNC: 33.3 G/DL (ref 31.5–36.5)
MCV RBC AUTO: 89 FL (ref 78–100)
MONOCYTES # BLD AUTO: 0.6 10E3/UL (ref 0–1.3)
MONOCYTES NFR BLD AUTO: 10 %
NEUTROPHILS # BLD AUTO: 3.7 10E3/UL (ref 1.6–8.3)
NEUTROPHILS NFR BLD AUTO: 62 %
NRBC # BLD AUTO: 0 10E3/UL
NRBC BLD AUTO-RTO: 0 /100
PLATELET # BLD AUTO: 168 10E3/UL (ref 150–450)
POTASSIUM BLD-SCNC: 4 MMOL/L (ref 3.4–5.3)
PROT SERPL-MCNC: 7.9 G/DL (ref 6.8–8.8)
RBC # BLD AUTO: 5.28 10E6/UL (ref 4.4–5.9)
SODIUM SERPL-SCNC: 138 MMOL/L (ref 133–144)
TROPONIN I SERPL HS-MCNC: 124 NG/L
WBC # BLD AUTO: 6 10E3/UL (ref 4–11)

## 2022-02-18 PROCEDURE — 85379 FIBRIN DEGRADATION QUANT: CPT | Performed by: STUDENT IN AN ORGANIZED HEALTH CARE EDUCATION/TRAINING PROGRAM

## 2022-02-18 PROCEDURE — 80053 COMPREHEN METABOLIC PANEL: CPT | Performed by: STUDENT IN AN ORGANIZED HEALTH CARE EDUCATION/TRAINING PROGRAM

## 2022-02-18 PROCEDURE — 84484 ASSAY OF TROPONIN QUANT: CPT | Performed by: STUDENT IN AN ORGANIZED HEALTH CARE EDUCATION/TRAINING PROGRAM

## 2022-02-18 PROCEDURE — 36415 COLL VENOUS BLD VENIPUNCTURE: CPT | Performed by: STUDENT IN AN ORGANIZED HEALTH CARE EDUCATION/TRAINING PROGRAM

## 2022-02-18 PROCEDURE — 71046 X-RAY EXAM CHEST 2 VIEWS: CPT

## 2022-02-18 PROCEDURE — 82040 ASSAY OF SERUM ALBUMIN: CPT | Performed by: STUDENT IN AN ORGANIZED HEALTH CARE EDUCATION/TRAINING PROGRAM

## 2022-02-18 PROCEDURE — 85610 PROTHROMBIN TIME: CPT | Performed by: STUDENT IN AN ORGANIZED HEALTH CARE EDUCATION/TRAINING PROGRAM

## 2022-02-18 PROCEDURE — 93010 ELECTROCARDIOGRAM REPORT: CPT | Performed by: STUDENT IN AN ORGANIZED HEALTH CARE EDUCATION/TRAINING PROGRAM

## 2022-02-18 PROCEDURE — 99285 EMERGENCY DEPT VISIT HI MDM: CPT | Mod: 25 | Performed by: STUDENT IN AN ORGANIZED HEALTH CARE EDUCATION/TRAINING PROGRAM

## 2022-02-18 PROCEDURE — 93005 ELECTROCARDIOGRAM TRACING: CPT | Performed by: STUDENT IN AN ORGANIZED HEALTH CARE EDUCATION/TRAINING PROGRAM

## 2022-02-18 PROCEDURE — 85004 AUTOMATED DIFF WBC COUNT: CPT | Performed by: STUDENT IN AN ORGANIZED HEALTH CARE EDUCATION/TRAINING PROGRAM

## 2022-02-18 NOTE — TELEPHONE ENCOUNTER
Per Dr Jackson: present to ER.     Spoke to patient about above recommendation. He is agreeable and will go to the ER in [place].

## 2022-02-18 NOTE — TELEPHONE ENCOUNTER
I notified Allison RUELAS RN covering for [facility] Regarding this message and I will route this message to the EP pool for further review.    Chris CORTEZ

## 2022-02-18 NOTE — TELEPHONE ENCOUNTER
"  Additional Information    Nursing judgment    Answer Assessment - Initial Assessment Questions  1. REASON FOR CALL or QUESTION: \"What is your reason for calling today?\" or \"How can I best help you?\" or \"What question do you have that I can help answer?\"        See note.    Protocols used: NO PROTOCOL AVAILABLE - INFORMATION ONLY-A-[place]    "

## 2022-02-18 NOTE — ED NOTES
Patient states he has a sharp pain in left upper back/shoulder area. States it started last Wednesday and went away and is now back. Denies SHORTNESS OF AIR had a pacemaker and is on blood thinners

## 2022-02-18 NOTE — ED TRIAGE NOTES
"Pt had \"sharp pain in my left scapula with soa that lasted all day Wednesday.\"  Pt called MD and was told to come in.  No soa  Now but cont. With pain.  No recent illness or fevers.  Pt has pacemaker.  "

## 2022-02-18 NOTE — TELEPHONE ENCOUNTER
"Patient had sudden onset of left shoulder blade  pain with extreme difficulty breathing for at least 20 minutes on 2/16. He was bring groceries in from the car at the time. He said the pain made it hard to breathe. He rates the pain at that time 8-9/10. He took Tylenol and rested, it \"mini subsided.\"  He is still having the pain but it has not been intense. He is taking his meds as ordered. He has not taken any Nitroglycerin. I asked him to take his BP -143/89 with a pulse of 86. His wife wanted him to call. I told him I would send a message to Dr. Jackson's nurse to call him. He will wait for the call. I told him if he gets the sharp pain and/or difficulty breathing before they call back he is to call 911. He is in agreement.  "
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
## 2022-02-23 ENCOUNTER — HOSPITAL ENCOUNTER (OUTPATIENT)
Dept: NUCLEAR MEDICINE | Facility: CLINIC | Age: 67
Setting detail: NUCLEAR MEDICINE
End: 2022-02-23
Attending: INTERNAL MEDICINE
Payer: MEDICARE

## 2022-02-23 ENCOUNTER — HOSPITAL ENCOUNTER (OUTPATIENT)
Dept: CARDIOLOGY | Facility: CLINIC | Age: 67
End: 2022-02-23
Attending: INTERNAL MEDICINE
Payer: MEDICARE

## 2022-02-23 DIAGNOSIS — I21.4 NSTEMI (NON-ST ELEVATED MYOCARDIAL INFARCTION) (H): ICD-10-CM

## 2022-02-23 DIAGNOSIS — G90.01 CAROTID SINUS SYNDROME: ICD-10-CM

## 2022-02-23 LAB
CV BLOOD PRESSURE: 53 MMHG
CV STRESS MAX HR HE: 92
NUC STRESS EJECTION FRACTION: 5 %
RATE PRESSURE PRODUCT: NORMAL
STRESS ECHO BASELINE DIASTOLIC HE: 82
STRESS ECHO BASELINE HR: 73 BPM
STRESS ECHO BASELINE SYSTOLIC BP: 136
STRESS ECHO CALCULATED PERCENT HR: 59 %
STRESS ECHO LAST STRESS DIASTOLIC BP: 76
STRESS ECHO LAST STRESS SYSTOLIC BP: 124
STRESS ECHO TARGET HR: 155
STRESS/REST PERFUSION RATIO: 1.11

## 2022-02-23 PROCEDURE — 78452 HT MUSCLE IMAGE SPECT MULT: CPT | Mod: 26 | Performed by: INTERNAL MEDICINE

## 2022-02-23 PROCEDURE — 250N000011 HC RX IP 250 OP 636: Performed by: INTERNAL MEDICINE

## 2022-02-23 PROCEDURE — 343N000001 HC RX 343: Performed by: INTERNAL MEDICINE

## 2022-02-23 PROCEDURE — G1004 CDSM NDSC: HCPCS | Performed by: INTERNAL MEDICINE

## 2022-02-23 PROCEDURE — 93016 CV STRESS TEST SUPVJ ONLY: CPT | Performed by: INTERNAL MEDICINE

## 2022-02-23 PROCEDURE — A9502 TC99M TETROFOSMIN: HCPCS | Performed by: INTERNAL MEDICINE

## 2022-02-23 PROCEDURE — 78452 HT MUSCLE IMAGE SPECT MULT: CPT | Mod: MG

## 2022-02-23 PROCEDURE — 93017 CV STRESS TEST TRACING ONLY: CPT

## 2022-02-23 RX ORDER — REGADENOSON 0.08 MG/ML
0.4 INJECTION, SOLUTION INTRAVENOUS ONCE
Status: COMPLETED | OUTPATIENT
Start: 2022-02-23 | End: 2022-02-23

## 2022-02-23 RX ADMIN — TETROFOSMIN 13.2 MCI.: 1.38 INJECTION, POWDER, LYOPHILIZED, FOR SOLUTION INTRAVENOUS at 07:50

## 2022-02-23 RX ADMIN — REGADENOSON 0.4 MG: 0.08 INJECTION, SOLUTION INTRAVENOUS at 09:11

## 2022-02-23 RX ADMIN — TETROFOSMIN 36.1 MCI.: 1.38 INJECTION, POWDER, LYOPHILIZED, FOR SOLUTION INTRAVENOUS at 09:17

## 2022-03-19 ENCOUNTER — HEALTH MAINTENANCE LETTER (OUTPATIENT)
Age: 67
End: 2022-03-19

## 2022-03-25 ENCOUNTER — TELEPHONE (OUTPATIENT)
Dept: UROLOGY | Facility: CLINIC | Age: 67
End: 2022-03-25
Payer: MEDICARE

## 2022-03-25 DIAGNOSIS — R33.9 BLADDER RETENTION: ICD-10-CM

## 2022-03-25 RX ORDER — TAMSULOSIN HYDROCHLORIDE 0.4 MG/1
0.4 CAPSULE ORAL DAILY
Qty: 90 CAPSULE | Refills: 0 | Status: SHIPPED | OUTPATIENT
Start: 2022-03-25 | End: 2022-07-05

## 2022-03-25 NOTE — TELEPHONE ENCOUNTER
Called pt, he was advised that he is due for his annual visit.  Appt scheduled for July 5th.  Refill authorized through this date.    Khadra Casiano  [facility] RN

## 2022-03-25 NOTE — TELEPHONE ENCOUNTER
[facility] Call Center    Phone Message    May a detailed message be left on voicemail: yes     Reason for Call: Medication Refill Request    Has the patient contacted the pharmacy for the refill? Yes   Name of medication being requested: tamsulosin (FLOMAX) 0.4 MG capsule  Provider who prescribed the medication: Dr. Yung  Pharmacy: [email or web address] - [address]  Date medication is needed: 3/25/22         Action Taken: Other: [facility]    Travel Screening: Not Applicable

## 2022-05-02 ENCOUNTER — ANCILLARY PROCEDURE (OUTPATIENT)
Dept: CARDIOLOGY | Facility: CLINIC | Age: 67
End: 2022-05-02
Attending: INTERNAL MEDICINE
Payer: MEDICARE

## 2022-05-02 DIAGNOSIS — R55 SYNCOPE, UNSPECIFIED SYNCOPE TYPE: ICD-10-CM

## 2022-05-02 PROCEDURE — 93296 REM INTERROG EVL PM/IDS: CPT

## 2022-05-02 PROCEDURE — 93294 REM INTERROG EVL PM/LDLS PM: CPT | Performed by: INTERNAL MEDICINE

## 2022-05-05 ENCOUNTER — VIRTUAL VISIT (OUTPATIENT)
Dept: CARDIOLOGY | Facility: CLINIC | Age: 67
End: 2022-05-05
Attending: INTERNAL MEDICINE
Payer: MEDICARE

## 2022-05-05 DIAGNOSIS — I21.4 NSTEMI (NON-ST ELEVATED MYOCARDIAL INFARCTION) (H): ICD-10-CM

## 2022-05-05 DIAGNOSIS — G90.01 CAROTID SINUS SYNDROME: ICD-10-CM

## 2022-05-05 PROCEDURE — 99214 OFFICE O/P EST MOD 30 MIN: CPT | Mod: 95 | Performed by: INTERNAL MEDICINE

## 2022-05-05 RX ORDER — FLECAINIDE ACETATE 100 MG/1
100 TABLET ORAL 2 TIMES DAILY
Qty: 180 TABLET | Refills: 3 | Status: SHIPPED | OUTPATIENT
Start: 2022-05-05 | End: 2023-04-17

## 2022-05-05 RX ORDER — METOPROLOL SUCCINATE 50 MG/1
50 TABLET, EXTENDED RELEASE ORAL DAILY
Qty: 90 TABLET | Refills: 3 | Status: SHIPPED | OUTPATIENT
Start: 2022-05-05 | End: 2023-04-17
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
## 2022-05-05 NOTE — PATIENT INSTRUCTIONS
You were seen in the Electrophysiology Clinic today by: Dr Jackson    Plan:     Follow up visit:  Dr Jackson in 6 months with device check prior        Your Care Team:  EP Cardiology   Telephone Number     Nurse Line  Lazara Brown RN  [phone]     For scheduling appts or procedures:    Diann Jain   [phone]   For the Device Clinic (Pacemakers, ICDs, Loop Recorders)    During business hours: [phone]  After business hours:   [phone]- select option 4 and ask for job code [id number].     On-call cardiologist for after hours or on weekends: [phone], option #4, and ask to speak to the on-call cardiologist.     Cardiovascular Clinic:   [address] [facility] [place] [address]      As always, Thank you for trusting us with your health care needs!

## 2022-05-05 NOTE — NURSING NOTE
Chief Complaint   Patient presents with     Follow Up     3 month, Pt has some questions about medications that he will discuss during the visit     Patient denies any changes since echeck-in regarding medication and allergies and states all information entered during echeck-in remains accurate.    Marquise Miles, LESLIE/CMA

## 2022-05-06 ENCOUNTER — TELEPHONE (OUTPATIENT)
Dept: CARDIOLOGY | Facility: CLINIC | Age: 67
End: 2022-05-06
Payer: MEDICARE

## 2022-05-06 NOTE — TELEPHONE ENCOUNTER
[facility] Call Center    Phone Message    May a detailed message be left on voicemail: yes     Reason for Call: Medication Refill Request    Has the patient contacted the pharmacy for the refill? Yes   Name of medication being requested: Flecainide and Metoprolol  Provider who prescribed the medication: Dr. Jackson  Pharmacy:[facility] [address] [place] [address]   Date medication is needed: ASAKATIANA       Arya stated that he does not have enough to last until his prescriptions get mailed to him. He would like a weeks worth sent to [facility] in [place]. Please call Arya to let him know when this has been done. Thank you.       Action Taken: Message routed to:  [facility] ([facility] Cardio    Travel Screening: Not Applicable

## 2022-05-06 NOTE — TELEPHONE ENCOUNTER
Called pt, left voicemail that refills had been originally sent in February to [facility] pharmacy so he can call them for a refill.   Lazara Brown RN on 5/6/2022 at 11:32 AM

## 2022-05-12 ENCOUNTER — APPOINTMENT (OUTPATIENT)
Dept: CT IMAGING | Facility: CLINIC | Age: 67
End: 2022-05-12
Attending: FAMILY MEDICINE
Payer: MEDICARE

## 2022-05-12 ENCOUNTER — HOSPITAL ENCOUNTER (EMERGENCY)
Facility: CLINIC | Age: 67
Discharge: HOME | End: 2022-05-12
Attending: FAMILY MEDICINE | Admitting: FAMILY MEDICINE
Payer: MEDICARE

## 2022-05-12 VITALS — SYSTOLIC BLOOD PRESSURE: 126 MMHG | HEART RATE: 71 BPM | DIASTOLIC BLOOD PRESSURE: 86 MMHG | OXYGEN SATURATION: 97 %

## 2022-05-12 DIAGNOSIS — S06.0X0A CONCUSSION WITHOUT LOSS OF CONSCIOUSNESS, INITIAL ENCOUNTER: ICD-10-CM

## 2022-05-12 PROCEDURE — 99284 EMERGENCY DEPT VISIT MOD MDM: CPT | Mod: 25 | Performed by: FAMILY MEDICINE

## 2022-05-12 PROCEDURE — 99282 EMERGENCY DEPT VISIT SF MDM: CPT | Performed by: FAMILY MEDICINE

## 2022-05-12 PROCEDURE — 70450 CT HEAD/BRAIN W/O DYE: CPT

## 2022-05-12 NOTE — DISCHARGE INSTRUCTIONS
RETURN TO THE EMERGENCY ROOM FOR THE FOLLOWING:    Severely worsened pain, vomiting and dehydration, concerning changes in behavior, or at anytime for any concern.    FOLLOW UP:    Consider follow-up with the concussion clinic if not improved over the next 7 days.  See contact information provided for scheduling.    TREATMENT RECOMMENDATIONS:    Tylenol for pain as needed.    NURSE ADVICE LINE:  [phone] or [phone]
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
## 2022-05-17 ENCOUNTER — TELEPHONE (OUTPATIENT)
Dept: CARDIOLOGY | Facility: CLINIC | Age: 67
End: 2022-05-17
Payer: MEDICARE

## 2022-05-17 NOTE — TELEPHONE ENCOUNTER
Per Dr Jackson- [facility] report from 5/2 looks fine, no evidence of anything that would indicate his symptoms. [facility] to [facility]ology for symptoms.       Called pt, left voicemail for pt to return call.   Lazara Brown RN on 5/17/2022 at 4:27 PM

## 2022-05-17 NOTE — TELEPHONE ENCOUNTER
[facility] Call Center    Phone Message    May a detailed message be left on voicemail: yes     Reason for Call: Other: Pt would like Lazara to call him to discuss his pacemaker, he doesn't know when he is to push the button and info was not uploaded to review for him     Action Taken: Message routed to:  [facility] Cardio    Travel Screening: Not Applicable
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
## 2022-05-18 LAB
MDC_IDC_LEAD_IMPLANT_DT: NORMAL
MDC_IDC_LEAD_IMPLANT_DT: NORMAL
MDC_IDC_LEAD_LOCATION: NORMAL
MDC_IDC_LEAD_LOCATION: NORMAL
MDC_IDC_LEAD_LOCATION_DETAIL_1: NORMAL
MDC_IDC_LEAD_LOCATION_DETAIL_1: NORMAL
MDC_IDC_LEAD_MFG: NORMAL
MDC_IDC_LEAD_MFG: NORMAL
MDC_IDC_LEAD_MODEL: NORMAL
MDC_IDC_LEAD_MODEL: NORMAL
MDC_IDC_LEAD_POLARITY_TYPE: NORMAL
MDC_IDC_LEAD_POLARITY_TYPE: NORMAL
MDC_IDC_LEAD_SERIAL: NORMAL
MDC_IDC_LEAD_SERIAL: NORMAL
MDC_IDC_LEAD_SPECIAL_FUNCTION: NORMAL
MDC_IDC_LEAD_SPECIAL_FUNCTION: NORMAL
MDC_IDC_MSMT_BATTERY_DTM: NORMAL
MDC_IDC_MSMT_BATTERY_REMAINING_LONGEVITY: 153 MO
MDC_IDC_MSMT_BATTERY_RRT_TRIGGER: 2.62
MDC_IDC_MSMT_BATTERY_STATUS: NORMAL
MDC_IDC_MSMT_BATTERY_VOLTAGE: 3.12 V
MDC_IDC_MSMT_LEADCHNL_RA_IMPEDANCE_VALUE: 342 OHM
MDC_IDC_MSMT_LEADCHNL_RA_IMPEDANCE_VALUE: 494 OHM
MDC_IDC_MSMT_LEADCHNL_RA_PACING_THRESHOLD_AMPLITUDE: 0.88 V
MDC_IDC_MSMT_LEADCHNL_RA_PACING_THRESHOLD_PULSEWIDTH: 0.4 MS
MDC_IDC_MSMT_LEADCHNL_RA_SENSING_INTR_AMPL: 3.25 MV
MDC_IDC_MSMT_LEADCHNL_RA_SENSING_INTR_AMPL: 3.25 MV
MDC_IDC_MSMT_LEADCHNL_RV_IMPEDANCE_VALUE: 418 OHM
MDC_IDC_MSMT_LEADCHNL_RV_IMPEDANCE_VALUE: 494 OHM
MDC_IDC_MSMT_LEADCHNL_RV_PACING_THRESHOLD_AMPLITUDE: 0.88 V
MDC_IDC_MSMT_LEADCHNL_RV_PACING_THRESHOLD_PULSEWIDTH: 0.4 MS
MDC_IDC_MSMT_LEADCHNL_RV_SENSING_INTR_AMPL: 22.25 MV
MDC_IDC_MSMT_LEADCHNL_RV_SENSING_INTR_AMPL: 22.25 MV
MDC_IDC_PG_IMPLANT_DTM: NORMAL
MDC_IDC_PG_MFG: NORMAL
MDC_IDC_PG_MODEL: NORMAL
MDC_IDC_PG_SERIAL: NORMAL
MDC_IDC_PG_TYPE: NORMAL
MDC_IDC_SESS_CLINIC_NAME: NORMAL
MDC_IDC_SESS_DTM: NORMAL
MDC_IDC_SESS_TYPE: NORMAL
MDC_IDC_SET_BRADY_AT_MODE_SWITCH_RATE: 171 {BEATS}/MIN
MDC_IDC_SET_BRADY_HYSTRATE: NORMAL
MDC_IDC_SET_BRADY_LOWRATE: 70 {BEATS}/MIN
MDC_IDC_SET_BRADY_MAX_SENSOR_RATE: 130 {BEATS}/MIN
MDC_IDC_SET_BRADY_MAX_TRACKING_RATE: 130 {BEATS}/MIN
MDC_IDC_SET_BRADY_MODE: NORMAL
MDC_IDC_SET_BRADY_NIGHT_RATE: 60 {BEATS}/MIN
MDC_IDC_SET_BRADY_PAV_DELAY_LOW: 180 MS
MDC_IDC_SET_BRADY_SAV_DELAY_LOW: 150 MS
MDC_IDC_SET_LEADCHNL_RA_PACING_AMPLITUDE: 1.75 V
MDC_IDC_SET_LEADCHNL_RA_PACING_ANODE_ELECTRODE_1: NORMAL
MDC_IDC_SET_LEADCHNL_RA_PACING_ANODE_LOCATION_1: NORMAL
MDC_IDC_SET_LEADCHNL_RA_PACING_CAPTURE_MODE: NORMAL
MDC_IDC_SET_LEADCHNL_RA_PACING_CATHODE_ELECTRODE_1: NORMAL
MDC_IDC_SET_LEADCHNL_RA_PACING_CATHODE_LOCATION_1: NORMAL
MDC_IDC_SET_LEADCHNL_RA_PACING_POLARITY: NORMAL
MDC_IDC_SET_LEADCHNL_RA_PACING_PULSEWIDTH: 0.4 MS
MDC_IDC_SET_LEADCHNL_RA_SENSING_ANODE_ELECTRODE_1: NORMAL
MDC_IDC_SET_LEADCHNL_RA_SENSING_ANODE_LOCATION_1: NORMAL
MDC_IDC_SET_LEADCHNL_RA_SENSING_CATHODE_ELECTRODE_1: NORMAL
MDC_IDC_SET_LEADCHNL_RA_SENSING_CATHODE_LOCATION_1: NORMAL
MDC_IDC_SET_LEADCHNL_RA_SENSING_POLARITY: NORMAL
MDC_IDC_SET_LEADCHNL_RA_SENSING_SENSITIVITY: 0.3 MV
MDC_IDC_SET_LEADCHNL_RV_PACING_AMPLITUDE: 2 V
MDC_IDC_SET_LEADCHNL_RV_PACING_ANODE_ELECTRODE_1: NORMAL
MDC_IDC_SET_LEADCHNL_RV_PACING_ANODE_LOCATION_1: NORMAL
MDC_IDC_SET_LEADCHNL_RV_PACING_CAPTURE_MODE: NORMAL
MDC_IDC_SET_LEADCHNL_RV_PACING_CATHODE_ELECTRODE_1: NORMAL
MDC_IDC_SET_LEADCHNL_RV_PACING_CATHODE_LOCATION_1: NORMAL
MDC_IDC_SET_LEADCHNL_RV_PACING_POLARITY: NORMAL
MDC_IDC_SET_LEADCHNL_RV_PACING_PULSEWIDTH: 0.4 MS
MDC_IDC_SET_LEADCHNL_RV_SENSING_ANODE_ELECTRODE_1: NORMAL
MDC_IDC_SET_LEADCHNL_RV_SENSING_ANODE_LOCATION_1: NORMAL
MDC_IDC_SET_LEADCHNL_RV_SENSING_CATHODE_ELECTRODE_1: NORMAL
MDC_IDC_SET_LEADCHNL_RV_SENSING_CATHODE_LOCATION_1: NORMAL
MDC_IDC_SET_LEADCHNL_RV_SENSING_POLARITY: NORMAL
MDC_IDC_SET_LEADCHNL_RV_SENSING_SENSITIVITY: 0.9 MV
MDC_IDC_SET_ZONE_DETECTION_INTERVAL: 350 MS
MDC_IDC_SET_ZONE_DETECTION_INTERVAL: 400 MS
MDC_IDC_SET_ZONE_TYPE: NORMAL
MDC_IDC_STAT_AT_BURDEN_PERCENT: 0 %
MDC_IDC_STAT_AT_DTM_END: NORMAL
MDC_IDC_STAT_AT_DTM_START: NORMAL
MDC_IDC_STAT_BRADY_AP_VP_PERCENT: 0.25 %
MDC_IDC_STAT_BRADY_AP_VS_PERCENT: 99.18 %
MDC_IDC_STAT_BRADY_AS_VP_PERCENT: 0 %
MDC_IDC_STAT_BRADY_AS_VS_PERCENT: 0.56 %
MDC_IDC_STAT_BRADY_DTM_END: NORMAL
MDC_IDC_STAT_BRADY_DTM_START: NORMAL
MDC_IDC_STAT_BRADY_RA_PERCENT_PACED: 99.46 %
MDC_IDC_STAT_BRADY_RV_PERCENT_PACED: 0.26 %
MDC_IDC_STAT_EPISODE_RECENT_COUNT: 0
MDC_IDC_STAT_EPISODE_RECENT_COUNT_DTM_END: NORMAL
MDC_IDC_STAT_EPISODE_RECENT_COUNT_DTM_START: NORMAL
MDC_IDC_STAT_EPISODE_TOTAL_COUNT: 0
MDC_IDC_STAT_EPISODE_TOTAL_COUNT: 14
MDC_IDC_STAT_EPISODE_TOTAL_COUNT: 5
MDC_IDC_STAT_EPISODE_TOTAL_COUNT_DTM_END: NORMAL
MDC_IDC_STAT_EPISODE_TOTAL_COUNT_DTM_START: NORMAL
MDC_IDC_STAT_EPISODE_TYPE: NORMAL

## 2022-05-20 NOTE — TELEPHONE ENCOUNTER
Called pt, advised him of Dr Joyce message to see PCP and possible pulmonology for further workup regarding breathing issues. Patient verbalizes understandings and will call with further questions or concerns.    Lazara Brown RN on 5/20/2022 at 1:19 PM

## 2022-05-23 ENCOUNTER — LAB REQUISITION (OUTPATIENT)
Dept: LAB | Facility: CLINIC | Age: 67
End: 2022-05-23
Payer: MEDICARE

## 2022-05-23 DIAGNOSIS — E78.5 HYPERLIPIDEMIA, UNSPECIFIED: ICD-10-CM

## 2022-05-23 DIAGNOSIS — I12.9 HYPERTENSIVE CHRONIC KIDNEY DISEASE WITH STAGE 1 THROUGH STAGE 4 CHRONIC KIDNEY DISEASE, OR UNSPECIFIED CHRONIC KIDNEY DISEASE: ICD-10-CM

## 2022-05-23 LAB
ANION GAP SERPL CALCULATED.3IONS-SCNC: 8 MMOL/L (ref 5–18)
BUN SERPL-MCNC: 18 MG/DL (ref 8–22)
CALCIUM SERPL-MCNC: 9.3 MG/DL (ref 8.5–10.5)
CHLORIDE BLD-SCNC: 106 MMOL/L (ref 98–107)
CHOLEST SERPL-MCNC: 121 MG/DL
CO2 SERPL-SCNC: 28 MMOL/L (ref 22–31)
CREAT SERPL-MCNC: 1.05 MG/DL (ref 0.7–1.3)
GFR SERPL CREATININE-BSD FRML MDRD: 79 ML/MIN/1.73M2
GLUCOSE BLD-MCNC: 128 MG/DL (ref 70–125)
HDLC SERPL-MCNC: 34 MG/DL
LDLC SERPL CALC-MCNC: 65 MG/DL
POTASSIUM BLD-SCNC: 4.3 MMOL/L (ref 3.5–5)
SODIUM SERPL-SCNC: 142 MMOL/L (ref 136–145)
TRIGL SERPL-MCNC: 109 MG/DL

## 2022-05-23 PROCEDURE — 80061 LIPID PANEL: CPT | Mod: ORL | Performed by: FAMILY MEDICINE

## 2022-05-23 PROCEDURE — 80048 BASIC METABOLIC PNL TOTAL CA: CPT | Mod: ORL | Performed by: FAMILY MEDICINE

## 2022-06-27 ENCOUNTER — VIRTUAL VISIT (OUTPATIENT)
Dept: NEUROLOGY | Facility: CLINIC | Age: 67
End: 2022-06-27
Payer: MEDICARE

## 2022-06-27 DIAGNOSIS — R42 DIZZINESS: ICD-10-CM

## 2022-06-27 DIAGNOSIS — F07.81 POST CONCUSSION SYNDROME: Primary | ICD-10-CM

## 2022-06-27 PROCEDURE — 99205 OFFICE O/P NEW HI 60 MIN: CPT | Mod: 95 | Performed by: NURSE PRACTITIONER

## 2022-06-27 NOTE — LETTER
"    6/27/2022         RE: Stiven Nguyễn  [address]  [facility] [place] [address]        Dear Colleague,    Thank you for referring your patient, Stiven Nguyễn, to the [facility]. Please see a copy of my visit note below.    Video Visit  Stievn Nguyễn is a 67 year old male who is being evaluated via a billable video visit in light of the ongoing global health crisis (COVID-19) that requires us to abide by social distancing mandates in order to reduce the risk of COVID-19 exposure.      The patient has been notified of following:     \"This virtual visit will be conducted via a video call between you and your physician/provider. We have found that certain health care needs can be provided without the need for a physical exam.  This service lets us provide the care you need with a short video conversation.  If a prescription is necessary we can send it directly to your pharmacy.  If lab work is needed we can place an order for that and you can then stop by our lab to have the test done at a later time.    If during the course of the call the physician/provider feels a video visit is not appropriate, you will not be charged for this service.\"     Patient has given verbal consent to a Video visit? Yes    Stiven Nguyễn chief complaint is: Post Concussion Syndrome      Current PT  No     Current OT   No     Current ST      No   Current Chiropractic   No    Psychiatrist currently  No   Past:   Yes   Psychologist currently  No   Past:   Yes  Primary: Currently    Yes                Need a note for work accommodations   No   Need a note for school accommodations    No        Medications  Currently on medication to help you sleep   No    Currently on medication to help with mental health No        Currently on medication for concentration or ADD /ADHD      No      Date of accident: 5/11/2022    Workman's Comp  No     [facility]   N/A        How concussion happened:    Patient is " retired he was working on his shed and stood up and turned around hitting his  forehead on a 2X4.    LOC:  Not sure, it knocked him down      Did you seek medical attention:  Yes    When :  5/12/2022    MRI/CT Completed Yes       Injury Description:               Was there a forcible blow to the head?:                Yes      Where on head? Forehead                                             Retrograde Amnesia (loss of memory of events before the injury)?:  Yes   Anterograde Amnesia (loss of memory of events following injury)?: Yes     Number of previous head injuries.      a few    Had all previous concussion symptoms resolved   No, patient had a head injury about 5 years ago where he fell at a boat launch hitting his head on a rock. LOC, he was taken by ambulance to the ER. Not all symptoms had resolved from that injury, cognitive issues, dizziness.     Work/School  Currently employed    No    Retired    Yes   How many years ago/Previous occupation: 3 years ago form the [employer]  Patient History  Patient was referred to the concussion clinic by Referred by Dr. Gale [facility].     Phone Start Time: 8:27 AM    Phone End Time:  8:45 AM    Total time of phone call 18 minutes    Mode of Communication: Video Conference via [employer]    Plan:     Neuropsychological assessment   No  Patient does state that he has a hard time remembering things, discussed today, patient will think about it  PT to evaluate and treat  Yes   OT to evaluate and treat  No   ST to evaluate and treat  No   Safe and Sound eval and treat   No   Referral to ophthalmology   No, just received new glasses with a stronger prescription, glasses do have prisms in them for double vision.   Referral to Neurology        No   Referral to psychology No   Referral to psychiatry  No   Other Referral   No   MRI/CT ordered today : No   Labs ordered today : No   New medication : patient may want to take a med for  "concentration and focus, will discuss with his wife and notify me through my chart   Work note completed : N/A   School note completed : N/A   C list sent : N/A     We discussed some treatment options and have elected to send patient to PT to help with vestibular complaints and convergence issues. He will think about medications, he does complain of memory issues which can be a side effect of oxybutynin (the patient takes this 3 times a day) possible switch to Myrbetriq. Patient will message me if he would like to try anything for cognition.    Medication Adjustment:  No medication changes at this time    Return to Work/School   -   Patient is retired     Return to clinic 8 weeks    Continue with the support of the clinic, reassurance, and redirection. Staff monitoring and ongoing assessments per team plan. This team will utilize appropriate emergency services if necessary. I will make myself available if concerns or problems arise.  The patient agrees to call/message before his next visit with any questions, concerns or problems.    Subjective:          HPI     Per note from ER on 5/12/22...The patient is a 67-year-old male presenting with head trauma.  The patient is currently on Eliquis for atrial fibrillation.  Known history of head trauma.  Yesterday, the patient was outside when he stood up quickly and hit his head on a 2 x 4 that was part of his garage structure.  This caused him to be knocked to the ground.  He denies loss of consciousness.  He had immediate pain.  Since the injury he has had continued pain at the site of impact which is in his right frontal head.  He has a local headache that is severe.  No nausea or vomiting.  He says, \"since the injury I am just not quite right.\"  He tells me that \"everything comes at me quickly while I am driving and everything else is just slow.\"  He has trouble finding his words quickly.  However, he does not have difficulty with communicating and his wife tells me " that he is able to answer questions well and is otherwise with normal behavior.  No local weakness or incoordination.  No facial droop.  No other injury reported.    What were all the injuries that the patient obtained during accident, was hit on forehead   Is the patient experiencing neck pain  No  Does the patient have any chronic body pain?   No        Headaches:  Significant ongoing headaches No   Headaches: Resolved  Improvement :Yes   Current Headache No   Wake with HA  No     Worse Headache    3/10           How often: it happened when head was hit    Average Headache 0/10.    Best Headache 0/10.  Brings on HA/Makes symptoms worse:   Does not know  Makes symptoms better. Closing eyes and stay out of light  Taking  acetaminophen (Tylenol)        Helpful:  Yes     Physical Symptoms:  Headache-No     Resolved Yes           Improved since accident Improved     Nausea- No      Vomiting - No      Balance problems - Yes        Resolved No  Improved since accident Worsen     Dizziness - Yes     Resolved No        Improved since accident Worsen   Visual problems - No      Fatigue - Yes     Resolved No         Improved since accident Same    Sensitivity to light - Yes     Resolved No         Improved since accident Same    Sensitivity to sound - Yes      Resolved No       Improved since accident Same    Numbness/tingling -No          Cognitive Symptoms  Feeling mentally foggy - Yes        Resolved No      Improved since accident Worsen    Feeling slowed down - Yes       Resolved No        Improved since accident Worsen   Difficulty Concentrating- Yes       Resolved  No    Improved since accident Worsen    Difficulty remembering - Yes       Resolved No       Improved since accident Worsen      Emotional Symptoms  Irritability - No            Sadness-   No      More emotional - No      Nervousness/anxiety - No          Psychiatric History:  Anxiety -No   Depression -No   Sleep Disorders - Yes    Ever Hospitalized for  mental health:            No   Any thought of hurting self or others now?   No   Any history of hurting self or others?            No     Sleep History:  Drowsiness- Yes        Resolved No       Improved since accident Same    Sleep less than usual - No   Sleep more than usual - No   Trouble falling asleep - No       Does the patient wake feeling rested - Yes        Resolved Yes          Improved since accident Same       Migraine Headaches      Patient history of migraines.   No       Family history of migraines    Does not know     Exertion:         Do the above stated symptoms worsen with physical activity? No         Do the above stated symptoms worsen with cognitive activity? No           The following portions of the patient's history were reviewed and updated as appropriate: allergies, current medications, past family history, past medical history, past social history, past surgical history and problem list.    Review of Systems  A comprehensive review of systems was negative except for what is noted above.    Objective:   Discussion was held with the patient today regarding concussion in general including types of injury, symptoms that are common, treatment and variability in time to recover. Education about concussion symptoms and length of time it would take the patient to recover was also given to the patient.  I have reassured the patient his symptoms are very common when a concussion is present and will improve with time. We discussed the risks and benefits of the medication including risk of worsening depression with medication adjustments and even the possibility of emergence of suicidal ideations.       Total time spent with the patient today was 65 minutes with greater than 50% of the time spent in counseling and care coordination. The patient will call before then with any questions, concerns or problems.The patient will seek out appropriate emergency services should that become  necessary.    Physical Exam:   Neck:  Full ROM  Yes  with pain or stiffness No     Neurologic:   Mental status: Alert, oriented, thought content appropriate.. Recent and remote memory grossly intact.  Yes  Speech is patient having word finding issues?  No     Assessment/Diagnosis managed and treated at today's visit :  Post concussion syndrome  Post concussion headache  Nausea  Dizziness  Fatigue  Insomnia  Sensitivity to light  Sound sensitivity  Concentration and Attention deficit  Memory difficulties  Anxiety d/t a medical condition  Irritability    Other:   Patient agrees to call or return sooner with any questions or concerns.  Risks and benefits were discussed. Continue with individual therapist if already established.     Continue with the support of the clinic, reassurance, and redirection. Staff monitoring and ongoing assessments per team plan.This team will utilize appropriate emergency services if necessary. I will make myself available if concerns or problems arise.     Mental Status Examination  Alertness:  alert  and oriented  Appearance:  well groomed  Behavior/Demeanor:  cooperative, pleasant and calm, with good  eye contact.  Speech:  normal  Psychomotor:  normal or unremarkable    Mood:  good  Affect:  appropriate and was congruent to speech content.  Thought Process/Associations: unremarkable   Thought Content: devoid of  suicidal and violent ideation and delusions.   Perception: devoid of  hallucinations  Insight:  good.  Judgment: good.  Attention/Concentration:  Normal  Language:  Intact  Fund of Knowledge:  Average.    Memory:  Immediate recall intact, Short-term memory intact and Long-term memory intact.       Consent was obtained for this service by one of our care team members    Video Visit Details    Type of service: Video Visit    Video Start Time: 0908    Video End Time:  0958    Total time of video visit: 50 minutes    15 minutes spent on the date of the encounter doing chart review,  "review of outside records, review of test results, interpretation of tests and documentation    Originating Location: Patient's home    Distant Location:  [facility]    Mode of Communication: Video Conference via [facility]    Patient Instructions   It was nice speaking with you today for our office visit held through a virtual visit. The following is a summary of our visit and my recommendations:    How to return to daily activities with concussion:  1. Get lots of rest. Be sure to get enough sleep at night- no late nights. Keep the same bedtime weekdays and weekends.   2. Take daytime naps or rest breaks when you feel tired or fatigued.  3. Limit physical activity as well as activities that require a lot of thinking or concentration. These activities can make symptoms worse and recovery time longer. In some cases, your doctor may prescribe time that you completely eliminate these activities to allow complete \"brain rest.\"  Physical activity includes going to the gym, sports practices, weight-training, running, exercising, heavy lifting, etc.  Thinking and concentration activities (e.g., cell phone texting, computer games, movies, parties, loud music and in severe cases may include limiting your time at work).  4. Drink lots of fluids and eat carbohydrates or protein to main appropriate blood sugar levels.  5. As symptoms decrease, with consent from your doctor, you may begin to gradually return to your daily activities. If symptoms worsen or return, lessen your activities, then try again to increase your activities gradually.   6. During recovery, it is normal to feel frustrated and sad when you do not feel right and you can't be as active as usual.  7. Repeated evaluation of your symptoms is recommended to help guide recovery. Please follow up as recommended by your doctor to ensure a safe and healthy recovery.    Watch for and go to the Emergency Department if you have any of the " following symptoms:  Headaches that significantly worsen  Looks very drowsy or can't be awakened  Can't recognize people or places  Worsening neck pain  Seizures  Repeated vomiting  Increasing confusion or irritability  Unusual behavioral change  Slurred speech  Weakness or numbness in arms/legs  Change in state of consciousness    For more information, please visit on the Internet:  http://www.cdc.gov/concussion/get_help.html   http://www.cdc.gov/concussion/pdf/Facts_about_Concussion_TBI-a.pdf      General Information:  Today you had your appointment with Annamaria Beltrán CNP     If lab work was done today as part of your evaluation you will generally be contacted via My Chart, mail, or phone with the results within 1-5 days. If there is an alarming result we will contact you by phone. Lab results come back at varying times, I generally wait until all labs are resulted before making comments on results. Please note labs are automatically released to My Chart once available.     If you need refills please contact your pharmacist. They will send a refill request to me to review. Please allow 3 business days for us to process all refill requests.     Please call or send a medical message through My Chart, with any questions or concerns.    If you need any paperwork completed please fax forms to [phone]. Please state if you would like a copy of the completed paperwork, mailed or faxed back to the patient and a fax number to fax the paperwork to. Please allow up to 10 business days for paperwork to be completed.    Annamaria Beltrán CNP            Again, thank you for allowing me to participate in the care of your patient.        Sincerely,        HAZEL Davey CNP

## 2022-06-27 NOTE — PROGRESS NOTES
"Video Visit  Stiven Nguyễn is a 67 year old male who is being evaluated via a billable video visit in light of the ongoing global health crisis (COVID-19) that requires us to abide by social distancing mandates in order to reduce the risk of COVID-19 exposure.      The patient has been notified of following:     \"This virtual visit will be conducted via a video call between you and your physician/provider. We have found that certain health care needs can be provided without the need for a physical exam.  This service lets us provide the care you need with a short video conversation.  If a prescription is necessary we can send it directly to your pharmacy.  If lab work is needed we can place an order for that and you can then stop by our lab to have the test done at a later time.    If during the course of the call the physician/provider feels a video visit is not appropriate, you will not be charged for this service.\"     Patient has given verbal consent to a Video visit? Yes    Stiven Nguyễn chief complaint is: Post Concussion Syndrome      Current PT  No     Current OT   No     Current ST      No   Current Chiropractic   No    Psychiatrist currently  No   Past:   Yes   Psychologist currently  No   Past:   Yes  Primary: Currently    Yes                Need a note for work accommodations   No   Need a note for school accommodations    No        Medications  Currently on medication to help you sleep   No    Currently on medication to help with mental health No        Currently on medication for concentration or ADD /ADHD      No      Date of accident: 5/11/2022    Workman's Comp  No     [facility]   N/A        How concussion happened:    Patient is retired he was working on his shed and stood up and turned around hitting his  forehead on a 2X4.    LOC:  Not sure, it knocked him down      Did you seek medical attention:  Yes    When :  5/12/2022    MRI/CT Completed Yes       Injury Description:               Was there " a forcible blow to the head?:                Yes      Where on head? Forehead                                             Retrograde Amnesia (loss of memory of events before the injury)?:  Yes   Anterograde Amnesia (loss of memory of events following injury)?: Yes     Number of previous head injuries.      a few    Had all previous concussion symptoms resolved   No, patient had a head injury about 5 years ago where he fell at a boat launch hitting his head on a rock. LOC, he was taken by ambulance to the ER. Not all symptoms had resolved from that injury, cognitive issues, dizziness.     Work/School  Currently employed    No    Retired    Yes   How many years ago/Previous occupation: 3 years ago form the [employer]  Patient History  Patient was referred to the concussion clinic by Referred by Dr. Gale [facility].     Phone Start Time: 8:27 AM    Phone End Time:  8:45 AM    Total time of phone call 18 minutes    Mode of Communication: Video Conference via [employer]    DANA Mayes    Plan:     Neuropsychological assessment   No  Patient does state that he has a hard time remembering things, discussed today, patient will think about it  PT to evaluate and treat  Yes   OT to evaluate and treat  No   ST to evaluate and treat  No   Safe and Sound eval and treat   No   Referral to ophthalmology   No, just received new glasses with a stronger prescription, glasses do have prisms in them for double vision.   Referral to Neurology        No   Referral to psychology No   Referral to psychiatry  No   Other Referral   No   MRI/CT ordered today : No   Labs ordered today : No   New medication : patient may want to take a med for concentration and focus, will discuss with his wife and notify me through my chart   Work note completed : N/A   School note completed : N/A   QRC list sent : N/A     We discussed some treatment options and have elected to send patient to PT to help with vestibular complaints and  "convergence issues. He will think about medications, he does complain of memory issues which can be a side effect of oxybutynin (the patient takes this 3 times a day) possible switch to Myrbetriq. Patient will message me if he would like to try anything for cognition.    Medication Adjustment:  No medication changes at this time    Return to Work/School   -   Patient is retired     Return to clinic 8 weeks    Continue with the support of the clinic, reassurance, and redirection. Staff monitoring and ongoing assessments per team plan. This team will utilize appropriate emergency services if necessary. I will make myself available if concerns or problems arise.  The patient agrees to call/message before his next visit with any questions, concerns or problems.    Subjective:          HPI     Per note from ER on 5/12/22...The patient is a 67-year-old male presenting with head trauma.  The patient is currently on Eliquis for atrial fibrillation.  Known history of head trauma.  Yesterday, the patient was outside when he stood up quickly and hit his head on a 2 x 4 that was part of his garage structure.  This caused him to be knocked to the ground.  He denies loss of consciousness.  He had immediate pain.  Since the injury he has had continued pain at the site of impact which is in his right frontal head.  He has a local headache that is severe.  No nausea or vomiting.  He says, \"since the injury I am just not quite right.\"  He tells me that \"everything comes at me quickly while I am driving and everything else is just slow.\"  He has trouble finding his words quickly.  However, he does not have difficulty with communicating and his wife tells me that he is able to answer questions well and is otherwise with normal behavior.  No local weakness or incoordination.  No facial droop.  No other injury reported.    What were all the injuries that the patient obtained during accident, was hit on forehead   Is the patient " experiencing neck pain  No  Does the patient have any chronic body pain?   No        Headaches:  Significant ongoing headaches No   Headaches: Resolved  Improvement :Yes   Current Headache No   Wake with HA  No     Worse Headache    3/10           How often: it happened when head was hit    Average Headache 0/10.    Best Headache 0/10.  Brings on HA/Makes symptoms worse:   Does not know  Makes symptoms better. Closing eyes and stay out of light  Taking  acetaminophen (Tylenol)        Helpful:  Yes     Physical Symptoms:  Headache-No     Resolved Yes           Improved since accident Improved     Nausea- No      Vomiting - No      Balance problems - Yes        Resolved No  Improved since accident Worsen     Dizziness - Yes     Resolved No        Improved since accident Worsen   Visual problems - No      Fatigue - Yes     Resolved No         Improved since accident Same    Sensitivity to light - Yes     Resolved No         Improved since accident Same    Sensitivity to sound - Yes      Resolved No       Improved since accident Same    Numbness/tingling -No          Cognitive Symptoms  Feeling mentally foggy - Yes        Resolved No      Improved since accident Worsen    Feeling slowed down - Yes       Resolved No        Improved since accident Worsen   Difficulty Concentrating- Yes       Resolved  No    Improved since accident Worsen    Difficulty remembering - Yes       Resolved No       Improved since accident Worsen      Emotional Symptoms  Irritability - No            Sadness-   No      More emotional - No      Nervousness/anxiety - No          Psychiatric History:  Anxiety -No   Depression -No   Sleep Disorders - Yes    Ever Hospitalized for mental health:            No   Any thought of hurting self or others now?   No   Any history of hurting self or others?            No     Sleep History:  Drowsiness- Yes        Resolved No       Improved since accident Same    Sleep less than usual - No   Sleep more than  usual - No   Trouble falling asleep - No       Does the patient wake feeling rested - Yes        Resolved Yes          Improved since accident Same       Migraine Headaches      Patient history of migraines.   No       Family history of migraines    Does not know     Exertion:         Do the above stated symptoms worsen with physical activity? No         Do the above stated symptoms worsen with cognitive activity? No           The following portions of the patient's history were reviewed and updated as appropriate: allergies, current medications, past family history, past medical history, past social history, past surgical history and problem list.    Review of Systems  A comprehensive review of systems was negative except for what is noted above.    Objective:   Discussion was held with the patient today regarding concussion in general including types of injury, symptoms that are common, treatment and variability in time to recover. Education about concussion symptoms and length of time it would take the patient to recover was also given to the patient.  I have reassured the patient his symptoms are very common when a concussion is present and will improve with time. We discussed the risks and benefits of the medication including risk of worsening depression with medication adjustments and even the possibility of emergence of suicidal ideations.       Total time spent with the patient today was 65 minutes with greater than 50% of the time spent in counseling and care coordination. The patient will call before then with any questions, concerns or problems.The patient will seek out appropriate emergency services should that become necessary.    Physical Exam:   Neck:  Full ROM  Yes  with pain or stiffness No     Neurologic:   Mental status: Alert, oriented, thought content appropriate.. Recent and remote memory grossly intact.  Yes  Speech is patient having word finding issues?  No     Assessment/Diagnosis managed  and treated at today's visit :  Post concussion syndrome  Post concussion headache  Nausea  Dizziness  Fatigue  Insomnia  Sensitivity to light  Sound sensitivity  Concentration and Attention deficit  Memory difficulties  Anxiety d/t a medical condition  Irritability    Other:   Patient agrees to call or return sooner with any questions or concerns.  Risks and benefits were discussed. Continue with individual therapist if already established.     Continue with the support of the clinic, reassurance, and redirection. Staff monitoring and ongoing assessments per team plan.This team will utilize appropriate emergency services if necessary. I will make myself available if concerns or problems arise.     Mental Status Examination  Alertness:  alert  and oriented  Appearance:  well groomed  Behavior/Demeanor:  cooperative, pleasant and calm, with good  eye contact.  Speech:  normal  Psychomotor:  normal or unremarkable    Mood:  good  Affect:  appropriate and was congruent to speech content.  Thought Process/Associations: unremarkable   Thought Content: devoid of  suicidal and violent ideation and delusions.   Perception: devoid of  hallucinations  Insight:  good.  Judgment: good.  Attention/Concentration:  Normal  Language:  Intact  Fund of Knowledge:  Average.    Memory:  Immediate recall intact, Short-term memory intact and Long-term memory intact.       Consent was obtained for this service by one of our care team members    Video Visit Details    Type of service: Video Visit    Video Start Time: 0908    Video End Time:  0958    Total time of video visit: 50 minutes    15 minutes spent on the date of the encounter doing chart review, review of outside records, review of test results, interpretation of tests and documentation    Originating Location: Patient's home    Distant Location:  [facility] [place]    Mode of Communication: Video Conference via [employer]    Patient Instructions   It was nice  "speaking with you today for our office visit held through a virtual visit. The following is a summary of our visit and my recommendations:    How to return to daily activities with concussion:  1. Get lots of rest. Be sure to get enough sleep at night- no late nights. Keep the same bedtime weekdays and weekends.   2. Take daytime naps or rest breaks when you feel tired or fatigued.  3. Limit physical activity as well as activities that require a lot of thinking or concentration. These activities can make symptoms worse and recovery time longer. In some cases, your doctor may prescribe time that you completely eliminate these activities to allow complete \"brain rest.\"  Physical activity includes going to the gym, sports practices, weight-training, running, exercising, heavy lifting, etc.  Thinking and concentration activities (e.g., cell phone texting, computer games, movies, parties, loud music and in severe cases may include limiting your time at work).  4. Drink lots of fluids and eat carbohydrates or protein to main appropriate blood sugar levels.  5. As symptoms decrease, with consent from your doctor, you may begin to gradually return to your daily activities. If symptoms worsen or return, lessen your activities, then try again to increase your activities gradually.   6. During recovery, it is normal to feel frustrated and sad when you do not feel right and you can't be as active as usual.  7. Repeated evaluation of your symptoms is recommended to help guide recovery. Please follow up as recommended by your doctor to ensure a safe and healthy recovery.    Watch for and go to the Emergency Department if you have any of the following symptoms:  Headaches that significantly worsen  Looks very drowsy or can't be awakened  Can't recognize people or places  Worsening neck pain  Seizures  Repeated vomiting  Increasing confusion or irritability  Unusual behavioral change  Slurred speech  Weakness or numbness " in arms/legs  Change in state of consciousness    For more information, please visit on the Internet:  http://www.cdc.gov/concussion/get_help.html   http://www.cdc.gov/concussion/pdf/Facts_about_Concussion_TBI-a.pdf      General Information:  Today you had your appointment with Annamaria Beltrán CNP     If lab work was done today as part of your evaluation you will generally be contacted via My Chart, mail, or phone with the results within 1-5 days. If there is an alarming result we will contact you by phone. Lab results come back at varying times, I generally wait until all labs are resulted before making comments on results. Please note labs are automatically released to My Chart once available.     If you need refills please contact your pharmacist. They will send a refill request to me to review. Please allow 3 business days for us to process all refill requests.     Please call or send a medical message through My Chart, with any questions or concerns.    If you need any paperwork completed please fax forms to [phone]. Please state if you would like a copy of the completed paperwork, mailed or faxed back to the patient and a fax number to fax the paperwork to. Please allow up to 10 business days for paperwork to be completed.    Annamaria Beltrán CNP

## 2022-07-05 ENCOUNTER — OFFICE VISIT (OUTPATIENT)
Dept: UROLOGY | Facility: CLINIC | Age: 67
End: 2022-07-05
Payer: MEDICARE

## 2022-07-05 VITALS
HEART RATE: 94 BPM | DIASTOLIC BLOOD PRESSURE: 89 MMHG | OXYGEN SATURATION: 96 % | TEMPERATURE: 98.3 F | SYSTOLIC BLOOD PRESSURE: 148 MMHG

## 2022-07-05 DIAGNOSIS — F07.81 POST CONCUSSION SYNDROME: Primary | ICD-10-CM

## 2022-07-05 DIAGNOSIS — R39.15 URINARY URGENCY: ICD-10-CM

## 2022-07-05 DIAGNOSIS — R35.0 URINARY FREQUENCY: ICD-10-CM

## 2022-07-05 DIAGNOSIS — R33.9 BLADDER RETENTION: Primary | ICD-10-CM

## 2022-07-05 LAB
ALBUMIN UR-MCNC: NEGATIVE MG/DL
APPEARANCE UR: CLEAR
BILIRUB UR QL STRIP: NEGATIVE
COLOR UR AUTO: YELLOW
GLUCOSE UR STRIP-MCNC: NEGATIVE MG/DL
HGB UR QL STRIP: NEGATIVE
KETONES UR STRIP-MCNC: NEGATIVE MG/DL
LEUKOCYTE ESTERASE UR QL STRIP: NEGATIVE
NITRATE UR QL: NEGATIVE
PH UR STRIP: 6 [PH] (ref 5–7)
PSA SERPL-MCNC: 2.03 UG/L (ref 0–4)
RBC #/AREA URNS AUTO: NORMAL /HPF
SP GR UR STRIP: 1.02 (ref 1–1.03)
UROBILINOGEN UR STRIP-ACNC: 1 E.U./DL
WBC #/AREA URNS AUTO: NORMAL /HPF

## 2022-07-05 PROCEDURE — 36415 COLL VENOUS BLD VENIPUNCTURE: CPT | Performed by: UROLOGY

## 2022-07-05 PROCEDURE — 51798 US URINE CAPACITY MEASURE: CPT | Performed by: UROLOGY

## 2022-07-05 PROCEDURE — 84153 ASSAY OF PSA TOTAL: CPT | Performed by: UROLOGY

## 2022-07-05 PROCEDURE — 81001 URINALYSIS AUTO W/SCOPE: CPT | Performed by: UROLOGY

## 2022-07-05 PROCEDURE — 87086 URINE CULTURE/COLONY COUNT: CPT | Performed by: UROLOGY

## 2022-07-05 PROCEDURE — 99213 OFFICE O/P EST LOW 20 MIN: CPT | Mod: 25 | Performed by: UROLOGY

## 2022-07-05 RX ORDER — FINASTERIDE 5 MG/1
5 TABLET, FILM COATED ORAL DAILY
Qty: 90 TABLET | Refills: 3 | Status: SHIPPED | OUTPATIENT
Start: 2022-07-05 | End: 2022-12-28

## 2022-07-05 RX ORDER — TAMSULOSIN HYDROCHLORIDE 0.4 MG/1
0.4 CAPSULE ORAL DAILY
Qty: 90 CAPSULE | Refills: 3 | Status: SHIPPED | OUTPATIENT
Start: 2022-07-05 | End: 2022-12-28

## 2022-07-05 RX ORDER — OXYBUTYNIN CHLORIDE 5 MG/1
5 TABLET ORAL 3 TIMES DAILY
Qty: 270 TABLET | Refills: 3 | Status: SHIPPED | OUTPATIENT
Start: 2022-07-05 | End: 2023-03-03

## 2022-07-05 NOTE — PROGRESS NOTES
Appointment source: Established Patient  Patient name: Stiven Nguyễn  Urology Staff: Mervin Yung MD    Subjective: This is a 67 year old year old male returning for follow up of symptoms of bladder outlet obstruction.    Reports that he is continuing to take a combination of finasteride 5 mg daily tamsulosin 0.4 mg daily and oxybutynin immediate release 5 mg 3 times daily.    The urinary frequency and nocturia remain an issue.    He does notice some xerostomia probably secondary to the oxybutynin.    Objective: Postvoid residual 223 mL.    PSA 2.03 ng/mL.    Assessment: Moderately good control of his voiding symptoms but requesting further intervention.    Plan: We will add finasteride 5 mg daily.  Discussed possible sexual side effects in view of his previous therapies for ED.  This is not currently an issue for him.    In view of the elevated postvoid residuals and some xerostomia I suggested he gradually discontinue the Ditropan starting with the morning dose and proceeding to the midday and evening dose if he does not notice significant worsening of his voiding symptoms.    He will come back in about 3 months to recheck his postvoid residual.    Total time 20 minutes

## 2022-07-05 NOTE — PATIENT INSTRUCTIONS
Start new medication (finasteride) 1 tablet daily.    Continue taking tamsulosin 1 tablet daily.    Begin reducing the dosage of the oxybutynin by stopping the morning dose and review whether it has any impact on your urination.  If urination continues unchanged then reduce further the oxybutynin dose by stopping the midday dose.  If no changes then also stop the nighttime dose.    If the urination gets worse after withholding the oxybutynin just continue taking it at the usual 3 times a day.    Return in 3 months for follow-up.

## 2022-07-05 NOTE — NURSING NOTE
"Initial BP (!) 148/89 (BP Location: Right arm, Patient Position: Sitting, Cuff Size: Adult Large)   Pulse 94   Temp 98.3  F (36.8  C) (Tympanic)   SpO2 96%  Estimated body mass index is 34.3 kg/m  as calculated from the following:    Height as of 2/18/22: 1.854 m (6' 1\").    Weight as of 2/18/22: 117.9 kg (260 lb). .    Liana Hamilton LPN on 7/5/2022 at 11:55 AM    "

## 2022-07-07 LAB — BACTERIA UR CULT: NORMAL

## 2022-07-08 DIAGNOSIS — I20.1 CORONARY VASOSPASM (H): ICD-10-CM

## 2022-07-08 RX ORDER — AMLODIPINE BESYLATE 10 MG/1
10 TABLET ORAL DAILY
Qty: 90 TABLET | Refills: 0 | Status: SHIPPED | OUTPATIENT
Start: 2022-07-08 | End: 2023-10-26

## 2022-07-08 NOTE — TELEPHONE ENCOUNTER
amLODIPine (NORVASC) 10 MG tablet  Last Written Prescription Date: 1/4/21  Last Fill Quantity: 90,   # refills: 3  Last Office Visit : 5/5/22  Future Office visit:  11/1/22    Routed because:  Bp> 140/90

## 2022-07-08 NOTE — TELEPHONE ENCOUNTER
[facility] Call Center    Phone Message    May a detailed message be left on voicemail: no     Reason for Call: Medication Question or concern regarding medication   Prescription Clarification  Name of Medication: amLODIPine (NORVASC) 10 MG tablet  Prescribing Provider: Giacomo Jackson MD    Pharmacy: [id number], [place] [address]  Phone# [phone]    What on the order needs clarification?   Refill request. Pt is completely out of this medication, high priority           Action Taken: Other: Cardiology    Travel Screening: Not Applicable

## 2022-07-11 ENCOUNTER — TELEPHONE (OUTPATIENT)
Dept: CARDIOLOGY | Facility: CLINIC | Age: 67
End: 2022-07-11

## 2022-07-20 ENCOUNTER — HOSPITAL ENCOUNTER (OUTPATIENT)
Dept: PHYSICAL THERAPY | Facility: CLINIC | Age: 67
Setting detail: THERAPIES SERIES
Discharge: HOME | End: 2022-07-20
Attending: NURSE PRACTITIONER
Payer: MEDICARE

## 2022-07-20 PROCEDURE — 97112 NEUROMUSCULAR REEDUCATION: CPT | Mod: GP | Performed by: PHYSICAL THERAPIST

## 2022-07-20 PROCEDURE — 97162 PT EVAL MOD COMPLEX 30 MIN: CPT | Mod: GP | Performed by: PHYSICAL THERAPIST

## 2022-07-20 NOTE — PROGRESS NOTES
"   PHYSICAL THERAPY EVALUATION    07/20/22 1400   Quick Adds   Quick Adds Vestibular Eval;Certification   Type of Visit Initial OP PT Evaluation   General Information   Start of Care Date 07/20/22   Referring Physician Annamaria Beltrán NP   Orders Evaluate and Treat as Indicated   Order Date 07/11/22   Medical Diagnosis post concussion, dizziness   Onset of illness/injury or Date of Surgery 05/11/22   Pertinent history of current vestibular problem (include personal factors and/or comorbidities that impact the POC)  Prior concussion(s)  (2 prior concussion 2016, 2017)   Pertinent history of current problem (include personal factors and/or comorbidities that impact the POC) Walked into a 2x4 hit on his forehead, building a shed, has to hang on to board to keep from falling. Initial major HA couple days, dizzy right away. Was at car show last wekeend, turning head side to side looking at 2 aisles increased sx. Feeling off balance, wobbly, kind of like on a boat. Leaving for [place] Aug 15th.   Prior level of function comment woodworking, fishing, shoot bow, yardwork   Patient role/Employment history Retired   [facility]   Patient/Family Goals Statement get rid of sx   Fall Risk Screen   Fall screen completed by PT   Have you fallen 2 or more times in the past year? No   Have you fallen and had an injury in the past year? No   Is patient a fall risk? No   Abuse Screen (yes response referral indicated)   Feels Unsafe at Home or Work/School no   Feels Threatened by Someone no   Does Anyone Try to Keep You From Having Contact with Others or Doing Things Outside Your Home? no   Physical Signs of Abuse Present no   Posture   Posture Forward head position;Protracted shoulders   Gait   Gait Comments gait with mild pathdeviation not outside 12\", gait with horiz head turn arms slayed, cautious, vertical head turns slowed pace slightly, arms splayed, slight stagger, pivot turn normal speed, had to stand " "still arms splayed after turn for movign sensation to stop   Balance   Balance Comments stanidng balance FA 4\" EO horiz or vertical head turns x10 signif increase moving sensation   Balance Special Tests   Balance Special Tests Modified CTSIB Conditions   Balance Special Tests Modified CTSIB Conditions   Condition 1, seconds 30 Seconds  (feet 2\" apart)   Condition 2, seconds 8 Seconds  (fall backward to Left x2)   Condition 4, seconds 30 Seconds  (feet apart 4\")   Condition 5, seconds 2 Seconds  (feet apart 4\")   Modified CTSIB Comments felt best feet apart apprx 14\" when eyes closed, still mild moving sensation   Cervicogenic Screen   Neck ROM WFL   Oculomotor Exam   Smooth Pursuit Normal   Saccades Normal   Saccades Comments eye fatigue, not HA   VOR Normal   Convergence Testing Abnormal   Convergence Testing Comments 15cm x3, eye strain   Infrared Goggle Exam or Frenzel Lense Exam   Vestibular Suppressant in Last 24 Hours? No   Exam completed with Room Light   Spontaneous Nystagmus Negative   Head Shake Horizontal Nystagmus Negative   Gurinder-Hallpike (right) Negative   Gurinder-Hallpike (Left) Negative   HSCC Supine Roll Test (Right) Negative   HSCC Supine Roll Test (Left) Negative   Planned Therapy Interventions   Planned Therapy Interventions balance training;neuromuscular re-education;strengthening;manual therapy   Clinical Impression   Criteria for Skilled Therapeutic Interventions Met yes, treatment indicated   PT Diagnosis vestibular and balance deficits   Functional limitations due to impairments walking, changing positions, turning head, concentrating, yard work, household tasks   Clinical Presentation Evolving/Changing   Clinical Presentation Rationale balance, vestibular, prior concusisons, memory   Clinical Decision Making (Complexity) Moderate complexity   Therapy Frequency 1 time/week   Predicted Duration of Therapy Intervention (days/wks) 8wks   Risk & Benefits of therapy have been explained Yes   Patient, " Family & other staff in agreement with plan of care Yes   Clinical Impression Comments y   Education Assessment   Preferred Learning Style Listening   Barriers to Learning No barriers   GOALS   PT Eval Goals 1;2;3   Goal 1   Goal Identifier 1   Goal Description pt will be [place] to walk and turn head for sightseeing, grocery shopping in 4wk   Target Date 08/17/22   Goal 2   Goal Identifier 2   Goal Description pt will be able to do all yardwork w/o dizziness in 8wk   Target Date 09/14/22   Goal 3   Goal Identifier 3   Goal Description pt will be able to do woodworking w/o increase in sx in 8wk   Target Date 09/14/22   Total Evaluation Time   PT Eval, Moderate Complexity Minutes (07865) 25   Therapy Certification   Certification date from 07/20/22   Certification date to 09/14/22   Medical Diagnosis post concussion   [facility]  Kris Hoenk  PT  [facility]  [address] [place] [address]  [email or web address]   Office: [phone]  Voicemail: [phone]

## 2022-07-20 NOTE — PROGRESS NOTES
[facility]                                                                                   OUTPATIENT PHYSICAL THERAPY FUNCTIONAL EVALUATION  PLAN OF TREATMENT FOR OUTPATIENT REHABILITATION  (COMPLETE FOR INITIAL CLAIMS ONLY)  Patient's Last Name, First Name, M.I.  YOB: 1955  Stiven Nguyễn     Provider's Name   [facility]   Medical Record No.  [id number]     Start of Care Date:  07/20/22   Onset Date:  05/11/22   Type:     _X__PT   ____OT  ____SLP Medical Diagnosis:  post concussion     PT Diagnosis:  vestibular and balance deficits Visits from SOC:  1                              __________________________________________________________________________________  Plan of Treatment/Functional Goals:  balance training, neuromuscular re-education, strengthening, manual therapy           GOALS  1  pt will be douglas to walk and turn head for sightseeing, grocery shopping in 4wk  08/17/22    2  pt will be able to do all yardwork w/o dizziness in 8wk  09/14/22    3  pt will be able to do woodworking w/o increase in sx in 8wk  09/14/22                Therapy Frequency:  1 time/week   Predicted Duration of Therapy Intervention:  8wks    Kris Hoenk, PT                                    I CERTIFY THE NEED FOR THESE SERVICES FURNISHED UNDER        THIS PLAN OF TREATMENT AND WHILE UNDER MY CARE     (Physician co-signature of this document indicates review and certification of the therapy plan).                Certification Date From:  07/20/22   Certification Date To:  09/14/22    Referring Provider:  Annamaria Beltrán NP    Initial Assessment  See Epic Evaluation- Start of Care Date: 07/20/22

## 2022-07-21 ENCOUNTER — MYC MEDICAL ADVICE (OUTPATIENT)
Dept: CARDIOLOGY | Facility: CLINIC | Age: 67
End: 2022-07-21

## 2022-07-21 DIAGNOSIS — I73.9 VASOSPASM (H): Primary | ICD-10-CM

## 2022-07-21 DIAGNOSIS — I20.1 CORONARY VASOSPASM (H): ICD-10-CM

## 2022-07-22 NOTE — TELEPHONE ENCOUNTER
Giacomo Jackson MD McDonald, Dana L RN  [place]   Tell him to take half the dose or just Every other day

## 2022-07-25 RX ORDER — ISOSORBIDE MONONITRATE 30 MG/1
15 TABLET, EXTENDED RELEASE ORAL DAILY
Start: 2022-07-25 | End: 2023-01-24

## 2022-07-27 ENCOUNTER — HOSPITAL ENCOUNTER (OUTPATIENT)
Dept: PHYSICAL THERAPY | Facility: CLINIC | Age: 67
Setting detail: THERAPIES SERIES
Discharge: HOME | End: 2022-07-27
Attending: NURSE PRACTITIONER
Payer: MEDICARE

## 2022-07-27 PROCEDURE — 97112 NEUROMUSCULAR REEDUCATION: CPT | Mod: GP | Performed by: PHYSICAL THERAPIST

## 2022-08-03 ENCOUNTER — HOSPITAL ENCOUNTER (OUTPATIENT)
Dept: PHYSICAL THERAPY | Facility: CLINIC | Age: 67
Setting detail: THERAPIES SERIES
Discharge: HOME | End: 2022-08-03
Attending: NURSE PRACTITIONER
Payer: MEDICARE

## 2022-08-03 PROCEDURE — 97112 NEUROMUSCULAR REEDUCATION: CPT | Mod: GP | Performed by: PHYSICAL THERAPIST

## 2022-08-08 NOTE — PROGRESS NOTES
"  P.T. TREATMENT LOG   Precautions:    Eval Date: 6/8/22 Progress Note: 7/25/2022   POC expires: 8/7/22 Insurance Limits:   Treatment Current Session Time   CANALITH  REPOSITIONING TREATMENT  (CPT 02589) Y/N Notes Not performed   CRT         NEURO RE-ED  (CPT 24579) Y/N Notes 8-22 Minutes   Reassessed FGA, ABC Y    BPPV ASSESSMENT N No subjective c/o dizziness   Vestibulo oculomotor exam    (6/8) See objective section   VOR CANCELLATION                       Standing H/VVOR-C no HVOR SSV 30 sec x 2  VVOR SSV 30 sec x 2 looking up pt has retinal slip   Ambulation w/ H/VVOR-C       VOR / GAZE STABILITY       Seated H/VVOR   Retinal slip during L head turn at SSV   Standing H/VVOR no                    no                    no Standing FA 5' from 1\" B on plain background  HVORx1: 120 bpm x60\", no dizziness or blurring, mild balance  VVORx1: 120 bpm x60\", no retinal sleip, no balance deficit  *pt reports 140 bpm for HEP; pt terminates      Standing, semi tandem, 5' from 1\" B on plain background  HVORx1: 105 bpm x60\", increase in lateral sway, no dizziness  VVORx1: 105 bpm x60\",  increase in lateral sway, no dizziness     SSV Standing on airex FA central and peripheral distraction with yellow wands  HVOR: 30 sec x 2 no difficulty   VVOR 30 sec x 2 no difficulty   Ambulation w/H/VVOR no HVOR: Central and peripheral distraction mild balance deficits  VVOR: Cental and peripheral distraction with wands mild balance deficits   H/VVOR on compliant surfaces       Functional VOR       HABITUATION       Ball circles       Repetitive functional movements       BALANCE- STATIC       On floor  Tandem 30\"x2  Semi-tandem with head turns x10  Semi- tandem with head nods x10  Tandem 4x15\" EO   Airex foam  NBOS with head turns x10  NBOS with head nods x10  FT with EC 30\"x2  Tandem on 2x30\"  Marching in place-2x10 no UE support   Rockerboard  AP weight shifting x10- no UE support  ML weight shifting x10-no UE support  AP center balance " Telephone visit    66-year-old male with complaints of ED and urinary frequency.    On a previous visit I suggested he try a vacuum erection assist device.  He did not find the device to be particularly useful but he and his wife have found other methods to maintain satisfactory intimacy and he is comfortable with his current approach.    The urinary frequency has been nicely controlled using 5 mg of Ditropan immediate release 3 times daily.    Impression: Erectile dysfunction managed by alternative approaches to intimacy and urinary frequency controlled with anticholinergic medication.    Continues taking tamsulosin 0.4 mg daily    Plan: Will call him back in 6 months for routine follow-up.    Tamsulosin and oxybutynin prescriptions were renewed for 1 year.    Total time 5 minutes.   "2x30\"- light UUE support  ML center balance 2x30\"- light UUE support   BALANCE- DYNAMIC       Ambulation-head turns/nods  Walking with head turns - 2x80 ft  Walking with head nods - 2x80 ft  Walking backwards - 2x80 ft    Ambulation - 180 & 360 degree turns  Walking with 180* turns - 2x80 ft   Retro ambulation EO       Ambulation with EC       Obstacles  Obstacle course consisting air ex pads, river stones, air pads and small hurdles  x8 laps navigating via various surfaces with minimal firm surface contact  -close supervision throughout the session.   Bosu Step up      Single leg marches       Tandem walking  3 laps in // bars no UE support   OKS       MSQ  SOT  FGA  DVA         (6/8) 0%     (6/13) 20/30   THER-EX   (CPT 00671) Y/N SETS REPS LOAD Not performed    CARDIOVASCULAR       Rec bike  x8 min           THER ACT  (CPT 73968) Y/N   8-22 MInutes     Progress Note no    Review pain, meds, falls, vitals, symptoms       *Subjective Measures   ABC            (6/8) 76%   Patient Education/HEP  Y Educated on goals met, progress made, HEP. Pt verbalized understanding          "

## 2022-08-11 ENCOUNTER — HOSPITAL ENCOUNTER (OUTPATIENT)
Dept: PHYSICAL THERAPY | Facility: CLINIC | Age: 67
Setting detail: THERAPIES SERIES
Discharge: HOME | End: 2022-08-11
Attending: NURSE PRACTITIONER
Payer: MEDICARE

## 2022-08-11 ENCOUNTER — ANCILLARY PROCEDURE (OUTPATIENT)
Dept: CARDIOLOGY | Facility: CLINIC | Age: 67
End: 2022-08-11
Attending: INTERNAL MEDICINE
Payer: MEDICARE

## 2022-08-11 DIAGNOSIS — R55 SYNCOPE, UNSPECIFIED SYNCOPE TYPE: ICD-10-CM

## 2022-08-11 LAB
MDC_IDC_LEAD_IMPLANT_DT: NORMAL
MDC_IDC_LEAD_IMPLANT_DT: NORMAL
MDC_IDC_LEAD_LOCATION: NORMAL
MDC_IDC_LEAD_LOCATION: NORMAL
MDC_IDC_LEAD_LOCATION_DETAIL_1: NORMAL
MDC_IDC_LEAD_LOCATION_DETAIL_1: NORMAL
MDC_IDC_LEAD_MFG: NORMAL
MDC_IDC_LEAD_MFG: NORMAL
MDC_IDC_LEAD_MODEL: NORMAL
MDC_IDC_LEAD_MODEL: NORMAL
MDC_IDC_LEAD_POLARITY_TYPE: NORMAL
MDC_IDC_LEAD_POLARITY_TYPE: NORMAL
MDC_IDC_LEAD_SERIAL: NORMAL
MDC_IDC_LEAD_SERIAL: NORMAL
MDC_IDC_LEAD_SPECIAL_FUNCTION: NORMAL
MDC_IDC_LEAD_SPECIAL_FUNCTION: NORMAL
MDC_IDC_MSMT_BATTERY_DTM: NORMAL
MDC_IDC_MSMT_BATTERY_REMAINING_LONGEVITY: 145 MO
MDC_IDC_MSMT_BATTERY_RRT_TRIGGER: 2.62
MDC_IDC_MSMT_BATTERY_STATUS: NORMAL
MDC_IDC_MSMT_BATTERY_VOLTAGE: 3.07 V
MDC_IDC_MSMT_LEADCHNL_RA_IMPEDANCE_VALUE: 342 OHM
MDC_IDC_MSMT_LEADCHNL_RA_IMPEDANCE_VALUE: 494 OHM
MDC_IDC_MSMT_LEADCHNL_RA_PACING_THRESHOLD_AMPLITUDE: 1 V
MDC_IDC_MSMT_LEADCHNL_RA_PACING_THRESHOLD_PULSEWIDTH: 0.4 MS
MDC_IDC_MSMT_LEADCHNL_RA_SENSING_INTR_AMPL: 2.38 MV
MDC_IDC_MSMT_LEADCHNL_RV_IMPEDANCE_VALUE: 399 OHM
MDC_IDC_MSMT_LEADCHNL_RV_IMPEDANCE_VALUE: 475 OHM
MDC_IDC_MSMT_LEADCHNL_RV_PACING_THRESHOLD_AMPLITUDE: 0.88 V
MDC_IDC_MSMT_LEADCHNL_RV_PACING_THRESHOLD_PULSEWIDTH: 0.4 MS
MDC_IDC_MSMT_LEADCHNL_RV_SENSING_INTR_AMPL: 19.75 MV
MDC_IDC_PG_IMPLANT_DTM: NORMAL
MDC_IDC_PG_MFG: NORMAL
MDC_IDC_PG_MODEL: NORMAL
MDC_IDC_PG_SERIAL: NORMAL
MDC_IDC_PG_TYPE: NORMAL
MDC_IDC_SESS_CLINIC_NAME: NORMAL
MDC_IDC_SESS_DTM: NORMAL
MDC_IDC_SESS_TYPE: NORMAL
MDC_IDC_SET_BRADY_AT_MODE_SWITCH_RATE: 171 {BEATS}/MIN
MDC_IDC_SET_BRADY_HYSTRATE: NORMAL
MDC_IDC_SET_BRADY_LOWRATE: 70 {BEATS}/MIN
MDC_IDC_SET_BRADY_MAX_SENSOR_RATE: 130 {BEATS}/MIN
MDC_IDC_SET_BRADY_MAX_TRACKING_RATE: 130 {BEATS}/MIN
MDC_IDC_SET_BRADY_MODE: NORMAL
MDC_IDC_SET_BRADY_NIGHT_RATE: 60 {BEATS}/MIN
MDC_IDC_SET_BRADY_PAV_DELAY_LOW: 180 MS
MDC_IDC_SET_BRADY_SAV_DELAY_LOW: 150 MS
MDC_IDC_SET_LEADCHNL_RA_PACING_AMPLITUDE: 2 V
MDC_IDC_SET_LEADCHNL_RA_PACING_ANODE_ELECTRODE_1: NORMAL
MDC_IDC_SET_LEADCHNL_RA_PACING_ANODE_LOCATION_1: NORMAL
MDC_IDC_SET_LEADCHNL_RA_PACING_CAPTURE_MODE: NORMAL
MDC_IDC_SET_LEADCHNL_RA_PACING_CATHODE_ELECTRODE_1: NORMAL
MDC_IDC_SET_LEADCHNL_RA_PACING_CATHODE_LOCATION_1: NORMAL
MDC_IDC_SET_LEADCHNL_RA_PACING_POLARITY: NORMAL
MDC_IDC_SET_LEADCHNL_RA_PACING_PULSEWIDTH: 0.4 MS
MDC_IDC_SET_LEADCHNL_RA_SENSING_ANODE_ELECTRODE_1: NORMAL
MDC_IDC_SET_LEADCHNL_RA_SENSING_ANODE_LOCATION_1: NORMAL
MDC_IDC_SET_LEADCHNL_RA_SENSING_CATHODE_ELECTRODE_1: NORMAL
MDC_IDC_SET_LEADCHNL_RA_SENSING_CATHODE_LOCATION_1: NORMAL
MDC_IDC_SET_LEADCHNL_RA_SENSING_POLARITY: NORMAL
MDC_IDC_SET_LEADCHNL_RA_SENSING_SENSITIVITY: 0.3 MV
MDC_IDC_SET_LEADCHNL_RV_PACING_AMPLITUDE: 2 V
MDC_IDC_SET_LEADCHNL_RV_PACING_ANODE_ELECTRODE_1: NORMAL
MDC_IDC_SET_LEADCHNL_RV_PACING_ANODE_LOCATION_1: NORMAL
MDC_IDC_SET_LEADCHNL_RV_PACING_CAPTURE_MODE: NORMAL
MDC_IDC_SET_LEADCHNL_RV_PACING_CATHODE_ELECTRODE_1: NORMAL
MDC_IDC_SET_LEADCHNL_RV_PACING_CATHODE_LOCATION_1: NORMAL
MDC_IDC_SET_LEADCHNL_RV_PACING_POLARITY: NORMAL
MDC_IDC_SET_LEADCHNL_RV_PACING_PULSEWIDTH: 0.4 MS
MDC_IDC_SET_LEADCHNL_RV_SENSING_ANODE_ELECTRODE_1: NORMAL
MDC_IDC_SET_LEADCHNL_RV_SENSING_ANODE_LOCATION_1: NORMAL
MDC_IDC_SET_LEADCHNL_RV_SENSING_CATHODE_ELECTRODE_1: NORMAL
MDC_IDC_SET_LEADCHNL_RV_SENSING_CATHODE_LOCATION_1: NORMAL
MDC_IDC_SET_LEADCHNL_RV_SENSING_POLARITY: NORMAL
MDC_IDC_SET_LEADCHNL_RV_SENSING_SENSITIVITY: 0.9 MV
MDC_IDC_SET_ZONE_DETECTION_INTERVAL: 350 MS
MDC_IDC_SET_ZONE_DETECTION_INTERVAL: 400 MS
MDC_IDC_SET_ZONE_TYPE: NORMAL
MDC_IDC_STAT_AT_BURDEN_PERCENT: 0 %
MDC_IDC_STAT_AT_DTM_END: NORMAL
MDC_IDC_STAT_AT_DTM_START: NORMAL
MDC_IDC_STAT_BRADY_AP_VP_PERCENT: 0.17 %
MDC_IDC_STAT_BRADY_AP_VS_PERCENT: 99.25 %
MDC_IDC_STAT_BRADY_AS_VP_PERCENT: 0 %
MDC_IDC_STAT_BRADY_AS_VS_PERCENT: 0.59 %
MDC_IDC_STAT_BRADY_DTM_END: NORMAL
MDC_IDC_STAT_BRADY_DTM_START: NORMAL
MDC_IDC_STAT_BRADY_RA_PERCENT_PACED: 99.44 %
MDC_IDC_STAT_BRADY_RV_PERCENT_PACED: 0.17 %
MDC_IDC_STAT_EPISODE_RECENT_COUNT: 0
MDC_IDC_STAT_EPISODE_RECENT_COUNT_DTM_END: NORMAL
MDC_IDC_STAT_EPISODE_RECENT_COUNT_DTM_START: NORMAL
MDC_IDC_STAT_EPISODE_TOTAL_COUNT: 0
MDC_IDC_STAT_EPISODE_TOTAL_COUNT: 14
MDC_IDC_STAT_EPISODE_TOTAL_COUNT: 5
MDC_IDC_STAT_EPISODE_TOTAL_COUNT_DTM_END: NORMAL
MDC_IDC_STAT_EPISODE_TOTAL_COUNT_DTM_START: NORMAL
MDC_IDC_STAT_EPISODE_TYPE: NORMAL

## 2022-08-11 PROCEDURE — 97112 NEUROMUSCULAR REEDUCATION: CPT | Mod: GP | Performed by: PHYSICAL THERAPIST

## 2022-08-11 PROCEDURE — 93296 REM INTERROG EVL PM/IDS: CPT

## 2022-08-11 PROCEDURE — 93294 REM INTERROG EVL PM/LDLS PM: CPT | Performed by: INTERNAL MEDICINE

## 2022-09-03 ENCOUNTER — HEALTH MAINTENANCE LETTER (OUTPATIENT)
Age: 67
End: 2022-09-03

## 2022-09-08 ENCOUNTER — TELEPHONE (OUTPATIENT)
Dept: NEUROLOGY | Facility: CLINIC | Age: 67
End: 2022-09-08

## 2022-09-08 ENCOUNTER — VIRTUAL VISIT (OUTPATIENT)
Dept: NEUROLOGY | Facility: CLINIC | Age: 67
End: 2022-09-08
Payer: MEDICARE

## 2022-09-08 DIAGNOSIS — R41.840 ATTENTION AND CONCENTRATION DEFICIT: ICD-10-CM

## 2022-09-08 DIAGNOSIS — F07.81 POST CONCUSSION SYNDROME: Primary | ICD-10-CM

## 2022-09-08 PROCEDURE — 99215 OFFICE O/P EST HI 40 MIN: CPT | Mod: 95 | Performed by: NURSE PRACTITIONER

## 2022-09-08 RX ORDER — METHYLPHENIDATE HYDROCHLORIDE 10 MG/1
10 TABLET ORAL 2 TIMES DAILY
Qty: 60 TABLET | Refills: 0 | Status: SHIPPED | OUTPATIENT
Start: 2022-09-08 | End: 2022-10-11

## 2022-09-08 NOTE — TELEPHONE ENCOUNTER
[facility] Call Center    Phone Message    May a detailed message be left on voicemail: yes     Reason for Call: Other: patient calling because he says during his appt with Dr. Beltrán she said she was going to prescribe Ritalin for him. He says he went to the pharmacy and they have not received that prescription. Please call back once it has been sent so pt knows when to pick it up.    Please send to : [facility] [place], [place] - [address]    Action Taken: Other: neurology    Travel Screening: Not Applicable
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
## 2022-09-09 ENCOUNTER — TELEPHONE (OUTPATIENT)
Dept: NEUROLOGY | Facility: CLINIC | Age: 67
End: 2022-09-09
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
## 2022-09-09 NOTE — TELEPHONE ENCOUNTER
Called patient to let him know that Rx for Ritalin was sent to pharmacy yesterday evening at 7:48PM. I let him know that he can go and  the Rx today.     Mervin Manuel, PARKER ATC on 9/9/2022 at 8:49 AM

## 2022-09-28 NOTE — PROGRESS NOTES
PHYSICAL THERAPY DISCHARGE  08/11/22 0800   Signing Clinician's Name / Credentials   Signing clinician's name / credentials Kris Hoenk, PT   Session Number   Session Number 4 MC seen from 7/20-8/11/22   Progress Note/Recertification   Recertification Due Date 09/14/22   Adult Goals   PT Eval Goals 1;2;3   Goal 1   Goal Identifier 1   Goal Description pt will be [place] to walk and turn head for sightseeing, grocery shopping in 4wk   Target Date 08/17/22   Date Met 08/03/22   Goal 2   Goal Identifier 2   Goal Description pt will be able to do all yardwork w/o dizziness in 8wk   Goal Progress min sx bending over   Target Date 09/14/22   Goal 3   Goal Identifier 3   Goal Description pt will be able to do woodworking w/o increase in sx in 8wk   Goal Progress can do things, sx bending over too many times   Target Date 09/14/22   Subjective Report   Subjective Report went to [facility] fair and too much visually going on, other than that, doing well. not noticing bending over sx as much, if he moves too fast it will bother him, going on ladders is  ok, mckayla a shed, looking up too long still bothers balance   Objective Measures   Objective Measures Objective Measure 1   Objective Measure 1   Objective Measure see treatment detail for sx   System Outcome Measures   Outcome Measures Concussion (see Concussion Symptom Assessment)  (2)   Treatment Interventions   Interventions Neuromuscular Re-education   Neuromuscular Re-education   Neuromuscular re-ed of mvmt, balance, coord, kinesthetic sense, posture, proprioception minutes (59408) 25   Skilled Intervention balance and vestib exercises for sx reduction, ahbituation, activity tolerance   Patient Response see details   Treatment Detail VOR cx tracking ball trunk rot x10 - dizzy, rested, hi/low x5each, after changing directions pt had to stop and take a knee to rest, dizzy, standing head turns diagonal - dizziness with looking up even w/o tracking object, sit to stand  from chair x10 - no sx   Oculomotor Exam   Convergence Testing Abnormal   Convergence Testing Comments 5cm, 6cm eye strain, near normal   Plan   Home program ex listed   Plan for next session on vacation, pt to self monitor sx, will hold open 4 wks  No further contacts after vacation, will discharge this episode of care   Total Session Time   Timed Code Treatment Minutes 25   Total Treatment Time (sum of timed and untimed services) 25   AMBULATORY CLINICS ONLY-MEDICAL AND TREATMENT DIAGNOSIS   Medical Diagnosis post concussion, dizziness   PT Diagnosis vestibular and balance deficits   [facility]  Kris Hoenk  PT  [facility]  [address] [place] [address]  [email or web address]   Office: [phone]  Voicemail: [phone]

## 2022-10-11 ENCOUNTER — OFFICE VISIT (OUTPATIENT)
Dept: UROLOGY | Facility: CLINIC | Age: 67
End: 2022-10-11
Payer: MEDICARE

## 2022-10-11 VITALS — HEART RATE: 73 BPM | DIASTOLIC BLOOD PRESSURE: 73 MMHG | SYSTOLIC BLOOD PRESSURE: 129 MMHG

## 2022-10-11 DIAGNOSIS — R33.9 BLADDER RETENTION: Primary | ICD-10-CM

## 2022-10-11 DIAGNOSIS — R33.9 URINARY RETENTION WITH INCOMPLETE BLADDER EMPTYING: ICD-10-CM

## 2022-10-11 LAB
ALBUMIN UR-MCNC: NEGATIVE MG/DL
APPEARANCE UR: CLEAR
BILIRUB UR QL STRIP: NEGATIVE
COLOR UR AUTO: YELLOW
GLUCOSE UR STRIP-MCNC: 100 MG/DL
HGB UR QL STRIP: NEGATIVE
KETONES UR STRIP-MCNC: NEGATIVE MG/DL
LEUKOCYTE ESTERASE UR QL STRIP: NEGATIVE
NITRATE UR QL: NEGATIVE
PH UR STRIP: 6.5 [PH] (ref 5–7)
RBC #/AREA URNS AUTO: NORMAL /HPF
SP GR UR STRIP: <=1.005 (ref 1–1.03)
UROBILINOGEN UR STRIP-ACNC: 0.2 E.U./DL
WBC #/AREA URNS AUTO: NORMAL /HPF

## 2022-10-11 PROCEDURE — 87086 URINE CULTURE/COLONY COUNT: CPT | Performed by: UROLOGY

## 2022-10-11 PROCEDURE — 51798 US URINE CAPACITY MEASURE: CPT | Performed by: UROLOGY

## 2022-10-11 PROCEDURE — 99213 OFFICE O/P EST LOW 20 MIN: CPT | Mod: 25 | Performed by: UROLOGY

## 2022-10-11 PROCEDURE — 81001 URINALYSIS AUTO W/SCOPE: CPT | Performed by: UROLOGY

## 2022-10-11 NOTE — NURSING NOTE
"Initial /73 (BP Location: Right arm, Patient Position: Chair, Cuff Size: Adult Regular)   Pulse 73  Estimated body mass index is 34.3 kg/m  as calculated from the following:    Height as of 2/18/22: 1.854 m (6' 1\").    Weight as of 2/18/22: 117.9 kg (260 lb). .    Patient is here for a recheck  post void residual 92mls.  lakesha kelly LPN    "

## 2022-10-11 NOTE — PROGRESS NOTES
Appointment source: Established Patient  Patient name: Stiven Nguyễn  Urology Staff: Mervin Yung MD    Subjective: This is a 67 year old year old male returning for follow up of urinary urgency.    He reports that recently, following the initiation of finasteride therapy, he has begun experiencing some urge incontinence.  This has not been a previous issue.    He currently is taking tamsulosin finasteride and 5 mg of immediate release oxybutynin in the evening.    He continues to have urgency throughout the day but can usually get to the bathroom on time.  On the occasions of the recent episodes of urge incontinence he did not even have a great deal of warning that he was about to pass urine.    He plans on starting to wear pull-ups while we work on this.    Objective: Post void residual has actually improved since starting the finasteride and is now less than 100 mL down from more than 200 mL.    Assessment: Improved bladder emptying but the onset of urge incontinence.    Plan: I suggested he move his oxybutynin dose to the morning to better control the daytime urgency and urge incontinence.  He currently does have some degree of nocturia but no while sleeping.    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  In view of this history he may respond to one of the beta 3 agonists possibly in combination with an anticholinergic if his urinary urgency and urge incontinence persists.    He will give me follow-up via [facility].      Total time 15 minutes

## 2022-10-13 LAB — BACTERIA UR CULT: NO GROWTH

## 2022-10-16 ENCOUNTER — NURSE TRIAGE (OUTPATIENT)
Dept: NURSING | Facility: CLINIC | Age: 67
End: 2022-10-16

## 2022-10-16 NOTE — TELEPHONE ENCOUNTER
S/B: Patient got his flu shot on Monday and developed a headache and body aches on Tuesday. Took a home test today which was positive.    A: The headache is currently 2/10. Has congestion which has made him be unable to wear his CPAP last night. No fever. Denies SOB or chest pain. Symptoms started Tuesday 10/11.     Gather patient reported symptoms   Assessment   Current Symptoms at time of phone call, reported by patient: (if no symptoms, document: No symptoms] Headache, body aches, congestion, cough   Date of symptom(s) onset (if applicable) 10/11       Treatment Options:   Patient classified as COVID treatment eligible by Epic high risk algorithm: Yes  Is the patient symptomatic at the time of result notification? Yes. Was the onset of symptoms within the last 5 days? Yes.   There are now oral medications available for the treatment of COVID-19.  Taking one of these medications within the first five days of symptoms (when people may not yet feel severely ill) has been shown to make people feel better, prevent them from getting sicker, and preventing hospitalization and death.   Does the patient agree to have a visit with a provider to discuss treatment options? No.  Reason patient declined:  Other: Symptoms are minimal and not interested      Review information with Patient    Your result was positive. This means you have COVID-19 (coronavirus).    How can I protect others?    These guidelines are for isolating before returning to work, school or .    If you DO have symptoms    Stay home and away from others     For at least 5 days after your symptoms started, AND    You are fever free for 24 hours (with no medicine that reduces fever), AND    Your other symptoms are better    Wear a mask for 10 full days anytime you are around others    If you DON'T have symptoms    Stay home and away from others for at least 5 days after your positive test    Wear a mask for 10 full days anytime you are around  others    There may be different guidelines for healthcare facilities.  Please check with the specific sites before arriving.    If you have been told by a doctor that you were severely ill with COVID-19 or are immunocompromised, you should isolate for at least 10 days.    You should not go back to work until you meet the guidelines above for ending your home isolation. You don't need to be retested for COVID-19 before going back to work--studies show that you won't spread the virus if it's been at least 10 days since your symptoms started (or 20 days, if you have a weak immune system).    Employers, schools, and daycares: This is an official notice for this person's medical guidelines for returning in-person.  They must meet the above guidelines before going back to work, school or  in person.    You will receive a positive COVID-19 letter via [employer] or the mail soon with additional self-care information.    Would you like me to review some of that information with you now?  Yes    How can I take care of myself?      Get lots of rest. Drink extra fluids (unless a doctor has told you not to).      Take Tylenol (acetaminophen) for fever or pain. If you have liver or kidney problems, ask your family doctor if it's okay to take Tylenol.     Take either:     650 mg (two 325 mg pills) every 4 to 6 hours, or     1,000 mg (two 500 mg pills) every 8 hours as needed.     Note: Do not take more than 3,000 mg in one day. Acetaminophen is found in many medicines (both prescribed and over-the-counter medicines). Read all labels to be sure you don't take too much.    For children, check the Tylenol bottle for the right dose (based on their age or weight).      If you have other health problems (like cancer, heart failure, an organ transplant or severe kidney disease): Call your specialty clinic if you don't feel better in the next 2 days.      Know when to call 911: Emergency warning signs include:    Trouble breathing or  shortness of breath    Pain or pressure in the chest that doesn't go away    Feeling confused like you haven't felt before, or not being able to wake up    Bluish-colored lips or face      If you were tested for an upcoming procedure, please contact your provider for next steps.    Tru Crooks RN      Reason for Disposition    HIGH RISK for severe COVID complications (e.g., weak immune system, age > 64 years, obesity with BMI > 25, pregnant, chronic lung disease or other chronic medical condition)  (Exception: Already seen by PCP and no new or worsening symptoms.)    Additional Information    Negative: SEVERE difficulty breathing (e.g., struggling for each breath, speaks in single words)    Negative: Difficult to awaken or acting confused (e.g., disoriented, slurred speech)    Negative: Bluish (or gray) lips or face now    Negative: Shock suspected (e.g., cold/pale/clammy skin, too weak to stand, low BP, rapid pulse)    Negative: Sounds like a life-threatening emergency to the triager    Negative: SEVERE or constant chest pain or pressure  (Exception: Mild central chest pain, present only when coughing.)    Negative: MODERATE difficulty breathing (e.g., speaks in phrases, SOB even at rest, pulse 100-120)    Negative: [1] Headache AND [2] stiff neck (can't touch chin to chest)    Negative: Oxygen level (e.g., pulse oximetry) 90 percent or lower    Negative: Chest pain or pressure    Negative: Patient sounds very sick or weak to the triager    Negative: MILD difficulty breathing (e.g., minimal/no SOB at rest, SOB with walking, pulse <100)    Negative: Fever > 103 F (39.4 C)    Negative: [1] Fever > 101 F (38.3 C) AND [2] age > 60 years    Negative: [1] Fever > 100.0 F (37.8 C) AND [2] bedridden (e.g., nursing home patient, CVA, chronic illness, recovering from surgery)    Protocols used: CORONAVIRUS (COVID-19) DIAGNOSED OR [id number] 1.18.2022

## 2022-11-01 ENCOUNTER — ANCILLARY PROCEDURE (OUTPATIENT)
Dept: CARDIOLOGY | Facility: CLINIC | Age: 67
End: 2022-11-01
Attending: INTERNAL MEDICINE
Payer: MEDICARE

## 2022-11-01 VITALS
HEART RATE: 89 BPM | OXYGEN SATURATION: 97 % | HEIGHT: 74 IN | BODY MASS INDEX: 34.01 KG/M2 | DIASTOLIC BLOOD PRESSURE: 84 MMHG | SYSTOLIC BLOOD PRESSURE: 134 MMHG | WEIGHT: 265 LBS

## 2022-11-01 DIAGNOSIS — R55 SYNCOPE, UNSPECIFIED SYNCOPE TYPE: ICD-10-CM

## 2022-11-01 DIAGNOSIS — G90.01 CAROTID SINUS SYNDROME: ICD-10-CM

## 2022-11-01 DIAGNOSIS — Z95.0 CARDIAC PACEMAKER IN SITU: Primary | ICD-10-CM

## 2022-11-01 DIAGNOSIS — I21.4 NSTEMI (NON-ST ELEVATED MYOCARDIAL INFARCTION) (H): ICD-10-CM

## 2022-11-01 DIAGNOSIS — I49.5 SINUS NODE DYSFUNCTION (H): ICD-10-CM

## 2022-11-01 PROCEDURE — 93280 PM DEVICE PROGR EVAL DUAL: CPT | Performed by: INTERNAL MEDICINE

## 2022-11-01 PROCEDURE — G0463 HOSPITAL OUTPT CLINIC VISIT: HCPCS | Mod: 25

## 2022-11-01 PROCEDURE — 99214 OFFICE O/P EST MOD 30 MIN: CPT | Mod: 25 | Performed by: INTERNAL MEDICINE

## 2022-11-01 RX ORDER — CALCIUM CARBONATE 500 MG/1
1 TABLET, CHEWABLE ORAL PRN
COMMUNITY
End: 2024-07-14

## 2022-11-01 ASSESSMENT — PAIN SCALES - GENERAL: PAINLEVEL: NO PAIN (0)

## 2022-11-01 NOTE — PATIENT INSTRUCTIONS
You were seen in the Electrophysiology Clinic today by: Dr Jackson    Plan:     Follow up Visit:  1 year with Dr Bermudez at [facility] with a device check prior    Device Follow up:   Transfer care to [facility]           If you have further questions, please utilize [employer] to contact us.     Your Care Team:    EP Cardiology   Telephone Number     Nurse Line  Lazara Brown, RN   Allison Pike, RN   [phone]     For scheduling appointments:   Nicole   [phone]   For procedure scheduling:    Diann Jain     [phone]   For the Device Clinic (Pacemakers, ICDs, Loop Recorders)    During business hours: [phone]  After business hours:   [phone]- select option 4 and ask for job code [id number].       On-call cardiologist for after hours or on weekends:   [phone], option #4, and ask to speak to the on-call cardiologist.     Cardiovascular Clinic:   [address] [place] [address]. [place] [address]      As always, Thank you for trusting us with your health care needs!
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
## 2022-11-01 NOTE — NURSING NOTE
Chief Complaint   Patient presents with     Follow Up     f/u SND / PPM       Vitals were taken and medications reconciled.    Colton Gupta, EMT  10:37 AM
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
## 2022-11-03 ENCOUNTER — TELEPHONE (OUTPATIENT)
Dept: CARDIOLOGY | Facility: CLINIC | Age: 67
End: 2022-11-03

## 2022-11-03 DIAGNOSIS — I20.1 VASOSPASTIC ANGINA (H): ICD-10-CM

## 2022-11-03 DIAGNOSIS — E78.5 HYPERLIPIDEMIA LDL GOAL <70: ICD-10-CM

## 2022-11-03 DIAGNOSIS — I10 BENIGN ESSENTIAL HYPERTENSION: ICD-10-CM

## 2022-11-03 DIAGNOSIS — Z95.0 CARDIAC PACEMAKER IN SITU: ICD-10-CM

## 2022-11-03 DIAGNOSIS — I49.5 SINUS NODE DYSFUNCTION (H): ICD-10-CM

## 2022-11-03 DIAGNOSIS — R07.9 CHEST PAIN: ICD-10-CM

## 2022-11-03 DIAGNOSIS — I10 HYPERTENSION: ICD-10-CM

## 2022-11-03 DIAGNOSIS — R07.89 CHEST PRESSURE: Primary | ICD-10-CM

## 2022-11-03 DIAGNOSIS — I25.10 CORONARY ARTERY DISEASE: ICD-10-CM

## 2022-11-03 NOTE — TELEPHONE ENCOUNTER
----- Message from Allison Osborn sent at 11/2/2022  6:31 AM CDT -----  Regarding: Patient wants to transfer his Care back to [place]  Please call patient to schedule f/u, he wants to transfer his pacemaker care back to [facility].    Thank you!      Received above request.  Called and spoke with patient who stated he would like to transfer his cardiology care - both MD and device - back to [facility].   Order for f/up with Dr. Bermudez and device check entered.  Message sent to the [place] scheduling team and to the Device techs requesting assistance in transferring care.  Patient requested his wife, Genna, be contacted for scheduling.    DAYAN St

## 2022-11-28 ENCOUNTER — HOSPITAL ENCOUNTER (EMERGENCY)
Facility: CLINIC | Age: 67
Discharge: HOME | End: 2022-11-28
Attending: NURSE PRACTITIONER | Admitting: NURSE PRACTITIONER
Payer: MEDICARE

## 2022-11-28 VITALS
RESPIRATION RATE: 16 BRPM | SYSTOLIC BLOOD PRESSURE: 151 MMHG | DIASTOLIC BLOOD PRESSURE: 89 MMHG | OXYGEN SATURATION: 96 % | TEMPERATURE: 97.4 F | HEART RATE: 95 BPM

## 2022-11-28 DIAGNOSIS — T16.9XXA FOREIGN BODY IN EAR: ICD-10-CM

## 2022-11-28 PROCEDURE — 69200 CLEAR OUTER EAR CANAL: CPT | Mod: RT | Performed by: NURSE PRACTITIONER

## 2022-11-28 PROCEDURE — 99212 OFFICE O/P EST SF 10 MIN: CPT | Mod: 25 | Performed by: NURSE PRACTITIONER

## 2022-11-28 PROCEDURE — G0463 HOSPITAL OUTPT CLINIC VISIT: HCPCS | Mod: 25 | Performed by: NURSE PRACTITIONER
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
## 2022-12-28 ENCOUNTER — MYC MEDICAL ADVICE (OUTPATIENT)
Dept: UROLOGY | Facility: CLINIC | Age: 67
End: 2022-12-28

## 2022-12-28 DIAGNOSIS — R33.9 BLADDER RETENTION: ICD-10-CM

## 2022-12-28 DIAGNOSIS — R39.15 URINARY URGENCY: ICD-10-CM

## 2022-12-29 RX ORDER — FINASTERIDE 5 MG/1
5 TABLET, FILM COATED ORAL DAILY
Qty: 90 TABLET | Refills: 2 | Status: SHIPPED | OUTPATIENT
Start: 2022-12-29 | End: 2023-09-15

## 2022-12-29 RX ORDER — TAMSULOSIN HYDROCHLORIDE 0.4 MG/1
0.4 CAPSULE ORAL DAILY
Qty: 90 CAPSULE | Refills: 2 | Status: SHIPPED | OUTPATIENT
Start: 2022-12-29 | End: 2023-06-12

## 2022-12-29 NOTE — TELEPHONE ENCOUNTER
[facility] to [facility] per patient request and [facility] protocol.  Shae Wilson RN on 12/29/2022 at 8:52 AM

## 2023-01-01 NOTE — ED NOTES
Circumcision baby    Date/Time: 2023 1:10 PM    Performed by: Hernesto Kendall MD  Authorized by: Hernesto Kendall MD    Verbal consent obtained?: Yes    Written consent obtained?: Yes    Risks and benefits: Risks, benefits and alternatives were discussed    Consent given by:  Parent  Required items: Required blood products, implants, devices and special equipment available    Patient identity confirmed:  Arm band, provided demographic data and hospital-assigned identification number  Time out: Immediately prior to the procedure a time out was called    Anatomy: Normal    Vitamin K: Confirmed    Restraint:  Standard molded circumcision board  Pain management / analgesia:  0.8 mL 1% lidocaine intradermal 1 time  Prep Used:  Betadine  Clamps:      Gomco     1.1 cm  Instrument was checked pre-procedure and approximated appropriately    Complications: No     Infant tolerated procedure well. Minimal blood loss. Pt c/o left testicular pain and lower abd pain after twisting tonight. Pt had hernia repair of umbilical hernia and bilat inguinal hernia. Incisions are CDI.

## 2023-01-12 LAB
MDC_IDC_LEAD_IMPLANT_DT: NORMAL
MDC_IDC_LEAD_IMPLANT_DT: NORMAL
MDC_IDC_LEAD_LOCATION: NORMAL
MDC_IDC_LEAD_LOCATION: NORMAL
MDC_IDC_LEAD_LOCATION_DETAIL_1: NORMAL
MDC_IDC_LEAD_LOCATION_DETAIL_1: NORMAL
MDC_IDC_LEAD_MFG: NORMAL
MDC_IDC_LEAD_MFG: NORMAL
MDC_IDC_LEAD_MODEL: NORMAL
MDC_IDC_LEAD_MODEL: NORMAL
MDC_IDC_LEAD_POLARITY_TYPE: NORMAL
MDC_IDC_LEAD_POLARITY_TYPE: NORMAL
MDC_IDC_LEAD_SERIAL: NORMAL
MDC_IDC_LEAD_SERIAL: NORMAL
MDC_IDC_LEAD_SPECIAL_FUNCTION: NORMAL
MDC_IDC_LEAD_SPECIAL_FUNCTION: NORMAL
MDC_IDC_MSMT_BATTERY_DTM: NORMAL
MDC_IDC_MSMT_BATTERY_REMAINING_LONGEVITY: 149 MO
MDC_IDC_MSMT_BATTERY_RRT_TRIGGER: 2.62
MDC_IDC_MSMT_BATTERY_STATUS: NORMAL
MDC_IDC_MSMT_BATTERY_VOLTAGE: 3.05 V
MDC_IDC_MSMT_LEADCHNL_RA_IMPEDANCE_VALUE: 342 OHM
MDC_IDC_MSMT_LEADCHNL_RA_IMPEDANCE_VALUE: 551 OHM
MDC_IDC_MSMT_LEADCHNL_RA_PACING_THRESHOLD_AMPLITUDE: 1 V
MDC_IDC_MSMT_LEADCHNL_RA_PACING_THRESHOLD_PULSEWIDTH: 0.4 MS
MDC_IDC_MSMT_LEADCHNL_RA_SENSING_INTR_AMPL: 2.88 MV
MDC_IDC_MSMT_LEADCHNL_RV_IMPEDANCE_VALUE: 418 OHM
MDC_IDC_MSMT_LEADCHNL_RV_IMPEDANCE_VALUE: 494 OHM
MDC_IDC_MSMT_LEADCHNL_RV_PACING_THRESHOLD_AMPLITUDE: 0.75 V
MDC_IDC_MSMT_LEADCHNL_RV_PACING_THRESHOLD_PULSEWIDTH: 0.4 MS
MDC_IDC_MSMT_LEADCHNL_RV_SENSING_INTR_AMPL: 19.25 MV
MDC_IDC_PG_IMPLANT_DTM: NORMAL
MDC_IDC_PG_MFG: NORMAL
MDC_IDC_PG_MODEL: NORMAL
MDC_IDC_PG_SERIAL: NORMAL
MDC_IDC_PG_TYPE: NORMAL
MDC_IDC_SESS_CLINIC_NAME: NORMAL
MDC_IDC_SESS_DTM: NORMAL
MDC_IDC_SESS_TYPE: NORMAL
MDC_IDC_SET_BRADY_AT_MODE_SWITCH_RATE: 171 {BEATS}/MIN
MDC_IDC_SET_BRADY_HYSTRATE: NORMAL
MDC_IDC_SET_BRADY_LOWRATE: 70 {BEATS}/MIN
MDC_IDC_SET_BRADY_MAX_SENSOR_RATE: 130 {BEATS}/MIN
MDC_IDC_SET_BRADY_MAX_TRACKING_RATE: 130 {BEATS}/MIN
MDC_IDC_SET_BRADY_MODE: NORMAL
MDC_IDC_SET_BRADY_NIGHT_RATE: 60 {BEATS}/MIN
MDC_IDC_SET_BRADY_PAV_DELAY_LOW: 180 MS
MDC_IDC_SET_BRADY_SAV_DELAY_LOW: 150 MS
MDC_IDC_SET_LEADCHNL_RA_PACING_AMPLITUDE: 1.75 V
MDC_IDC_SET_LEADCHNL_RA_PACING_ANODE_ELECTRODE_1: NORMAL
MDC_IDC_SET_LEADCHNL_RA_PACING_ANODE_LOCATION_1: NORMAL
MDC_IDC_SET_LEADCHNL_RA_PACING_CAPTURE_MODE: NORMAL
MDC_IDC_SET_LEADCHNL_RA_PACING_CATHODE_ELECTRODE_1: NORMAL
MDC_IDC_SET_LEADCHNL_RA_PACING_CATHODE_LOCATION_1: NORMAL
MDC_IDC_SET_LEADCHNL_RA_PACING_POLARITY: NORMAL
MDC_IDC_SET_LEADCHNL_RA_PACING_PULSEWIDTH: 0.4 MS
MDC_IDC_SET_LEADCHNL_RA_SENSING_ANODE_ELECTRODE_1: NORMAL
MDC_IDC_SET_LEADCHNL_RA_SENSING_ANODE_LOCATION_1: NORMAL
MDC_IDC_SET_LEADCHNL_RA_SENSING_CATHODE_ELECTRODE_1: NORMAL
MDC_IDC_SET_LEADCHNL_RA_SENSING_CATHODE_LOCATION_1: NORMAL
MDC_IDC_SET_LEADCHNL_RA_SENSING_POLARITY: NORMAL
MDC_IDC_SET_LEADCHNL_RA_SENSING_SENSITIVITY: 0.3 MV
MDC_IDC_SET_LEADCHNL_RV_PACING_AMPLITUDE: 2 V
MDC_IDC_SET_LEADCHNL_RV_PACING_ANODE_ELECTRODE_1: NORMAL
MDC_IDC_SET_LEADCHNL_RV_PACING_ANODE_LOCATION_1: NORMAL
MDC_IDC_SET_LEADCHNL_RV_PACING_CAPTURE_MODE: NORMAL
MDC_IDC_SET_LEADCHNL_RV_PACING_CATHODE_ELECTRODE_1: NORMAL
MDC_IDC_SET_LEADCHNL_RV_PACING_CATHODE_LOCATION_1: NORMAL
MDC_IDC_SET_LEADCHNL_RV_PACING_POLARITY: NORMAL
MDC_IDC_SET_LEADCHNL_RV_PACING_PULSEWIDTH: 0.4 MS
MDC_IDC_SET_LEADCHNL_RV_SENSING_ANODE_ELECTRODE_1: NORMAL
MDC_IDC_SET_LEADCHNL_RV_SENSING_ANODE_LOCATION_1: NORMAL
MDC_IDC_SET_LEADCHNL_RV_SENSING_CATHODE_ELECTRODE_1: NORMAL
MDC_IDC_SET_LEADCHNL_RV_SENSING_CATHODE_LOCATION_1: NORMAL
MDC_IDC_SET_LEADCHNL_RV_SENSING_POLARITY: NORMAL
MDC_IDC_SET_LEADCHNL_RV_SENSING_SENSITIVITY: 0.9 MV
MDC_IDC_SET_ZONE_DETECTION_INTERVAL: 350 MS
MDC_IDC_SET_ZONE_DETECTION_INTERVAL: 400 MS
MDC_IDC_SET_ZONE_TYPE: NORMAL
MDC_IDC_STAT_AT_BURDEN_PERCENT: 0 %
MDC_IDC_STAT_AT_DTM_END: NORMAL
MDC_IDC_STAT_AT_DTM_START: NORMAL
MDC_IDC_STAT_BRADY_AP_VP_PERCENT: 0.17 %
MDC_IDC_STAT_BRADY_AP_VS_PERCENT: 99.19 %
MDC_IDC_STAT_BRADY_AS_VP_PERCENT: 0 %
MDC_IDC_STAT_BRADY_AS_VS_PERCENT: 0.63 %
MDC_IDC_STAT_BRADY_DTM_END: NORMAL
MDC_IDC_STAT_BRADY_DTM_START: NORMAL
MDC_IDC_STAT_BRADY_RA_PERCENT_PACED: 99.38 %
MDC_IDC_STAT_BRADY_RV_PERCENT_PACED: 0.17 %
MDC_IDC_STAT_EPISODE_RECENT_COUNT: 0
MDC_IDC_STAT_EPISODE_RECENT_COUNT_DTM_END: NORMAL
MDC_IDC_STAT_EPISODE_RECENT_COUNT_DTM_START: NORMAL
MDC_IDC_STAT_EPISODE_TOTAL_COUNT: 0
MDC_IDC_STAT_EPISODE_TOTAL_COUNT: 0
MDC_IDC_STAT_EPISODE_TOTAL_COUNT: 14
MDC_IDC_STAT_EPISODE_TOTAL_COUNT_DTM_END: NORMAL
MDC_IDC_STAT_EPISODE_TOTAL_COUNT_DTM_START: NORMAL
MDC_IDC_STAT_EPISODE_TYPE: NORMAL

## 2023-01-24 ENCOUNTER — OFFICE VISIT (OUTPATIENT)
Dept: CARDIOLOGY | Facility: CLINIC | Age: 68
End: 2023-01-24
Attending: INTERNAL MEDICINE
Payer: MEDICARE

## 2023-01-24 VITALS
SYSTOLIC BLOOD PRESSURE: 128 MMHG | BODY MASS INDEX: 35.41 KG/M2 | HEART RATE: 83 BPM | DIASTOLIC BLOOD PRESSURE: 78 MMHG | WEIGHT: 273 LBS | OXYGEN SATURATION: 97 %

## 2023-01-24 DIAGNOSIS — R06.02 SOB (SHORTNESS OF BREATH): Primary | ICD-10-CM

## 2023-01-24 DIAGNOSIS — I10 BENIGN ESSENTIAL HYPERTENSION: ICD-10-CM

## 2023-01-24 DIAGNOSIS — E78.5 HYPERLIPIDEMIA LDL GOAL <70: ICD-10-CM

## 2023-01-24 DIAGNOSIS — I25.10 CORONARY ARTERY DISEASE INVOLVING NATIVE HEART WITHOUT ANGINA PECTORIS, UNSPECIFIED VESSEL OR LESION TYPE: ICD-10-CM

## 2023-01-24 DIAGNOSIS — I20.1 ANGINA PECTORIS WITH DOCUMENTED SPASM (H): ICD-10-CM

## 2023-01-24 DIAGNOSIS — R07.89 CHEST PRESSURE: ICD-10-CM

## 2023-01-24 DIAGNOSIS — I20.1 VASOSPASTIC ANGINA (H): ICD-10-CM

## 2023-01-24 DIAGNOSIS — I20.1 CORONARY VASOSPASM (H): ICD-10-CM

## 2023-01-24 DIAGNOSIS — I10 HYPERTENSION, UNSPECIFIED TYPE: ICD-10-CM

## 2023-01-24 PROCEDURE — 99215 OFFICE O/P EST HI 40 MIN: CPT | Performed by: INTERNAL MEDICINE

## 2023-01-24 RX ORDER — FLUDROCORTISONE ACETATE 0.1 MG/1
0.1 TABLET ORAL DAILY
Qty: 30 TABLET | Refills: 1 | Status: SHIPPED | OUTPATIENT
Start: 2023-01-24 | End: 2023-02-13

## 2023-01-24 RX ORDER — ISOSORBIDE MONONITRATE 30 MG/1
30 TABLET, EXTENDED RELEASE ORAL DAILY
Qty: 90 TABLET | Refills: 3 | Status: SHIPPED | OUTPATIENT
Start: 2023-01-24 | End: 2023-09-27
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
## 2023-01-24 NOTE — PROGRESS NOTES
"Service Date: 01/24/2023    CARDIOLOGY CLINIC CONSULTATION    REASON FOR VISIT:  Followup.    HISTORY OF PRESENTING ILLNESS:  Arya is a pleasant 67-year-old gentleman who I had seen once in 04/2021 for a second opinion.  Prior to that, he was followed up by Dr. Pickard and Dr. Reddy.  Now, he is seeing me in followup at [facility] in [facility].    The patient has had complex cardiovascular issues, some with unclear diagnoses.  He has had a history of obesity, hypertension, borderline diabetes, chronic kidney disease, cataplexy and multiple other cardiovascular issues.    It appears that his coronary events began around 2015 when he presented with a non-ST elevation MI and chest discomfort.  He was noted to have moderate nonobstructive disease in the LAD.  In 2016, he underwent an acetylcholine coronary spasm study.  He became bradycardic, needed temporary pacing at that time but he was noted to have spasm of the mid LAD distal to the stenotic lesion.  He has been since then treated with medications for \"vasospastic angina.\"  In the past, it appeared that he did not tolerate Ranexa but used to be and even currently is on a combination of amlodipine and isosorbide mononitrate.  He was not on beta blockers for spasm plus bradycardia that was noted.  He is on aspirin statin.  Further, he had repeated coronary angiographies in 2017 and 2021.  All of them have shown moderate nonobstructive coronary artery disease.  His last angiogram in 01/2021 at [facility] had shown a moderate focal eccentric 50% stenosis of the proximal to mid LAD, a negative FFR as previously described and a mild myocardial bridging in the mid LAD segment.  Normal LVEDP.    He is seeing me for coronary vasospasm but more importantly, episodes of recurrent spells of blanking out of unclear etiology that have been going on for many decades but more recently had been \"different\" presentations.  Some of them were orthostatic.  Some of them " "were blanking-out spells.  No mary grace syncope while awake.  In any case, I referred him to Dr. Giacomo Jackson at [facility] and he saw him in 08/2021.  I had requested a tilt table study.  He had that done and there were some abnormal, although not completely diagnostic, findings on it.  He did have a pause and concerns for borderline carotid sinus syndrome.  He also had some blood pressure drops that may be unrelated to the low heart rates with carotid massage.  His diagnosis was unclear and syncope was thought potentially due to either low heart rates or a combination of that with vasodepressor response.  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  Since the pacemaker has been implanted, he felt somewhat better.  Around the same time, he says his Imdur was also discontinued because his lightheadedness/dizziness was thought to be related to Imdur.  When he took his Imdur off, he started having symptoms of chest discomfort and \"vasospastic angina\" again.  Since then, he says he was put back on a lower dose, 15 mg, of Imdur again.      Today, Arya is here for followup.  He has been having much fewer symptoms of lightheadedness and dizziness after his pacemaker and low-dose of Imdur but he is still experiencing them.  He had some episodes in November.  He had some in 01/2023.  He says the lower dose of Imdur and the pacemaker have helped somewhat but now he is experiencing the symptoms of chest discomfort, both on the right and left side, that do not necessarily always happen with exertion.  He feels these are his vasospastic anginal spells.  Lastly, he has been telling me that he has been experiencing significant exertional shortness of breath and he feels tired all day long.  He has prostate issues and he takes tamsulosin and finasteride for that.    Lastly, his device interrogation has in the past shown runs of atrial tachycardia.  He says he has " "been also told by Dr. Jackson that he has atrial fibrillation and has been put on Xarelto for that.  He also has a history of PE. For his atrial tachycardia he has been started on flecainide and beta-blockers per EP at [facility].    CURRENT MEDICATIONS:  Include:  1.  Amlodipine 10 mg daily.  2.  Atorvastatin 10 mg daily.    3.  Finasteride 5 mg daily.    4.  Tamsulosin 0.4 mg daily.    5.  Flecainide 100 mg b.i.d.  6.  Imdur 15 mg daily.    7.  Lisinopril 40 mg daily.  8.  Metoprolol succinate 50 mg daily.  9.  Nitroglycerin p.r.n.  10.  Xarelto 20 mg daily  11.  Oxybutynin.  12.  Omeprazole.    PHYSICAL EXAMINATION:    VITAL SIGNS:  Blood pressure today is 128/78 with a pulse of 83, weight is 273 pounds.  GENERAL:  Alert, oriented x3, in no acute distress.  NECK:  Thick.  JVP is hard to see.  LUNGS:  Clear.  CARDIAC:  Sounds are regular.    EXTREMITIES:  Have no edema.  PSYCHIATRIC:  Appropriate affect.  HEENT:  No icterus, pallor.    PERTINENT DATA:  Last LDL was 65, HDL 34, total cholesterol 121.  Last BMP was normal but was in 2022.  Last CBC, again, was normal but was in 2022.  Last ECG from 2022 shows atrial-paced rhythm.  Nonspecific IVCD.  Last device interrogation from 11/2022 shows 100% atrial pacing, 0.2% ventricular pacing.  No arrhythmias recorded.  Last nuclear stress test in 02/2022 did not demonstrate clear evidence of myocardial infarction or ischemia.  EF was 53%.  Study was technically challenging due to body habitus.  He had a cardiac MRI done in 05/2021 that was normal biventricular size and function without valve disease or late gadolinium enhancement.    ASSESSMENT AND PLAN:  Arya is 67 with a complex cardiovascular issues.  Some of them have been a diagnostic dilemma.  I am not 100% sure and I told him that, whether his symptoms are related to vasospastic angina and whether his symptoms of lightheadedness and these \"spells\" are cardiac in origin or not.  In any case, his symptoms " have been somewhat better after the pacemaker implantation and a lower dose of Imdur.  1.  In regards to his symptoms of lightheadedness and dizziness, I think these are variable presentations.  Some of them are spells which do not appear to be cardiac in origin.  However, some of them are orthostatic in nature and they are worsened with position, especially standing up too quickly.  I think the Imdur may be contributing to the latter but I do not know if it is an explanation for any of his spells that he has been experiencing for decades.  He has had chest pain when he has gone off Imdur and it is unclear to say whether that is spasm or not.  I would suggest that we try a low dose of Florinef.  I think him being on finasteride and tamsulosin for his prostate is probably making these issues worse but an attempt at low dose of Florinef initiation with labs check in about 2-3 weeks may be of benefit.  2.  In regards to his chest discomfort, these are sporadic episodes of chest pain that happen on both left and right sides of his chest.  Again, I am not sure if this is due to vasospastic angina or not.  He has also had a mid LAD bridge, as documented before.  Symptoms were significantly worse when he was not on Imdur.  Now, since we are starting him on Florinef to tackle his orthostasis, let us see if we can increase the Imdur up to 30 mg daily.  If he starts having side effects after a higher dose of Imdur, let us back down lisinopril dosing from 40 to 20 and see that combination.  I have told him to check back with us in about 2-3 weeks with that.  3.  Atrial arrhythmia/AFib.  He says he has been put on anticoagulation by EP team at the [facility].  He is on flecainide per them as well.  I would continue that along with metoprolol.  Routine care in the [facility].  4.  In regards to his CAD, he is on a statin.  LDL is at goal.  He is obese and at risk of cardiovascular events.  No smoking, alcohol.  Exercise,  healthy diet recommended.  5.  He has been complaining of this exertional shortness of breath.  He has had moderate disease in his coronaries before.  I am not sure if this has progressed.  Stress testing has been suboptimal quality for him.  I recommend that he get an opinion with Dr. Andre at [facility].  This should be done for 2 reasons.  One is to consider any other alternative treatments for coronary spasm such as arginine, etc., and second is consideration for invasive coronary angiography to assess for progression of obstructive coronary artery disease and to look at his myocardial bridge and perhaps at the same time, even repeat a vasospasm challenge on current medications.    As mentioned, he has a complicated set of medical issues that I am not 100% sure about the diagnosis for various of his concerns, whether it comes to his chest pain or his episodes of spells and I have told him that.  I think if these symptoms persist or get worse, I do not think I have the answer to his issues and we may recommend a referral to a center of expertise in autonomic dysfunction and vasospastic disease such as [facility].    I will make a referral for patient to see Dr. Andre in about a month.  Labs since we are initiating Florinef.  Follow up in 4 months with [facility].    Husam Bermudez MD     cc:  Giacomo Jackson MD    Physicians  [address], [facility] [address]  [place] [address]    Oliva Zhang MD   [facility]  [address] [place] [address]    Husam Bermudez MD        D: 2023   T: 2023   MT: dov    Name:     MARKEL HEADLEY  MRN:      [id number]        Account:      [id number]   :      1955           Service Date: 2023       Document: [id number]
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
## 2023-01-30 ENCOUNTER — TELEPHONE (OUTPATIENT)
Dept: DERMATOLOGY | Facility: CLINIC | Age: 68
End: 2023-01-30

## 2023-01-30 ENCOUNTER — OFFICE VISIT (OUTPATIENT)
Dept: DERMATOLOGY | Facility: CLINIC | Age: 68
End: 2023-01-30
Payer: MEDICARE

## 2023-01-30 DIAGNOSIS — L82.1 SEBORRHEIC KERATOSIS: ICD-10-CM

## 2023-01-30 DIAGNOSIS — D18.01 ANGIOMA OF SKIN: ICD-10-CM

## 2023-01-30 DIAGNOSIS — D23.9 DERMAL NEVUS: ICD-10-CM

## 2023-01-30 DIAGNOSIS — L81.4 LENTIGO: Primary | ICD-10-CM

## 2023-01-30 DIAGNOSIS — D04.39 SQUAMOUS CELL CARCINOMA IN SITU (SCCIS) OF SKIN OF LEFT TEMPLE REGION: ICD-10-CM

## 2023-01-30 DIAGNOSIS — D04.39 SQUAMOUS CELL CARCINOMA IN SITU (SCCIS) OF SKIN OF NOSE: ICD-10-CM

## 2023-01-30 PROCEDURE — 11103 TANGNTL BX SKIN EA SEP/ADDL: CPT | Performed by: DERMATOLOGY

## 2023-01-30 PROCEDURE — 88331 PATH CONSLTJ SURG 1 BLK 1SPC: CPT | Performed by: DERMATOLOGY

## 2023-01-30 PROCEDURE — 99203 OFFICE O/P NEW LOW 30 MIN: CPT | Mod: 25 | Performed by: DERMATOLOGY

## 2023-01-30 PROCEDURE — 11102 TANGNTL BX SKIN SINGLE LES: CPT | Performed by: DERMATOLOGY

## 2023-01-30 NOTE — LETTER
[facility] [place]  [address] [address]  Phone: [phone]    January 30, 2023    Stiven Nguyễn                                                                                                            [address]  [place] [facility] [facility] [address]            Dear Mr. Nguyễn,    You are scheduled for Mohs Surgery on:         Please check in at Dermatology Clinic.   (2nd Floor, last  Clinic on right up staircase or elevator -past OB/GYN clinic)    You don't need to arrive more than 5-10 minutes prior to your appointment time.     Be sure to eat a good breakfast and bathe and wash your hair prior to Surgery.    If you are taking any anti-coagulants that are prescribed by your Doctor (such as Coumadin/warfarin, Plavix, Aspirin, Ibuprofen), please continue taking them.     However, If you are taking anti-coagulants over the counter without  a Doctor's order for a Medical condition, please discontinue them 10 days prior to Surgery.      Please wear loose comfortable clothing as it could possibly be 4-6 hours until your surgery is completed depending upon how many layers of tissue need to be removed.     Wi-fi access is available.     Thank you,      Dudley Bradley MD/ Sadie Magaña RN

## 2023-01-30 NOTE — TELEPHONE ENCOUNTER
----- Message from Dudley Bradley MD sent at 1/30/2023 12:32 PM CST -----  L temple  Squamous cell carcinoma in situ   L NSW  Squamous cell carcinoma in situ   Schedule excision
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
## 2023-01-30 NOTE — PATIENT INSTRUCTIONS
Wound Care Instructions     FOR SUPERFICIAL WOUNDS     [facility] [phone]    [facility] [phone]                       AFTER 24 HOURS YOU SHOULD REMOVE THE BANDAGE AND BEGIN DAILY DRESSING CHANGES AS FOLLOWS:     1) Remove Dressing.     2) Clean and dry the area with tap water using a Q-tip or sterile gauze pad.     3) Apply Vaseline, Aquaphor, Polysporin ointment or Bacitracin ointment over entire wound.  Do NOT use Neosporin ointment.     4) Cover the wound with a band-aid, or a sterile non-stick gauze pad and micropore paper tape      REPEAT THESE INSTRUCTIONS AT LEAST ONCE A DAY UNTIL THE WOUND HAS COMPLETELY HEALED.    It is an old wives tale that a wound heals better when it is exposed to air and allowed to dry out. The wound will heal faster with a better cosmetic result if it is kept moist with ointment and covered with a bandage.    **Do not let the wound dry out.**      Supplies Needed:      *Cotton tipped applicators (Q-tips)    *Polysporin Ointment or Bacitracin Ointment (NOT NEOSPORIN)    *Band-aids or non-stick gauze pads and micropore paper tape.      PATIENT INFORMATION:    During the healing process you will notice a number of changes. All wounds develop a small halo of redness surrounding the wound.  This means healing is occurring. Severe itching with extensive redness usually indicates sensitivity to the ointment or bandage tape used to dress the wound.  You should call our office if this develops.      Swelling  and/or discoloration around your surgical site is common, particularly when performed around the eye.    All wounds normally drain.  The larger the wound the more drainage there will be.  After 7-10 days, you will notice the wound beginning to shrink and new skin will begin to grow.  The wound is healed when you can see skin has formed over the entire area.  A healed wound has a healthy, shiny look to the surface and is red to dark pink in color  to normalize.  Wounds may take approximately 4-6 weeks to heal.  Larger wounds may take 6-8 weeks.  After the wound is healed you may discontinue dressing changes.    You may experience a sensation of tightness as your wound heals. This is normal and will gradually subside.    Your healed wound may be sensitive to temperature changes. This sensitivity improves with time, but if you re having a lot of discomfort, try to avoid temperature extremes.    Patients frequently experience itching after their wound appears to have healed because of the continue healing under the skin.  Plain Vaseline will help relieve the itching.        POSSIBLE COMPLICATIONS    BLEEDING:    Leave the bandage in place.  Use tightly rolled up gauze or a cloth to apply direct pressure over the bandage for 30  minutes.  Reapply pressure for an additional 30 minutes if necessary  Use additional gauze and tape to maintain pressure once the bleeding has stopped.

## 2023-01-31 NOTE — TELEPHONE ENCOUNTER
"Patient notified. Patient verbalized understanding.     He states: \"I am mini freaking out right now, I did not expect this-can I call you back?..\"    Sadie Magaña RN    "

## 2023-01-31 NOTE — TELEPHONE ENCOUNTER
Patient returned call.   Scheduled Mohs per his request.  Information sent via Malachi MACDONALD RN  [facility]

## 2023-02-01 ENCOUNTER — ANCILLARY PROCEDURE (OUTPATIENT)
Dept: CARDIOLOGY | Facility: CLINIC | Age: 68
End: 2023-02-01
Attending: INTERNAL MEDICINE
Payer: MEDICARE

## 2023-02-01 DIAGNOSIS — I49.5 SINUS NODE DYSFUNCTION (H): ICD-10-CM

## 2023-02-01 DIAGNOSIS — Z95.0 CARDIAC PACEMAKER IN SITU: ICD-10-CM

## 2023-02-01 PROCEDURE — 93296 REM INTERROG EVL PM/IDS: CPT | Performed by: INTERNAL MEDICINE

## 2023-02-01 PROCEDURE — 93294 REM INTERROG EVL PM/LDLS PM: CPT | Performed by: INTERNAL MEDICINE

## 2023-02-03 LAB
MDC_IDC_LEAD_IMPLANT_DT: NORMAL
MDC_IDC_LEAD_IMPLANT_DT: NORMAL
MDC_IDC_LEAD_LOCATION: NORMAL
MDC_IDC_LEAD_LOCATION: NORMAL
MDC_IDC_LEAD_LOCATION_DETAIL_1: NORMAL
MDC_IDC_LEAD_LOCATION_DETAIL_1: NORMAL
MDC_IDC_LEAD_MFG: NORMAL
MDC_IDC_LEAD_MFG: NORMAL
MDC_IDC_LEAD_MODEL: NORMAL
MDC_IDC_LEAD_MODEL: NORMAL
MDC_IDC_LEAD_POLARITY_TYPE: NORMAL
MDC_IDC_LEAD_POLARITY_TYPE: NORMAL
MDC_IDC_LEAD_SERIAL: NORMAL
MDC_IDC_LEAD_SERIAL: NORMAL
MDC_IDC_LEAD_SPECIAL_FUNCTION: NORMAL
MDC_IDC_LEAD_SPECIAL_FUNCTION: NORMAL
MDC_IDC_MSMT_BATTERY_DTM: NORMAL
MDC_IDC_MSMT_BATTERY_REMAINING_LONGEVITY: 144 MO
MDC_IDC_MSMT_BATTERY_RRT_TRIGGER: 2.62
MDC_IDC_MSMT_BATTERY_STATUS: NORMAL
MDC_IDC_MSMT_BATTERY_VOLTAGE: 3.03 V
MDC_IDC_MSMT_LEADCHNL_RA_IMPEDANCE_VALUE: 342 OHM
MDC_IDC_MSMT_LEADCHNL_RA_IMPEDANCE_VALUE: 513 OHM
MDC_IDC_MSMT_LEADCHNL_RA_PACING_THRESHOLD_AMPLITUDE: 0.88 V
MDC_IDC_MSMT_LEADCHNL_RA_PACING_THRESHOLD_PULSEWIDTH: 0.4 MS
MDC_IDC_MSMT_LEADCHNL_RA_SENSING_INTR_AMPL: 2.88 MV
MDC_IDC_MSMT_LEADCHNL_RA_SENSING_INTR_AMPL: 2.88 MV
MDC_IDC_MSMT_LEADCHNL_RV_IMPEDANCE_VALUE: 380 OHM
MDC_IDC_MSMT_LEADCHNL_RV_IMPEDANCE_VALUE: 456 OHM
MDC_IDC_MSMT_LEADCHNL_RV_PACING_THRESHOLD_AMPLITUDE: 0.88 V
MDC_IDC_MSMT_LEADCHNL_RV_PACING_THRESHOLD_PULSEWIDTH: 0.4 MS
MDC_IDC_MSMT_LEADCHNL_RV_SENSING_INTR_AMPL: 18.88 MV
MDC_IDC_MSMT_LEADCHNL_RV_SENSING_INTR_AMPL: 18.88 MV
MDC_IDC_PG_IMPLANT_DTM: NORMAL
MDC_IDC_PG_MFG: NORMAL
MDC_IDC_PG_MODEL: NORMAL
MDC_IDC_PG_SERIAL: NORMAL
MDC_IDC_PG_TYPE: NORMAL
MDC_IDC_SESS_CLINIC_NAME: NORMAL
MDC_IDC_SESS_DTM: NORMAL
MDC_IDC_SESS_TYPE: NORMAL
MDC_IDC_SET_BRADY_AT_MODE_SWITCH_RATE: 171 {BEATS}/MIN
MDC_IDC_SET_BRADY_HYSTRATE: NORMAL
MDC_IDC_SET_BRADY_LOWRATE: 70 {BEATS}/MIN
MDC_IDC_SET_BRADY_MAX_SENSOR_RATE: 130 {BEATS}/MIN
MDC_IDC_SET_BRADY_MAX_TRACKING_RATE: 130 {BEATS}/MIN
MDC_IDC_SET_BRADY_MODE: NORMAL
MDC_IDC_SET_BRADY_NIGHT_RATE: 60 {BEATS}/MIN
MDC_IDC_SET_BRADY_PAV_DELAY_LOW: 180 MS
MDC_IDC_SET_BRADY_SAV_DELAY_LOW: 150 MS
MDC_IDC_SET_LEADCHNL_RA_PACING_AMPLITUDE: 1.75 V
MDC_IDC_SET_LEADCHNL_RA_PACING_ANODE_ELECTRODE_1: NORMAL
MDC_IDC_SET_LEADCHNL_RA_PACING_ANODE_LOCATION_1: NORMAL
MDC_IDC_SET_LEADCHNL_RA_PACING_CAPTURE_MODE: NORMAL
MDC_IDC_SET_LEADCHNL_RA_PACING_CATHODE_ELECTRODE_1: NORMAL
MDC_IDC_SET_LEADCHNL_RA_PACING_CATHODE_LOCATION_1: NORMAL
MDC_IDC_SET_LEADCHNL_RA_PACING_POLARITY: NORMAL
MDC_IDC_SET_LEADCHNL_RA_PACING_PULSEWIDTH: 0.4 MS
MDC_IDC_SET_LEADCHNL_RA_SENSING_ANODE_ELECTRODE_1: NORMAL
MDC_IDC_SET_LEADCHNL_RA_SENSING_ANODE_LOCATION_1: NORMAL
MDC_IDC_SET_LEADCHNL_RA_SENSING_CATHODE_ELECTRODE_1: NORMAL
MDC_IDC_SET_LEADCHNL_RA_SENSING_CATHODE_LOCATION_1: NORMAL
MDC_IDC_SET_LEADCHNL_RA_SENSING_POLARITY: NORMAL
MDC_IDC_SET_LEADCHNL_RA_SENSING_SENSITIVITY: 0.3 MV
MDC_IDC_SET_LEADCHNL_RV_PACING_AMPLITUDE: 2 V
MDC_IDC_SET_LEADCHNL_RV_PACING_ANODE_ELECTRODE_1: NORMAL
MDC_IDC_SET_LEADCHNL_RV_PACING_ANODE_LOCATION_1: NORMAL
MDC_IDC_SET_LEADCHNL_RV_PACING_CAPTURE_MODE: NORMAL
MDC_IDC_SET_LEADCHNL_RV_PACING_CATHODE_ELECTRODE_1: NORMAL
MDC_IDC_SET_LEADCHNL_RV_PACING_CATHODE_LOCATION_1: NORMAL
MDC_IDC_SET_LEADCHNL_RV_PACING_POLARITY: NORMAL
MDC_IDC_SET_LEADCHNL_RV_PACING_PULSEWIDTH: 0.4 MS
MDC_IDC_SET_LEADCHNL_RV_SENSING_ANODE_ELECTRODE_1: NORMAL
MDC_IDC_SET_LEADCHNL_RV_SENSING_ANODE_LOCATION_1: NORMAL
MDC_IDC_SET_LEADCHNL_RV_SENSING_CATHODE_ELECTRODE_1: NORMAL
MDC_IDC_SET_LEADCHNL_RV_SENSING_CATHODE_LOCATION_1: NORMAL
MDC_IDC_SET_LEADCHNL_RV_SENSING_POLARITY: NORMAL
MDC_IDC_SET_LEADCHNL_RV_SENSING_SENSITIVITY: 0.9 MV
MDC_IDC_SET_ZONE_DETECTION_INTERVAL: 350 MS
MDC_IDC_SET_ZONE_DETECTION_INTERVAL: 400 MS
MDC_IDC_SET_ZONE_TYPE: NORMAL
MDC_IDC_STAT_AT_BURDEN_PERCENT: 0 %
MDC_IDC_STAT_AT_DTM_END: NORMAL
MDC_IDC_STAT_AT_DTM_START: NORMAL
MDC_IDC_STAT_BRADY_AP_VP_PERCENT: 0.26 %
MDC_IDC_STAT_BRADY_AP_VS_PERCENT: 98.92 %
MDC_IDC_STAT_BRADY_AS_VP_PERCENT: 0 %
MDC_IDC_STAT_BRADY_AS_VS_PERCENT: 0.82 %
MDC_IDC_STAT_BRADY_DTM_END: NORMAL
MDC_IDC_STAT_BRADY_DTM_START: NORMAL
MDC_IDC_STAT_BRADY_RA_PERCENT_PACED: 99.18 %
MDC_IDC_STAT_BRADY_RV_PERCENT_PACED: 0.26 %
MDC_IDC_STAT_EPISODE_RECENT_COUNT: 0
MDC_IDC_STAT_EPISODE_RECENT_COUNT_DTM_END: NORMAL
MDC_IDC_STAT_EPISODE_RECENT_COUNT_DTM_START: NORMAL
MDC_IDC_STAT_EPISODE_TOTAL_COUNT: 0
MDC_IDC_STAT_EPISODE_TOTAL_COUNT: 14
MDC_IDC_STAT_EPISODE_TOTAL_COUNT: 5
MDC_IDC_STAT_EPISODE_TOTAL_COUNT_DTM_END: NORMAL
MDC_IDC_STAT_EPISODE_TOTAL_COUNT_DTM_START: NORMAL
MDC_IDC_STAT_EPISODE_TYPE: NORMAL

## 2023-02-07 ENCOUNTER — LAB (OUTPATIENT)
Dept: LAB | Facility: CLINIC | Age: 68
End: 2023-02-07
Payer: MEDICARE

## 2023-02-07 DIAGNOSIS — I10 HYPERTENSION, UNSPECIFIED TYPE: ICD-10-CM

## 2023-02-07 DIAGNOSIS — I20.1 ANGINA PECTORIS WITH DOCUMENTED SPASM (H): ICD-10-CM

## 2023-02-07 DIAGNOSIS — I25.10 CORONARY ARTERY DISEASE INVOLVING NATIVE HEART WITHOUT ANGINA PECTORIS, UNSPECIFIED VESSEL OR LESION TYPE: ICD-10-CM

## 2023-02-07 DIAGNOSIS — R06.02 SOB (SHORTNESS OF BREATH): ICD-10-CM

## 2023-02-07 DIAGNOSIS — E78.5 HYPERLIPIDEMIA LDL GOAL <70: ICD-10-CM

## 2023-02-07 DIAGNOSIS — R07.89 CHEST PRESSURE: ICD-10-CM

## 2023-02-07 LAB
ALBUMIN SERPL BCG-MCNC: 4.2 G/DL (ref 3.5–5.2)
ALP SERPL-CCNC: 91 U/L (ref 40–129)
ALT SERPL W P-5'-P-CCNC: 30 U/L (ref 10–50)
ANION GAP SERPL CALCULATED.3IONS-SCNC: 11 MMOL/L (ref 7–15)
AST SERPL W P-5'-P-CCNC: 21 U/L (ref 10–50)
BILIRUB SERPL-MCNC: 0.6 MG/DL
BUN SERPL-MCNC: 14.2 MG/DL (ref 8–23)
CALCIUM SERPL-MCNC: 9.3 MG/DL (ref 8.8–10.2)
CHLORIDE SERPL-SCNC: 103 MMOL/L (ref 98–107)
CHOLEST SERPL-MCNC: 134 MG/DL
CREAT SERPL-MCNC: 0.95 MG/DL (ref 0.67–1.17)
DEPRECATED HCO3 PLAS-SCNC: 27 MMOL/L (ref 22–29)
ERYTHROCYTE [DISTWIDTH] IN BLOOD BY AUTOMATED COUNT: 13 % (ref 10–15)
GFR SERPL CREATININE-BSD FRML MDRD: 88 ML/MIN/1.73M2
GLUCOSE SERPL-MCNC: 166 MG/DL (ref 70–99)
HCT VFR BLD AUTO: 42.9 % (ref 40–53)
HDLC SERPL-MCNC: 46 MG/DL
HGB BLD-MCNC: 14.5 G/DL (ref 13.3–17.7)
LDLC SERPL CALC-MCNC: 69 MG/DL
MCH RBC QN AUTO: 29.7 PG (ref 26.5–33)
MCHC RBC AUTO-ENTMCNC: 33.8 G/DL (ref 31.5–36.5)
MCV RBC AUTO: 88 FL (ref 78–100)
NONHDLC SERPL-MCNC: 88 MG/DL
NT-PROBNP SERPL-MCNC: 121 PG/ML (ref 0–900)
PLATELET # BLD AUTO: 163 10E3/UL (ref 150–450)
POTASSIUM SERPL-SCNC: 3.9 MMOL/L (ref 3.4–5.3)
PROT SERPL-MCNC: 7.7 G/DL (ref 6.4–8.3)
RBC # BLD AUTO: 4.88 10E6/UL (ref 4.4–5.9)
SODIUM SERPL-SCNC: 141 MMOL/L (ref 136–145)
TRIGL SERPL-MCNC: 95 MG/DL
WBC # BLD AUTO: 6.1 10E3/UL (ref 4–11)

## 2023-02-07 PROCEDURE — 80061 LIPID PANEL: CPT | Performed by: INTERNAL MEDICINE

## 2023-02-07 PROCEDURE — 85027 COMPLETE CBC AUTOMATED: CPT | Performed by: INTERNAL MEDICINE

## 2023-02-07 PROCEDURE — 83880 ASSAY OF NATRIURETIC PEPTIDE: CPT | Performed by: INTERNAL MEDICINE

## 2023-02-07 PROCEDURE — 36415 COLL VENOUS BLD VENIPUNCTURE: CPT | Performed by: INTERNAL MEDICINE

## 2023-02-07 PROCEDURE — 80053 COMPREHEN METABOLIC PANEL: CPT | Performed by: INTERNAL MEDICINE

## 2023-02-13 ENCOUNTER — TELEPHONE (OUTPATIENT)
Dept: CARDIOLOGY | Facility: CLINIC | Age: 68
End: 2023-02-13
Payer: MEDICARE

## 2023-02-13 NOTE — TELEPHONE ENCOUNTER
[employer] with providers recommendations.    Shameka Pompa RN

## 2023-02-13 NOTE — TELEPHONE ENCOUNTER
"Routing grazyna beltran to Dr. Bermudez    \"Since I have been taking fludrocortisone (FLORINEF)  my hands and feet have been swelling more than usual. Also I still get dizzy, feel unsteady at times and have passed out recently for a Very short time. My wife was there when it happened. How would you like me to proceed.     Thank you     Arya Nguyễn\"      You last saw pt on 1/24/23 where this medications was started for his lightheadedness/dizziness.    Please advise?  "

## 2023-02-13 NOTE — TELEPHONE ENCOUNTER
In that case please cut the dose down to 0.05 mg daily.    Please have him consider taking an appointment at [facility] for autonomic dysfunction evaluation.    Appointment with Dr. Caceres as previously mentioned.

## 2023-02-20 ENCOUNTER — HOSPITAL ENCOUNTER (EMERGENCY)
Facility: CLINIC | Age: 68
Discharge: HOME | End: 2023-02-20
Attending: NURSE PRACTITIONER | Admitting: NURSE PRACTITIONER
Payer: MEDICARE

## 2023-02-20 ENCOUNTER — APPOINTMENT (OUTPATIENT)
Dept: GENERAL RADIOLOGY | Facility: CLINIC | Age: 68
End: 2023-02-20
Attending: NURSE PRACTITIONER
Payer: MEDICARE

## 2023-02-20 ENCOUNTER — APPOINTMENT (OUTPATIENT)
Dept: CT IMAGING | Facility: CLINIC | Age: 68
End: 2023-02-20
Attending: NURSE PRACTITIONER
Payer: MEDICARE

## 2023-02-20 VITALS
DIASTOLIC BLOOD PRESSURE: 94 MMHG | TEMPERATURE: 98 F | RESPIRATION RATE: 18 BRPM | BODY MASS INDEX: 36.02 KG/M2 | HEIGHT: 73 IN | OXYGEN SATURATION: 92 % | SYSTOLIC BLOOD PRESSURE: 132 MMHG | HEART RATE: 74 BPM

## 2023-02-20 DIAGNOSIS — M25.551 HIP PAIN, RIGHT: ICD-10-CM
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
## 2023-02-20 DIAGNOSIS — S30.0XXA CONTUSION OF BUTTOCK, INITIAL ENCOUNTER: ICD-10-CM

## 2023-02-20 LAB
HOLD SPECIMEN: NORMAL

## 2023-02-20 PROCEDURE — 96375 TX/PRO/DX INJ NEW DRUG ADDON: CPT | Performed by: NURSE PRACTITIONER

## 2023-02-20 PROCEDURE — 99285 EMERGENCY DEPT VISIT HI MDM: CPT | Mod: 25 | Performed by: NURSE PRACTITIONER

## 2023-02-20 PROCEDURE — 250N000013 HC RX MED GY IP 250 OP 250 PS 637: Performed by: NURSE PRACTITIONER

## 2023-02-20 PROCEDURE — 99284 EMERGENCY DEPT VISIT MOD MDM: CPT | Performed by: NURSE PRACTITIONER

## 2023-02-20 PROCEDURE — 96374 THER/PROPH/DIAG INJ IV PUSH: CPT | Performed by: NURSE PRACTITIONER

## 2023-02-20 PROCEDURE — 250N000011 HC RX IP 250 OP 636: Performed by: NURSE PRACTITIONER

## 2023-02-20 PROCEDURE — 72125 CT NECK SPINE W/O DYE: CPT | Mod: MA

## 2023-02-20 PROCEDURE — 70450 CT HEAD/BRAIN W/O DYE: CPT | Mod: MA

## 2023-02-20 PROCEDURE — 73502 X-RAY EXAM HIP UNI 2-3 VIEWS: CPT

## 2023-02-20 RX ORDER — OXYCODONE HYDROCHLORIDE 5 MG/1
5 TABLET ORAL EVERY 6 HOURS PRN
Qty: 12 TABLET | Refills: 0 | Status: SHIPPED | OUTPATIENT
Start: 2023-02-20 | End: 2023-02-23

## 2023-02-20 RX ORDER — ACETAMINOPHEN 500 MG
1000 TABLET ORAL ONCE
Status: COMPLETED | OUTPATIENT
Start: 2023-02-20 | End: 2023-02-20

## 2023-02-20 RX ORDER — HYDROMORPHONE HYDROCHLORIDE 1 MG/ML
0.5 INJECTION, SOLUTION INTRAMUSCULAR; INTRAVENOUS; SUBCUTANEOUS
Status: DISCONTINUED | OUTPATIENT
Start: 2023-02-20 | End: 2023-02-20 | Stop reason: HOSPADM

## 2023-02-20 RX ORDER — ONDANSETRON 2 MG/ML
4 INJECTION INTRAMUSCULAR; INTRAVENOUS EVERY 30 MIN PRN
Status: DISCONTINUED | OUTPATIENT
Start: 2023-02-20 | End: 2023-02-20 | Stop reason: HOSPADM

## 2023-02-20 RX ADMIN — ONDANSETRON 4 MG: 2 INJECTION INTRAMUSCULAR; INTRAVENOUS at 15:44

## 2023-02-20 RX ADMIN — ACETAMINOPHEN 1000 MG: 500 TABLET ORAL at 15:40

## 2023-02-20 RX ADMIN — HYDROMORPHONE HYDROCHLORIDE 0.5 MG: 1 INJECTION, SOLUTION INTRAMUSCULAR; INTRAVENOUS; SUBCUTANEOUS at 15:45

## 2023-02-20 ASSESSMENT — ACTIVITIES OF DAILY LIVING (ADL)
ADLS_ACUITY_SCORE: 37
ADLS_ACUITY_SCORE: 37

## 2023-02-20 NOTE — ED TRIAGE NOTES
"Fell outside, no loss of consciousness but \"saw starts\".  Complains of right hip, right shoulder and low back pain.  Denies n/t, +CMS.  C-collar placed by EMS.  On xarelto for pacemaker and history of blood clots.     Triage Assessment     Row Name 02/20/23 1433       Triage Assessment (Adult)    Airway WDL WDL       Respiratory WDL    Respiratory WDL WDL       Skin Circulation/Temperature WDL    Skin Circulation/Temperature WDL WDL       Cardiac WDL    Cardiac WDL WDL       Peripheral/Neurovascular WDL    Peripheral Neurovascular WDL WDL       Cognitive/Neuro/Behavioral WDL    Cognitive/Neuro/Behavioral WDL WDL              "
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
## 2023-02-21 NOTE — DISCHARGE INSTRUCTIONS
I recommend Tylenol 1000 mg by mouth 3 times daily for pain management ice to the buttocks for swelling and pain management.  When pain is more severe than this I recommend oxycodone you can take 1 tablet every 6 hours as needed.  Oxycodone is a narcotic and can cause addiction and severe constipation.  I recommend either taking a stool softener when taking this medication or eating at least a cup of prunes, apricots, pears daily.  Follow-up with primary care if you run out of the narcotic prescription for refills.  I expect that you would be more sore over the next 2 to 4 days than you are today.  Things should begin to improve thereafter.  Follow-up with primary care if no improvement in symptoms over the next week.  Return to the emergency room if you are unable to urinate or stool.

## 2023-02-21 NOTE — TELEPHONE ENCOUNTER
Spoke to patient and he is extremely anxious about Mohs surgery tomorrow.     He is on Oxycodone for pain related to an ER visit last night for a hip issue. He asked if he can continue to take it if needed? I did advise he can as local anestheic will be used for Mohs surgery. I will add a note to appt that he is taking.     Sadie Magaña RN

## 2023-02-22 ENCOUNTER — OFFICE VISIT (OUTPATIENT)
Dept: DERMATOLOGY | Facility: CLINIC | Age: 68
End: 2023-02-22
Payer: MEDICARE

## 2023-02-22 VITALS — OXYGEN SATURATION: 98 % | HEART RATE: 78 BPM | DIASTOLIC BLOOD PRESSURE: 87 MMHG | SYSTOLIC BLOOD PRESSURE: 143 MMHG

## 2023-02-22 DIAGNOSIS — D04.39 SQUAMOUS CELL CARCINOMA IN SITU (SCCIS) OF SKIN OF NOSE: Primary | ICD-10-CM

## 2023-02-22 PROCEDURE — 17311 MOHS 1 STAGE H/N/HF/G: CPT | Performed by: DERMATOLOGY

## 2023-02-22 PROCEDURE — 13152 CMPLX RPR E/N/E/L 2.6-7.5 CM: CPT | Performed by: DERMATOLOGY
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
## 2023-02-22 NOTE — PATIENT INSTRUCTIONS
Sutured Wound Care     [facility]: [phone]    [facility]: [phone]          No strenuous activity for 48 hours. Resume moderate activity in 48 hours. No heavy exercising until you are seen for follow up in one week.     Take Tylenol as needed for discomfort.                         Do not drink alcoholic beverages for 48 hours.     Keep the pressure bandage in place for 24 hours. If the bandage becomes blood tinged or loose, reinforce it with gauze and tape.        (Refer to the reverse side of this page for management of bleeding).    Remove pressure bandage in 24 hours     Leave the flat bandage in place until your follow up appointment.    Keep the bandage dry. Wash around it carefully.    If the tape becomes soiled or starts to come off, reinforce it with additional paper tape.    Do not smoke for 3 weeks; smoking is detrimental to wound healing.    It is normal to have swelling and bruising around the surgical site. The bruising will fade in approximately 10-14 days. Elevate the area to reduce swelling.    Numbness, itchiness and sensitivity to temperature changes can occur after surgery and may take up to 18 months to normalize.      POSSIBLE COMPLICATIONS    BLEEDING:    Leave the bandage in place.  Use tightly rolled up gauze or a cloth to apply direct pressure over the bandage for 20   minutes.  Reapply pressure for an additional 20 minutes if necessary  Call the office or go to the nearest emergency room if pressure fails to stop the bleeding.  Use additional gauze and tape to maintain pressure once the bleeding has stopped.        PAIN:    Post operative pain should slowly get better, never worse.  A severe increase in pain may indicate a problem. Call the office if this occurs.    In case of emergency phone:Dr Bradley [phone]
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
## 2023-03-01 ENCOUNTER — OFFICE VISIT (OUTPATIENT)
Dept: DERMATOLOGY | Facility: CLINIC | Age: 68
End: 2023-03-01
Payer: MEDICARE

## 2023-03-01 ENCOUNTER — ALLIED HEALTH/NURSE VISIT (OUTPATIENT)
Dept: DERMATOLOGY | Facility: CLINIC | Age: 68
End: 2023-03-01
Payer: MEDICARE

## 2023-03-01 VITALS — OXYGEN SATURATION: 97 % | HEART RATE: 84 BPM | DIASTOLIC BLOOD PRESSURE: 82 MMHG | SYSTOLIC BLOOD PRESSURE: 147 MMHG

## 2023-03-01 DIAGNOSIS — D04.39 SQUAMOUS CELL CARCINOMA IN SITU (SCCIS) OF SKIN OF LEFT TEMPLE REGION: Primary | ICD-10-CM

## 2023-03-01 DIAGNOSIS — Z48.01 ENCOUNTER FOR CHANGE OR REMOVAL OF SURGICAL WOUND DRESSING: Primary | ICD-10-CM

## 2023-03-01 PROCEDURE — 17311 MOHS 1 STAGE H/N/HF/G: CPT | Mod: 79 | Performed by: DERMATOLOGY

## 2023-03-01 PROCEDURE — 99207 PR NO CHARGE NURSE ONLY: CPT

## 2023-03-01 ASSESSMENT — PAIN SCALES - GENERAL: PAINLEVEL: NO PAIN (0)

## 2023-03-01 NOTE — PROGRESS NOTES
Stiven Nguyễn comes into clinic today at the request of Dr. Bradley Ordering Provider for Wound Check Action taken: See Below.    This service provided today was under the supervising provider of the day Dr. Bradley, who was available if needed.    Pt returned to clinic for post surgery 1 week follow up bandage change. Pt has no complaints, denies pain. Bandage removed from nose, area cleansed with normal saline. Site is healing and wound edges approximating well. Reapplied new steri strips and paper tape.    Advised to watch for signs/sx of infection; spreading redness, drainage, odor, fever. Call or report promptly to clinic. Pt given written instructions and informed to rtc as needed. Patient verbalized understanding.       Denia Castellanos, CMA
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
## 2023-03-01 NOTE — PATIENT INSTRUCTIONS
Open Wound Care     for LEFT TEMPLE      No strenuous activity for 48 hours    Take Tylenol as needed for discomfort.                                                .         Do not drink alcoholic beverages for 48 hours.    Keep the pressure bandage in place for 24 hours. If the bandage becomes blood tinged or loose, reinforce it with gauze and tape.        (Refer to the reverse side of this page for management of bleeding).    Remove bandage in 24 hours and begin wound care as follows:     Clean area with tap water using a Q tip or gauze pad, (shower / bathe normally)  Dry wound with Q tip or gauze pad  Apply Aquaphor, Vaseline, Polysporin or Bacitracin Ointment with a Q tip  Do NOT use Neosporin Ointment *  Cover the wound with a band-aid or nonstick gauze pad and paper tape.  Repeat wound care once a day until wound is completely healed.    It is an old wives tale that a wound heals better when it is exposed to air and allowed to dry out. The wound will heal faster with a better cosmetic result if it is kept moist with ointment and covered with a bandage.  Do not let the wound dry out.      Supplies Needed:                Qtips or gauze pads                Polysporin or Bacitracin Ointment                Bandaids or nonstick gauze pads and paper tape    Wound care kits and brown paper tape are available for purchase at   the pharmacy.    BLEEDING:    Use tightly rolled up gauze or cloth to apply direct pressure over the bandage for 20   minutes.  Reapply pressure for an additional 20 minutes if necessary  Call the office or go to the nearest emergency room if pressure fails to stop the bleeding.  Use additional gauze and tape to maintain pressure once the bleeding has stopped.  Begin wound care 24 hours after surgery as directed.                  WOUND HEALING    One week after surgery a pink / red halo will form around the outside of the wound.   This is new skin.  The center of the wound will appear yellowish  white and produce some drainage.  The pink halo will slowly migrate in toward the center of the wound until the wound is covered with new shiny pink skin.  There will be no more drainage when the wound is completely healed.  It will take six months to one year for the redness to fade.  The scar may be itchy, tight and sensitive to extreme temperatures for a year after the surgery.  Massaging the area several times a day for several minutes after the wound is completely healed will help the scar soften and normalize faster. Begin massage only after healing is complete.      In case of emergency call: Dr Bradley: [phone]    [facility]: [phone]    [facility]:[phone]       WOUND CARE INSTRUCTIONS  for  ONE WEEK AFTER SURGERY - NOSE      Leave flat bandage on your skin for one week after today s bandage change.  In one week when you remove the bandage, you may resume your regular skin care routine, including washing with mild soap and water, applying moisturizer, make-up and sunscreen.    If there are any open or bleeding areas at the incision/graft site you should begin to cover the area with a bandage daily as follows:    Clean and dry the area with plain tap water using a Q-tip or sterile gauze pad.  Apply Polysporin or Bacitracin ointment to the open area.  Cover the wound with a band-aid or a sterile non-stick gauze pad and micropore paper tape.         SIGNS OF INFECTION  - If you notice any of these signs of infection, call your doctor right away: expanding redness around the wound.  - Yellow or greenish-colored pus or cloudy wound drainage.    - Red streaking spreading from the wound.  - Increased swelling, tenderness, or pain around the wound.   - Fever.    Please remember that yellow and clear drainage from a wound can be normal and related to normal wound healing.  Isolated drainage from a wound without a combination of the above features does not indicate infection.        *Once the bandages are removed, the scar will be red and firm (especially in the lip/chin area). This is normal and will fade in time. It might take 6-12 months for this to happen.     *Massaging the area will help the scar soften and fade quicker. Begin to massage the area one month after the bandages have been removed. To massage apply pressure directly and firmly over the scar with the fingertips and move in a circular motion. Massage the area for a few minutes several times a day. Continue to massage the site for several months.    *Approximately 6-8 weeks after surgery it is not uncommon to see the formation of  tender pimple-like  bump along the scar. This is normal. As the scar continues to mature and the stitches underneath the skin begin to dissolve, this might occur. Do not pick or squeeze, this will resolve on it s own. Should one break open producing a small amount of drainage, apply Polysporin or Bacitracin ointment a few times a day until the wound is completely healed.    *Numbness in the surgical area is expected. It might take 12-18 months for the feeling to return to normal. During this time sensations of itchiness, tingling and occasional sharp pains might be noted. These feelings are normal and will subside once the nerves have completely healed.         IN CASE OF EMERGENCY: Dr Bradley [phone]     If you were seen in [place] call: [phone]    If you were seen in [place] call: [phone]
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
## 2023-03-03 DIAGNOSIS — R39.15 URINARY URGENCY: ICD-10-CM

## 2023-03-03 NOTE — TELEPHONE ENCOUNTER
Requested Prescriptions   Pending Prescriptions Disp Refills     oxybutynin (DITROPAN) 5 MG tablet 270 tablet 3     Sig: Take 1 tablet (5 mg) by mouth 3 times daily       There is no refill protocol information for this order        Last office visit: 10/11/2022 with prescribing provider:  Dr. Yung    [facility]          [facility]

## 2023-03-06 ENCOUNTER — TRANSFERRED RECORDS (OUTPATIENT)
Dept: HEALTH INFORMATION MANAGEMENT | Facility: CLINIC | Age: 68
End: 2023-03-06

## 2023-03-06 RX ORDER — OXYBUTYNIN CHLORIDE 5 MG/1
5 TABLET ORAL 3 TIMES DAILY
Qty: 270 TABLET | Refills: 0 | Status: SHIPPED | OUTPATIENT
Start: 2023-03-06 | End: 2023-06-27

## 2023-03-07 ENCOUNTER — TELEPHONE (OUTPATIENT)
Dept: UROLOGY | Facility: CLINIC | Age: 68
End: 2023-03-07

## 2023-03-07 ENCOUNTER — VIRTUAL VISIT (OUTPATIENT)
Dept: CARDIOLOGY | Facility: CLINIC | Age: 68
End: 2023-03-07
Payer: MEDICARE

## 2023-03-07 DIAGNOSIS — I25.10 CORONARY ARTERY DISEASE INVOLVING NATIVE HEART WITHOUT ANGINA PECTORIS, UNSPECIFIED VESSEL OR LESION TYPE: ICD-10-CM

## 2023-03-07 DIAGNOSIS — I10 HYPERTENSION, UNSPECIFIED TYPE: ICD-10-CM

## 2023-03-07 DIAGNOSIS — R53.1 WEAKNESS: ICD-10-CM

## 2023-03-07 DIAGNOSIS — I10 HYPERTENSION: ICD-10-CM

## 2023-03-07 DIAGNOSIS — R07.89 CHEST PRESSURE: ICD-10-CM

## 2023-03-07 DIAGNOSIS — I25.10 CAD (CORONARY ARTERY DISEASE): ICD-10-CM

## 2023-03-07 DIAGNOSIS — R06.02 SOB (SHORTNESS OF BREATH): ICD-10-CM

## 2023-03-07 DIAGNOSIS — I20.1 ANGINA PECTORIS WITH DOCUMENTED SPASM (H): ICD-10-CM

## 2023-03-07 DIAGNOSIS — E78.5 HYPERLIPIDEMIA LDL GOAL <70: ICD-10-CM

## 2023-03-07 DIAGNOSIS — I20.1 VASOSPASTIC ANGINA (H): Primary | ICD-10-CM

## 2023-03-07 DIAGNOSIS — R55 SYNCOPE: ICD-10-CM

## 2023-03-07 PROCEDURE — 99215 OFFICE O/P EST HI 40 MIN: CPT | Mod: 95 | Performed by: INTERNAL MEDICINE

## 2023-03-07 NOTE — PROGRESS NOTES
Service Date: 03/07/2023    VIDEO CARDIAC CONFERENCE CONSULTATION    HISTORY OF PRESENT ILLNESS:  Stiven Nguyễn is a 68-year-old gentleman.  He was seen today by a video visit with his wife.  He is a patient of my partner, Dr. Bermudez, but in fact has had numerous visits.  Today was a video visit but I did a lot of preparation work before the visit.  It is a very complex issue.  This patient has seen Dr. Bermudez, my partner; Dr. Jackson, my University partner; Dr. Reddy, my partner; several of our nurse practitioners and this has just been last 2 years.  I was asked to see him for a series of complex problems.  Again, on video visit, I think these are difficult and lacking and so, in the end, I told him I really do need to see him but my assumption, reading through, is the 2 main issues that I have been asked via another consultant for is chest pain which may represent vasospastic angina and near-syncopal events and I have a strong suspicion those two are interrelated.  His chart lists a history of hypertension, borderline diabetes, chronic kidney disease, cataplexy and cardiovascular issues.  He had 2 angiograms in 2015, 1 in 2016, 1 in 2017.  He was having chest pain and the 2015 angiograms all suggested only minimal coronary disease.  In 2016, my partner, Dr. Riggs, did an angiogram but there was suspicion of spasm, so Dr. Riggs used my acetylcholine protocol and indeed, it was markedly abnormal.  What he did have was some LAD disease at the LAD diagonal junction but an FFR of that segment was normal.  There was a portion of the mid LAD below that bifurcation that looked to have a mild narrowing but it looked to me like it may have been under a myocardial bridge but I do have to admit I did not see much in the way of systolic contraction of the coronary artery.  It was just the pathway looked like the mid LAD might have been intramyocardial.  Dr. Riggs did my acetylcholine protocol and indeed, the patient  had marked spasm.  The LAD closed down to probably close to 80%.  The patient was actually asleep during that time, so there was no report of any angina with that and they did not mention if there were any significant EKG changes.  The patient was placed on the standard medication for vasospasm including high-dose amlodipine 10 mg a day and moderately high-dose Imdur.  He is, however, on metoprolol.  His other issue is near-syncopal events.  He has sick sinus syndrome.  He has a pacemaker and I looked at the last pacer check from 01/31.  He is using the atrial channel 99% of the time, the ventricular channel rare.  There is no significant atrial or ventricular arrhythmias identified.  The patient's last episode of near syncope was Saturday which was just a few days ago.  The scenario was he was bending down and then picked up something, stood up and turned, and with that, he had a bit of a gray-out spell.  The family said it lasted for about a minute.  He did not lose consciousness.  Almost all of the episodes sound to me like they are orthostatic in nature.  On a good note, he states he has not had any chest pain in several months and has not used nitroglycerin in several months.  At this juncture, on the phone, it is very difficult.  I am going to ask him to check his blood pressure and pulse sitting and then standing, leaning against the wall, for 1 minute and standing at 3 minutes and do that several times and bring that data with him to an in-person office visit.  I am going to get an echocardiogram to make sure the heart function remains normal since we have had no check of his ejection fraction since 2017, except he had a nuclear test in 02/2022 which was negative for ischemia.  My suspicion is that this is orthostatic hypotension from his medications.  If that is the case, this is odd he is on host of blood pressure pills including high-dose lisinopril which does not affect spasm one way or the other.  He is  on Imdur, high dose, which treats spasm and lowers his blood pressure.  He is on high-dose amlodipine which treats spasm and blood pressure but lisinopril does not treat the spasm, and metoprolol, if anything, makes spasm worse.  If he indeed is orthostatic which is my current suspicion, what I would do is drop off the lisinopril, at least lower the dose.  It is odd that we have him on 3 or 4 blood pressure pills and we have him on Florinef.  We will see if we can fine tune that a little bit.  When he comes into the office, I will also check orthostatics.  I will also check carotid sinus massage to make sure that he does not have a hypersensitive carotid sinus syndrome.  If his angina is better and he needs to be on a beta blocker, which I am not sure if he needs to at all, but if he needs to be on a beta blocker, I would switch it to nebivolol which is beta-1 selective and vasodilating.  On occasion, I prescribed high-dose L-arginine which is a precursor to nitric oxide.  Again, I do not think we need to do that quite yet.  I do not think we need to put an event recorder or an implantable loop recorder in and I think the pacer is, for the most part, probably telling us that this is not an electrical problem but we can discuss that later.  We can determine if we should be doing an ACTH stim test for hypoadrenalism but the electrolytes on 02/07 were completely normal including the sodium and potassium.  Granted, that is not the ideal test for that but it is reasonable.  His blood sugar is a little high.  His family doctor will address that.  He is not anemic.  I did review all those tests.    Today's visit including the video time, the time to review the chart before and after and this dictation including my review of several angiograms ended up being a complex 1-hour visit.    Narendra Andre MD     cc:  Oliva Zhang MD   [facility]  [address] [place] [address]    Husam Bermudez MD   [facility]  [facility]  [address]  [place] [address]    Giacomo Jackson MD    [facility]  [address],   [place] [address]    Narendra Andre MD        D: 2023   T: 2023   MT: dov    Name:     MARKEL HEADLEY  MRN:      [id number]        Account:      [id number]   :      1955           Service Date: 2023       Document: [id number]

## 2023-03-07 NOTE — TELEPHONE ENCOUNTER
Reason for Call:  Other prescription    Detailed comments: Patient calling to notify care team that [facility] is requesting our office call the Doctor verification line regarding his refill for oxybutynin (DITROPAN) 5 MG tablet. Please call doc line at [phone]. Patient states he only has 9 pills left. Please call patient with any questions.     Phone Number Patient can be reached at: Home number on file [phone] (home)    Best Time: any    Can we leave a detailed message on this number? YES    Call taken on 3/7/2023 at 8:06 AM by Quinton Ng

## 2023-03-07 NOTE — PROGRESS NOTES
Arya is a 68 year old who is being evaluated via a billable video visit.      How would you like to obtain your AVS? Mail a copy  If the video visit is dropped, the invitation should be resent by: Send to e-mail at: [email or web address]  Will anyone else be joining your video visit? No      Review Of Systems  Skin: negative  Eyes: glasses  Ears/Nose/Throat: negative  Respiratory: Dyspnea on exertion-   Cardiovascular: palpitations and chest pain  Gastrointestinal: negative  Genitourinary: negative  Musculoskeletal: negative  Neurologic: negative  Psychiatric: negative  Hematologic/Lymphatic/Immunologic: negative  Endocrine: negative    Patient reported vitals:  BP: 138/80  Heart rate: 71  Weight: 273 lb      Video-Visit Details    Type of service:  Video Visit   Video Start Time: 1015  Video End Time:1050  Originating Location (pt. Location): Home    Distant Location (provider location):  On-site  Platform used for Video Visit: Cennox

## 2023-03-07 NOTE — LETTER
3/7/2023    Oliva Zhang MD  [address]  [place] [address]    RE: Stiven Nguyễn       Dear Colleague,     I had the pleasure of seeing Stiven Nguyễn in the [facility].  Arya is a 68 year old who is being evaluated via a billable video visit.      How would you like to obtain your AVS? Mail a copy  If the video visit is dropped, the invitation should be resent by: Send to e-mail at: [email or web address]  Will anyone else be joining your video visit? No      Review Of Systems  Skin: negative  Eyes: glasses  Ears/Nose/Throat: negative  Respiratory: Dyspnea on exertion-   Cardiovascular: palpitations and chest pain  Gastrointestinal: negative  Genitourinary: negative  Musculoskeletal: negative  Neurologic: negative  Psychiatric: negative  Hematologic/Lymphatic/Immunologic: negative  Endocrine: negative    Patient reported vitals:  BP: 138/80  Heart rate: 71  Weight: 273 lb      Video-Visit Details    Type of service:  Video Visit   Video Start Time: 1015  Video End Time:1050  Originating Location (pt. Location): Home    Distant Location (provider location):  On-site  Platform used for Video Visit: [employer]      Service Date: 03/07/2023    VIDEO CARDIAC CONFERENCE CONSULTATION    HISTORY OF PRESENT ILLNESS:  Stiven Nguyễn is a 68-year-old gentleman.  He was seen today by a video visit with his wife.  He is a patient of my partner, Dr. Bermudez, but in fact has had numerous visits.  Today was a video visit but I did a lot of preparation work before the visit.  It is a very complex issue.  This patient has seen Dr. Bermudez, my partner; Dr. Jackson, my [employer] partner; Dr. Reddy, my partner; several of our nurse practitioners and this has just been last 2 years.  I was asked to see him for a series of complex problems.  Again, on video visit, I think these are difficult and lacking and so, in the end, I told him I really do need to see him but my assumption, reading through, is the 2 main  issues that I have been asked via another consultant for is chest pain which may represent vasospastic angina and near-syncopal events and I have a strong suspicion those two are interrelated.  His chart lists a history of hypertension, borderline diabetes, chronic kidney disease, cataplexy and cardiovascular issues.  He had 2 angiograms in 2015, 1 in 2016, 1 in 2017.  He was having chest pain and the 2015 angiograms all suggested only minimal coronary disease.  In 2016, my partner, Dr. Riggs, did an angiogram but there was suspicion of spasm, so Dr. Riggs used my acetylcholine protocol and indeed, it was markedly abnormal.  What he did have was some LAD disease at the LAD diagonal junction but an FFR of that segment was normal.  There was a portion of the mid LAD below that bifurcation that looked to have a mild narrowing but it looked to me like it may have been under a myocardial bridge but I do have to admit I did not see much in the way of systolic contraction of the coronary artery.  It was just the pathway looked like the mid LAD might have been intramyocardial.  Dr. Riggs did my acetylcholine protocol and indeed, the patient had marked spasm.  The LAD closed down to probably close to 80%.  The patient was actually asleep during that time, so there was no report of any angina with that and they did not mention if there were any significant EKG changes.  The patient was placed on the standard medication for vasospasm including high-dose amlodipine 10 mg a day and moderately high-dose Imdur.  He is, however, on metoprolol.  His other issue is near-syncopal events.  He has sick sinus syndrome.  He has a pacemaker and I looked at the last pacer check from 01/31.  He is using the atrial channel 99% of the time, the ventricular channel rare.  There is no significant atrial or ventricular arrhythmias identified.  The patient's last episode of near syncope was Saturday which was just a few days ago.  The  scenario was he was bending down and then picked up something, stood up and turned, and with that, he had a bit of a gray-out spell.  The family said it lasted for about a minute.  He did not lose consciousness.  Almost all of the episodes sound to me like they are orthostatic in nature.  On a good note, he states he has not had any chest pain in several months and has not used nitroglycerin in several months.  At this juncture, on the phone, it is very difficult.  I am going to ask him to check his blood pressure and pulse sitting and then standing, leaning against the wall, for 1 minute and standing at 3 minutes and do that several times and bring that data with him to an in-person office visit.  I am going to get an echocardiogram to make sure the heart function remains normal since we have had no check of his ejection fraction since 2017, except he had a nuclear test in 02/2022 which was negative for ischemia.  My suspicion is that this is orthostatic hypotension from his medications.  If that is the case, this is odd he is on host of blood pressure pills including high-dose lisinopril which does not affect spasm one way or the other.  He is on Imdur, high dose, which treats spasm and lowers his blood pressure.  He is on high-dose amlodipine which treats spasm and blood pressure but lisinopril does not treat the spasm, and metoprolol, if anything, makes spasm worse.  If he indeed is orthostatic which is my current suspicion, what I would do is drop off the lisinopril, at least lower the dose.  It is odd that we have him on 3 or 4 blood pressure pills and we have him on Florinef.  We will see if we can fine tune that a little bit.  When he comes into the office, I will also check orthostatics.  I will also check carotid sinus massage to make sure that he does not have a hypersensitive carotid sinus syndrome.  If his angina is better and he needs to be on a beta blocker, which I am not sure if he needs to at all,  but if he needs to be on a beta blocker, I would switch it to nebivolol which is beta-1 selective and vasodilating.  On occasion, I prescribed high-dose L-arginine which is a precursor to nitric oxide.  Again, I do not think we need to do that quite yet.  I do not think we need to put an event recorder or an implantable loop recorder in and I think the pacer is, for the most part, probably telling us that this is not an electrical problem but we can discuss that later.  We can determine if we should be doing an ACTH stim test for hypoadrenalism but the electrolytes on  were completely normal including the sodium and potassium.  Granted, that is not the ideal test for that but it is reasonable.  His blood sugar is a little high.  His family doctor will address that.  He is not anemic.  I did review all those tests.    Today's visit including the video time, the time to review the chart before and after and this dictation including my review of several angiograms ended up being a complex 1-hour visit.    Narendra Andre MD     cc:  Oliva Zhang MD   [facility]  [address] [place] [address]    Husam Bermudez MD   [facility]  [address]  [place] [address]    Giacomo Jackson MD    Phys[facility]  [address], [address]  [place] [address]    Narendra Andre MD        D: 2023   T: 2023   MT: dov    Name:     MARKEL HEADLEY  MRN:      [id number]        Account:      [id number]   :      1955           Service Date: 2023       Document: [id number]      Thank you for allowing me to participate in the care of your patient.      Sincerely,     Narendra Andre MD     [facility]  cc:   Husam Bermudez MD  [address]  [place] [address]

## 2023-03-07 NOTE — TELEPHONE ENCOUNTER
Called pharmacy.   Updated on information needed- code and dx     Melania MACDONALD RN  [facility]

## 2023-03-13 ENCOUNTER — LAB (OUTPATIENT)
Dept: LAB | Facility: CLINIC | Age: 68
End: 2023-03-13
Payer: MEDICARE

## 2023-03-13 ENCOUNTER — HOSPITAL ENCOUNTER (OUTPATIENT)
Dept: CARDIOLOGY | Facility: CLINIC | Age: 68
Discharge: HOME | End: 2023-03-13
Attending: INTERNAL MEDICINE | Admitting: INTERNAL MEDICINE
Payer: MEDICARE

## 2023-03-13 DIAGNOSIS — I10 HYPERTENSION: ICD-10-CM

## 2023-03-13 DIAGNOSIS — R55 SYNCOPE: ICD-10-CM

## 2023-03-13 DIAGNOSIS — I25.10 CAD (CORONARY ARTERY DISEASE): ICD-10-CM

## 2023-03-13 DIAGNOSIS — I20.1 VASOSPASTIC ANGINA (H): ICD-10-CM

## 2023-03-13 DIAGNOSIS — I10 HYPERTENSION: Primary | ICD-10-CM

## 2023-03-13 LAB
LVEF ECHO: NORMAL
TSH SERPL DL<=0.005 MIU/L-ACNC: 0.87 UIU/ML (ref 0.3–4.2)

## 2023-03-13 PROCEDURE — 255N000002 HC RX 255 OP 636: Performed by: INTERNAL MEDICINE

## 2023-03-13 PROCEDURE — 36415 COLL VENOUS BLD VENIPUNCTURE: CPT | Performed by: INTERNAL MEDICINE

## 2023-03-13 PROCEDURE — 93306 TTE W/DOPPLER COMPLETE: CPT | Mod: 26 | Performed by: INTERNAL MEDICINE

## 2023-03-13 PROCEDURE — 999N000208 ECHOCARDIOGRAM COMPLETE

## 2023-03-13 PROCEDURE — 84443 ASSAY THYROID STIM HORMONE: CPT | Performed by: INTERNAL MEDICINE

## 2023-03-13 RX ADMIN — HUMAN ALBUMIN MICROSPHERES AND PERFLUTREN 3 ML: 10; .22 INJECTION, SOLUTION INTRAVENOUS at 16:34
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
## 2023-04-04 ENCOUNTER — OFFICE VISIT (OUTPATIENT)
Dept: CARDIOLOGY | Facility: CLINIC | Age: 68
End: 2023-04-04
Payer: MEDICARE

## 2023-04-04 ENCOUNTER — DOCUMENTATION ONLY (OUTPATIENT)
Dept: CARDIOLOGY | Facility: CLINIC | Age: 68
End: 2023-04-04

## 2023-04-04 ENCOUNTER — MYC MEDICAL ADVICE (OUTPATIENT)
Dept: CARDIOLOGY | Facility: CLINIC | Age: 68
End: 2023-04-04

## 2023-04-04 VITALS
OXYGEN SATURATION: 97 % | HEART RATE: 83 BPM | SYSTOLIC BLOOD PRESSURE: 133 MMHG | DIASTOLIC BLOOD PRESSURE: 84 MMHG | WEIGHT: 271.4 LBS | BODY MASS INDEX: 35.81 KG/M2

## 2023-04-04 DIAGNOSIS — I20.1 ANGINA PECTORIS WITH DOCUMENTED SPASM (H): ICD-10-CM

## 2023-04-04 DIAGNOSIS — I25.10 CORONARY ARTERY DISEASE INVOLVING NATIVE HEART WITHOUT ANGINA PECTORIS, UNSPECIFIED VESSEL OR LESION TYPE: ICD-10-CM

## 2023-04-04 DIAGNOSIS — R06.02 SOB (SHORTNESS OF BREATH): ICD-10-CM

## 2023-04-04 DIAGNOSIS — R07.89 CHEST PRESSURE: ICD-10-CM

## 2023-04-04 DIAGNOSIS — I10 HYPERTENSION, UNSPECIFIED TYPE: ICD-10-CM

## 2023-04-04 DIAGNOSIS — I20.1 VASOSPASTIC ANGINA (H): ICD-10-CM

## 2023-04-04 DIAGNOSIS — E78.5 HYPERLIPIDEMIA LDL GOAL <70: ICD-10-CM

## 2023-04-04 DIAGNOSIS — I10 BENIGN ESSENTIAL HYPERTENSION: ICD-10-CM

## 2023-04-04 DIAGNOSIS — I20.1 CORONARY VASOSPASM (H): ICD-10-CM

## 2023-04-04 PROCEDURE — 99214 OFFICE O/P EST MOD 30 MIN: CPT | Performed by: PHYSICIAN ASSISTANT

## 2023-04-04 PROCEDURE — 93000 ELECTROCARDIOGRAM COMPLETE: CPT | Performed by: INTERNAL MEDICINE

## 2023-04-04 RX ORDER — LISINOPRIL 40 MG/1
20 TABLET ORAL DAILY
Start: 2023-04-04 | End: 2023-04-20

## 2023-04-04 RX ORDER — FLUDROCORTISONE ACETATE 0.1 MG/1
0.05 TABLET ORAL DAILY
Qty: 45 TABLET | Refills: 3 | Status: SHIPPED | OUTPATIENT
Start: 2023-04-04 | End: 2023-04-21

## 2023-04-04 NOTE — PATIENT INSTRUCTIONS
"Arya - it was nice to see you and Genna today!     At your visit today we reviewed:    Reviewed that you're continuing to have spells of lightheadedness, with \"getting wavy vision\" and not seeing right, with feeling \"off balance\" like you're on a boat  Reviewed 2 episodes of passing out while sitting - 3/11 and 3/12     Medication Changes:    DECREASE lisinopril to 20 mg daily (1/2 tablet)  Continue all other medications - including Florinef 1/2 tablet daily. I sent this to [facility] but CALL if need a short Rx needed until it comes    Recommendations:    Check blood sugar when safe to do so after an event  Continue to check BPs sitting/standing and write down for Dr. Andre  I'll ask Device to call you to see what was happening on 3/11 and 3/12     Follow-up:    See Dr. Andre for cardiology follow up in 2023 but CALL Cardiology nurses Shameka & Clotilde @ [phone] for any issues, questions or concerns in the interim.      To schedule a future appointment, we kindly ask that you call cardiology scheduling at [phone] three months prior to requested visit date.    Important Phone Numbers for [facility]):    [place] [facility] Shameka Mabry: [phone]  Cardiology [phone]  [place] [facility] [phone]  [place] [facility] [phone]  [place] [facility]: [phone]      "

## 2023-04-04 NOTE — PROGRESS NOTES
Could you pls have Arya send a remote check?  He had a syncopal episode while sitting on 3/11 @ ~1800 while eating dinner and again 3/12 between .    Want to make sure he did not have any arrhythmias around these times to account for this.    Thanks-Gayle
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
## 2023-04-04 NOTE — PROGRESS NOTES
"Dr. Andre- update    I saw Arya in follow-up today.  You did a virtual visit with him 3/2023 at Dr. Bermudez's recommendation, and our meeting him in person in 6/2023 to discuss his vasospastic angina and \"spells.\"    1.  Since you saw you, BPs at home have been running 130-140 while sitting.  After standing x 2 minutes, BPs just a few points lower  2.  Since he saw you, has had 2 episodes of this lightheadedness leading to syncope while sitting.  Previously, had only occurred when changing position (even when going slowly).     It is documented in my note, but briefly, first episode was 3/11 while eating dinner.  He was turning his head quite a bit to look at the other dinner guests, and felt himself going out.  No abdominal cramping/diarrhea/nausea, diaphoresis/clamminess, etc.     Second episode was the following day while sitting playing board games.  Again, turning his head to look at other players and felt himself going out.  No associated symptoms.    **I have a message out to Device to have Arya get a remote check to ensure no arrhythmias around this time    3.  Reviewed his echocardiogram done a few weeks ago that looked good    4.  I reduced his lisinopril to 20 mg daily after reading your note.  He remains on low-dose Florinef.    I will keep you posted about his device interrogation if there is anything interesting found on it to account for the spells.  In the past, there have not been, but he has never had syncope while sitting.    Please let me know if he would like anything different before you see him 6/2023    Thanks-Gayle      "

## 2023-04-05 NOTE — PROGRESS NOTES
"COURTESY Medtronic Chika (D) Pacemaker Remote Device Check  Patient seen in clinic for device evaluation and iterative programming.   AP: 99.4%    : 0.3%    Mode: AAIR-DDDR     Presenting Rhythm: AP-VS 90s    Heart Rate: stable with excellent variability; HR mainly 60-100s per histogram    Sensing: WNL    Pacing Threshold: WNL    Impedance: WNL    Battery Status: estimated 12 years remaining until RRT    Atrial Arrhythmia: none    Ventricular Arrhythmia: none    Stable device check. Leads stable. No episodes logged. Patient aware. Update routed to Gayle López.    Patient notices dizziness particularly when going from a squatting/bending position to standing. Upon review of med chart, patient take flomax daily. Encouraged patient to speaking with his urologist regarding this medication as this med is known to have side effects similar to what he is describing. He is aware that this may very well not be the cause but at this point he is \"willing to try anything\" to feel better.    KATIE RN        "

## 2023-04-05 NOTE — PROGRESS NOTES
Called Patricia CONTI requesting he send a manual transmission so that we can assess whether any symptoms occurred at the times he experienced fainting spells. Explained how to send a transmission for either bedside monitor or phone mendez. # to [employer] provided if patient has any questions.    Will keep our eye out for transmission and update encounter accordingly.    KATIE HANLEY

## 2023-04-06 RX ORDER — NITROGLYCERIN 0.4 MG/1
TABLET SUBLINGUAL
Qty: 90 TABLET | Refills: 3 | Status: SHIPPED | OUTPATIENT
Start: 2023-04-06

## 2023-04-06 NOTE — PROGRESS NOTES
"Update -    1.  Confirmed device showed NO arrhythmia or abnl to account for his episodes 3/11 and 3/12.    2.  Tilt Table and CSM done by Dr. Jackson 10/2021 - copied below for you   CSM reported \"positive on Tilt Table but nondiagnostic when seated\" and dual chamber PPM placed at that time    3.  He sent [employer] message with BPs ... sitting, standing (immediately, and then standing after 2-3 minutes).  Looks like he does drop sometimes.     Here are my readings with dates and times.   Date/ Time                      Sitting         1st Stand        2nd stand  3/13/23  1940 hrs             156/94          143/82              155\85  3/16/23   0908 hrs            137/76          124/65              128/67  3/20/23   0729 hrs            134/73          107/67              115/66  3/22/23   0747 hrs            156/88          135/75              132/72  3/25/23   0955 hrs            138/84          122/71              131/70  3/27/23   1935 hrs            146/93          131/71              131/70      4.  I reduced lisinopril at OV on Tuesday.  You see in person 6/2023.    Gayle MCKEON PROCEDURE NOTE 10/2021     *Procedures:*     1. TILT table test.  2. Autonomic function test.  3. EEG monitoring     *Cardiologist:*  Giacomo Jackson     *EP Fellow:*  N/A     *Referring MD: *  Dr Giacomo Jackson     *Pre-operative Diagnosis:*  Syncope.     *Complications:*  None.      *Medications:*  NTG 0.4mg SL/None.      *Clinical Profile:*  Mr. Nguyễn is a 66-year-old male kindly referred by Dr. Heather Bermudez for evaluation of syncopal episodes which occurred earlier this year.  Patient does have a long history of other \"spells\" which were different than the syncopal episodes.     History of the syncopal episodes indicate that the first occurred after getting up from his recliner and immediately collapsed.  His wife did not notice any change in complexion.  He did not complain of feeling hot or cold or nauseated.      A second " episode occurred perhaps within a month.  He had been up working in the kitchen and apparently collapsed with no evident postural change.     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  He may have been looking upward the wall and was getting up and down off of small ladder.  The possibility of carotid sinus stimulation exists although it is not clear.     A fourth episode occurred at home.  He was looking up at his wife who is on a higher floor and apparently felt poorly and collapsed again     The patient is here for autonomic testing including tilt testing.      Please see [facility] clinic visit note  for details.       PROCEDURE     The risks and benefits of the procedure were explained to the patient in   full. Written informed consent was obtained. The patient was brought to the   EP lab in a fasting and hemodynamically stable condition. Physical   examination of the patient did not reveal any carotid bruits, and review of   the chart did not reveal the presence of stroke or TIA within the past   three months.      The following maneuvers were done sequentially:     1- Sitting for 5 minutes:   End of 5 min:  HR 52, /78     2- Standing for 10 minutes     Ruperto: No immediate hypotension  Immediate Ruperto HR N/A   BP N/A     time from standing to ruperto N/A      time from ruperto to recovery N/A      30 sec: HR 66, /74  1 min: HR 54, /76  2 min: HR 53, /74  3 min: HR 49, /77  4 min: HR 54, /74  5 min: HR 50, /70  6 min: HR 63, /80  7 min: HR 57, /68  8 min: HR 56, /76  9 min: HR 49, /76  10 min: HR N/A, BP N/A     2- Maneuvers in seated position:     A. Carotid sinus massage:  Seated  Right:  Base: 47/min  131/66  Ruperto  HR36 , /71.      Left:  HR 42 , /71  Ruperto  HR  42  BP  140/77     Symptoms: Felt unstable with LCSM     Tilted  Right  R base  HR  48   BP  146/73  R ruperto  HR 69  BP  134/73        Left  L base HR 48, //73  L ruperto  HR  >5 sec pause   /69     Symptoms:  Lightheaded, not syncope        B. Valsalva:   Baseline: HR 48 Valsalva, /80  Phase 1: HR 49, Max /108  Phase 2: HR 80, Min /115  Phase 3: Present/Absent  Phase 4 (Overshoot): HR 53, Max /86     Symptoms: None  Valsalva ratio equals 80/53=1.5 (borderline normal)     C.  Respiratory sinus arrhythmia     Seated  In 81 out 69 delta 12   In the 75 out 43 32  In 63   out 49 delta 14  In 73 out 40 delta 33   In 77 out 47 delta 30  Average 24.2 (accuracy is affected by marked baseline sinus arrhythmia).     Supine  In 72 out 55 delta 17  In 70 out 49 delta 21  In 78 out 46 delta 32  In 83 out 40 delta of 43  In 78 out 49 delta 29     Average is 28.4 (accuracy may be affected by marked baseline sinus arrhythmia)        3- Supine rest for 5 minutes:      HR 58 , Max /71     4- TILT at 70 degrees for 20 minutes: (1 liter fluid was given before tilt)     30 sec HR 58 /71  1 min: HR 55, /80  2 min: HR 59, /73  3 min: HR 57, /78  4 min: HR 66, /72  5 min: HR 56 , /75  6 min: HR 52, /73  7 min: HR 50, /75  8 min: HR 52, /73  9 min: HR 52, /74  10 min: HR 51, /76  12 min: HR 88, /76  14 min: HR 52, /78  16 min: HR 80, /82  18 min: HR 59, /70  20 min: HR 52, /73     No symptoms     5- No drug administered     DISCUSSION:  Basic autonomic studies were borderline.  Patient did demonstrate some instability when standing and looking upright but did not manifest marked hypotension or bradycardia.  Carotid sinus massage was positive on the tilt table but nondiagnostic when seated.  Valsalva was borderline.  Respiratory sinus arrhythmia was impacted by intrinsic underlying sinus arrhythmia but overall appeared to be normal.     Given patient's history which appears to be consistent with the potential for carotid sinus  syndrome and given the cardiac pause when the carotid sinus massage was applied in the upright position favors recommending a cardiac pacemaker although additional treatment for vasodepressive response may also be needed.  This was discussed with the patient and his wife on station 2 a after the procedure.  During that visit I pointed out that the history and EP finding suggested carotid sinus syndrome but diagnosis could be incorrect and the patient may still have some symptoms afterwards.  He and his wife indicated that on many occasions he has been told that he needs a pacemaker due to heart rate slowing.  I added to the that in the future he may need additional medication to treat and vasodepressive response.     After discussion with his insurance carrier was decided that we should proceed with a pacemaker implantation this afternoon.  Given the likelihood of carotid sinus syndrome a dual-chamber device is preferred.  Consent was then obtained.     EEG findings will be reported separately by neurology.  Preliminary report did not show any slowing at onset of the study when the patient had carotid massage while seated.  For review at the end of the study when he had a 5-second pause is yet to be completed     Dr Jackson was present throughout the entire procedure.      I very much appreciated the opportunity to see and assess Stiven Nguyễn in the clinic with CV Fellow and resident. Please do not hesitate to contact my office if you have any questions or concerns.  The pacemaker report is provided separately.     Giacomo Jackson MD  Cardiac Arrhythmia Service  [facility]  [phone]

## 2023-04-12 DIAGNOSIS — I21.4 NSTEMI (NON-ST ELEVATED MYOCARDIAL INFARCTION) (H): ICD-10-CM

## 2023-04-12 DIAGNOSIS — G90.01 CAROTID SINUS SYNDROME: ICD-10-CM

## 2023-04-17 RX ORDER — METOPROLOL SUCCINATE 50 MG/1
TABLET, EXTENDED RELEASE ORAL
Qty: 90 TABLET | Refills: 3 | Status: SHIPPED | OUTPATIENT
Start: 2023-04-17 | End: 2023-06-12

## 2023-04-17 RX ORDER — FLECAINIDE ACETATE 100 MG/1
TABLET ORAL
Qty: 180 TABLET | Refills: 3 | Status: SHIPPED | OUTPATIENT
Start: 2023-04-17 | End: 2024-04-30

## 2023-04-17 NOTE — TELEPHONE ENCOUNTER
[facility] Cardiology Refill Guideline reviewed.  Medication meets criteria for refill. Refills sent. Clotilde Lauren RN Cardiology April 17, 2023, 3:14 PM

## 2023-04-20 ENCOUNTER — MYC MEDICAL ADVICE (OUTPATIENT)
Dept: CARDIOLOGY | Facility: CLINIC | Age: 68
End: 2023-04-20
Payer: MEDICARE

## 2023-04-21 RX ORDER — LISINOPRIL 40 MG/1
20 TABLET ORAL DAILY
COMMUNITY
Start: 2023-04-21 | End: 2023-05-01

## 2023-04-27 ENCOUNTER — MYC MEDICAL ADVICE (OUTPATIENT)
Dept: CARDIOLOGY | Facility: CLINIC | Age: 68
End: 2023-04-27
Payer: MEDICARE

## 2023-04-29 ENCOUNTER — HEALTH MAINTENANCE LETTER (OUTPATIENT)
Age: 68
End: 2023-04-29

## 2023-05-08 ENCOUNTER — MEDICAL CORRESPONDENCE (OUTPATIENT)
Dept: HEALTH INFORMATION MANAGEMENT | Facility: CLINIC | Age: 68
End: 2023-05-08

## 2023-05-08 ENCOUNTER — LAB REQUISITION (OUTPATIENT)
Dept: LAB | Facility: CLINIC | Age: 68
End: 2023-05-08
Payer: MEDICARE

## 2023-05-08 ENCOUNTER — TRANSFERRED RECORDS (OUTPATIENT)
Dept: HEALTH INFORMATION MANAGEMENT | Facility: CLINIC | Age: 68
End: 2023-05-08

## 2023-05-08 DIAGNOSIS — E11.65 TYPE 2 DIABETES MELLITUS WITH HYPERGLYCEMIA (H): ICD-10-CM

## 2023-05-08 LAB
ANION GAP SERPL CALCULATED.3IONS-SCNC: 9 MMOL/L (ref 7–15)
BUN SERPL-MCNC: 16.1 MG/DL (ref 8–23)
CALCIUM SERPL-MCNC: 9.5 MG/DL (ref 8.8–10.2)
CHLORIDE SERPL-SCNC: 103 MMOL/L (ref 98–107)
CHOLEST SERPL-MCNC: 136 MG/DL
CREAT SERPL-MCNC: 0.87 MG/DL (ref 0.67–1.17)
DEPRECATED HCO3 PLAS-SCNC: 28 MMOL/L (ref 22–29)
GFR SERPL CREATININE-BSD FRML MDRD: >90 ML/MIN/1.73M2
GLUCOSE SERPL-MCNC: 188 MG/DL (ref 70–99)
HDLC SERPL-MCNC: 43 MG/DL
LDLC SERPL CALC-MCNC: 76 MG/DL
NONHDLC SERPL-MCNC: 93 MG/DL
POTASSIUM SERPL-SCNC: 4.5 MMOL/L (ref 3.4–5.3)
SODIUM SERPL-SCNC: 140 MMOL/L (ref 136–145)
TRIGL SERPL-MCNC: 84 MG/DL

## 2023-05-08 PROCEDURE — 80048 BASIC METABOLIC PNL TOTAL CA: CPT | Mod: ORL | Performed by: FAMILY MEDICINE

## 2023-05-08 PROCEDURE — 80061 LIPID PANEL: CPT | Mod: ORL | Performed by: FAMILY MEDICINE

## 2023-05-09 ENCOUNTER — MEDICAL CORRESPONDENCE (OUTPATIENT)
Dept: HEALTH INFORMATION MANAGEMENT | Facility: CLINIC | Age: 68
End: 2023-05-09
Payer: MEDICARE

## 2023-05-10 ENCOUNTER — ANCILLARY PROCEDURE (OUTPATIENT)
Dept: CARDIOLOGY | Facility: CLINIC | Age: 68
End: 2023-05-10
Attending: INTERNAL MEDICINE
Payer: MEDICARE

## 2023-05-10 DIAGNOSIS — Z95.0 CARDIAC PACEMAKER IN SITU: ICD-10-CM

## 2023-05-10 DIAGNOSIS — I49.5 SINUS NODE DYSFUNCTION (H): ICD-10-CM

## 2023-05-10 PROCEDURE — 93294 REM INTERROG EVL PM/LDLS PM: CPT | Performed by: INTERNAL MEDICINE

## 2023-05-10 PROCEDURE — 93296 REM INTERROG EVL PM/IDS: CPT | Performed by: INTERNAL MEDICINE

## 2023-05-15 LAB
MDC_IDC_LEAD_IMPLANT_DT: NORMAL
MDC_IDC_LEAD_IMPLANT_DT: NORMAL
MDC_IDC_LEAD_LOCATION: NORMAL
MDC_IDC_LEAD_LOCATION: NORMAL
MDC_IDC_LEAD_LOCATION_DETAIL_1: NORMAL
MDC_IDC_LEAD_LOCATION_DETAIL_1: NORMAL
MDC_IDC_LEAD_MFG: NORMAL
MDC_IDC_LEAD_MFG: NORMAL
MDC_IDC_LEAD_MODEL: NORMAL
MDC_IDC_LEAD_MODEL: NORMAL
MDC_IDC_LEAD_POLARITY_TYPE: NORMAL
MDC_IDC_LEAD_POLARITY_TYPE: NORMAL
MDC_IDC_LEAD_SERIAL: NORMAL
MDC_IDC_LEAD_SERIAL: NORMAL
MDC_IDC_LEAD_SPECIAL_FUNCTION: NORMAL
MDC_IDC_LEAD_SPECIAL_FUNCTION: NORMAL
MDC_IDC_MSMT_BATTERY_DTM: NORMAL
MDC_IDC_MSMT_BATTERY_REMAINING_LONGEVITY: 142 MO
MDC_IDC_MSMT_BATTERY_RRT_TRIGGER: 2.62
MDC_IDC_MSMT_BATTERY_STATUS: NORMAL
MDC_IDC_MSMT_BATTERY_VOLTAGE: 3.03 V
MDC_IDC_MSMT_LEADCHNL_RA_IMPEDANCE_VALUE: 342 OHM
MDC_IDC_MSMT_LEADCHNL_RA_IMPEDANCE_VALUE: 456 OHM
MDC_IDC_MSMT_LEADCHNL_RA_PACING_THRESHOLD_AMPLITUDE: 0.75 V
MDC_IDC_MSMT_LEADCHNL_RA_PACING_THRESHOLD_PULSEWIDTH: 0.4 MS
MDC_IDC_MSMT_LEADCHNL_RA_SENSING_INTR_AMPL: 2.88 MV
MDC_IDC_MSMT_LEADCHNL_RA_SENSING_INTR_AMPL: 2.88 MV
MDC_IDC_MSMT_LEADCHNL_RV_IMPEDANCE_VALUE: 399 OHM
MDC_IDC_MSMT_LEADCHNL_RV_IMPEDANCE_VALUE: 475 OHM
MDC_IDC_MSMT_LEADCHNL_RV_PACING_THRESHOLD_AMPLITUDE: 0.75 V
MDC_IDC_MSMT_LEADCHNL_RV_PACING_THRESHOLD_PULSEWIDTH: 0.4 MS
MDC_IDC_MSMT_LEADCHNL_RV_SENSING_INTR_AMPL: 19.88 MV
MDC_IDC_MSMT_LEADCHNL_RV_SENSING_INTR_AMPL: 19.88 MV
MDC_IDC_PG_IMPLANT_DTM: NORMAL
MDC_IDC_PG_MFG: NORMAL
MDC_IDC_PG_MODEL: NORMAL
MDC_IDC_PG_SERIAL: NORMAL
MDC_IDC_PG_TYPE: NORMAL
MDC_IDC_SESS_CLINIC_NAME: NORMAL
MDC_IDC_SESS_DTM: NORMAL
MDC_IDC_SESS_TYPE: NORMAL
MDC_IDC_SET_BRADY_AT_MODE_SWITCH_RATE: 171 {BEATS}/MIN
MDC_IDC_SET_BRADY_HYSTRATE: NORMAL
MDC_IDC_SET_BRADY_LOWRATE: 70 {BEATS}/MIN
MDC_IDC_SET_BRADY_MAX_SENSOR_RATE: 130 {BEATS}/MIN
MDC_IDC_SET_BRADY_MAX_TRACKING_RATE: 130 {BEATS}/MIN
MDC_IDC_SET_BRADY_MODE: NORMAL
MDC_IDC_SET_BRADY_NIGHT_RATE: 60 {BEATS}/MIN
MDC_IDC_SET_BRADY_PAV_DELAY_LOW: 180 MS
MDC_IDC_SET_BRADY_SAV_DELAY_LOW: 150 MS
MDC_IDC_SET_LEADCHNL_RA_PACING_AMPLITUDE: 1.5 V
MDC_IDC_SET_LEADCHNL_RA_PACING_ANODE_ELECTRODE_1: NORMAL
MDC_IDC_SET_LEADCHNL_RA_PACING_ANODE_LOCATION_1: NORMAL
MDC_IDC_SET_LEADCHNL_RA_PACING_CAPTURE_MODE: NORMAL
MDC_IDC_SET_LEADCHNL_RA_PACING_CATHODE_ELECTRODE_1: NORMAL
MDC_IDC_SET_LEADCHNL_RA_PACING_CATHODE_LOCATION_1: NORMAL
MDC_IDC_SET_LEADCHNL_RA_PACING_POLARITY: NORMAL
MDC_IDC_SET_LEADCHNL_RA_PACING_PULSEWIDTH: 0.4 MS
MDC_IDC_SET_LEADCHNL_RA_SENSING_ANODE_ELECTRODE_1: NORMAL
MDC_IDC_SET_LEADCHNL_RA_SENSING_ANODE_LOCATION_1: NORMAL
MDC_IDC_SET_LEADCHNL_RA_SENSING_CATHODE_ELECTRODE_1: NORMAL
MDC_IDC_SET_LEADCHNL_RA_SENSING_CATHODE_LOCATION_1: NORMAL
MDC_IDC_SET_LEADCHNL_RA_SENSING_POLARITY: NORMAL
MDC_IDC_SET_LEADCHNL_RA_SENSING_SENSITIVITY: 0.3 MV
MDC_IDC_SET_LEADCHNL_RV_PACING_AMPLITUDE: 2 V
MDC_IDC_SET_LEADCHNL_RV_PACING_ANODE_ELECTRODE_1: NORMAL
MDC_IDC_SET_LEADCHNL_RV_PACING_ANODE_LOCATION_1: NORMAL
MDC_IDC_SET_LEADCHNL_RV_PACING_CAPTURE_MODE: NORMAL
MDC_IDC_SET_LEADCHNL_RV_PACING_CATHODE_ELECTRODE_1: NORMAL
MDC_IDC_SET_LEADCHNL_RV_PACING_CATHODE_LOCATION_1: NORMAL
MDC_IDC_SET_LEADCHNL_RV_PACING_POLARITY: NORMAL
MDC_IDC_SET_LEADCHNL_RV_PACING_PULSEWIDTH: 0.4 MS
MDC_IDC_SET_LEADCHNL_RV_SENSING_ANODE_ELECTRODE_1: NORMAL
MDC_IDC_SET_LEADCHNL_RV_SENSING_ANODE_LOCATION_1: NORMAL
MDC_IDC_SET_LEADCHNL_RV_SENSING_CATHODE_ELECTRODE_1: NORMAL
MDC_IDC_SET_LEADCHNL_RV_SENSING_CATHODE_LOCATION_1: NORMAL
MDC_IDC_SET_LEADCHNL_RV_SENSING_POLARITY: NORMAL
MDC_IDC_SET_LEADCHNL_RV_SENSING_SENSITIVITY: 0.9 MV
MDC_IDC_SET_ZONE_DETECTION_INTERVAL: 350 MS
MDC_IDC_SET_ZONE_DETECTION_INTERVAL: 400 MS
MDC_IDC_SET_ZONE_TYPE: NORMAL
MDC_IDC_STAT_AT_BURDEN_PERCENT: 0 %
MDC_IDC_STAT_AT_DTM_END: NORMAL
MDC_IDC_STAT_AT_DTM_START: NORMAL
MDC_IDC_STAT_BRADY_AP_VP_PERCENT: 0.37 %
MDC_IDC_STAT_BRADY_AP_VS_PERCENT: 98.8 %
MDC_IDC_STAT_BRADY_AS_VP_PERCENT: 0 %
MDC_IDC_STAT_BRADY_AS_VS_PERCENT: 0.83 %
MDC_IDC_STAT_BRADY_DTM_END: NORMAL
MDC_IDC_STAT_BRADY_DTM_START: NORMAL
MDC_IDC_STAT_BRADY_RA_PERCENT_PACED: 99.16 %
MDC_IDC_STAT_BRADY_RV_PERCENT_PACED: 0.37 %
MDC_IDC_STAT_EPISODE_RECENT_COUNT: 0
MDC_IDC_STAT_EPISODE_RECENT_COUNT_DTM_END: NORMAL
MDC_IDC_STAT_EPISODE_RECENT_COUNT_DTM_START: NORMAL
MDC_IDC_STAT_EPISODE_TOTAL_COUNT: 0
MDC_IDC_STAT_EPISODE_TOTAL_COUNT: 14
MDC_IDC_STAT_EPISODE_TOTAL_COUNT: 5
MDC_IDC_STAT_EPISODE_TOTAL_COUNT_DTM_END: NORMAL
MDC_IDC_STAT_EPISODE_TOTAL_COUNT_DTM_START: NORMAL
MDC_IDC_STAT_EPISODE_TYPE: NORMAL

## 2023-06-06 ENCOUNTER — ALLIED HEALTH/NURSE VISIT (OUTPATIENT)
Dept: EDUCATION SERVICES | Facility: CLINIC | Age: 68
End: 2023-06-06
Payer: MEDICARE

## 2023-06-06 DIAGNOSIS — E11.9 TYPE 2 DIABETES MELLITUS WITHOUT COMPLICATION, WITHOUT LONG-TERM CURRENT USE OF INSULIN (H): Primary | ICD-10-CM

## 2023-06-06 PROCEDURE — G0108 DIAB MANAGE TRN  PER INDIV: HCPCS | Performed by: DIETITIAN, REGISTERED

## 2023-06-06 NOTE — LETTER
6/6/2023         RE: Stiven Nguyễn  [address]  [place] [address]        Dear Colleague,    Thank you for referring your patient, Stiven Nguyễn, to the [facility] [place]. Please see a copy of my visit note below.    Diabetes Self-Management Education & Support  Presents for: Individual review  Type of Service: In Person Visit     ASSESSMENT:  Arya has had Diabetes for 10 years and has successfully managed via aggressive diet and life style changes.  Writer had one previous phone visit with patient in early 2020.  3 months ago A1c crept up for the first time and he was agreeable to start medications.  Arya dislikes medications, but also takes Diabetes very seriously and understands the consequences of uncontrolled blood sugars and for that reason he was willing to try metformin.  It has lowered blood sugars considerably already and he is only taking half a tab per his clinics instructions due to being very sensitive to it from a GI side effect standpoint.  He meets with his [facility] PCP tomorrow 6/7 and will discuss the medications further.  Focused on diet changes and reviewed detailed logs together.  Practiced carbohydrate counting and label reading with the foods he eats.  Has a very consistent routine and breakfast is around 82gm carbohydrate/day.  Lunch and dinner are closer to 30 to 60 grams of carbohydrate with more proteins, veggies and controlled carbohydrates.  Then has a large bowl of ice cream at night. Arya notes he has to have the ice cream but is willing to reduce the portion.  At breakfast may find some benefit in changing the chocolate milk to regular (saves 13 grams of sugar/ day) and reducing honey in tea (saves 10 grams/day).  Changing the ice cream could modify added sugar by up to 40grams or more grams/day.  These 2-3 changes, most days of the year could considerably modify blood sugars and Arya knew before hand that these were likely the main  contributers.  Has a high level of activity and continue with this.  Continue with positive diet & lifestyle changes       Monitorin, 154, 165, 183  161, 134, 154, 147     Has an OTC relion meter and buys strips. Has a freestyle lite meter (per ) but no prescription for strips and they were expensive cash pay.  Writer cannot order supplies for an outside clinic and no protocol in place, so Arya will ask his PCP to write an prescription for freestyle lite strips tomorrow.  Reviewed doing some post prandial checks here and there to better assess food.     Patient's most recent  A1c with [facility] labs; no access.  Arya thinks it was close to 8 at the last check.   Lab Results   Component Value Date    A1C 6.1 2021       Diabetes knowledge and skills assessment:   Patient is knowledgeable in diabetes management concepts related to: Healthy Eating, Being Active, Monitoring, Taking Medication, Problem Solving, Reducing Risks and Healthy Coping  Continue education with the following diabetes management concepts: any topics as needed in the future.   Based on learning assessment above, most appropriate setting for further diabetes education would be: Individual setting.      PLAN  Continue with positive diet & lifestyle changes - try food adjustments as noted above   Occasional post prandial checks, 2 hours after breakfast & ice cream   PCP follow up tomorrow   Follow up anytime on mychart or with a visit.     Education Materials Provided:  [employer] Healthy Living with Diabetes Book and Carbohydrate Counting      SUBJECTIVE/OBJECTIVE:  Presents for: Individual review  Accompanied by: Self  Diabetes education in the past 24mo: No  Focus of Visit: Monitoring, Reducing Risks, Taking Medication, Healthy Eating, Diabetes Pathophysiology  Diabetes type: Type 2  Date of diagnosis: about 10 years ago.   Just started medications recently.  Disease course: Improving  How confident are you filling out medical  "forms by yourself:: Extremely  Other concerns:: None  Cultural Influences/Ethnic Background:  Not  or     Diabetes Symptoms & Complications:          Patient Problem List and Family Medical History reviewed for relevant medical history, current medical status, and diabetes risk factors.    Vitals:  There were no vitals taken for this visit.  Estimated body mass index is 35.81 kg/m  as calculated from the following:    Height as of 2/20/23: 1.854 m (6' 1\").    Weight as of 4/4/23: 123.1 kg (271 lb 6.4 oz).   Last 3 BP:   BP Readings from Last 3 Encounters:   04/04/23 133/84   03/01/23 (!) 147/82   02/22/23 (!) 143/87       History   Smoking Status     Former   Smokeless Tobacco     Former     Types: Snuff     Quit date: 7/7/2011       Labs:  Lab Results   Component Value Date    A1C 6.1 04/07/2021     Lab Results   Component Value Date     05/08/2023     05/23/2022     05/17/2021     Lab Results   Component Value Date    LDL 76 05/08/2023    LDL 65 04/07/2021     HDL Cholesterol   Date Value Ref Range Status   04/07/2021 35 (A) >=40 mg/dL Final     Direct Measure HDL   Date Value Ref Range Status   05/08/2023 43 >=40 mg/dL Final   ]  GFR Estimate   Date Value Ref Range Status   05/08/2023 >90 >60 mL/min/1.73m2 Final     Comment:     eGFR calculated using 2021 CKD-EPI equation.   05/17/2021 >90 >60 mL/min/[1.73_m2] Final     Comment:     Non  GFR Calc  Starting 12/18/2018, serum creatinine based estimated GFR (eGFR) will be   calculated using the Chronic Kidney Disease Epidemiology Collaboration   (CKD-EPI) equation.       GFR Estimate If Black   Date Value Ref Range Status   05/17/2021 >90 >60 mL/min/[1.73_m2] Final     Comment:      GFR Calc  Starting 12/18/2018, serum creatinine based estimated GFR (eGFR) will be   calculated using the Chronic Kidney Disease Epidemiology Collaboration   (CKD-EPI) equation.       Lab Results   Component Value Date    " CR 0.87 05/08/2023    CR 0.85 05/17/2021     No results found for: MICROALBUMIN    Healthy Eating:  Healthy Eating Assessed Today: Yes  Meal planning/habits: Avoiding sweets, Carb counting, Smaller portions    Being Active:  Being Active Assessed Today: Yes  Exercise:: Yes    Monitoring:  Monitoring Assessed Today: Yes  Did patient bring glucose meter to appointment? : Yes  Blood Glucose Meter: Reli-On      Taking Medications:  Taking Medication Assessed Today: Yes  Current Treatments: Oral Medication (taken by mouth)    Problem Solving:   yes       Reducing Risks:  Reducing Risks Assessed Today: Yes  Diabetes Risks: Age over 45 years, Family History  CAD Risks: Diabetes Mellitus, Male sex    Healthy Coping:  Healthy Coping Assessed Today: Yes  Emotional response to diabetes: Ready to learn  Stage of change: ACTION (Actively working towards change)  Patient Activation Measure Survey Score:       View : No data to display.                Care Plan and Education Provided:  Care Plan: Diabetes   Updates made by Ruthy Venegas RD since 6/8/2023 12:00 AM      Problem: HbA1C Not In Goal       Goal: Establish Regular Follow-Ups with PCP       Task: Discuss with PCP the recommended timing for patient's next follow up visit(s)    Responsible User: Ruthy Venegas RD      Task: Discuss schedule for PCP visits with patient Completed 6/8/2023   Responsible User: Ruthy Venegas RD      Goal: Get HbA1C Level in Goal       Task: Educate patient on diabetes education self-management topics Completed 6/8/2023   Responsible User: Ruthy Venegas RD      Task: Educate patient on benefits of regular glucose monitoring Completed 6/8/2023   Responsible User: Ruthy Venegas RD      Task: Refer patient to appropriate extended care team member, as needed (Medication Therapy Management, Behavioral Health, Physical Therapy, etc.)    Responsible User: Ruthy Venegas RD      Task: Discuss diabetes treatment  plan with patient Completed 6/8/2023   Responsible User: Ruthy Venegas RD      Problem: Diabetes Self-Management Education Needed to Optimize Self-Care Behaviors       Goal: Understand diabetes pathophysiology and disease progression       Task: Provide education on diabetes pathophysiology and disease progression specfic to patient's diabetes type Completed 6/8/2023   Responsible User: Ruthy Venegas RD      Goal: Healthy Eating - follow a healthy eating pattern for diabetes       Task: Provide education on portion control and consistency in amount, composition and timing of food intake Completed 6/8/2023   Responsible User: Ruthy Venegas RD      Task: Provide education on managing carbohydrate intake (carbohydrate counting, plate planning method, etc.) Completed 6/8/2023   Responsible User: Ruthy Venegas RD      Task: Provide education on weight management    Responsible User: Ruthy Venegas RD      Task: Provide education on heart healthy eating    Responsible User: Ruthy Venegas RD      Task: Provide education on eating out    Responsible User: Ruthy Venegas RD      Task: Develop individualized healthy eating plan with patient    Responsible User: Ruthy Venegas RD      Goal: Being Active - get regular physical activity, working up to at least 150 minutes per week       Task: Provide education on relationship of activity to glucose and precautions to take if at risk for low glucose Completed 6/8/2023   Responsible User: Ruthy Venegas RD      Task: Discuss barriers to physical activity with patient    Responsible User: Ruthy Venegas RD      Task: Develop physical activity plan with patient    Responsible User: Ruthy Venegas RD      Task: Explore community resources including walking groups, assistance programs, and home videos    Responsible User: Ruthy Venegas RD      Goal: Monitoring - monitor glucose and ketones as directed        Task: Provide education on blood glucose monitoring (purpose, proper technique, frequency, glucose targets, interpreting results, when to use glucose control solution, sharps disposal) Completed 6/8/2023   Responsible User: Ruthy Venegas RD      Task: Provide education on continuous glucose monitoring (sensor placement, use of mendez or /reader, understanding glucose trends, alerts and alarms, differences between sensor glucose and blood glucose)    Responsible User: Ruthy Venegas RD      Task: Provide education on ketone monitoring (when to monitor, frequency, etc.)    Responsible User: Ruthy Venegas RD      Goal: Taking Medication - patient is consistently taking medications as directed       Task: Provide education on action of prescribed medication, including when to take and possible side effects Completed 6/8/2023   Responsible User: Ruthy Venegas RD      Task: Provide education on insulin and injectable diabetes medications, including administration, storage, site selection and rotation for injection sites    Responsible User: Ruthy Venegas RD      Task: Discuss barriers to medication adherence with patient and provide management technique ideas as appropriate    Responsible User: Ruthy Venegas RD      Task: Provide education on frequency and refill details of medications    Responsible User: Ruthy Venegas RD      Goal: Problem Solving - know how to prevent and manage short-term diabetes complications       Task: Provide education on high blood glucose - causes, signs/symptoms, prevention and treatment    Responsible User: Ruthy Venegas RD      Task: Provide education on low blood glucose - causes, signs/symptoms, prevention, treatment, carrying a carbohydrate source at all times, and medical identification    Responsible User: Ruthy Venegas RD      Task: Provide education on safe travel with diabetes    Responsible User: Rubi  JOSE Bliss      Task: Provide education on how to care for diabetes on sick days    Responsible User: Ruthy Venegas RD      Task: Provide education on when to call a health care provider    Responsible User: Ruthy Venegas RD      Goal: Reducing Risks - know how to prevent and treat long-term diabetes complications       Task: Provide education on major complications of diabetes, prevention, early diagnostic measures and treatment of complications    Responsible User: Ruthy Venegas RD      Task: Provide education on recommended care for dental, eye and foot health    Responsible User: Ruthy Venegas RD      Task: Provide education on Hemoglobin A1c - goals and relationship to blood glucose levels    Responsible User: Ruthy Venegas RD      Task: Provide education on recommendations for heart health - lipid levels and goals, blood pressure and goals, and aspirin therapy, if indicated    Responsible User: Ruthy Venegas RD      Task: Provide education on tobacco cessation    Responsible User: Ruthy Venegas RD      Goal: Healthy Coping - use available resources to cope with the challenges of managing diabetes       Task: Discuss recognizing feelings about having diabetes    Responsible User: Ruthy Venegas RD      Task: Provide education on the benefits of making appropriate lifestyle changes    Responsible User: Ruthy Venegas RD      Task: Provide education on benefits of utilizing support systems    Responsible User: Ruthy Venegas RD      Task: Discuss methods for coping with stress    Responsible User: Ruthy Venegas RD      Task: Provide education on when to seek professional counseling    Responsible User: Ruthy Venegas RD Elizabeth Swartout RD, RUSLAN, [facility]  Diabetes Education      Time Spent: 40 minutes  Encounter Type: Individual    A copy of this encounter was shared with the provider.

## 2023-06-06 NOTE — PROGRESS NOTES
Diabetes Self-Management Education & Support  Presents for: Individual review  Type of Service: In Person Visit     ASSESSMENT:  Arya has had Diabetes for 10 years and has successfully managed via aggressive diet and life style changes.  Writer had one previous phone visit with patient in early .  3 months ago A1c crept up for the first time and he was agreeable to start medications.  Arya dislikes medications, but also takes Diabetes very seriously and understands the consequences of uncontrolled blood sugars and for that reason he was willing to try metformin.  It has lowered blood sugars considerably already and he is only taking half a tab per his clinics instructions due to being very sensitive to it from a GI side effect standpoint.  He meets with his [facility] PCP tomorrow  and will discuss the medications further.  Focused on diet changes and reviewed detailed logs together.  Practiced carbohydrate counting and label reading with the foods he eats.  Has a very consistent routine and breakfast is around 82gm carbohydrate/day.  Lunch and dinner are closer to 30 to 60 grams of carbohydrate with more proteins, veggies and controlled carbohydrates.  Then has a large bowl of ice cream at night. Arya notes he has to have the ice cream but is willing to reduce the portion.  At breakfast may find some benefit in changing the chocolate milk to regular (saves 13 grams of sugar/ day) and reducing honey in tea (saves 10 grams/day).  Changing the ice cream could modify added sugar by up to 40grams or more grams/day.  These 2-3 changes, most days of the year could considerably modify blood sugars and Arya knew before hand that these were likely the main contributers.  Has a high level of activity and continue with this.  Continue with positive diet & lifestyle changes       Monitorin, 154, 165, 183  161, 134, 154, 147     Has an OTC relion meter and buys strips. Has a freestyle lite meter (per [employer]) but no  prescription for strips and they were expensive cash pay.  Writer cannot order supplies for an outside clinic and no protocol in place, so Arya will ask his PCP to write an prescription for freestyle lite strips tomorrow.  Reviewed doing some post prandial checks here and there to better assess food.     Patient's most recent  A1c with [facility]; no access.  Arya thinks it was close to 8 at the last check.   Lab Results   Component Value Date    A1C 6.1 04/07/2021       Diabetes knowledge and skills assessment:   Patient is knowledgeable in diabetes management concepts related to: Healthy Eating, Being Active, Monitoring, Taking Medication, Problem Solving, Reducing Risks and Healthy Coping  Continue education with the following diabetes management concepts: any topics as needed in the future.   Based on learning assessment above, most appropriate setting for further diabetes education would be: Individual setting.      PLAN  Continue with positive diet & lifestyle changes - try food adjustments as noted above   Occasional post prandial checks, 2 hours after breakfast & ice cream   PCP follow up tomorrow   Follow up anytime on mychart or with a visit.     Education Materials Provided:  [employer] Healthy Living with Diabetes Book and Carbohydrate Counting      SUBJECTIVE/OBJECTIVE:  Presents for: Individual review  Accompanied by: Self  Diabetes education in the past 24mo: No  Focus of Visit: Monitoring, Reducing Risks, Taking Medication, Healthy Eating, Diabetes Pathophysiology  Diabetes type: Type 2  Date of diagnosis: about 10 years ago.   Just started medications recently.  Disease course: Improving  How confident are you filling out medical forms by yourself:: Extremely  Other concerns:: None  Cultural Influences/Ethnic Background:  Not  or     Diabetes Symptoms & Complications:          Patient Problem List and Family Medical History reviewed for relevant medical history, current medical  "status, and diabetes risk factors.    Vitals:  There were no vitals taken for this visit.  Estimated body mass index is 35.81 kg/m  as calculated from the following:    Height as of 2/20/23: 1.854 m (6' 1\").    Weight as of 4/4/23: 123.1 kg (271 lb 6.4 oz).   Last 3 BP:   BP Readings from Last 3 Encounters:   04/04/23 133/84   03/01/23 (!) 147/82   02/22/23 (!) 143/87       History   Smoking Status     Former   Smokeless Tobacco     Former     Types: Snuff     Quit date: 7/7/2011       Labs:  Lab Results   Component Value Date    A1C 6.1 04/07/2021     Lab Results   Component Value Date     05/08/2023     05/23/2022     05/17/2021     Lab Results   Component Value Date    LDL 76 05/08/2023    LDL 65 04/07/2021     HDL Cholesterol   Date Value Ref Range Status   04/07/2021 35 (A) >=40 mg/dL Final     Direct Measure HDL   Date Value Ref Range Status   05/08/2023 43 >=40 mg/dL Final   ]  GFR Estimate   Date Value Ref Range Status   05/08/2023 >90 >60 mL/min/1.73m2 Final     Comment:     eGFR calculated using 2021 CKD-EPI equation.   05/17/2021 >90 >60 mL/min/[1.73_m2] Final     Comment:     Non  GFR Calc  Starting 12/18/2018, serum creatinine based estimated GFR (eGFR) will be   calculated using the Chronic Kidney Disease Epidemiology Collaboration   (CKD-EPI) equation.       GFR Estimate If Black   Date Value Ref Range Status   05/17/2021 >90 >60 mL/min/[1.73_m2] Final     Comment:      GFR Calc  Starting 12/18/2018, serum creatinine based estimated GFR (eGFR) will be   calculated using the Chronic Kidney Disease Epidemiology Collaboration   (CKD-EPI) equation.       Lab Results   Component Value Date    CR 0.87 05/08/2023    CR 0.85 05/17/2021     No results found for: MICROALBUMIN    Healthy Eating:  Healthy Eating Assessed Today: Yes  Meal planning/habits: Avoiding sweets, Carb counting, Smaller portions    Being Active:  Being Active Assessed Today: " Yes  Exercise:: Yes    Monitoring:  Monitoring Assessed Today: Yes  Did patient bring glucose meter to appointment? : Yes  Blood Glucose Meter: Reli-On      Taking Medications:  Taking Medication Assessed Today: Yes  Current Treatments: Oral Medication (taken by mouth)    Problem Solving:   yes       Reducing Risks:  Reducing Risks Assessed Today: Yes  Diabetes Risks: Age over 45 years, Family History  CAD Risks: Diabetes Mellitus, Male sex    Healthy Coping:  Healthy Coping Assessed Today: Yes  Emotional response to diabetes: Ready to learn  Stage of change: ACTION (Actively working towards change)  Patient Activation Measure Survey Score:       View : No data to display.                Care Plan and Education Provided:  Care Plan: Diabetes   Updates made by Ruthy Venegas RD since 6/8/2023 12:00 AM      Problem: HbA1C Not In Goal       Goal: Establish Regular Follow-Ups with PCP       Task: Discuss with PCP the recommended timing for patient's next follow up visit(s)    Responsible User: Ruthy Venegas RD      Task: Discuss schedule for PCP visits with patient Completed 6/8/2023   Responsible User: Ruthy Venegas RD      Goal: Get HbA1C Level in Goal       Task: Educate patient on diabetes education self-management topics Completed 6/8/2023   Responsible User: Ruthy Venegas RD      Task: Educate patient on benefits of regular glucose monitoring Completed 6/8/2023   Responsible User: Ruthy Venegas RD      Task: Refer patient to appropriate extended care team member, as needed (Medication Therapy Management, Behavioral Health, Physical Therapy, etc.)    Responsible User: Ruthy Venegas RD      Task: Discuss diabetes treatment plan with patient Completed 6/8/2023   Responsible User: Ruthy Venegas RD      Problem: Diabetes Self-Management Education Needed to Optimize Self-Care Behaviors       Goal: Understand diabetes pathophysiology and disease progression       Task:  Provide education on diabetes pathophysiology and disease progression specfic to patient's diabetes type Completed 6/8/2023   Responsible User: Ruthy Venegas RD      Goal: Healthy Eating - follow a healthy eating pattern for diabetes       Task: Provide education on portion control and consistency in amount, composition and timing of food intake Completed 6/8/2023   Responsible User: Ruthy Venegas RD      Task: Provide education on managing carbohydrate intake (carbohydrate counting, plate planning method, etc.) Completed 6/8/2023   Responsible User: Ruthy Venegas RD      Task: Provide education on weight management    Responsible User: Ruthy Venegas RD      Task: Provide education on heart healthy eating    Responsible User: Ruhty Venegas RD      Task: Provide education on eating out    Responsible User: Ruthy Venegas RD      Task: Develop individualized healthy eating plan with patient    Responsible User: Ruthy Venegas RD      Goal: Being Active - get regular physical activity, working up to at least 150 minutes per week       Task: Provide education on relationship of activity to glucose and precautions to take if at risk for low glucose Completed 6/8/2023   Responsible User: Ruthy Venegas RD      Task: Discuss barriers to physical activity with patient    Responsible User: Ruthy Venegas RD      Task: Develop physical activity plan with patient    Responsible User: Ruthy Venegas RD      Task: Explore community resources including walking groups, assistance programs, and home videos    Responsible User: Ruthy Venegas RD      Goal: Monitoring - monitor glucose and ketones as directed       Task: Provide education on blood glucose monitoring (purpose, proper technique, frequency, glucose targets, interpreting results, when to use glucose control solution, sharps disposal) Completed 6/8/2023   Responsible User: Ruthy Venegas RD       Task: Provide education on continuous glucose monitoring (sensor placement, use of mendez or /reader, understanding glucose trends, alerts and alarms, differences between sensor glucose and blood glucose)    Responsible User: Ruthy Venegas RD      Task: Provide education on ketone monitoring (when to monitor, frequency, etc.)    Responsible User: Ruthy Venegas RD      Goal: Taking Medication - patient is consistently taking medications as directed       Task: Provide education on action of prescribed medication, including when to take and possible side effects Completed 6/8/2023   Responsible User: Ruthy Venegas RD      Task: Provide education on insulin and injectable diabetes medications, including administration, storage, site selection and rotation for injection sites    Responsible User: Ruthy Venegas RD      Task: Discuss barriers to medication adherence with patient and provide management technique ideas as appropriate    Responsible User: Ruthy Venegas RD      Task: Provide education on frequency and refill details of medications    Responsible User: Ruthy Venegas RD      Goal: Problem Solving - know how to prevent and manage short-term diabetes complications       Task: Provide education on high blood glucose - causes, signs/symptoms, prevention and treatment    Responsible User: Ruthy Venegas RD      Task: Provide education on low blood glucose - causes, signs/symptoms, prevention, treatment, carrying a carbohydrate source at all times, and medical identification    Responsible User: Ruthy Venegas RD      Task: Provide education on safe travel with diabetes    Responsible User: Ruthy Venegas RD      Task: Provide education on how to care for diabetes on sick days    Responsible User: Ruthy Venegas RD      Task: Provide education on when to call a health care provider    Responsible User: Ruthy Venegas RD      Goal: Reducing  Risks - know how to prevent and treat long-term diabetes complications       Task: Provide education on major complications of diabetes, prevention, early diagnostic measures and treatment of complications    Responsible User: Ruthy Venegas RD      Task: Provide education on recommended care for dental, eye and foot health    Responsible User: Ruthy Venegas RD      Task: Provide education on Hemoglobin A1c - goals and relationship to blood glucose levels    Responsible User: Ruthy Venegas RD      Task: Provide education on recommendations for heart health - lipid levels and goals, blood pressure and goals, and aspirin therapy, if indicated    Responsible User: Ruthy Venegas RD      Task: Provide education on tobacco cessation    Responsible User: Ruthy Venegas RD      Goal: Healthy Coping - use available resources to cope with the challenges of managing diabetes       Task: Discuss recognizing feelings about having diabetes    Responsible User: Ruthy Venegas RD      Task: Provide education on the benefits of making appropriate lifestyle changes    Responsible User: Ruthy Venegas RD      Task: Provide education on benefits of utilizing support systems    Responsible User: Ruthy Venegas RD      Task: Discuss methods for coping with stress    Responsible User: Ruthy Venegas RD      Task: Provide education on when to seek professional counseling    Responsible User: Ruthy Venegas RD Elizabeth Swartout RD, RUSLAN, [facility]  Diabetes Education      Time Spent: 40 minutes  Encounter Type: Individual    A copy of this encounter was shared with the provider.

## 2023-06-12 ENCOUNTER — MYC MEDICAL ADVICE (OUTPATIENT)
Dept: CARDIOLOGY | Facility: CLINIC | Age: 68
End: 2023-06-12

## 2023-06-12 ENCOUNTER — OFFICE VISIT (OUTPATIENT)
Dept: CARDIOLOGY | Facility: CLINIC | Age: 68
End: 2023-06-12
Payer: MEDICARE

## 2023-06-12 VITALS
OXYGEN SATURATION: 95 % | BODY MASS INDEX: 36.05 KG/M2 | HEIGHT: 73 IN | SYSTOLIC BLOOD PRESSURE: 116 MMHG | HEART RATE: 76 BPM | DIASTOLIC BLOOD PRESSURE: 72 MMHG | WEIGHT: 272 LBS

## 2023-06-12 DIAGNOSIS — I48.0 PAROXYSMAL ATRIAL FIBRILLATION (H): ICD-10-CM

## 2023-06-12 DIAGNOSIS — I20.1 VASOSPASTIC ANGINA (H): ICD-10-CM

## 2023-06-12 DIAGNOSIS — Z13.6 SCREENING FOR CARDIOVASCULAR CONDITION: Primary | ICD-10-CM

## 2023-06-12 DIAGNOSIS — G90.01 HYPERSENSITIVE CAROTID SINUS SYNDROME: ICD-10-CM

## 2023-06-12 DIAGNOSIS — I10 BENIGN ESSENTIAL HYPERTENSION: ICD-10-CM

## 2023-06-12 PROCEDURE — 93000 ELECTROCARDIOGRAM COMPLETE: CPT | Performed by: INTERNAL MEDICINE

## 2023-06-12 PROCEDURE — 99215 OFFICE O/P EST HI 40 MIN: CPT | Performed by: INTERNAL MEDICINE

## 2023-06-12 RX ORDER — METFORMIN HCL 500 MG
500 TABLET, EXTENDED RELEASE 24 HR ORAL
COMMUNITY
Start: 2023-05-08

## 2023-06-12 NOTE — PROGRESS NOTES
Service Date: 06/12/2023    CARDIOLOGY OFFICE VISIT    OFFICE NOTE:  Stiven Nguyễn is a pleasant, 68-year-old gentleman.  He is accompanied by his wife.  I am meeting him for the first time.  The only other interaction I had was he was referred to me by Dr. Bermudez in March, but that was a video visit, so I never actually met him.  He has multiple moving parts to his story.  He also has multiple cardiologists, which may or may not be of benefit.  He does have chest pain, but he has 2 different types of chest pain, one in the right upper chest, which he thinks is probably his heart, and then another gripping, sharp one that is sometimes in the center of his chest and sometimes to the left.  Today I reviewed his actual angiogram pictures with him in [id number].  He had only mild fixed coronary artery disease of the LAD and mild fixed coronary artery disease of the right coronary artery.  Dr. Riggs did the Heifetz protocol with acetylcholine in 2016, and he spasmed down his LAD.  He did not close it completely, but it was very tight.  Apparently, he was sleeping during this time, so we do not know if it was symptomatic chest pain.  For that reason, he is on vasodilating drugs.    The next issue is that he has multiple episodes of lightheadedness.  He saw my teacher, Dr. Jackson.  He has had a tilt-table study. If I understand correctly, the tilt-table study was negative, but he had positive carotid sinus massage.  He has a pacemaker, and interestingly he is set to DDD 70, and they may have done that because of the carotid sinus massage.  They wanted the heart rate to be a little higher. They also apparently picked up atrial fibrillation on one of his pacer checks, even though clinically he was not aware of anything, so he was placed on metoprolol and flecainide.  I am not sure if that is the best medication or not for a couple of reasons.  One is metoprolol can potentially make vasospastic angina worse, and I am going  to go ahead and stop that.  The next is that the patient in some respects has coronary artery disease, i.e., the vasospasm, and he is also on flecainide, and I am not sure if that is a great combination, but I am only going to make one change at a time, knowing that my teacher, Dr. Jackson, who is a professor of EP, did this, and I think I am going to yield to his good decision.    The next is the patient is clearly overweight with a large abdomen, and this is important. When he bends over and stands up, he is lightheaded.  I cannot help but wonder if some of this might be orthostatic, but more likely that when he bends over, his large intestines decrease venous return, and that is why he is lightheaded.  I actually checked orthostatic blood pressures today, and there is no drop sitting.  He was 116/72 and standing 122/71, and I told him this is a real phenomenon.  The patient is also going to be going to [facility] for autonomic testing, but that has not been done yet, and I think we can hold off on that.      Therefore, my thoughts today are the chest pain may be vasospasm.  He told me that he has used 3 nitroglycerins in the past 3-4 weeks.  He gets intermittent shortness of breath out of the blue.  He had an episode where his wife or somebody was cutting his hair, and he tilted his neck up and felt a little lightheaded.  He also had another episode in May when he was on an airplane.  At takeoff, he felt lightheaded, but there was no trigger for that.  I am going to stop the metoprolol because it can make vasospasm worsen, and if there is a problem with atrial fib, I would either use carvedilol or nebivolol or switch the Norvasc over to diltiazem for both rate control, atrial fib and antispasm. I am going to stop the tamsulosin.  He takes it at night, but it is famous for causing orthostatic hypotension, and he is already on Proscar, so hopefully his prostate is shrunken from hormone manipulation.  We will have  to see if he has high blood pressure when he comes back at his next visit.  I am going to have the pacer turned down to 60.  I am wondering if perhaps some of the shortness of breath that occurs for no real reason was either vasospasm or his bending over with his intestines pressing on his inferior vena cava, or perhaps he is pacing too fast, so we will look and see what the rate histogram is. I would like to decrease the heart rate to 60.  Again, it is possible that Dr. Jackson wanted the heart rate at 70 with the pacer because of the flecainide, but I think we are going to make a couple changes at a time, and if we do not see any more atrial fib, I would consider stopping the flecainide and just seeing what happens.  Especially if we put him back on nebivolol, at least we will have rate control, and he is already on Xarelto long term because of DVT.    Today's visit was 1 hour.  Multiple issues were discussed:  Blood pressure, vasospasm, angiogram review, blood tests review, pacemaker evaluation, blood pressure management.  I did a carotid sinus massage today, also, and it was negative.    Narendra Andre MD    cc:  Giacomo Jackson MD  [facility]  [address] [id number]    Oliva Zhang MD   [facility]  [address] [place], [place] [id number]    Husam Bermudez MD  [facility]   [address], [address] [id number]    Narendra Andre MD        D: 2023   T: 2023   MT: karla    Name:     MARKEL HEADLEY  MRN:      [id number]        Account:      [id number]   :      1955           Service Date: 2023       Document: [id number]
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
## 2023-06-14 ENCOUNTER — OFFICE VISIT (OUTPATIENT)
Dept: PHARMACY | Facility: CLINIC | Age: 68
End: 2023-06-14
Payer: OTHER GOVERNMENT

## 2023-06-14 DIAGNOSIS — I48.20 CHRONIC ATRIAL FIBRILLATION (H): ICD-10-CM

## 2023-06-14 DIAGNOSIS — E78.5 HYPERLIPIDEMIA LDL GOAL <70: ICD-10-CM

## 2023-06-14 DIAGNOSIS — K21.9 GASTRO-ESOPHAGEAL REFLUX DISEASE WITHOUT ESOPHAGITIS: ICD-10-CM

## 2023-06-14 DIAGNOSIS — Z86.718 HISTORY OF DVT (DEEP VEIN THROMBOSIS): ICD-10-CM

## 2023-06-14 DIAGNOSIS — E11.9 TYPE 2 DIABETES MELLITUS WITHOUT COMPLICATION, WITHOUT LONG-TERM CURRENT USE OF INSULIN (H): ICD-10-CM

## 2023-06-14 DIAGNOSIS — N40.0 BENIGN PROSTATIC HYPERPLASIA, UNSPECIFIED WHETHER LOWER URINARY TRACT SYMPTOMS PRESENT: ICD-10-CM

## 2023-06-14 DIAGNOSIS — J30.1 ALLERGIC RHINITIS DUE TO POLLEN, UNSPECIFIED SEASONALITY: ICD-10-CM

## 2023-06-14 DIAGNOSIS — I25.10 CORONARY ARTERY DISEASE INVOLVING NATIVE HEART WITHOUT ANGINA PECTORIS, UNSPECIFIED VESSEL OR LESION TYPE: ICD-10-CM

## 2023-06-14 DIAGNOSIS — R52 PAIN: ICD-10-CM

## 2023-06-14 DIAGNOSIS — I10 HYPERTENSION, UNSPECIFIED TYPE: ICD-10-CM

## 2023-06-14 DIAGNOSIS — R42 LIGHTHEADEDNESS: Primary | ICD-10-CM

## 2023-06-14 PROCEDURE — 99207 PR NO CHARGE LOS: CPT | Performed by: PHARMACIST

## 2023-06-14 NOTE — PATIENT INSTRUCTIONS
"Recommendations from today's MTM visit:                                                    MTM (medication therapy management) is a service provided by a clinical pharmacist designed to help you get the most of out of your medicines.   Today we reviewed what your medicines are for, how to know if they are working, that your medicines are safe and how to make your medicine regimen as easy as possible.      Arya,    It was a pleasure talking with you! We discussed the following:    Start taking finasteride, oxybutynin and amlodipine in the evening - monitor symptoms of lightheadedness.     Follow-up: Wednesday, July 12th at 9:30 AM    It was great speaking with you today.  I value your experience and would be very thankful for your time in providing feedback in our clinic survey. In the next few days, you may receive an email or text message from [employer] with a link to a survey related to your  clinical pharmacist.\"     To schedule another MTM appointment, please call the clinic directly or you may call the MTM scheduling line at [phone] or toll-free at [phone].     My Clinical Pharmacist's contact information:                                                      Please feel free to contact me with any questions or concerns you have.      Keep up the good work!!    Suzan Aranda, PharmD  [phone] in clinic on Tuesday and Wednesday        "

## 2023-06-14 NOTE — PROGRESS NOTES
Medication Therapy Management (MTM) Encounter    ASSESSMENT:                            Medication Adherence/Access: No issues identified    Lightheadedness: Patient is working with cardiology - for now asked patient to start taking his amlodipine, oxybutynin and finasteride in the evening - all have some risk for dizziness. Of note: Flecainide has risk for syncope of 1-2 %, patient has been taking since 2/9/2022. Imdur has risk for hypotension up to 5%.     Mild Pain: stable     Hypertension/CAD/DVT/Atrial Fibrillation: Start taking amlodipine in the evening - continue to monitor lightheadedness.    Hyperlipidemia: stable    Type 2 Diabetes: stable - review pneumonia vaccine with patient at follow up visit.     BPH: start taking both amlodipine and finasteride in the evening continue to monitor lightheadedness.    GERD: stable    Allergies: stable    PLAN:                            1. Start taking finasteride, oxybutynin and amlodipine in the evening - monitor symptoms of lightheadedness.     2. At follow up review pneumonia vaccine with patient.     Follow-up: Wednesday, July 12th at 9:30 AM    SUBJECTIVE/OBJECTIVE:                          Arya Nguyễn is a 68 year old male coming in for an initial visit. He was referred to me from Oliva Zhang MD.      Reason for visit: Getting lightheaded - Comprehensive Medication Review.    Allergies/ADRs: Reviewed in chart  Past Medical History: Reviewed in chart  Tobacco: He reports that he has quit smoking. He quit smokeless tobacco use about 11 years ago.  His smokeless tobacco use included snuff.  Alcohol: not currently using    Medication Adherence/Access: no issues reported    Lightheadedness: Patient saw cardiology on Monday - stopped metoprolol, tamsulosin on hold - getting lightheaded - passing out sort of. Hasn't had since Monday - not focused on it. Had problems before pacemaker - better after pacemaker things settled down for a while. Happening typically a  couple of times a week. So far cardiology is having him take his blood pressure sitting to standing and then standing - which has been going pretty well. Has had pacemaker since October 2021. Does not feel dizzy - lightheaded - no warning - can be standing, sitting, walking - no rhyme or reason when it hits. Cardiology is thinking patient is having low blood pressure.     Mild Pain: taking acetaminophen 1000 mg as needed for headaches or milk body aches.      Hypertension/CAD/DVT/Atrial Fibrillation:   Amlodipine 10 mg daily in the morning   Flecainide 100 mg twice daily.   Xarelto 20 mg daily in the morning with breakfast - patient states okay with cardiology that he is taking in the morning.   Isosorbide mononitrate 30 mg every morning.   Nitrostat - using every once in a while with relief - has it with him all the time  Patient reports the following medication side effects: lightheadedness. No excessive bruising or bleeding.   Followed by cardiology.     BP Readings from Last 3 Encounters:   06/12/23 116/72   04/04/23 133/84   03/01/23 (!) 147/82     Pulse Readings from Last 3 Encounters:   06/12/23 76   04/04/23 83   03/01/23 84     Hyperlipidemia:   Atorvastatin 10 mg daily takes in the evening  Patient reports no significant myalgias or other side effects.    Recent Labs   Lab Test 05/08/23  1500 02/07/23  0718 10/02/15  1549 05/21/15  0531   CHOL 136 134   < > 169   HDL 43 46   < > 36*   LDL 76 69   < > 108   TRIG 84 95   < > 123   CHOLHDLRATIO  --   --   --  4.7    < > = values in this interval not displayed.     Type 2 Diabetes:    Metformin 500 mg once daily with evening meal     Patient is not experiencing side effects.  Blood sugar monitoring: checking every couple of days in he morning - this morning 145.   I am not able to find a current A1c lab in [facility], patient states up to date and last A1c was <8%  Eye exam: up to date  Foot exam: up to date    Lab Results   Component Value Date    A1C 6.1  04/07/2021    A1C 6.5 01/04/2021    A1C 6.8 07/15/2019    A1C 7.4 04/11/2019    A1C 6.6 05/03/2016     Patient gets yearly flu vaccine and has had Covid vaccine - I do not see that patient has a gotten pneumonia vaccine.      BPH:   finasteride 5 mg daily in the morning  Oxybutynin 5 mg once daily in the morning.     Working with urology.    GERD:   Omeprazole 20 mg daily once in a while Tums - as needed. Working well.     Allergies: taking Allegra 180 mg in the evenings. Allergies are doing well.     Today's Vitals: There were no vitals taken for this visit. Patient declined getting a blood pressure reading today.   ----------------    I spent 40 minutes with this patient today. A copy of the visit note was provided to the patient's provider(s).    A summary of these recommendations was given to the patient.    Suzan Aranda, PharmD  Medication Therapy Management      Medication Therapy Recommendations  Lightheadedness    Current Medication: amLODIPine (NORVASC) 10 MG tablet   Rationale: Undesirable effect - Adverse medication event - Safety   Recommendation: Change Administration Time   Status: Patient Agreed - Adherence/Education

## 2023-06-19 ENCOUNTER — HOSPITAL ENCOUNTER (EMERGENCY)
Facility: CLINIC | Age: 68
Discharge: HOME | End: 2023-06-19
Attending: EMERGENCY MEDICINE | Admitting: EMERGENCY MEDICINE
Payer: MEDICARE

## 2023-06-19 VITALS
DIASTOLIC BLOOD PRESSURE: 93 MMHG | BODY MASS INDEX: 35.12 KG/M2 | TEMPERATURE: 98.5 F | RESPIRATION RATE: 16 BRPM | WEIGHT: 265 LBS | OXYGEN SATURATION: 95 % | SYSTOLIC BLOOD PRESSURE: 145 MMHG | HEIGHT: 73 IN | HEART RATE: 70 BPM

## 2023-06-19 DIAGNOSIS — T63.441A BEE STING REACTION, ACCIDENTAL OR UNINTENTIONAL, INITIAL ENCOUNTER: ICD-10-CM

## 2023-06-19 PROCEDURE — 99284 EMERGENCY DEPT VISIT MOD MDM: CPT | Performed by: EMERGENCY MEDICINE

## 2023-06-19 PROCEDURE — 250N000013 HC RX MED GY IP 250 OP 250 PS 637: Performed by: EMERGENCY MEDICINE

## 2023-06-19 PROCEDURE — 99283 EMERGENCY DEPT VISIT LOW MDM: CPT

## 2023-06-19 PROCEDURE — 250N000012 HC RX MED GY IP 250 OP 636 PS 637: Performed by: EMERGENCY MEDICINE

## 2023-06-19 RX ORDER — EPINEPHRINE 0.3 MG/.3ML
0.3 INJECTION SUBCUTANEOUS PRN
Qty: 2 EACH | Refills: 0 | Status: SHIPPED | OUTPATIENT
Start: 2023-06-19

## 2023-06-19 RX ORDER — PREDNISONE 20 MG/1
20 TABLET ORAL ONCE
Status: COMPLETED | OUTPATIENT
Start: 2023-06-19 | End: 2023-06-19

## 2023-06-19 RX ORDER — FAMOTIDINE 20 MG/1
20 TABLET, FILM COATED ORAL ONCE
Status: COMPLETED | OUTPATIENT
Start: 2023-06-19 | End: 2023-06-19

## 2023-06-19 RX ADMIN — PREDNISONE 20 MG: 20 TABLET ORAL at 11:26

## 2023-06-19 RX ADMIN — FAMOTIDINE 20 MG: 20 TABLET, FILM COATED ORAL at 11:26

## 2023-06-19 ASSESSMENT — ENCOUNTER SYMPTOMS
MUSCULOSKELETAL NEGATIVE: 1
CARDIOVASCULAR NEGATIVE: 1
HEMATOLOGIC/LYMPHATIC NEGATIVE: 1
ALLERGIC/IMMUNOLOGIC NEGATIVE: 1
EYES NEGATIVE: 1
PSYCHIATRIC NEGATIVE: 1
NEUROLOGICAL NEGATIVE: 1
RESPIRATORY NEGATIVE: 1
GASTROINTESTINAL NEGATIVE: 1

## 2023-06-19 ASSESSMENT — ACTIVITIES OF DAILY LIVING (ADL): ADLS_ACUITY_SCORE: 35
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
## 2023-06-19 NOTE — DISCHARGE INSTRUCTIONS
1) Your evaluation today did not a life-threatening reaction after bee sting.  We discussed symptoms that would be concerning including shortness of breath, vomiting, fainting, facial swelling tongue swelling change in your voice or difficulty swallowing.  After period of care you appear stable for discharge to home with plan to consider using Benadryl 50 mg every 8-12 hours if needed or Zyrtec 10 to 20 mg every 24 hours if needed.    2) You given an EpiPen to use if needed in the event that he develop any concerning symptoms such as trouble swallowing, tongue swelling facial swelling shortness of breath fainting or any new concerns as reviewed and discussed.  We discussed using the EpiPen only if medically required.    3) After a period of observation and care you appear stable for discharge to home however if you develop new symptoms of concern or if you have to use your EpiPen you should return to be reevaluated

## 2023-06-19 NOTE — ED TRIAGE NOTES
Bee/wasp sting at 1000-took 50 mg benadryl, states had some tongue tingling and felt lightheaded     Triage Assessment     Row Name 06/19/23 1054       Triage Assessment (Adult)    Airway WDL X;airway symptoms    Airway Symptoms other (see comments)  tongue tingling, dry mouth       Respiratory WDL    Respiratory WDL WDL       Skin Circulation/Temperature WDL    Skin Circulation/Temperature WDL WDL       Cardiac WDL    Cardiac WDL WDL       Peripheral/Neurovascular WDL    Peripheral Neurovascular WDL WDL       Cognitive/Neuro/Behavioral WDL    Cognitive/Neuro/Behavioral WDL WDL

## 2023-06-26 ENCOUNTER — MYC MEDICAL ADVICE (OUTPATIENT)
Dept: UROLOGY | Facility: CLINIC | Age: 68
End: 2023-06-26
Payer: MEDICARE

## 2023-06-26 DIAGNOSIS — R39.15 URINARY URGENCY: ICD-10-CM

## 2023-06-27 RX ORDER — OXYBUTYNIN CHLORIDE 5 MG/1
5 TABLET ORAL 3 TIMES DAILY
Qty: 270 TABLET | Refills: 0 | Status: SHIPPED | OUTPATIENT
Start: 2023-06-27 | End: 2023-09-07

## 2023-06-27 NOTE — TELEPHONE ENCOUNTER
Last OV 10/11/22. Refilled oxybutynin per [facility] protocol.  Notified patient via mychart about refill and to schedule a f/u appointment in Urology or request refills from PCP.    Shae Wilson RN on 6/27/2023 at 9:24 AM

## 2023-07-12 ENCOUNTER — OFFICE VISIT (OUTPATIENT)
Dept: PHARMACY | Facility: CLINIC | Age: 68
End: 2023-07-12
Payer: OTHER GOVERNMENT

## 2023-07-12 DIAGNOSIS — R42 LIGHTHEADEDNESS: Primary | ICD-10-CM

## 2023-07-12 DIAGNOSIS — Z23 ENCOUNTER FOR IMMUNIZATION: ICD-10-CM

## 2023-07-12 PROCEDURE — 99207 PR NO CHARGE LOS: CPT | Performed by: PHARMACIST

## 2023-07-12 RX ORDER — TAMSULOSIN HYDROCHLORIDE 0.4 MG/1
0.4 CAPSULE ORAL DAILY
COMMUNITY
End: 2023-09-15

## 2023-07-12 NOTE — PATIENT INSTRUCTIONS
"Recommendations from today's MTM visit:                                                       Arya,    It was a pleasure talking with you! We discussed the following:    Continue current drug therapy.    2.   Consider getting a pneumonia vaccine for prevention (PCV20).    Follow-up: as needed    It was great speaking with you today.  I value your experience and would be very thankful for your time in providing feedback in our clinic survey. In the next few days, you may receive an email or text message from [email or web address] with a link to a survey related to your  clinical pharmacist.\"     To schedule another MTM appointment, please call the clinic directly or you may call the MTM scheduling line at [phone] or toll-free at [phone].     My Clinical Pharmacist's contact information:                                                      Please feel free to contact me with any questions or concerns you have.      Keep up the good work!!    Suzan Aranda, PharmD  [phone] in clinic on Tuesday and Wednesday          "

## 2023-07-12 NOTE — PROGRESS NOTES
Medication Therapy Management (MTM) Encounter    ASSESSMENT:                            Medication Adherence/Access: No issues identified    Lightheadedness: resolved - patient is no longer having problems with dizziness/lightheadedness.    Immunizations: recommend patient consider getting PCV20 vaccine.    PLAN:                            1. Continue current drug therapy.    2.   Consider getting a pneumonia vaccine for prevention (PCV20).    Follow-up: as needed    SUBJECTIVE/OBJECTIVE:                          Arya Nguyễn is a 68 year old male coming in for a follow-up visit.  Today's visit is a follow-up MTM visit from 6/14/2023.     Reason for visit: follow up MTM visit.    Tobacco: He reports that he has quit smoking. He quit smokeless tobacco use about 12 years ago.  His smokeless tobacco use included snuff.  Alcohol: not currently using    Medication Adherence/Access: no issues reported    Lightheadedness: Patient is no longer having problems with being lightheaded and/or dizzy. He was having problems with frequent urination so he restarted tamsulosin - he started taking it in the evening, but didn't help with daytime symptoms - so he started taking it in the morning. Even with taking tamsulosin in the morning he no longer is having any problems with dizziness/lightheadedness.     Immunizations: reviewed pneumonia vaccine with patient - he is aware, declined getting it.  He states he has had pneumonia twice.     Today's Vitals: There were no vitals taken for this visit. Patient declined getting a blood pressure checked today - has follow up appointment with cardiology on Monday.   ----------------    I spent 15 minutes with this patient today. A copy of the visit note was provided to the patient's provider(s).    A summary of these recommendations was given to the patient.    Suzan Aranda, PharmD  Medication Therapy Management      Medication Therapy Recommendations  Encounter for immunization    Rationale:  Patient prefers not to take - Adherence - Adherence   Recommendation: Start Medication - PNEUMOCOCCAL 20-BELLO CONJ VACC IM   Status: Declined per Patient
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
## 2023-07-17 ENCOUNTER — OFFICE VISIT (OUTPATIENT)
Dept: CARDIOLOGY | Facility: CLINIC | Age: 68
End: 2023-07-17
Payer: MEDICARE

## 2023-07-17 VITALS
OXYGEN SATURATION: 96 % | SYSTOLIC BLOOD PRESSURE: 136 MMHG | BODY MASS INDEX: 35.25 KG/M2 | HEART RATE: 77 BPM | RESPIRATION RATE: 20 BRPM | DIASTOLIC BLOOD PRESSURE: 79 MMHG | WEIGHT: 267.2 LBS

## 2023-07-17 DIAGNOSIS — I48.0 PAROXYSMAL ATRIAL FIBRILLATION (H): Primary | ICD-10-CM

## 2023-07-17 PROCEDURE — 99213 OFFICE O/P EST LOW 20 MIN: CPT | Performed by: PHYSICIAN ASSISTANT
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
## 2023-07-17 NOTE — PATIENT INSTRUCTIONS
Arya - it was nice to see you and Genna today!     At your visit today we reviewed:    FINALLY feeling better! HOORAY!  An episode of chest discomfort for nitroglycerin but otherwise OK despite stopping metoprolol     Medication Changes:    No changes today    Recommendations:    Decrease LRL to 60 bpm tomorrow as planned. Can ALWAYS go back up to 60 bpm if needed    Follow-up:    See us for cardiology follow up depending on discussion with Dr. Andre of  but CALL Cardiology nurses Shameka & Clotilde @ [phone] for any issues, questions or concerns in the interim.      To schedule a future appointment, we kindly ask that you call cardiology scheduling at [phone] three months prior to requested visit date.    Important Phone Numbers for [facility]):    [place] [facility] Shameka Mabry: [phone]  Cardiology [phone]  [place] [facility] [phone]  [place] [facility] [phone]  [facility]: [phone]

## 2023-07-18 ENCOUNTER — TELEPHONE (OUTPATIENT)
Dept: CARDIOLOGY | Facility: CLINIC | Age: 68
End: 2023-07-18
Payer: MEDICARE

## 2023-07-18 ENCOUNTER — HOSPITAL ENCOUNTER (OUTPATIENT)
Dept: CARDIOLOGY | Facility: CLINIC | Age: 68
Discharge: HOME | End: 2023-07-18
Attending: INTERNAL MEDICINE | Admitting: INTERNAL MEDICINE
Payer: MEDICARE

## 2023-07-18 DIAGNOSIS — Z13.6 SCREENING FOR CARDIOVASCULAR CONDITION: ICD-10-CM

## 2023-07-18 DIAGNOSIS — G90.01 HYPERSENSITIVE CAROTID SINUS SYNDROME: ICD-10-CM

## 2023-07-18 DIAGNOSIS — I10 BENIGN ESSENTIAL HYPERTENSION: ICD-10-CM

## 2023-07-18 DIAGNOSIS — Z95.0 CARDIAC PACEMAKER IN SITU: ICD-10-CM

## 2023-07-18 DIAGNOSIS — I48.0 PAROXYSMAL ATRIAL FIBRILLATION (H): ICD-10-CM

## 2023-07-18 DIAGNOSIS — I20.1 VASOSPASTIC ANGINA (H): ICD-10-CM

## 2023-07-18 DIAGNOSIS — I49.5 SINUS NODE DYSFUNCTION (H): Primary | ICD-10-CM

## 2023-07-18 PROCEDURE — 93280 PM DEVICE PROGR EVAL DUAL: CPT | Mod: 26 | Performed by: INTERNAL MEDICINE

## 2023-07-18 PROCEDURE — 93280 PM DEVICE PROGR EVAL DUAL: CPT

## 2023-07-18 NOTE — TELEPHONE ENCOUNTER
"----- Message from Blanca López PA-C sent at 7/17/2023  4:29 PM CDT -----  Regarding: PARAM Koenig is being seen in [place] tomorrow to turn LRL down from 70-60 per Dr. Andre's recommendations from 6/12 note.    Arya is actually feeling really good, and we talked about whether or not to keep the appointment with you as he has been dealing with random episodes of lightheadedness/wavy vision/wobbliness for years and is finally feeling better.  He opted to go ahead and have you turned down LRL, but please reassure him (as I did) that if this is starting to make him feel poorly, he can contact us and we can get him in for LRL back up to 70 ASAP.    Here is Dr. Andre's note:    \"I am going to have the pacer turned down to 60.  I am wondering if perhaps some of the shortness of breath that occurs for no real reason was either vasospasm or his bending over with his intestines pressing on his inferior vena cava, or perhaps he is pacing too fast, so we will look and see what the rate histogram is. I would like to decrease the heart rate to 60.  Again, it is possible that Dr. Jackson wanted the heart rate at 70 with the pacer because of the flecainide, but I think we are going to make a couple changes at a time, and if we do not see any more atrial fib, I would consider stopping the flecainide and just seeing what happens.  \"    Gayle    Received above message from Gayle López PA-C.  Lower rate limit was turned down to 60 today per recommendation from Dr. Andre.  Patient was instructed to notify clinic if he develops any symptoms (increased fatigue, shortness of breath, activity intolerance, etc) and we could change the lower rate limit back up to 70 if needed.  Patient verbalized understanding and agreement with plan.     DAYAN St   "

## 2023-07-19 LAB
MDC_IDC_EPISODE_DTM: NORMAL
MDC_IDC_EPISODE_DURATION: 14 S
MDC_IDC_EPISODE_ID: 24
MDC_IDC_EPISODE_TYPE: NORMAL
MDC_IDC_LEAD_IMPLANT_DT: NORMAL
MDC_IDC_LEAD_IMPLANT_DT: NORMAL
MDC_IDC_LEAD_LOCATION: NORMAL
MDC_IDC_LEAD_LOCATION: NORMAL
MDC_IDC_LEAD_LOCATION_DETAIL_1: NORMAL
MDC_IDC_LEAD_LOCATION_DETAIL_1: NORMAL
MDC_IDC_LEAD_MFG: NORMAL
MDC_IDC_LEAD_MFG: NORMAL
MDC_IDC_LEAD_MODEL: NORMAL
MDC_IDC_LEAD_MODEL: NORMAL
MDC_IDC_LEAD_POLARITY_TYPE: NORMAL
MDC_IDC_LEAD_POLARITY_TYPE: NORMAL
MDC_IDC_LEAD_SERIAL: NORMAL
MDC_IDC_LEAD_SERIAL: NORMAL
MDC_IDC_LEAD_SPECIAL_FUNCTION: NORMAL
MDC_IDC_LEAD_SPECIAL_FUNCTION: NORMAL
MDC_IDC_MSMT_BATTERY_DTM: NORMAL
MDC_IDC_MSMT_BATTERY_REMAINING_LONGEVITY: 142 MO
MDC_IDC_MSMT_BATTERY_RRT_TRIGGER: 2.62
MDC_IDC_MSMT_BATTERY_STATUS: NORMAL
MDC_IDC_MSMT_BATTERY_VOLTAGE: 3.03 V
MDC_IDC_MSMT_LEADCHNL_RA_IMPEDANCE_VALUE: 361 OHM
MDC_IDC_MSMT_LEADCHNL_RA_IMPEDANCE_VALUE: 513 OHM
MDC_IDC_MSMT_LEADCHNL_RA_PACING_THRESHOLD_AMPLITUDE: 0.75 V
MDC_IDC_MSMT_LEADCHNL_RA_PACING_THRESHOLD_PULSEWIDTH: 0.4 MS
MDC_IDC_MSMT_LEADCHNL_RA_SENSING_INTR_AMPL: 2.75 MV
MDC_IDC_MSMT_LEADCHNL_RA_SENSING_INTR_AMPL: 3 MV
MDC_IDC_MSMT_LEADCHNL_RV_IMPEDANCE_VALUE: 418 OHM
MDC_IDC_MSMT_LEADCHNL_RV_IMPEDANCE_VALUE: 494 OHM
MDC_IDC_MSMT_LEADCHNL_RV_PACING_THRESHOLD_AMPLITUDE: 0.75 V
MDC_IDC_MSMT_LEADCHNL_RV_PACING_THRESHOLD_PULSEWIDTH: 0.4 MS
MDC_IDC_MSMT_LEADCHNL_RV_SENSING_INTR_AMPL: 17.62 MV
MDC_IDC_MSMT_LEADCHNL_RV_SENSING_INTR_AMPL: 22 MV
MDC_IDC_PG_IMPLANT_DTM: NORMAL
MDC_IDC_PG_MFG: NORMAL
MDC_IDC_PG_MODEL: NORMAL
MDC_IDC_PG_SERIAL: NORMAL
MDC_IDC_PG_TYPE: NORMAL
MDC_IDC_SESS_CLINIC_NAME: NORMAL
MDC_IDC_SESS_DTM: NORMAL
MDC_IDC_SESS_TYPE: NORMAL
MDC_IDC_SET_BRADY_AT_MODE_SWITCH_RATE: 171 {BEATS}/MIN
MDC_IDC_SET_BRADY_HYSTRATE: NORMAL
MDC_IDC_SET_BRADY_LOWRATE: 60 {BEATS}/MIN
MDC_IDC_SET_BRADY_MAX_SENSOR_RATE: 130 {BEATS}/MIN
MDC_IDC_SET_BRADY_MAX_TRACKING_RATE: 130 {BEATS}/MIN
MDC_IDC_SET_BRADY_MODE: NORMAL
MDC_IDC_SET_BRADY_NIGHT_RATE: 60 {BEATS}/MIN
MDC_IDC_SET_BRADY_PAV_DELAY_LOW: 180 MS
MDC_IDC_SET_BRADY_SAV_DELAY_LOW: 150 MS
MDC_IDC_SET_LEADCHNL_RA_PACING_AMPLITUDE: 1.5 V
MDC_IDC_SET_LEADCHNL_RA_PACING_ANODE_ELECTRODE_1: NORMAL
MDC_IDC_SET_LEADCHNL_RA_PACING_ANODE_LOCATION_1: NORMAL
MDC_IDC_SET_LEADCHNL_RA_PACING_CAPTURE_MODE: NORMAL
MDC_IDC_SET_LEADCHNL_RA_PACING_CATHODE_ELECTRODE_1: NORMAL
MDC_IDC_SET_LEADCHNL_RA_PACING_CATHODE_LOCATION_1: NORMAL
MDC_IDC_SET_LEADCHNL_RA_PACING_POLARITY: NORMAL
MDC_IDC_SET_LEADCHNL_RA_PACING_PULSEWIDTH: 0.4 MS
MDC_IDC_SET_LEADCHNL_RA_SENSING_ANODE_ELECTRODE_1: NORMAL
MDC_IDC_SET_LEADCHNL_RA_SENSING_ANODE_LOCATION_1: NORMAL
MDC_IDC_SET_LEADCHNL_RA_SENSING_CATHODE_ELECTRODE_1: NORMAL
MDC_IDC_SET_LEADCHNL_RA_SENSING_CATHODE_LOCATION_1: NORMAL
MDC_IDC_SET_LEADCHNL_RA_SENSING_POLARITY: NORMAL
MDC_IDC_SET_LEADCHNL_RA_SENSING_SENSITIVITY: 0.3 MV
MDC_IDC_SET_LEADCHNL_RV_PACING_AMPLITUDE: 2 V
MDC_IDC_SET_LEADCHNL_RV_PACING_ANODE_ELECTRODE_1: NORMAL
MDC_IDC_SET_LEADCHNL_RV_PACING_ANODE_LOCATION_1: NORMAL
MDC_IDC_SET_LEADCHNL_RV_PACING_CAPTURE_MODE: NORMAL
MDC_IDC_SET_LEADCHNL_RV_PACING_CATHODE_ELECTRODE_1: NORMAL
MDC_IDC_SET_LEADCHNL_RV_PACING_CATHODE_LOCATION_1: NORMAL
MDC_IDC_SET_LEADCHNL_RV_PACING_POLARITY: NORMAL
MDC_IDC_SET_LEADCHNL_RV_PACING_PULSEWIDTH: 0.4 MS
MDC_IDC_SET_LEADCHNL_RV_SENSING_ANODE_ELECTRODE_1: NORMAL
MDC_IDC_SET_LEADCHNL_RV_SENSING_ANODE_LOCATION_1: NORMAL
MDC_IDC_SET_LEADCHNL_RV_SENSING_CATHODE_ELECTRODE_1: NORMAL
MDC_IDC_SET_LEADCHNL_RV_SENSING_CATHODE_LOCATION_1: NORMAL
MDC_IDC_SET_LEADCHNL_RV_SENSING_POLARITY: NORMAL
MDC_IDC_SET_LEADCHNL_RV_SENSING_SENSITIVITY: 0.9 MV
MDC_IDC_SET_ZONE_DETECTION_INTERVAL: 350 MS
MDC_IDC_SET_ZONE_DETECTION_INTERVAL: 400 MS
MDC_IDC_SET_ZONE_TYPE: NORMAL
MDC_IDC_STAT_AT_BURDEN_PERCENT: 0 %
MDC_IDC_STAT_AT_DTM_END: NORMAL
MDC_IDC_STAT_AT_DTM_START: NORMAL
MDC_IDC_STAT_BRADY_AP_VP_PERCENT: 0.23 %
MDC_IDC_STAT_BRADY_AP_VS_PERCENT: 98.02 %
MDC_IDC_STAT_BRADY_AS_VP_PERCENT: 0 %
MDC_IDC_STAT_BRADY_AS_VS_PERCENT: 1.75 %
MDC_IDC_STAT_BRADY_DTM_END: NORMAL
MDC_IDC_STAT_BRADY_DTM_START: NORMAL
MDC_IDC_STAT_BRADY_RA_PERCENT_PACED: 98.26 %
MDC_IDC_STAT_BRADY_RV_PERCENT_PACED: 0.23 %
MDC_IDC_STAT_EPISODE_RECENT_COUNT: 0
MDC_IDC_STAT_EPISODE_RECENT_COUNT_DTM_END: NORMAL
MDC_IDC_STAT_EPISODE_RECENT_COUNT_DTM_START: NORMAL
MDC_IDC_STAT_EPISODE_TOTAL_COUNT: 0
MDC_IDC_STAT_EPISODE_TOTAL_COUNT: 0
MDC_IDC_STAT_EPISODE_TOTAL_COUNT: 14
MDC_IDC_STAT_EPISODE_TOTAL_COUNT_DTM_END: NORMAL
MDC_IDC_STAT_EPISODE_TOTAL_COUNT_DTM_START: NORMAL
MDC_IDC_STAT_EPISODE_TYPE: NORMAL

## 2023-07-27 NOTE — TELEPHONE ENCOUNTER
"Received call from patient who stated that he has been monitoring his symptoms since his PPM lower rate limit was decreased to 60 and he has noticed a change and would like this turned back up to 70.     He stated since the change, he has been much more tired.  He stated he normally would be able to wake up in the morning and get up and go.  Now it is mid day before he is able to do anything and he \"feel(s) like a frog with my butt hitting the ground\".  Denies any other symptoms, just increased fatigue.     Courtesy device check scheduled for 8/1/23 to change lower rate limit back to 70bpm.  Will route update to Dr. Andre and Gayle López PA-C.     DAYAN St   " Statement Selected

## 2023-07-31 NOTE — TELEPHONE ENCOUNTER
Narendra Andre MD  You; Blanca López PA-C4 days ago       Thanks!     Blanca López PA-C4 days ago       Great - thank you for update!     Gayle         Note       Above responses received from Gayle López PA-C and Dr. Andre.  Will see patient on 8/1/23 to adjust lower rate limit as scheduled.     DAYAN St

## 2023-08-01 ENCOUNTER — HOSPITAL ENCOUNTER (OUTPATIENT)
Dept: CARDIOLOGY | Facility: CLINIC | Age: 68
Discharge: HOME | End: 2023-08-01
Attending: INTERNAL MEDICINE
Payer: MEDICARE

## 2023-08-01 DIAGNOSIS — I49.5 SINUS NODE DYSFUNCTION (H): Primary | ICD-10-CM

## 2023-08-01 DIAGNOSIS — Z95.0 CARDIAC PACEMAKER IN SITU: ICD-10-CM

## 2023-08-01 DIAGNOSIS — I49.5 SINUS NODE DYSFUNCTION (H): ICD-10-CM

## 2023-08-01 LAB
MDC_IDC_LEAD_IMPLANT_DT: NORMAL
MDC_IDC_LEAD_IMPLANT_DT: NORMAL
MDC_IDC_LEAD_LOCATION: NORMAL
MDC_IDC_LEAD_LOCATION: NORMAL
MDC_IDC_LEAD_LOCATION_DETAIL_1: NORMAL
MDC_IDC_LEAD_LOCATION_DETAIL_1: NORMAL
MDC_IDC_LEAD_MFG: NORMAL
MDC_IDC_LEAD_MFG: NORMAL
MDC_IDC_LEAD_MODEL: NORMAL
MDC_IDC_LEAD_MODEL: NORMAL
MDC_IDC_LEAD_POLARITY_TYPE: NORMAL
MDC_IDC_LEAD_POLARITY_TYPE: NORMAL
MDC_IDC_LEAD_SERIAL: NORMAL
MDC_IDC_LEAD_SERIAL: NORMAL
MDC_IDC_LEAD_SPECIAL_FUNCTION: NORMAL
MDC_IDC_LEAD_SPECIAL_FUNCTION: NORMAL
MDC_IDC_MSMT_BATTERY_DTM: NORMAL
MDC_IDC_MSMT_BATTERY_REMAINING_LONGEVITY: 142 MO
MDC_IDC_MSMT_BATTERY_RRT_TRIGGER: 2.62
MDC_IDC_MSMT_BATTERY_STATUS: NORMAL
MDC_IDC_MSMT_BATTERY_VOLTAGE: 3.03 V
MDC_IDC_MSMT_LEADCHNL_RA_IMPEDANCE_VALUE: 380 OHM
MDC_IDC_MSMT_LEADCHNL_RA_IMPEDANCE_VALUE: 513 OHM
MDC_IDC_MSMT_LEADCHNL_RA_SENSING_INTR_AMPL: 2.75 MV
MDC_IDC_MSMT_LEADCHNL_RA_SENSING_INTR_AMPL: 2.75 MV
MDC_IDC_MSMT_LEADCHNL_RV_IMPEDANCE_VALUE: 418 OHM
MDC_IDC_MSMT_LEADCHNL_RV_IMPEDANCE_VALUE: 494 OHM
MDC_IDC_MSMT_LEADCHNL_RV_SENSING_INTR_AMPL: 18.5 MV
MDC_IDC_MSMT_LEADCHNL_RV_SENSING_INTR_AMPL: 18.5 MV
MDC_IDC_PG_IMPLANT_DTM: NORMAL
MDC_IDC_PG_MFG: NORMAL
MDC_IDC_PG_MODEL: NORMAL
MDC_IDC_PG_SERIAL: NORMAL
MDC_IDC_PG_TYPE: NORMAL
MDC_IDC_SESS_CLINIC_NAME: NORMAL
MDC_IDC_SESS_DTM: NORMAL
MDC_IDC_SESS_TYPE: NORMAL
MDC_IDC_SET_BRADY_AT_MODE_SWITCH_RATE: 171 {BEATS}/MIN
MDC_IDC_SET_BRADY_HYSTRATE: NORMAL
MDC_IDC_SET_BRADY_LOWRATE: 70 {BEATS}/MIN
MDC_IDC_SET_BRADY_MAX_SENSOR_RATE: 130 {BEATS}/MIN
MDC_IDC_SET_BRADY_MAX_TRACKING_RATE: 130 {BEATS}/MIN
MDC_IDC_SET_BRADY_MODE: NORMAL
MDC_IDC_SET_BRADY_NIGHT_RATE: 60 {BEATS}/MIN
MDC_IDC_SET_BRADY_PAV_DELAY_LOW: 180 MS
MDC_IDC_SET_BRADY_SAV_DELAY_LOW: 150 MS
MDC_IDC_SET_LEADCHNL_RA_PACING_AMPLITUDE: 1.5 V
MDC_IDC_SET_LEADCHNL_RA_PACING_ANODE_ELECTRODE_1: NORMAL
MDC_IDC_SET_LEADCHNL_RA_PACING_ANODE_LOCATION_1: NORMAL
MDC_IDC_SET_LEADCHNL_RA_PACING_CAPTURE_MODE: NORMAL
MDC_IDC_SET_LEADCHNL_RA_PACING_CATHODE_ELECTRODE_1: NORMAL
MDC_IDC_SET_LEADCHNL_RA_PACING_CATHODE_LOCATION_1: NORMAL
MDC_IDC_SET_LEADCHNL_RA_PACING_POLARITY: NORMAL
MDC_IDC_SET_LEADCHNL_RA_PACING_PULSEWIDTH: 0.4 MS
MDC_IDC_SET_LEADCHNL_RA_SENSING_ANODE_ELECTRODE_1: NORMAL
MDC_IDC_SET_LEADCHNL_RA_SENSING_ANODE_LOCATION_1: NORMAL
MDC_IDC_SET_LEADCHNL_RA_SENSING_CATHODE_ELECTRODE_1: NORMAL
MDC_IDC_SET_LEADCHNL_RA_SENSING_CATHODE_LOCATION_1: NORMAL
MDC_IDC_SET_LEADCHNL_RA_SENSING_POLARITY: NORMAL
MDC_IDC_SET_LEADCHNL_RA_SENSING_SENSITIVITY: 0.3 MV
MDC_IDC_SET_LEADCHNL_RV_PACING_AMPLITUDE: 2 V
MDC_IDC_SET_LEADCHNL_RV_PACING_ANODE_ELECTRODE_1: NORMAL
MDC_IDC_SET_LEADCHNL_RV_PACING_ANODE_LOCATION_1: NORMAL
MDC_IDC_SET_LEADCHNL_RV_PACING_CAPTURE_MODE: NORMAL
MDC_IDC_SET_LEADCHNL_RV_PACING_CATHODE_ELECTRODE_1: NORMAL
MDC_IDC_SET_LEADCHNL_RV_PACING_CATHODE_LOCATION_1: NORMAL
MDC_IDC_SET_LEADCHNL_RV_PACING_POLARITY: NORMAL
MDC_IDC_SET_LEADCHNL_RV_PACING_PULSEWIDTH: 0.4 MS
MDC_IDC_SET_LEADCHNL_RV_SENSING_ANODE_ELECTRODE_1: NORMAL
MDC_IDC_SET_LEADCHNL_RV_SENSING_ANODE_LOCATION_1: NORMAL
MDC_IDC_SET_LEADCHNL_RV_SENSING_CATHODE_ELECTRODE_1: NORMAL
MDC_IDC_SET_LEADCHNL_RV_SENSING_CATHODE_LOCATION_1: NORMAL
MDC_IDC_SET_LEADCHNL_RV_SENSING_POLARITY: NORMAL
MDC_IDC_SET_LEADCHNL_RV_SENSING_SENSITIVITY: 0.9 MV
MDC_IDC_SET_ZONE_DETECTION_INTERVAL: 350 MS
MDC_IDC_SET_ZONE_DETECTION_INTERVAL: 400 MS
MDC_IDC_SET_ZONE_TYPE: NORMAL
MDC_IDC_STAT_AT_BURDEN_PERCENT: 0 %
MDC_IDC_STAT_AT_DTM_END: NORMAL
MDC_IDC_STAT_AT_DTM_START: NORMAL
MDC_IDC_STAT_BRADY_AP_VP_PERCENT: 0.06 %
MDC_IDC_STAT_BRADY_AP_VS_PERCENT: 88.95 %
MDC_IDC_STAT_BRADY_AS_VP_PERCENT: 0 %
MDC_IDC_STAT_BRADY_AS_VS_PERCENT: 10.98 %
MDC_IDC_STAT_BRADY_DTM_END: NORMAL
MDC_IDC_STAT_BRADY_DTM_START: NORMAL
MDC_IDC_STAT_BRADY_RA_PERCENT_PACED: 89 %
MDC_IDC_STAT_BRADY_RV_PERCENT_PACED: 0.07 %
MDC_IDC_STAT_EPISODE_RECENT_COUNT: 0
MDC_IDC_STAT_EPISODE_RECENT_COUNT_DTM_END: NORMAL
MDC_IDC_STAT_EPISODE_RECENT_COUNT_DTM_START: NORMAL
MDC_IDC_STAT_EPISODE_TOTAL_COUNT: 0
MDC_IDC_STAT_EPISODE_TOTAL_COUNT: 0
MDC_IDC_STAT_EPISODE_TOTAL_COUNT: 14
MDC_IDC_STAT_EPISODE_TOTAL_COUNT_DTM_END: NORMAL
MDC_IDC_STAT_EPISODE_TOTAL_COUNT_DTM_START: NORMAL
MDC_IDC_STAT_EPISODE_TYPE: NORMAL

## 2023-08-01 PROCEDURE — 99207 CARDIAC DEVICE CHECK - IN CLINIC: CPT | Performed by: INTERNAL MEDICINE

## 2023-08-04 NOTE — PROGRESS NOTES
Patient returned to 2A post tilt table study.  VSS.  Denies pain.  Per report, patient may need to return to cath lab today.  Continue to keep NPO for now.  Dr. Jackson at bedside to discuss plan.   4 = No assist / stand by assistance

## 2023-08-08 ENCOUNTER — APPOINTMENT (OUTPATIENT)
Dept: ULTRASOUND IMAGING | Facility: CLINIC | Age: 68
End: 2023-08-08
Attending: FAMILY MEDICINE
Payer: MEDICARE

## 2023-08-08 ENCOUNTER — HOSPITAL ENCOUNTER (EMERGENCY)
Facility: CLINIC | Age: 68
Discharge: HOME | End: 2023-08-08
Attending: FAMILY MEDICINE | Admitting: FAMILY MEDICINE
Payer: MEDICARE

## 2023-08-08 VITALS
DIASTOLIC BLOOD PRESSURE: 77 MMHG | HEART RATE: 70 BPM | OXYGEN SATURATION: 95 % | RESPIRATION RATE: 18 BRPM | HEIGHT: 73 IN | WEIGHT: 265 LBS | SYSTOLIC BLOOD PRESSURE: 129 MMHG | TEMPERATURE: 97.6 F | BODY MASS INDEX: 35.12 KG/M2

## 2023-08-08 DIAGNOSIS — R10.32 LEFT GROIN PAIN: ICD-10-CM

## 2023-08-08 LAB
ALBUMIN SERPL BCG-MCNC: 4.3 G/DL (ref 3.5–5.2)
ALBUMIN UR-MCNC: NEGATIVE MG/DL
ALP SERPL-CCNC: 75 U/L (ref 40–129)
ALT SERPL W P-5'-P-CCNC: 28 U/L (ref 0–70)
ANION GAP SERPL CALCULATED.3IONS-SCNC: 11 MMOL/L (ref 7–15)
APPEARANCE UR: CLEAR
AST SERPL W P-5'-P-CCNC: 43 U/L (ref 0–45)
BASOPHILS # BLD AUTO: 0.1 10E3/UL (ref 0–0.2)
BASOPHILS NFR BLD AUTO: 1 %
BILIRUB SERPL-MCNC: 0.6 MG/DL
BILIRUB UR QL STRIP: NEGATIVE
BUN SERPL-MCNC: 16.8 MG/DL (ref 8–23)
CALCIUM SERPL-MCNC: 9.8 MG/DL (ref 8.8–10.2)
CHLORIDE SERPL-SCNC: 104 MMOL/L (ref 98–107)
COLOR UR AUTO: YELLOW
CREAT SERPL-MCNC: 0.9 MG/DL (ref 0.67–1.17)
DEPRECATED HCO3 PLAS-SCNC: 24 MMOL/L (ref 22–29)
EOSINOPHIL # BLD AUTO: 0.2 10E3/UL (ref 0–0.7)
EOSINOPHIL NFR BLD AUTO: 3 %
ERYTHROCYTE [DISTWIDTH] IN BLOOD BY AUTOMATED COUNT: 13.1 % (ref 10–15)
GFR SERPL CREATININE-BSD FRML MDRD: >90 ML/MIN/1.73M2
GLUCOSE SERPL-MCNC: 141 MG/DL (ref 70–99)
GLUCOSE UR STRIP-MCNC: NEGATIVE MG/DL
HCT VFR BLD AUTO: 41 % (ref 40–53)
HGB BLD-MCNC: 14.5 G/DL (ref 13.3–17.7)
HGB UR QL STRIP: NEGATIVE
IMM GRANULOCYTES # BLD: 0 10E3/UL
IMM GRANULOCYTES NFR BLD: 0 %
KETONES UR STRIP-MCNC: NEGATIVE MG/DL
LEUKOCYTE ESTERASE UR QL STRIP: NEGATIVE
LYMPHOCYTES # BLD AUTO: 1.2 10E3/UL (ref 0.8–5.3)
LYMPHOCYTES NFR BLD AUTO: 19 %
MCH RBC QN AUTO: 30.3 PG (ref 26.5–33)
MCHC RBC AUTO-ENTMCNC: 35.4 G/DL (ref 31.5–36.5)
MCV RBC AUTO: 86 FL (ref 78–100)
MONOCYTES # BLD AUTO: 0.6 10E3/UL (ref 0–1.3)
MONOCYTES NFR BLD AUTO: 11 %
MUCOUS THREADS #/AREA URNS LPF: PRESENT /LPF
NEUTROPHILS # BLD AUTO: 4 10E3/UL (ref 1.6–8.3)
NEUTROPHILS NFR BLD AUTO: 66 %
NITRATE UR QL: NEGATIVE
NRBC # BLD AUTO: 0 10E3/UL
NRBC BLD AUTO-RTO: 0 /100
PH UR STRIP: 8 [PH] (ref 5–7)
PLATELET # BLD AUTO: 173 10E3/UL (ref 150–450)
POTASSIUM SERPL-SCNC: 5.1 MMOL/L (ref 3.4–5.3)
PROT SERPL-MCNC: 7.4 G/DL (ref 6.4–8.3)
RBC # BLD AUTO: 4.79 10E6/UL (ref 4.4–5.9)
RBC URINE: <1 /HPF
SODIUM SERPL-SCNC: 139 MMOL/L (ref 136–145)
SP GR UR STRIP: 1.02 (ref 1–1.03)
UROBILINOGEN UR STRIP-MCNC: 2 MG/DL
WBC # BLD AUTO: 6.1 10E3/UL (ref 4–11)
WBC URINE: <1 /HPF

## 2023-08-08 PROCEDURE — 76770 US EXAM ABDO BACK WALL COMP: CPT

## 2023-08-08 PROCEDURE — 93976 VASCULAR STUDY: CPT

## 2023-08-08 PROCEDURE — 99284 EMERGENCY DEPT VISIT MOD MDM: CPT | Mod: 25 | Performed by: FAMILY MEDICINE

## 2023-08-08 PROCEDURE — 80053 COMPREHEN METABOLIC PANEL: CPT | Performed by: FAMILY MEDICINE

## 2023-08-08 PROCEDURE — 99284 EMERGENCY DEPT VISIT MOD MDM: CPT | Performed by: FAMILY MEDICINE

## 2023-08-08 PROCEDURE — 96361 HYDRATE IV INFUSION ADD-ON: CPT | Performed by: FAMILY MEDICINE

## 2023-08-08 PROCEDURE — 85025 COMPLETE CBC W/AUTO DIFF WBC: CPT | Performed by: FAMILY MEDICINE

## 2023-08-08 PROCEDURE — 258N000003 HC RX IP 258 OP 636: Performed by: FAMILY MEDICINE

## 2023-08-08 PROCEDURE — 81001 URINALYSIS AUTO W/SCOPE: CPT | Performed by: FAMILY MEDICINE

## 2023-08-08 PROCEDURE — 96360 HYDRATION IV INFUSION INIT: CPT | Performed by: FAMILY MEDICINE

## 2023-08-08 PROCEDURE — 36415 COLL VENOUS BLD VENIPUNCTURE: CPT | Performed by: FAMILY MEDICINE

## 2023-08-08 RX ORDER — HYDROCODONE BITARTRATE AND ACETAMINOPHEN 5; 325 MG/1; MG/1
1-2 TABLET ORAL EVERY 8 HOURS PRN
Qty: 10 TABLET | Refills: 0 | Status: SHIPPED | OUTPATIENT
Start: 2023-08-08 | End: 2023-09-15

## 2023-08-08 RX ADMIN — SODIUM CHLORIDE 500 ML: 9 INJECTION, SOLUTION INTRAVENOUS at 09:40

## 2023-08-08 ASSESSMENT — ACTIVITIES OF DAILY LIVING (ADL)
ADLS_ACUITY_SCORE: 37
ADLS_ACUITY_SCORE: 37

## 2023-08-08 NOTE — DISCHARGE INSTRUCTIONS
RETURN TO THE EMERGENCY ROOM FOR THE FOLLOWING:    Severely worsened pain, repeated vomiting, fever greater than 101, or at anytime for any concern.    FOLLOW UP:    Consider follow-up with your general surgeon for further discussion if the pain is not improving over the next 5 days.    TREATMENT RECOMMENDATIONS:    Norco (5/325) 1-2 tablets every 8 hours for pain (start with one).  Note, each tab has 325 mg tylenol.  Do not exceed 3000 mg tylenol per 24 hours.    NURSE ADVICE LINE:  [phone] or [phone]

## 2023-08-08 NOTE — ED TRIAGE NOTES
Pt here with sudden onset of left groin pain this AM. No injury/trauma. Pt reports pain from lower abdomen to left testicle.      Triage Assessment       Row Name 08/08/23 0921       Triage Assessment (Adult)    Airway WDL WDL       Respiratory WDL    Respiratory WDL WDL       Skin Circulation/Temperature WDL    Skin Circulation/Temperature WDL WDL       Cardiac WDL    Cardiac WDL WDL       Peripheral/Neurovascular WDL    Peripheral Neurovascular WDL WDL       Cognitive/Neuro/Behavioral WDL    Cognitive/Neuro/Behavioral WDL WDL
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
## 2023-08-10 ENCOUNTER — TRANSCRIBE ORDERS (OUTPATIENT)
Dept: OTHER | Age: 68
End: 2023-08-10

## 2023-08-10 DIAGNOSIS — H91.90 UNSPECIFIED HEARING LOSS, UNSPECIFIED EAR: Primary | ICD-10-CM

## 2023-08-21 ENCOUNTER — LAB REQUISITION (OUTPATIENT)
Dept: LAB | Facility: CLINIC | Age: 68
End: 2023-08-21
Payer: MEDICARE

## 2023-08-21 DIAGNOSIS — E11.65 TYPE 2 DIABETES MELLITUS WITH HYPERGLYCEMIA (H): ICD-10-CM

## 2023-08-21 LAB
ANION GAP SERPL CALCULATED.3IONS-SCNC: 10 MMOL/L (ref 7–15)
BUN SERPL-MCNC: 15.8 MG/DL (ref 8–23)
CALCIUM SERPL-MCNC: 9.2 MG/DL (ref 8.8–10.2)
CHLORIDE SERPL-SCNC: 104 MMOL/L (ref 98–107)
CREAT SERPL-MCNC: 0.95 MG/DL (ref 0.67–1.17)
CREAT UR-MCNC: 38.3 MG/DL
DEPRECATED HCO3 PLAS-SCNC: 26 MMOL/L (ref 22–29)
GFR SERPL CREATININE-BSD FRML MDRD: 88 ML/MIN/1.73M2
GLUCOSE SERPL-MCNC: 169 MG/DL (ref 70–99)
MICROALBUMIN UR-MCNC: <12 MG/L
MICROALBUMIN/CREAT UR: NORMAL MG/G{CREAT}
POTASSIUM SERPL-SCNC: 4.1 MMOL/L (ref 3.4–5.3)
SODIUM SERPL-SCNC: 140 MMOL/L (ref 136–145)

## 2023-08-21 PROCEDURE — 80048 BASIC METABOLIC PNL TOTAL CA: CPT | Mod: ORL | Performed by: FAMILY MEDICINE

## 2023-08-21 PROCEDURE — 82570 ASSAY OF URINE CREATININE: CPT | Mod: ORL | Performed by: FAMILY MEDICINE

## 2023-08-22 ENCOUNTER — OFFICE VISIT (OUTPATIENT)
Dept: SURGERY | Facility: CLINIC | Age: 68
End: 2023-08-22
Payer: MEDICARE

## 2023-08-22 VITALS
TEMPERATURE: 98.1 F | DIASTOLIC BLOOD PRESSURE: 75 MMHG | WEIGHT: 264.99 LBS | SYSTOLIC BLOOD PRESSURE: 142 MMHG | BODY MASS INDEX: 35.12 KG/M2 | HEIGHT: 73 IN | HEART RATE: 80 BPM

## 2023-08-22 DIAGNOSIS — R10.30 INGUINAL PAIN, UNSPECIFIED LATERALITY: Primary | ICD-10-CM

## 2023-08-22 PROCEDURE — 99213 OFFICE O/P EST LOW 20 MIN: CPT | Performed by: SURGERY

## 2023-08-22 ASSESSMENT — PAIN SCALES - GENERAL: PAINLEVEL: NO PAIN (0)

## 2023-08-22 NOTE — PROGRESS NOTES
" General Surgery Progress Note    Patient returns for evaluation of pelvic/groin pain. He has had approximately 4 episodes of stabbing pain in his low pelvis over the last 1.5 weeks.  He was seen in ED twice.  They did routine labs, UA, ultrasound without any specific findings.  He notes he is usually walking or doing what he considers minor activity.  Denies any bulge, change in bowel habits, change in bladder habits, nausea, vomiting, fevers or chills.    Physical exam: Vitals: BP (!) 142/75 (BP Location: Right arm, Patient Position: Sitting, Cuff Size: Adult Large)   Pulse 80   Temp 98.1  F (36.7  C) (Oral)   Ht 1.854 m (6' 0.99\")   Wt 120.2 kg (264 lb 15.9 oz)   BMI 34.97 kg/m    BMI= Body mass index is 34.97 kg/m .    Exam:  Constitutional: healthy, alert, and no distress; joking, ambulating without difficulty and freely mobile up and down to exam table  Cardiovascular: negative  Respiratory: negative  Gastrointestinal: Abdomen soft, non-tender. BS normal. No masses, organomegaly.  Well healed laparoscopic incisions.  No bulge with valsalva, standing.  No tenderness to groin.      Assessment:     ICD-10-CM    1. Inguinal pain, unspecified laterality  R10.30 CT Pelvis Soft Tissue wo Contrast        Plan: Mr. Nguyễn returns with pelvic pain.  He has no discernible precipitating event. He has no evidence of recurrence on exam today.  He does have a history of kidney stones, but feels this is different.  I discussed his mesh is well incorporated at this time and that the tacks are long gone.  It would be atypical for scar tissue/mesh to be the source of his symptoms at this time given that he had no issues with similar episodes until now.  I discussed options of continued observation versus imaging and he \"needs to know.\"  CT pelvis, non-contrast ordered at this time.  Should this be negative, PT could be consulted.  I advised rest, ice, OTC analgesics.    González Liriano, DO on 8/22/2023 at 10:37 AM      "

## 2023-08-22 NOTE — LETTER
"    8/22/2023         RE: Stiven Nguyễn  [address]  [facility] [place] [address]        Dear Colleague,    Thank you for referring your patient, Stiven Nguyễn, to the [facility]. Please see a copy of my visit note below.     General Surgery Progress Note    Patient returns for evaluation of pelvic/groin pain. He has had approximately 4 episodes of stabbing pain in his low pelvis over the last 1.5 weeks.  He was seen in ED twice.  They did routine labs, UA, ultrasound without any specific findings.  He notes he is usually walking or doing what he considers minor activity.  Denies any bulge, change in bowel habits, change in bladder habits, nausea, vomiting, fevers or chills.    Physical exam: Vitals: BP (!) 142/75 (BP Location: Right arm, Patient Position: Sitting, Cuff Size: Adult Large)   Pulse 80   Temp 98.1  F (36.7  C) (Oral)   Ht 1.854 m (6' 0.99\")   Wt 120.2 kg (264 lb 15.9 oz)   BMI 34.97 kg/m    BMI= Body mass index is 34.97 kg/m .    Exam:  Constitutional: healthy, alert, and no distress; joking, ambulating without difficulty and freely mobile up and down to exam table  Cardiovascular: negative  Respiratory: negative  Gastrointestinal: Abdomen soft, non-tender. BS normal. No masses, organomegaly.  Well healed laparoscopic incisions.  No bulge with valsalva, standing.  No tenderness to groin.      Assessment:     ICD-10-CM    1. Inguinal pain, unspecified laterality  R10.30 CT Pelvis Soft Tissue wo Contrast        Plan: Mr. Nguyễn returns with pelvic pain.  He has no discernible precipitating event. He has no evidence of recurrence on exam today.  He does have a history of kidney stones, but feels this is different.  I discussed his mesh is well incorporated at this time and that the tacks are long gone.  It would be atypical for scar tissue/mesh to be the source of his symptoms at this time given that he had no issues with similar episodes until now.  I discussed options " "of continued observation versus imaging and he \"needs to know.\"  CT pelvis, non-contrast ordered at this time.  Should this be negative, PT could be consulted.  I advised rest, ice, OTC analgesics.    González Liriano,  on 8/22/2023 at 10:37 AM        Again, thank you for allowing me to participate in the care of your patient.        Sincerely,        González Liriano, DO  "

## 2023-08-22 NOTE — NURSING NOTE
"Initial BP (!) 142/75 (BP Location: Right arm, Patient Position: Sitting, Cuff Size: Adult Large)   Pulse 80   Temp 98.1  F (36.7  C) (Oral)   Ht 1.854 m (6' 0.99\")   Wt 120.2 kg (264 lb 15.9 oz)   BMI 34.97 kg/m   Estimated body mass index is 34.97 kg/m  as calculated from the following:    Height as of this encounter: 1.854 m (6' 0.99\").    Weight as of this encounter: 120.2 kg (264 lb 15.9 oz). .  Jo Harding MA    "

## 2023-08-24 ENCOUNTER — HOSPITAL ENCOUNTER (OUTPATIENT)
Dept: CT IMAGING | Facility: CLINIC | Age: 68
Discharge: HOME | End: 2023-08-24
Attending: SURGERY | Admitting: SURGERY
Payer: MEDICARE

## 2023-08-24 DIAGNOSIS — R10.30 INGUINAL PAIN, UNSPECIFIED LATERALITY: ICD-10-CM

## 2023-08-24 PROCEDURE — G1010 CDSM STANSON: HCPCS

## 2023-08-30 DIAGNOSIS — R10.32 LEFT INGUINAL PAIN: Primary | ICD-10-CM

## 2023-09-06 ENCOUNTER — OFFICE VISIT (OUTPATIENT)
Dept: DERMATOLOGY | Facility: CLINIC | Age: 68
End: 2023-09-06
Payer: MEDICARE

## 2023-09-06 ENCOUNTER — OFFICE VISIT (OUTPATIENT)
Dept: AUDIOLOGY | Facility: CLINIC | Age: 68
End: 2023-09-06
Payer: MEDICARE

## 2023-09-06 DIAGNOSIS — L81.4 LENTIGO: Primary | ICD-10-CM

## 2023-09-06 DIAGNOSIS — L82.1 SEBORRHEIC KERATOSIS: ICD-10-CM

## 2023-09-06 DIAGNOSIS — D23.9 DERMAL NEVUS: ICD-10-CM

## 2023-09-06 DIAGNOSIS — Z85.828 HISTORY OF SKIN CANCER: ICD-10-CM

## 2023-09-06 DIAGNOSIS — D18.01 ANGIOMA OF SKIN: ICD-10-CM

## 2023-09-06 DIAGNOSIS — H90.3 SENSORINEURAL HEARING LOSS (SNHL) OF BOTH EARS: Primary | ICD-10-CM

## 2023-09-06 PROCEDURE — V5299 HEARING SERVICE: HCPCS | Performed by: AUDIOLOGIST

## 2023-09-06 PROCEDURE — 99213 OFFICE O/P EST LOW 20 MIN: CPT | Performed by: DERMATOLOGY
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
## 2023-09-06 NOTE — PROGRESS NOTES
AUDIOLOGY REPORT: HEARING AID CHECK    SUBJECTIVE: Stiven Nguyễn is a 68 year old male, :  1955, was seen in the Audiology Clinic at [facility] on 23 for a check of their hearing aids.       Background:   Patient is here today with the complaint of needing more information on his application. \  Procedures:       SIDE: Both    : ReSound    TYPE: Linx 9 RITE    S/N:      R: [id number]     L: [id number]    WARRANTY: [facility]    Today we worked on the ins and outs of the application and how to change various settings on the hearing aids. Patient was very happy to learn this information.     Plan:   Patient will return as needed for hearing aid concerns.     NO CHARGE VISIT      González Murrell CCC-A  Licensed Audiologist #[id number]  2023
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
## 2023-09-07 DIAGNOSIS — R39.15 URINARY URGENCY: ICD-10-CM

## 2023-09-07 NOTE — TELEPHONE ENCOUNTER
Requested Prescriptions   Pending Prescriptions Disp Refills    oxyBUTYnin (DITROPAN) 5 MG tablet 270 tablet 0     Sig: Take 1 tablet (5 mg) by mouth 3 times daily       There is no refill protocol information for this order            Last office visit: 9/6/2023 ; last virtual visit: Visit date not found with prescribing provider:  Dudley Bradley   Future Office Visit:      Thank you,  Jackelin Gandara

## 2023-09-08 RX ORDER — OXYBUTYNIN CHLORIDE 5 MG/1
5 TABLET ORAL 3 TIMES DAILY
Qty: 270 TABLET | Refills: 0 | Status: SHIPPED | OUTPATIENT
Start: 2023-09-08 | End: 2023-09-15 | Stop reason: ALTCHOICE

## 2023-09-08 NOTE — TELEPHONE ENCOUNTER
"Request for oxybutynin 5mg TID to be sent to [facility]  Last filled 6/27/23 for 90 day supply with a not ethat patient will need to have a follow up with [facility] for further refills  This refill request had Dr Bradley's (dermatologist) name on it, this must be an error this medication was prescribed by Dr Yung Urologist.  LOV with Silvano 10/11/22  From 10/11/22 OV:  \"Plan: I suggested he move his oxybutynin dose to the morning to better control the daytime urgency and urge incontinence.  He currently does have some degree of nocturia but no while sleeping. ...   ...[facility] will [facility].\"  Passes [facility] refill protocol.  Writer filled 90 day supply with a sig note instructing patient that he will need a follow up visit with urology or can contact his Primary Care Provider for further refills of this medication.    Kishore HANLEY [facility]    "

## 2023-09-14 ENCOUNTER — THERAPY VISIT (OUTPATIENT)
Dept: PHYSICAL THERAPY | Facility: CLINIC | Age: 68
End: 2023-09-14
Attending: SURGERY
Payer: MEDICARE

## 2023-09-14 DIAGNOSIS — R10.32 LEFT INGUINAL PAIN: Primary | ICD-10-CM

## 2023-09-14 PROCEDURE — 999N000104 HC STATISTIC NO CHARGE: Performed by: PHYSICAL MEDICINE & REHABILITATION

## 2023-09-15 ENCOUNTER — OFFICE VISIT (OUTPATIENT)
Dept: UROLOGY | Facility: CLINIC | Age: 68
End: 2023-09-15
Attending: STUDENT IN AN ORGANIZED HEALTH CARE EDUCATION/TRAINING PROGRAM
Payer: MEDICARE

## 2023-09-15 VITALS
WEIGHT: 264 LBS | BODY MASS INDEX: 34.99 KG/M2 | HEIGHT: 73 IN | DIASTOLIC BLOOD PRESSURE: 91 MMHG | HEART RATE: 85 BPM | SYSTOLIC BLOOD PRESSURE: 168 MMHG | OXYGEN SATURATION: 97 %

## 2023-09-15 DIAGNOSIS — N50.812 LEFT TESTICULAR PAIN: ICD-10-CM

## 2023-09-15 DIAGNOSIS — R33.9 BLADDER RETENTION: ICD-10-CM

## 2023-09-15 DIAGNOSIS — Z12.5 SCREENING FOR PROSTATE CANCER: Primary | ICD-10-CM

## 2023-09-15 DIAGNOSIS — R39.15 URINARY URGENCY: ICD-10-CM

## 2023-09-15 LAB — PSA SERPL DL<=0.01 NG/ML-MCNC: 0.79 NG/ML (ref 0–4.5)

## 2023-09-15 PROCEDURE — G0103 PSA SCREENING: HCPCS | Performed by: STUDENT IN AN ORGANIZED HEALTH CARE EDUCATION/TRAINING PROGRAM

## 2023-09-15 PROCEDURE — 99213 OFFICE O/P EST LOW 20 MIN: CPT | Performed by: STUDENT IN AN ORGANIZED HEALTH CARE EDUCATION/TRAINING PROGRAM

## 2023-09-15 PROCEDURE — 36415 COLL VENOUS BLD VENIPUNCTURE: CPT | Performed by: STUDENT IN AN ORGANIZED HEALTH CARE EDUCATION/TRAINING PROGRAM

## 2023-09-15 RX ORDER — TAMSULOSIN HYDROCHLORIDE 0.4 MG/1
0.4 CAPSULE ORAL DAILY
Qty: 90 CAPSULE | Refills: 3 | Status: SHIPPED | OUTPATIENT
Start: 2023-09-15 | End: 2024-02-13

## 2023-09-15 RX ORDER — SOLIFENACIN SUCCINATE 5 MG/1
5 TABLET, FILM COATED ORAL DAILY
Qty: 90 TABLET | Refills: 1 | Status: SHIPPED | OUTPATIENT
Start: 2023-09-15 | End: 2023-12-15 | Stop reason: ALTCHOICE

## 2023-09-15 RX ORDER — FINASTERIDE 5 MG/1
5 TABLET, FILM COATED ORAL DAILY
Qty: 90 TABLET | Refills: 3 | Status: SHIPPED | OUTPATIENT
Start: 2023-09-15 | End: 2024-02-13

## 2023-09-15 ASSESSMENT — PAIN SCALES - GENERAL: PAINLEVEL: WORST PAIN (10)
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
## 2023-09-21 ENCOUNTER — THERAPY VISIT (OUTPATIENT)
Dept: PHYSICAL THERAPY | Facility: CLINIC | Age: 68
End: 2023-09-21
Attending: STUDENT IN AN ORGANIZED HEALTH CARE EDUCATION/TRAINING PROGRAM
Payer: MEDICARE

## 2023-09-21 DIAGNOSIS — N50.812 LEFT TESTICULAR PAIN: ICD-10-CM

## 2023-09-21 PROCEDURE — 97535 SELF CARE MNGMENT TRAINING: CPT | Mod: GP | Performed by: PHYSICAL THERAPIST

## 2023-09-21 PROCEDURE — 97162 PT EVAL MOD COMPLEX 30 MIN: CPT | Mod: GP | Performed by: PHYSICAL THERAPIST

## 2023-09-21 PROCEDURE — 97110 THERAPEUTIC EXERCISES: CPT | Mod: GP | Performed by: PHYSICAL THERAPIST

## 2023-09-21 NOTE — PROGRESS NOTES
PHYSICAL THERAPY EVALUATION  Type of Visit: Evaluation    See electronic medical record for Abuse and Falls Screening details.    Subjective       Presenting condition or subjective complaint: Testicular pain, and urinary ugency, Urinary Incontinence. Pt presents with primary c/o pelvic pain above the bone and goes into the testicles. The pain will double him over. Pain started about 1 yr ago and was about 1x per month, now is happening more frequently. Pt was recently put on Vesicare and this has helped him to sleep thru nite with maybe 1x per nite. Pt was seen by Ortho PT and told to get referral for Pelvic PT. Additionally, pt has a c/o leaking urine with urges and has to wear a protective pad if going on longer trips (plane).   Date of onset: 09/15/23    Relevant medical history:   Diabetes, LBP, anxiety, CKD, CAD, DVT, GERD, HA's, hyperlipidemia, HTN, PE  Dates & types of surgery:  ,Pacemaker 2021, (R) RCR & SAD 2021, umbillical hernia repair 2021, Laser Holmium Lithotripsy ureteral stent 2012, achilles tendon suergery    Prior diagnostic imaging/testing results:     CT, US - both unremarkable  Prior therapy history for the same diagnosis, illness or injury: No      Prior Level of Function  Transfers: Independent  Ambulation: Independent  ADL: Independent    Living Environment  Social support:   Lives with wife who recently had 2 TKA's.   Type of home: -- (Has easy access to bathroom at [facility])   Stairs to enter the home:         Ramp:     Stairs inside the home:         Help at home:    Equipment owned:       Employment: No Retired [employer] and [employer]  Hobbies/Interests: Walk with wife, hunting, fishing, woodworking    Patient goals for therapy: Go on a plane without wearing a pad, be able to go on a road trip for 2 hours without having to use bathroom. Be able to sleep thru the nite without taking medication.    Pain assessment: Pain not rated today, but states happens just above the pubic bone and goes into  testicles.     Objective      PELVIC EVALUATION  ADDITIONAL HISTORY:  Sex assigned at birth: Male  Gender identity: Male    Pronouns: He/Him/His      Bladder History:  Feels bladder filling: Yes  Triggers for feeling of inability to wait to go to the bathroom: Yes Going from sit to stand  How long can you wait to urinate: very little time with an urge  Gets up at night to urinate: Yes 2-4 times per nite  Can stop the flow of urine when urinating: Sometimes  Volume of urine usually released: -- (Depends on the time of day)   Other issues: Slow or hesitant urine stream; Dribbling after urinating  Number of bladder infections in last 12 months:    Fluid intake per day: Water is about 48oz/day ; Coffee is 16oz in ams ; alcohol = none  Medications taken for bladder: Yes Proscar (prostate), Flowmax, Vesicare   Activities causing urine leak: Run; Hurrying to the bathroom due to a strong urge to urinate (pee); Other activities get up from chair  Amount of urine typically leaked: enough to make underwear wet  Pads used to help with leaking: No (but will if on a longer flight)   I use this type/brand: wife's    Bowel History:  Frequency of bowel movement: 1x per day  Consistency of stool: Soft-formed can vary  Ignores the urge to defecate: No  Other bowel issues:    Length of time spent trying to have a bowel movement: normal    Sexual Function History:  Sexual orientation: Straight    Sexually active: Yes  Lubrication used: No No  Pelvic pain: Walking; Sitting; Standing; Certain positions    Pain or difficulty with orgasms/erection/ejaculation: No    State of menopause:    Hormone medications:        Do you get regular exercise: Yes, I do this type of exercise: walk with wife who recently had knees replaced, Have you tried pelvic floor strengthening exercises for 4 weeks: No, Do you have any history of trauma that is relevant to your care that you d like to share: No    Discussed reason for referral regarding pelvic health  "needs and external/internal pelvic floor muscle examination with patient/guardian.  Opportunity provided to ask questions and verbal consent for assessment and intervention was given.    PAIN: denies pain currently, but states can double him over in pain sometimes; was happening periodically, now more frequently @ least 1x per every week to 2 weeks. Pain is lower abdomen and into testicles/groin.    POSTURE: Standing Posture: Rounded shoulders, Lordosis decreased  LUMBAR SCREEN: AROM WFL  Stiff and \"tight\" at EOR in flexion, extension, SB, and rotations  HIP SCREEN:  Strength: WFL   Functional Strength Testing: SLS: < 10 sec (B)    PELVIC/SI SCREEN:  Supine to Sit: neg, Sacroiliac Provocation Test: neg, Pubic Symphysis Provocation: neg    PAIN PROVOCATION TEST: WNL  PELVIS/SI SPECIAL TESTS: WNL  BREATHING SYMMETRY: Asymmetrical, Decreased rib cage mobility    PELVIC EXAM  Deferred today based on time constraints of session.      ABDOMINAL ASSESSMENT  Diastasis Rectus Abdominis (ARIA):  ARIA presence: No; does have enlarged habitus at full abdomen    Abdominal Activation/Strength:  Pt admits \"gaining a front office\"/abdominal weight gain; and that this \"may have something to do with the symptoms\".     Scar:   NA    Fascial Tension/Restriction/Tone: Hypomobile    BIOFEEDBACK:  Not warranted today    DERMATOMES: WNL  DTR S: NA    Assessment & Plan   CLINICAL IMPRESSIONS  Medical Diagnosis: Left testicular pain    Treatment Diagnosis: Pelvic Floor Muscle Dysfunction   Impression/Assessment: Patient is a 68 year old male with pelvic pain, urinary urgency, and incontinence complaints.  The following significant findings have been identified: Decreased ROM/flexibility, Decreased joint mobility, Decreased strength, and Impaired muscle performance. These impairments interfere with their ability to perform self care tasks, recreational activities, and household chores as compared to previous level of function.     Clinical " Decision Making (Complexity):  Clinical Presentation: Evolving/Changing  Clinical Presentation Rationale: based on medical and personal factors listed in PT evaluation  Clinical Decision Making (Complexity): Moderate complexity    PLAN OF CARE  Treatment Interventions:  Modalities: Biofeedback, E-stim, Ultrasound  Interventions: Manual Therapy, Neuromuscular Re-education, Therapeutic Activity, Therapeutic Exercise, Self-Care/Home Management    Long Term Goals     PT Goal 1  Goal Identifier: STG  Goal Description: 1)Pt will report making it to bathroom without leaking on the way 50% of urges, in 4 weeks.  Target Date: 10/19/23  PT Goal 2  Goal Identifier: STG  Goal Description: 2)Pt will report void times consistently of every 1.5 hours in 4 weeks.  Target Date: 10/19/23  PT Goal 3  Goal Identifier: LTG  Goal Description: 3)Pt will report sleeping through the nite with 2 or less times up to void, in 6 weeks.  Target Date: 11/02/23  PT Goal 4  Goal Identifier: LTG  Goal Description: 4)Pt will report ability to travel for 2 hours without having to use bathroom, in 8 weeks.  Target Date: 11/16/23  PT Goal 5  Goal Identifier: LT  Goal Description: 5)Pt will be indep in HEP to prevent worsening of symptoms., in 8 weeks.  Target Date: 11/16/23      Frequency of Treatment: 1x per week weaning to every other week  Duration of Treatment: 8 weeks    Recommended Referrals to Other Professionals:  none  Education Assessment:   Learner/Method: Patient;Listening;Reading;Demonstration;Pictures/Video;No Barriers to Learning    Risks and benefits of evaluation/treatment have been explained.   Patient/Family/caregiver agrees with Plan of Care.     Evaluation Time:     PT Eval, Moderate Complexity Minutes (28006): 25   Present: Not applicable     Signing Clinician: Sofia Candelaria, PT      [facility]                                                                                   OUTPATIENT  PHYSICAL THERAPY      PLAN OF TREATMENT FOR OUTPATIENT REHABILITATION   Patient's Last Name, First Name, Stiven Barrios YOB: 1955   Provider's Name   [facility]   Medical Record No.  [id number]     Onset Date: 09/15/23  Start of Care Date: 09/21/23     Medical Diagnosis:  Left testicular pain      PT Treatment Diagnosis:  Pelvic Floor Muscle Dysfunction Plan of Treatment  Frequency/Duration: 1x per week weaning to every other week/ 8 weeks    Certification date from 09/21/23 to 11/16/23         See note for plan of treatment details and functional goals     Sofia Candelaria, PT                         I CERTIFY THE NEED FOR THESE SERVICES FURNISHED UNDER        THIS PLAN OF TREATMENT AND WHILE UNDER MY CARE     (Physician attestation of this document indicates review and certification of the therapy plan).                Referring Provider:  Carmen Jin      Initial Assessment  See Epic Evaluation- Start of Care Date: 09/21/23

## 2023-09-27 DIAGNOSIS — I20.1 CORONARY VASOSPASM (H): ICD-10-CM

## 2023-09-27 RX ORDER — ISOSORBIDE MONONITRATE 30 MG/1
30 TABLET, EXTENDED RELEASE ORAL DAILY
Qty: 90 TABLET | Refills: 0 | Status: SHIPPED | OUTPATIENT
Start: 2023-09-27 | End: 2023-09-28

## 2023-09-28 DIAGNOSIS — I20.1 CORONARY VASOSPASM (H): ICD-10-CM

## 2023-09-28 RX ORDER — ISOSORBIDE MONONITRATE 30 MG/1
30 TABLET, EXTENDED RELEASE ORAL DAILY
Qty: 30 TABLET | Refills: 0 | Status: SHIPPED | OUTPATIENT
Start: 2023-09-28 | End: 2023-09-29

## 2023-09-28 RX ORDER — ISOSORBIDE MONONITRATE 30 MG/1
30 TABLET, EXTENDED RELEASE ORAL DAILY
Qty: 30 TABLET | Refills: 0 | Status: CANCELLED | OUTPATIENT
Start: 2023-09-28

## 2023-09-28 NOTE — TELEPHONE ENCOUNTER
Already done in another encounter from today. Clotilde Lauren RN Cardiology September 28, 2023, 10:51 AM

## 2023-09-28 NOTE — TELEPHONE ENCOUNTER
[facility] Cardiology Refill Guideline reviewed.  Medication meets criteria for refill. Refill sent. Clotilde Lauren RN Cardiology September 28, 2023, 9:19 AM

## 2023-09-29 DIAGNOSIS — I20.1 CORONARY VASOSPASM (H): ICD-10-CM

## 2023-09-29 RX ORDER — ISOSORBIDE MONONITRATE 30 MG/1
30 TABLET, EXTENDED RELEASE ORAL DAILY
Qty: 90 TABLET | Refills: 2 | Status: SHIPPED | OUTPATIENT
Start: 2023-09-29 | End: 2024-05-28

## 2023-10-04 ENCOUNTER — THERAPY VISIT (OUTPATIENT)
Dept: PHYSICAL THERAPY | Facility: CLINIC | Age: 68
End: 2023-10-04
Attending: STUDENT IN AN ORGANIZED HEALTH CARE EDUCATION/TRAINING PROGRAM
Payer: MEDICARE

## 2023-10-04 DIAGNOSIS — N50.812 LEFT TESTICULAR PAIN: Primary | ICD-10-CM

## 2023-10-04 PROCEDURE — 97110 THERAPEUTIC EXERCISES: CPT | Mod: GP | Performed by: PHYSICAL THERAPIST

## 2023-10-04 PROCEDURE — 97535 SELF CARE MNGMENT TRAINING: CPT | Mod: GP | Performed by: PHYSICAL THERAPIST

## 2023-10-04 PROCEDURE — 97140 MANUAL THERAPY 1/> REGIONS: CPT | Mod: GP | Performed by: PHYSICAL THERAPIST

## 2023-10-09 ENCOUNTER — THERAPY VISIT (OUTPATIENT)
Dept: PHYSICAL THERAPY | Facility: CLINIC | Age: 68
End: 2023-10-09
Attending: STUDENT IN AN ORGANIZED HEALTH CARE EDUCATION/TRAINING PROGRAM
Payer: MEDICARE

## 2023-10-09 DIAGNOSIS — N50.812 LEFT TESTICULAR PAIN: Primary | ICD-10-CM

## 2023-10-09 PROCEDURE — 97535 SELF CARE MNGMENT TRAINING: CPT | Mod: GP | Performed by: PHYSICAL THERAPIST

## 2023-10-09 PROCEDURE — 97110 THERAPEUTIC EXERCISES: CPT | Mod: GP | Performed by: PHYSICAL THERAPIST

## 2023-10-11 ENCOUNTER — APPOINTMENT (OUTPATIENT)
Dept: GENERAL RADIOLOGY | Facility: CLINIC | Age: 68
End: 2023-10-11
Attending: EMERGENCY MEDICINE
Payer: MEDICARE

## 2023-10-11 ENCOUNTER — HOSPITAL ENCOUNTER (EMERGENCY)
Facility: CLINIC | Age: 68
Discharge: HOME | End: 2023-10-11
Attending: EMERGENCY MEDICINE | Admitting: EMERGENCY MEDICINE
Payer: MEDICARE

## 2023-10-11 VITALS
BODY MASS INDEX: 34.46 KG/M2 | HEART RATE: 72 BPM | OXYGEN SATURATION: 96 % | TEMPERATURE: 98.5 F | SYSTOLIC BLOOD PRESSURE: 136 MMHG | RESPIRATION RATE: 12 BRPM | HEIGHT: 73 IN | WEIGHT: 260 LBS | DIASTOLIC BLOOD PRESSURE: 87 MMHG

## 2023-10-11 DIAGNOSIS — R07.9 CHEST PAIN, UNSPECIFIED TYPE: ICD-10-CM

## 2023-10-11 LAB
ALBUMIN SERPL BCG-MCNC: 4.3 G/DL (ref 3.5–5.2)
ALP SERPL-CCNC: 88 U/L (ref 40–129)
ALT SERPL W P-5'-P-CCNC: 33 U/L (ref 0–70)
ANION GAP SERPL CALCULATED.3IONS-SCNC: 8 MMOL/L (ref 7–15)
AST SERPL W P-5'-P-CCNC: 30 U/L (ref 0–45)
BILIRUB SERPL-MCNC: 0.5 MG/DL
BUN SERPL-MCNC: 15.6 MG/DL (ref 8–23)
CALCIUM SERPL-MCNC: 9.6 MG/DL (ref 8.8–10.2)
CHLORIDE SERPL-SCNC: 105 MMOL/L (ref 98–107)
CREAT SERPL-MCNC: 0.96 MG/DL (ref 0.67–1.17)
DEPRECATED HCO3 PLAS-SCNC: 28 MMOL/L (ref 22–29)
EGFRCR SERPLBLD CKD-EPI 2021: 87 ML/MIN/1.73M2
ERYTHROCYTE [DISTWIDTH] IN BLOOD BY AUTOMATED COUNT: 13 % (ref 10–15)
GLUCOSE SERPL-MCNC: 146 MG/DL (ref 70–99)
HCT VFR BLD AUTO: 40.4 % (ref 40–53)
HGB BLD-MCNC: 14.2 G/DL (ref 13.3–17.7)
HOLD SPECIMEN: NORMAL
HOLD SPECIMEN: NORMAL
MCH RBC QN AUTO: 30.3 PG (ref 26.5–33)
MCHC RBC AUTO-ENTMCNC: 35.1 G/DL (ref 31.5–36.5)
MCV RBC AUTO: 86 FL (ref 78–100)
PLATELET # BLD AUTO: 165 10E3/UL (ref 150–450)
POTASSIUM SERPL-SCNC: 4.3 MMOL/L (ref 3.4–5.3)
PROT SERPL-MCNC: 7.3 G/DL (ref 6.4–8.3)
RBC # BLD AUTO: 4.68 10E6/UL (ref 4.4–5.9)
SODIUM SERPL-SCNC: 141 MMOL/L (ref 135–145)
TROPONIN T SERPL HS-MCNC: 15 NG/L
TROPONIN T SERPL HS-MCNC: 18 NG/L
WBC # BLD AUTO: 5.4 10E3/UL (ref 4–11)

## 2023-10-11 PROCEDURE — 84484 ASSAY OF TROPONIN QUANT: CPT | Mod: 91 | Performed by: EMERGENCY MEDICINE

## 2023-10-11 PROCEDURE — 80053 COMPREHEN METABOLIC PANEL: CPT | Performed by: EMERGENCY MEDICINE

## 2023-10-11 PROCEDURE — 93010 ELECTROCARDIOGRAM REPORT: CPT | Mod: 59 | Performed by: EMERGENCY MEDICINE

## 2023-10-11 PROCEDURE — 99284 EMERGENCY DEPT VISIT MOD MDM: CPT | Mod: 25 | Performed by: EMERGENCY MEDICINE

## 2023-10-11 PROCEDURE — 85027 COMPLETE CBC AUTOMATED: CPT | Performed by: EMERGENCY MEDICINE

## 2023-10-11 PROCEDURE — 93308 TTE F-UP OR LMTD: CPT | Mod: 26 | Performed by: EMERGENCY MEDICINE

## 2023-10-11 PROCEDURE — 71046 X-RAY EXAM CHEST 2 VIEWS: CPT

## 2023-10-11 PROCEDURE — 36415 COLL VENOUS BLD VENIPUNCTURE: CPT | Performed by: EMERGENCY MEDICINE

## 2023-10-11 PROCEDURE — 99285 EMERGENCY DEPT VISIT HI MDM: CPT | Mod: 25

## 2023-10-11 PROCEDURE — 93308 TTE F-UP OR LMTD: CPT

## 2023-10-11 PROCEDURE — 93005 ELECTROCARDIOGRAM TRACING: CPT

## 2023-10-11 ASSESSMENT — ACTIVITIES OF DAILY LIVING (ADL)
ADLS_ACUITY_SCORE: 37

## 2023-10-11 NOTE — ED TRIAGE NOTES
Pt here with mid sternal chest pain that started this morning around 0830. Pt reports taking 2 po nitroglycerin with no relief. Currently, pt denies chest pain and or SOB. Pt has a pacemaker.      Triage Assessment (Adult)       Row Name 10/11/23 1258          Triage Assessment    Airway WDL WDL        Respiratory WDL    Respiratory WDL WDL        Cardiac WDL    Cardiac WDL X;chest pain        Chest Pain Assessment    Chest Pain Location midsternal     Precipitating Factors at rest     Chest Pain Intervention cardiac biomarkers drawn;cardiac monitor placed;12-lead ECG obtained;activity minimized        Cognitive/Neuro/Behavioral WDL    Cognitive/Neuro/Behavioral WDL WDL        Alfredo Coma Scale    Best Eye Response 4-->(E4) spontaneous     Best Motor Response 6-->(M6) obeys commands     Best Verbal Response 5-->(V5) oriented     Alfredo Coma Scale Score 15

## 2023-10-11 NOTE — DISCHARGE INSTRUCTIONS
Your evaluation in the Emergency Department was reassuring however no cause of your pain was identified.     Follow up with your Cardiologist as planned.     If your symptoms worsen or you develop new or concerning symptoms, please return to the Emergency Department for further evaluation and treatment.
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
## 2023-10-17 ENCOUNTER — ANCILLARY PROCEDURE (OUTPATIENT)
Dept: CARDIOLOGY | Facility: CLINIC | Age: 68
End: 2023-10-17
Attending: INTERNAL MEDICINE
Payer: MEDICARE

## 2023-10-17 DIAGNOSIS — Z95.0 CARDIAC PACEMAKER IN SITU: ICD-10-CM

## 2023-10-17 DIAGNOSIS — I49.5 SINUS NODE DYSFUNCTION (H): ICD-10-CM

## 2023-10-17 LAB
MDC_IDC_EPISODE_DTM: NORMAL
MDC_IDC_EPISODE_DURATION: 0 S
MDC_IDC_EPISODE_DURATION: 1 S
MDC_IDC_EPISODE_DURATION: 190 S
MDC_IDC_EPISODE_DURATION: 3 S
MDC_IDC_EPISODE_ID: 25
MDC_IDC_EPISODE_ID: 26
MDC_IDC_EPISODE_ID: 27
MDC_IDC_EPISODE_ID: 28
MDC_IDC_EPISODE_TYPE: NORMAL
MDC_IDC_LEAD_CONNECTION_STATUS: NORMAL
MDC_IDC_LEAD_CONNECTION_STATUS: NORMAL
MDC_IDC_LEAD_IMPLANT_DT: NORMAL
MDC_IDC_LEAD_IMPLANT_DT: NORMAL
MDC_IDC_LEAD_LOCATION: NORMAL
MDC_IDC_LEAD_LOCATION: NORMAL
MDC_IDC_LEAD_LOCATION_DETAIL_1: NORMAL
MDC_IDC_LEAD_LOCATION_DETAIL_1: NORMAL
MDC_IDC_LEAD_MFG: NORMAL
MDC_IDC_LEAD_MFG: NORMAL
MDC_IDC_LEAD_MODEL: NORMAL
MDC_IDC_LEAD_MODEL: NORMAL
MDC_IDC_LEAD_POLARITY_TYPE: NORMAL
MDC_IDC_LEAD_POLARITY_TYPE: NORMAL
MDC_IDC_LEAD_SERIAL: NORMAL
MDC_IDC_LEAD_SERIAL: NORMAL
MDC_IDC_LEAD_SPECIAL_FUNCTION: NORMAL
MDC_IDC_LEAD_SPECIAL_FUNCTION: NORMAL
MDC_IDC_MSMT_BATTERY_DTM: NORMAL
MDC_IDC_MSMT_BATTERY_REMAINING_LONGEVITY: 137 MO
MDC_IDC_MSMT_BATTERY_RRT_TRIGGER: 2.62
MDC_IDC_MSMT_BATTERY_STATUS: NORMAL
MDC_IDC_MSMT_BATTERY_VOLTAGE: 3.02 V
MDC_IDC_MSMT_LEADCHNL_RA_IMPEDANCE_VALUE: 342 OHM
MDC_IDC_MSMT_LEADCHNL_RA_IMPEDANCE_VALUE: 456 OHM
MDC_IDC_MSMT_LEADCHNL_RA_PACING_THRESHOLD_AMPLITUDE: 0.62 V
MDC_IDC_MSMT_LEADCHNL_RA_PACING_THRESHOLD_PULSEWIDTH: 0.4 MS
MDC_IDC_MSMT_LEADCHNL_RA_SENSING_INTR_AMPL: 2.75 MV
MDC_IDC_MSMT_LEADCHNL_RA_SENSING_INTR_AMPL: 2.75 MV
MDC_IDC_MSMT_LEADCHNL_RV_IMPEDANCE_VALUE: 380 OHM
MDC_IDC_MSMT_LEADCHNL_RV_IMPEDANCE_VALUE: 456 OHM
MDC_IDC_MSMT_LEADCHNL_RV_PACING_THRESHOLD_AMPLITUDE: 0.88 V
MDC_IDC_MSMT_LEADCHNL_RV_PACING_THRESHOLD_PULSEWIDTH: 0.4 MS
MDC_IDC_MSMT_LEADCHNL_RV_SENSING_INTR_AMPL: 18.38 MV
MDC_IDC_MSMT_LEADCHNL_RV_SENSING_INTR_AMPL: 18.38 MV
MDC_IDC_PG_IMPLANT_DTM: NORMAL
MDC_IDC_PG_MFG: NORMAL
MDC_IDC_PG_MODEL: NORMAL
MDC_IDC_PG_SERIAL: NORMAL
MDC_IDC_PG_TYPE: NORMAL
MDC_IDC_SESS_CLINIC_NAME: NORMAL
MDC_IDC_SESS_DTM: NORMAL
MDC_IDC_SESS_TYPE: NORMAL
MDC_IDC_SET_BRADY_AT_MODE_SWITCH_RATE: 171 {BEATS}/MIN
MDC_IDC_SET_BRADY_HYSTRATE: NORMAL
MDC_IDC_SET_BRADY_LOWRATE: 70 {BEATS}/MIN
MDC_IDC_SET_BRADY_MAX_SENSOR_RATE: 130 {BEATS}/MIN
MDC_IDC_SET_BRADY_MAX_TRACKING_RATE: 130 {BEATS}/MIN
MDC_IDC_SET_BRADY_MODE: NORMAL
MDC_IDC_SET_BRADY_NIGHT_RATE: 60 {BEATS}/MIN
MDC_IDC_SET_BRADY_PAV_DELAY_LOW: 180 MS
MDC_IDC_SET_BRADY_SAV_DELAY_LOW: 150 MS
MDC_IDC_SET_LEADCHNL_RA_PACING_AMPLITUDE: 1.5 V
MDC_IDC_SET_LEADCHNL_RA_PACING_ANODE_ELECTRODE_1: NORMAL
MDC_IDC_SET_LEADCHNL_RA_PACING_ANODE_LOCATION_1: NORMAL
MDC_IDC_SET_LEADCHNL_RA_PACING_CAPTURE_MODE: NORMAL
MDC_IDC_SET_LEADCHNL_RA_PACING_CATHODE_ELECTRODE_1: NORMAL
MDC_IDC_SET_LEADCHNL_RA_PACING_CATHODE_LOCATION_1: NORMAL
MDC_IDC_SET_LEADCHNL_RA_PACING_POLARITY: NORMAL
MDC_IDC_SET_LEADCHNL_RA_PACING_PULSEWIDTH: 0.4 MS
MDC_IDC_SET_LEADCHNL_RA_SENSING_ANODE_ELECTRODE_1: NORMAL
MDC_IDC_SET_LEADCHNL_RA_SENSING_ANODE_LOCATION_1: NORMAL
MDC_IDC_SET_LEADCHNL_RA_SENSING_CATHODE_ELECTRODE_1: NORMAL
MDC_IDC_SET_LEADCHNL_RA_SENSING_CATHODE_LOCATION_1: NORMAL
MDC_IDC_SET_LEADCHNL_RA_SENSING_POLARITY: NORMAL
MDC_IDC_SET_LEADCHNL_RA_SENSING_SENSITIVITY: 0.3 MV
MDC_IDC_SET_LEADCHNL_RV_PACING_AMPLITUDE: 2 V
MDC_IDC_SET_LEADCHNL_RV_PACING_ANODE_ELECTRODE_1: NORMAL
MDC_IDC_SET_LEADCHNL_RV_PACING_ANODE_LOCATION_1: NORMAL
MDC_IDC_SET_LEADCHNL_RV_PACING_CAPTURE_MODE: NORMAL
MDC_IDC_SET_LEADCHNL_RV_PACING_CATHODE_ELECTRODE_1: NORMAL
MDC_IDC_SET_LEADCHNL_RV_PACING_CATHODE_LOCATION_1: NORMAL
MDC_IDC_SET_LEADCHNL_RV_PACING_POLARITY: NORMAL
MDC_IDC_SET_LEADCHNL_RV_PACING_PULSEWIDTH: 0.4 MS
MDC_IDC_SET_LEADCHNL_RV_SENSING_ANODE_ELECTRODE_1: NORMAL
MDC_IDC_SET_LEADCHNL_RV_SENSING_ANODE_LOCATION_1: NORMAL
MDC_IDC_SET_LEADCHNL_RV_SENSING_CATHODE_ELECTRODE_1: NORMAL
MDC_IDC_SET_LEADCHNL_RV_SENSING_CATHODE_LOCATION_1: NORMAL
MDC_IDC_SET_LEADCHNL_RV_SENSING_POLARITY: NORMAL
MDC_IDC_SET_LEADCHNL_RV_SENSING_SENSITIVITY: 0.9 MV
MDC_IDC_SET_ZONE_DETECTION_INTERVAL: 350 MS
MDC_IDC_SET_ZONE_DETECTION_INTERVAL: 400 MS
MDC_IDC_SET_ZONE_STATUS: NORMAL
MDC_IDC_SET_ZONE_STATUS: NORMAL
MDC_IDC_SET_ZONE_TYPE: NORMAL
MDC_IDC_SET_ZONE_VENDOR_TYPE: NORMAL
MDC_IDC_STAT_AT_BURDEN_PERCENT: 0 %
MDC_IDC_STAT_AT_DTM_END: NORMAL
MDC_IDC_STAT_AT_DTM_START: NORMAL
MDC_IDC_STAT_BRADY_AP_VP_PERCENT: 0.1 %
MDC_IDC_STAT_BRADY_AP_VS_PERCENT: 93.76 %
MDC_IDC_STAT_BRADY_AS_VP_PERCENT: 0 %
MDC_IDC_STAT_BRADY_AS_VS_PERCENT: 6.14 %
MDC_IDC_STAT_BRADY_DTM_END: NORMAL
MDC_IDC_STAT_BRADY_DTM_START: NORMAL
MDC_IDC_STAT_BRADY_RA_PERCENT_PACED: 93.9 %
MDC_IDC_STAT_BRADY_RV_PERCENT_PACED: 0.1 %
MDC_IDC_STAT_EPISODE_RECENT_COUNT: 0
MDC_IDC_STAT_EPISODE_RECENT_COUNT: 1
MDC_IDC_STAT_EPISODE_RECENT_COUNT: 3
MDC_IDC_STAT_EPISODE_RECENT_COUNT_DTM_END: NORMAL
MDC_IDC_STAT_EPISODE_RECENT_COUNT_DTM_START: NORMAL
MDC_IDC_STAT_EPISODE_TOTAL_COUNT: 0
MDC_IDC_STAT_EPISODE_TOTAL_COUNT: 17
MDC_IDC_STAT_EPISODE_TOTAL_COUNT: 7
MDC_IDC_STAT_EPISODE_TOTAL_COUNT_DTM_END: NORMAL
MDC_IDC_STAT_EPISODE_TOTAL_COUNT_DTM_START: NORMAL
MDC_IDC_STAT_EPISODE_TYPE: NORMAL
MDC_IDC_STAT_TACHYTHERAPY_RECENT_DTM_END: NORMAL
MDC_IDC_STAT_TACHYTHERAPY_RECENT_DTM_START: NORMAL
MDC_IDC_STAT_TACHYTHERAPY_TOTAL_DTM_END: NORMAL
MDC_IDC_STAT_TACHYTHERAPY_TOTAL_DTM_START: NORMAL

## 2023-10-17 PROCEDURE — 93296 REM INTERROG EVL PM/IDS: CPT | Performed by: INTERNAL MEDICINE

## 2023-10-17 PROCEDURE — 93294 REM INTERROG EVL PM/LDLS PM: CPT | Performed by: INTERNAL MEDICINE
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
## 2023-10-23 ENCOUNTER — OFFICE VISIT (OUTPATIENT)
Dept: CARDIOLOGY | Facility: CLINIC | Age: 68
End: 2023-10-23
Attending: PHYSICIAN ASSISTANT
Payer: MEDICARE

## 2023-10-23 VITALS
OXYGEN SATURATION: 95 % | SYSTOLIC BLOOD PRESSURE: 126 MMHG | WEIGHT: 270.6 LBS | DIASTOLIC BLOOD PRESSURE: 72 MMHG | BODY MASS INDEX: 35.86 KG/M2 | HEIGHT: 73 IN | RESPIRATION RATE: 20 BRPM | HEART RATE: 79 BPM

## 2023-10-23 DIAGNOSIS — I49.5 SINUS NODE DYSFUNCTION (H): ICD-10-CM

## 2023-10-23 PROCEDURE — 99214 OFFICE O/P EST MOD 30 MIN: CPT | Performed by: PHYSICIAN ASSISTANT
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
## 2023-10-23 NOTE — PATIENT INSTRUCTIONS
Arya - it was nice see you and Genna and Colton today!     At your visit today we reviewed:    2 episodes of fainting - one while coughing, one while sitting  No arrhythmias to account for it  Pacer check 10/2023 looked good - your episodes of fast heart beat was with deer hunting!  Reviewed episodes of CP/ER visit     Medication Changes:    I'll talk to Dr. Andre but I think he'll switch your amlodipine to Verapamil    Recommendations:     Continue routine device interrogations    Follow-up:    See us for cardiology follow up depending on results of discussion with Dr. Andre but CALL Cardiology nurses Shameka & Clotilde @ [phone] for any issues, questions or concerns in the interim.      To schedule a future appointment, we kindly ask that you call cardiology scheduling at [phone] three months prior to requested visit date.    Important Phone Numbers for [facility]):    [place] [facility] Shameka Mabry: [phone]  Cardiology [phone]  [place] [facility] [phone]  [facility] [phone]  [facility]: [phone]

## 2023-10-26 ENCOUNTER — DOCUMENTATION ONLY (OUTPATIENT)
Dept: CARDIOLOGY | Facility: CLINIC | Age: 68
End: 2023-10-26
Payer: MEDICARE

## 2023-10-26 DIAGNOSIS — I48.0 PAROXYSMAL ATRIAL FIBRILLATION (H): ICD-10-CM

## 2023-10-26 DIAGNOSIS — I20.1 CORONARY VASOSPASM (H): ICD-10-CM

## 2023-10-26 DIAGNOSIS — Z95.0 CARDIAC PACEMAKER IN SITU: ICD-10-CM

## 2023-10-26 DIAGNOSIS — I49.5 SINUS NODE DYSFUNCTION (H): Primary | ICD-10-CM

## 2023-10-26 RX ORDER — VERAPAMIL HYDROCHLORIDE 180 MG/1
180 TABLET, EXTENDED RELEASE ORAL AT BEDTIME
Qty: 30 TABLET | Refills: 5 | Status: SHIPPED | OUTPATIENT
Start: 2023-10-26 | End: 2024-01-02 | Stop reason: ALTCHOICE

## 2023-10-26 NOTE — PROGRESS NOTES
Pls let Arya know that I heard back from Dr. Andre re: his recurrent CP (in ER for) and 2 episodes of syncope (1 while choking slightly at a restaurant, the other just while sitting talking).    Device RNs:    Dr. Andre is wondering if there's any programming that can be done for this to turn on rate drop therapy. If so, pls schedule an appointment to have this done up in [place].    [place] RNs:  1.  Please call him after Device responds  2. Please let him know that Dr. Andre agrees to stop amlodipine 10 mg daily and start verapamil 180 mg daily.    This should help control BP, HR and helps with spasm.      If he agrees, please update Epic med list (I have verapamil pended).  He had a rash with Diltiazem but in the past had been on verapamil and it was only stopped due to low heart rates (now has a pacemaker, so this should not be an issue).  3.  For spasm, could try over-the-counter L-arginine, 2 g 3 times daily.  I think he should start the verapamil first, and if he is doing well on that, would start L-arginine in 3-4 weeks.  I do not want him to start both at one time in case he has any side effects.      3.  See me 1/2024 if he agrees - pls place order    [facility] Narendra Interiano MD Moody, Katherine McAllan, PA-C  Find out if his pacer is medtronic and if so ask our pacer RN to see if it can be programed to RATE DROP THERAPY  OK to try low dose verapamil 180 or 240mg daily and recheck pacer for rhythm/afib  Also he can try OTC  L-ARGININE  2 grams three times a day for vasospasm    Check his orthostatics          Previous Messages       ----- Message -----  From: Blanca López PA-C  Sent: 10/24/2023   8:07 AM CDT  To: Narendra Andre MD  Subject: Update                                          Hi Dr. Andre - I saw your pt up at [facility] yesterday.      His lightheadedness/wavy vision/disequilibrium overall still much improved after you stopped metoprolol    1) He's had 2 syncopal  "episodes since I saw him 7/2023. Has h/o carotid sinus syndrome and s/p PPM.  No arrhythmias accounted for this.  No seizure activity.    A) 1 episode while eating at a restaurant and slightly choking on a bite.  Genna notes he was out for a few seconds.    B) had another episode, this time just while sitting and talking.  No head turning, and out for ~30 seconds.  Genna notes blood sugar may have been elevated given he had been eating a lot of sweets, but no objective data.      In both instances, no arrhythmias.      2) back in ER 10/11 for chest discomfort with intermittent relief with NTG, somewhat different than his normal \"vasospasm.\"  Negative troponins, POC echo okay.  Last stress test 2/2022, cath 2017 ([facility]).      ** I'm unsure of what to make of these episodes of syncope, but think the first 1 was likely due to the choking/vagal.  For that chest pain, you had previously mentioned switching amlodipine to verapamil (rash with Diltiazem), for BP, HR and spasm.  That's my plan unless you recommend differently!!    Gayle    "

## 2023-10-26 NOTE — PROGRESS NOTES
Patient's PPM is a dual chamber Medtronic [place]. I will discuss with Sulaiman, Medtronic rep, if there is a sudden david response or rate drop algorithm that can be turned on.     ADDENDUM 2:00PM. Discussed with Sulaiman, there is a Rate Drop Response setting that can be turned on. Double checked pt's current settings, and rate drop response is currently programmed OFF.     Called pt, explained the programming change, and scheduled pt for courtesy device check next week in [place] on 10/31/2023.     Will route to [place] RNs per Gayle NIÑO's note below.

## 2023-10-30 ENCOUNTER — THERAPY VISIT (OUTPATIENT)
Dept: PHYSICAL THERAPY | Facility: CLINIC | Age: 68
End: 2023-10-30
Attending: STUDENT IN AN ORGANIZED HEALTH CARE EDUCATION/TRAINING PROGRAM
Payer: MEDICARE

## 2023-10-30 DIAGNOSIS — N50.812 LEFT TESTICULAR PAIN: Primary | ICD-10-CM

## 2023-10-30 PROCEDURE — 97535 SELF CARE MNGMENT TRAINING: CPT | Mod: GP | Performed by: PHYSICAL THERAPIST

## 2023-10-30 PROCEDURE — 97110 THERAPEUTIC EXERCISES: CPT | Mod: GP | Performed by: PHYSICAL THERAPIST

## 2023-10-30 NOTE — PROGRESS NOTES
"*This note serves as the Discharge Note, as pt did not return and this is the last known status.        Physical Therapy Progress Note    Patient Name: Stiven Nguyễn  MR#: [id number]  : 1955  MD name: Carmen Jin PA-C  Pt seen from: 23 to 10/30/23  Diagnosis: Testicular Pain, PFM Dysfunction         10/30/23 0500   Appointment Info   Signing clinician's name / credentials Sofia Candelaria, PT MA #[id number]   Total/Authorized Visits 6 (Medicare)   Visits Used 4   Medical Diagnosis Left testicular pain   PT Tx Diagnosis Pelvic Floor Muscle Dysfunction   Progress Note/Certification   Start of Care Date 23   Onset of illness/injury or Date of Surgery 09/15/23   Therapy Frequency 1x per week weaning to every other week   Predicted Duration 8 weeks   Certification date from 23   Certification date to 23   Progress Note Due Date 23   PT Goal 1   Goal Identifier STG   Goal Description 1)Pt will report making it to bathroom without leaking on the way 50% of urges, in 4 weeks.   Goal Progress Met: pt states \"as long as I am going fairly regularly this is not an issue.\"   Target Date 10/19/23   Date Met 10/30/23   PT Goal 2   Goal Identifier STG   Goal Description 2)Pt will report void times consistently of every 1.5 hours in 4 weeks.   Target Date 10/19/23   Goal Progress Met: pt states \"can be every 30 mins if I am out in the woods hunting, and can be 2 hours if I am home watching tv.\"   Date Met 10/30/23   PT Goal 3   Goal Identifier LTG   Goal Description 3)Pt will report sleeping through the nite with 2 or less times up to void, in 6 weeks.   Target Date 23   Goal Progress Met: pt states \"I am getting up at nite at the most 2 times.\"   Date Met 10/30/23   PT Goal 4   Goal Identifier LTG   Goal Description 4)Pt will report ability to travel for 2 hours without having to use bathroom, in 8 weeks.   Goal Progress Met: states has not had to use the urinal in his vehicle yet, but " "thinks that just knowing it is there helps any anxiety so the urge is not as strong.   Target Date 11/16/23   Date Met 10/30/23   PT Goal 5   Goal Identifier LTG   Goal Description 5)Pt will be indep in HEP to prevent worsening of symptoms., in 8 weeks.   Target Date 11/16/23   Goal Progress Met: pt states he knows his home program well and will continue to do routinely.   Date Met 10/30/23   Subjective Report   Subjective Report Pt is in woods hunting and has more ability to empty his bladder, so is staying drier.  Not feeling like he is fully emptying at every void.  States \"I really don't feel like I have any of the problems that I came in with anymore.\"   Objective Measure 1   Objective Measure Void Times   Details In the woods - goes a lot, or in [place] (\[place]  Does not see this as an issue/problem that he needs to fix.   Objective Measure 2   Objective Measure Leaking   Details Little bit \"every once in a while.\"  States \"this is really rare now. \"   Objective Measure 3   Objective Measure Pain   Details Almost non-existent! Not getting doubling over pain.   Objective Measure 4   Objective Measure BMs   Details Every 2-3 days. Feels more constipated since starting [facility].   Therapeutic Procedure/Exercise   Therapeutic Procedures: strength, endurance, ROM, flexibillity minutes (89616) 15   Ther Proc 1 - Details Reviewed stretching, PFM quick and holds, and the need for continuing program routinely.  Suggested trying to get bladder on a schedule to reduce frequency,. but pt states \"that is not really an issue - I can go as often as I feel I need to.\"  Explained that routine is good and will make a good pattern for the bladder.  Told pt that he should cont exers even after d/c'ing from PT or symptoms may return.   Skilled Intervention Exer: stretching, timed voids, ongoing program.   Patient Response/Progress Pt states \"I have no problem doing these exers and will continue to do them " "for as long as I can.\"   Self Care/home Management   ADL/Home Mgmt Training (81967) 15   Self Care 1 - Details Told pt that one way to garcia his constipation is to drink water more routinely throughout the day, and eatt more veggies/fiber. Reviewed toileting posture and how other cultures poop in the squatting position - using a stool under feet would mimic squat position somewhat and ease BMs.   Skilled Intervention Self-care: tips to reduce constipation, fluid intake, void times   Patient Response/Progress Pt states \"I should really pay more attention to my diet, and that would help my bowels.\"  Agrees to try more vegetables/fiber. States he knows his water requirements.   Plan   Home program [id number]   Updates to plan of care Cont work with relaxation, fully emptying and PFCs with focus on \"the let go.\"   Plan for next session Pt states \"no real big issues anymore\" and agrees to put PT on hold for 4 weeks.  If not hear from pt, then will d/c to home program. Goals have been met.   Comments   Pelvic Health Informed Consent Statement Discussed with patient/guardian reason for referral regarding pelvic health needs and external/internal pelvic floor muscle examination.  Opportunity provided to ask questions and verbal consent for assessment and intervention was given.   Total Session Time   Timed Code Treatment Minutes 30   Total Treatment Time (sum of timed and untimed services) 30   Thank you for the referral of this patient.  Sofia Candelaria, PT, MA  #[id number]      "

## 2023-10-31 ENCOUNTER — HOSPITAL ENCOUNTER (OUTPATIENT)
Dept: CARDIOLOGY | Facility: CLINIC | Age: 68
Discharge: HOME | End: 2023-10-31
Attending: INTERNAL MEDICINE
Payer: MEDICARE

## 2023-10-31 DIAGNOSIS — Z95.0 CARDIAC PACEMAKER IN SITU: ICD-10-CM

## 2023-10-31 DIAGNOSIS — I49.5 SINUS NODE DYSFUNCTION (H): ICD-10-CM

## 2023-10-31 PROCEDURE — 99207 CARDIAC DEVICE CHECK - IN CLINIC: CPT | Performed by: INTERNAL MEDICINE

## 2023-10-31 NOTE — PROGRESS NOTES
Patient came into clinic for a courtesy device check to turn rate drop response on.  Upon attempting to program this, it appeared that the mode would have to be changed from AAIR-DDDR to AAI-DDD causing patient to lose rate response.      Called and spoke with Liz in [employer] who confirmed that in order to turn rate drop response on, patient's mode would need to be changed to AAI-DDD  and patient would lose rate response.      Patient has been AP 94-99% of the time with his lower rate limit programmed at 70 (and 89% of the time when it was programmed to 60bpm for a couple of weeks).  Per Liz, the rate drop response only kicks in when patient is AS, so this feature would only be active/effective 1-6% of the time and patient would not be able to have rate response simultaneously active to help with the other 94-99% of the time.  Patient's underlying rhythm today was SDysrrhythmia in the 30-60s.      Also spoke with [employer] retana, to see if the MVP mode potentially could be contributing to symptoms as this causes the patient to drop 2 beats when switching from AAIR to DDDR.  He recommended programming on the Maximum AV interval to 450ms.  This prevents patient from having dropped beats and instead would mode switch once 2 beats hit this max AV interval of 450ms.      Maximum AV limit turned on and programmed at 450ms per [employer] retana's, recommendations.  Of note, there was NOT a MVP mode switch at the time of patient's episode on 10/21/23 at the restaurant.  The other episode is unable to be checked as the device only stores 10 MVP mode switches.    Will route message to Dr. Andre updating him on the above and to see if he still would like the rate drop response programmed on if it means patient will lose his rate response.  Patient is aware of and in agreement with plan.      DAYAN St

## 2023-11-01 NOTE — PROGRESS NOTES
Narendra Andre MD  You; Blanca López PA-C3 hours ago (12:15 PM)     SH  Thanks   can leave settings as is       Received above message from Dr. Andre.  Called and updated patient on Dr. Andre response and recommendations to keep patient's device as currently programmed (with the MVP AV delay limit on and rate drop response off).  Patient verbalized understanding and appreciation for call.     DAYAN St

## 2023-11-02 LAB
MDC_IDC_EPISODE_DTM: NORMAL
MDC_IDC_EPISODE_DURATION: 3 S
MDC_IDC_EPISODE_ID: 29
MDC_IDC_EPISODE_TYPE: NORMAL
MDC_IDC_EPISODE_TYPE_INDUCED: NO
MDC_IDC_LEAD_CONNECTION_STATUS: NORMAL
MDC_IDC_LEAD_CONNECTION_STATUS: NORMAL
MDC_IDC_LEAD_IMPLANT_DT: NORMAL
MDC_IDC_LEAD_IMPLANT_DT: NORMAL
MDC_IDC_LEAD_LOCATION: NORMAL
MDC_IDC_LEAD_LOCATION: NORMAL
MDC_IDC_LEAD_LOCATION_DETAIL_1: NORMAL
MDC_IDC_LEAD_LOCATION_DETAIL_1: NORMAL
MDC_IDC_LEAD_MFG: NORMAL
MDC_IDC_LEAD_MFG: NORMAL
MDC_IDC_LEAD_MODEL: NORMAL
MDC_IDC_LEAD_MODEL: NORMAL
MDC_IDC_LEAD_POLARITY_TYPE: NORMAL
MDC_IDC_LEAD_POLARITY_TYPE: NORMAL
MDC_IDC_LEAD_SERIAL: NORMAL
MDC_IDC_LEAD_SERIAL: NORMAL
MDC_IDC_LEAD_SPECIAL_FUNCTION: NORMAL
MDC_IDC_LEAD_SPECIAL_FUNCTION: NORMAL
MDC_IDC_MSMT_BATTERY_DTM: NORMAL
MDC_IDC_MSMT_BATTERY_REMAINING_LONGEVITY: 139 MO
MDC_IDC_MSMT_BATTERY_RRT_TRIGGER: 2.62
MDC_IDC_MSMT_BATTERY_STATUS: NORMAL
MDC_IDC_MSMT_BATTERY_VOLTAGE: 3.02 V
MDC_IDC_MSMT_LEADCHNL_RA_IMPEDANCE_VALUE: 342 OHM
MDC_IDC_MSMT_LEADCHNL_RA_IMPEDANCE_VALUE: 513 OHM
MDC_IDC_MSMT_LEADCHNL_RA_PACING_THRESHOLD_AMPLITUDE: 0.62 V
MDC_IDC_MSMT_LEADCHNL_RA_PACING_THRESHOLD_PULSEWIDTH: 0.4 MS
MDC_IDC_MSMT_LEADCHNL_RA_SENSING_INTR_AMPL: 2.88 MV
MDC_IDC_MSMT_LEADCHNL_RA_SENSING_INTR_AMPL: 3 MV
MDC_IDC_MSMT_LEADCHNL_RV_IMPEDANCE_VALUE: 418 OHM
MDC_IDC_MSMT_LEADCHNL_RV_IMPEDANCE_VALUE: 494 OHM
MDC_IDC_MSMT_LEADCHNL_RV_PACING_THRESHOLD_AMPLITUDE: 0.75 V
MDC_IDC_MSMT_LEADCHNL_RV_PACING_THRESHOLD_PULSEWIDTH: 0.4 MS
MDC_IDC_MSMT_LEADCHNL_RV_SENSING_INTR_AMPL: 19.25 MV
MDC_IDC_MSMT_LEADCHNL_RV_SENSING_INTR_AMPL: 20 MV
MDC_IDC_PG_IMPLANT_DTM: NORMAL
MDC_IDC_PG_MFG: NORMAL
MDC_IDC_PG_MODEL: NORMAL
MDC_IDC_PG_SERIAL: NORMAL
MDC_IDC_PG_TYPE: NORMAL
MDC_IDC_SESS_CLINIC_NAME: NORMAL
MDC_IDC_SESS_DTM: NORMAL
MDC_IDC_SESS_TYPE: NORMAL
MDC_IDC_SET_BRADY_AT_MODE_SWITCH_RATE: 171 {BEATS}/MIN
MDC_IDC_SET_BRADY_HYSTRATE: NORMAL
MDC_IDC_SET_BRADY_LOWRATE: 70 {BEATS}/MIN
MDC_IDC_SET_BRADY_MAX_SENSOR_RATE: 130 {BEATS}/MIN
MDC_IDC_SET_BRADY_MAX_TRACKING_RATE: 130 {BEATS}/MIN
MDC_IDC_SET_BRADY_MODE: NORMAL
MDC_IDC_SET_BRADY_NIGHT_RATE: 60 {BEATS}/MIN
MDC_IDC_SET_BRADY_PAV_DELAY_LOW: 180 MS
MDC_IDC_SET_BRADY_SAV_DELAY_LOW: 150 MS
MDC_IDC_SET_LEADCHNL_RA_PACING_AMPLITUDE: 1.5 V
MDC_IDC_SET_LEADCHNL_RA_PACING_ANODE_ELECTRODE_1: NORMAL
MDC_IDC_SET_LEADCHNL_RA_PACING_ANODE_LOCATION_1: NORMAL
MDC_IDC_SET_LEADCHNL_RA_PACING_CAPTURE_MODE: NORMAL
MDC_IDC_SET_LEADCHNL_RA_PACING_CATHODE_ELECTRODE_1: NORMAL
MDC_IDC_SET_LEADCHNL_RA_PACING_CATHODE_LOCATION_1: NORMAL
MDC_IDC_SET_LEADCHNL_RA_PACING_POLARITY: NORMAL
MDC_IDC_SET_LEADCHNL_RA_PACING_PULSEWIDTH: 0.4 MS
MDC_IDC_SET_LEADCHNL_RA_SENSING_ANODE_ELECTRODE_1: NORMAL
MDC_IDC_SET_LEADCHNL_RA_SENSING_ANODE_LOCATION_1: NORMAL
MDC_IDC_SET_LEADCHNL_RA_SENSING_CATHODE_ELECTRODE_1: NORMAL
MDC_IDC_SET_LEADCHNL_RA_SENSING_CATHODE_LOCATION_1: NORMAL
MDC_IDC_SET_LEADCHNL_RA_SENSING_POLARITY: NORMAL
MDC_IDC_SET_LEADCHNL_RA_SENSING_SENSITIVITY: 0.3 MV
MDC_IDC_SET_LEADCHNL_RV_PACING_AMPLITUDE: 2 V
MDC_IDC_SET_LEADCHNL_RV_PACING_ANODE_ELECTRODE_1: NORMAL
MDC_IDC_SET_LEADCHNL_RV_PACING_ANODE_LOCATION_1: NORMAL
MDC_IDC_SET_LEADCHNL_RV_PACING_CAPTURE_MODE: NORMAL
MDC_IDC_SET_LEADCHNL_RV_PACING_CATHODE_ELECTRODE_1: NORMAL
MDC_IDC_SET_LEADCHNL_RV_PACING_CATHODE_LOCATION_1: NORMAL
MDC_IDC_SET_LEADCHNL_RV_PACING_POLARITY: NORMAL
MDC_IDC_SET_LEADCHNL_RV_PACING_PULSEWIDTH: 0.4 MS
MDC_IDC_SET_LEADCHNL_RV_SENSING_ANODE_ELECTRODE_1: NORMAL
MDC_IDC_SET_LEADCHNL_RV_SENSING_ANODE_LOCATION_1: NORMAL
MDC_IDC_SET_LEADCHNL_RV_SENSING_CATHODE_ELECTRODE_1: NORMAL
MDC_IDC_SET_LEADCHNL_RV_SENSING_CATHODE_LOCATION_1: NORMAL
MDC_IDC_SET_LEADCHNL_RV_SENSING_POLARITY: NORMAL
MDC_IDC_SET_LEADCHNL_RV_SENSING_SENSITIVITY: 0.9 MV
MDC_IDC_SET_ZONE_DETECTION_INTERVAL: 350 MS
MDC_IDC_SET_ZONE_DETECTION_INTERVAL: 400 MS
MDC_IDC_SET_ZONE_STATUS: NORMAL
MDC_IDC_SET_ZONE_STATUS: NORMAL
MDC_IDC_SET_ZONE_TYPE: NORMAL
MDC_IDC_SET_ZONE_VENDOR_TYPE: NORMAL
MDC_IDC_STAT_AT_BURDEN_PERCENT: 0 %
MDC_IDC_STAT_AT_DTM_END: NORMAL
MDC_IDC_STAT_AT_DTM_START: NORMAL
MDC_IDC_STAT_BRADY_AP_VP_PERCENT: 0.09 %
MDC_IDC_STAT_BRADY_AP_VS_PERCENT: 94.02 %
MDC_IDC_STAT_BRADY_AS_VP_PERCENT: 0 %
MDC_IDC_STAT_BRADY_AS_VS_PERCENT: 5.89 %
MDC_IDC_STAT_BRADY_DTM_END: NORMAL
MDC_IDC_STAT_BRADY_DTM_START: NORMAL
MDC_IDC_STAT_BRADY_RA_PERCENT_PACED: 94.15 %
MDC_IDC_STAT_BRADY_RV_PERCENT_PACED: 0.09 %
MDC_IDC_STAT_EPISODE_RECENT_COUNT: 0
MDC_IDC_STAT_EPISODE_RECENT_COUNT: 1
MDC_IDC_STAT_EPISODE_RECENT_COUNT: 4
MDC_IDC_STAT_EPISODE_RECENT_COUNT_DTM_END: NORMAL
MDC_IDC_STAT_EPISODE_RECENT_COUNT_DTM_START: NORMAL
MDC_IDC_STAT_EPISODE_TOTAL_COUNT: 0
MDC_IDC_STAT_EPISODE_TOTAL_COUNT: 18
MDC_IDC_STAT_EPISODE_TOTAL_COUNT: 7
MDC_IDC_STAT_EPISODE_TOTAL_COUNT_DTM_END: NORMAL
MDC_IDC_STAT_EPISODE_TOTAL_COUNT_DTM_START: NORMAL
MDC_IDC_STAT_EPISODE_TYPE: NORMAL
MDC_IDC_STAT_TACHYTHERAPY_RECENT_DTM_END: NORMAL
MDC_IDC_STAT_TACHYTHERAPY_RECENT_DTM_START: NORMAL
MDC_IDC_STAT_TACHYTHERAPY_TOTAL_DTM_END: NORMAL
MDC_IDC_STAT_TACHYTHERAPY_TOTAL_DTM_START: NORMAL

## 2023-11-29 ENCOUNTER — APPOINTMENT (OUTPATIENT)
Dept: GENERAL RADIOLOGY | Facility: CLINIC | Age: 68
End: 2023-11-29
Attending: EMERGENCY MEDICINE
Payer: MEDICARE

## 2023-11-29 ENCOUNTER — NURSE TRIAGE (OUTPATIENT)
Dept: NURSING | Facility: CLINIC | Age: 68
End: 2023-11-29
Payer: MEDICARE

## 2023-11-29 ENCOUNTER — HOSPITAL ENCOUNTER (EMERGENCY)
Facility: CLINIC | Age: 68
Discharge: HOME | End: 2023-11-29
Attending: EMERGENCY MEDICINE | Admitting: EMERGENCY MEDICINE
Payer: MEDICARE

## 2023-11-29 VITALS
OXYGEN SATURATION: 97 % | WEIGHT: 272 LBS | SYSTOLIC BLOOD PRESSURE: 179 MMHG | TEMPERATURE: 96.8 F | RESPIRATION RATE: 20 BRPM | DIASTOLIC BLOOD PRESSURE: 98 MMHG | HEART RATE: 76 BPM | BODY MASS INDEX: 36.05 KG/M2 | HEIGHT: 73 IN

## 2023-11-29 DIAGNOSIS — Z79.01 CHRONIC ANTICOAGULATION: ICD-10-CM

## 2023-11-29 DIAGNOSIS — S20.212A CHEST WALL CONTUSION, LEFT, INITIAL ENCOUNTER: ICD-10-CM

## 2023-11-29 PROCEDURE — 99283 EMERGENCY DEPT VISIT LOW MDM: CPT | Performed by: EMERGENCY MEDICINE

## 2023-11-29 PROCEDURE — 71101 X-RAY EXAM UNILAT RIBS/CHEST: CPT | Mod: LT

## 2023-11-29 PROCEDURE — 250N000013 HC RX MED GY IP 250 OP 250 PS 637: Performed by: EMERGENCY MEDICINE

## 2023-11-29 RX ORDER — OXYCODONE HYDROCHLORIDE 5 MG/1
5-10 TABLET ORAL EVERY 6 HOURS PRN
Qty: 12 TABLET | Refills: 0 | Status: SHIPPED | OUTPATIENT
Start: 2023-11-29 | End: 2023-12-15

## 2023-11-29 RX ORDER — OXYCODONE HYDROCHLORIDE 5 MG/1
5 TABLET ORAL ONCE
Status: COMPLETED | OUTPATIENT
Start: 2023-11-29 | End: 2023-11-29

## 2023-11-29 RX ADMIN — OXYCODONE HYDROCHLORIDE 5 MG: 5 TABLET ORAL at 09:40

## 2023-11-29 RX ADMIN — OXYCODONE HYDROCHLORIDE 5 MG: 5 TABLET ORAL at 11:01

## 2023-11-29 ASSESSMENT — ACTIVITIES OF DAILY LIVING (ADL)
ADLS_ACUITY_SCORE: 35
ADLS_ACUITY_SCORE: 37

## 2023-11-29 NOTE — TELEPHONE ENCOUNTER
Nurse Triage SBAR    Is this a 2nd Level Triage? NO    Situation: Chest injury    Background: Patient fell, hit upper rib cage, and now is having difficulty breathing. Reports pain with deep breaths. Patient believes he must have hit ribs on arm of the chair.     Assessment: Pain in left chest, when breathing in pain rated at 8 on scale of 0-10, rated at 2-3 when not breathing in. Symmetrical chest movement.     Protocol Recommended Disposition:   Go to ED Now    Recommendation: Patient will go to the ED now;  reviewed disposition and care advice. Patient has no further questions. Advised patient to have another adult drive if possible, patient states his wife is unavailable until later, may drive himself. Reiterated care advice. No further questions at this time.     Blanca Fernandez RN on 11/29/2023 at 6:46 AM      Reason for Disposition   SEVERE chest pain    Additional Information   Negative: Major injury from dangerous force or speed (e.g., MVA, fall > 10 feet or 3 meters)   Negative: Bullet wound, knife wound, or other penetrating object   Negative: Puncture wound that sounds life-threatening to the triager   Negative: SEVERE difficulty breathing (e.g., struggling for each breath, speaks in single words)   Negative: [1] Major bleeding (e.g., actively dripping or spurting) AND [2] can't be stopped   Negative: Open wound of the chest with sound of moving air (sucking wound) or visible air bubbles   Negative: Shock suspected (e.g., cold/pale/clammy skin, too weak to stand, low BP, rapid pulse)   Negative: Coughing or spitting up blood   Negative: Bluish (or gray) lips or face now   Negative: Unconscious or was unconscious   Negative: Sounds like a life-threatening emergency to the triager   Negative: [1] Injuries at more than 1 site AND [2] unsure which guideline to use   Negative: Chest pain not from an injury OR cause is unknown   Negative: Wound looks infected    Protocols used: Chest Injury-A-

## 2023-11-29 NOTE — ED NOTES
Pt was on floor picking up toys and when getting up pt fell into wooden arm chair injuring left ribs with pain last night.  Pt tried getting dress to go hunting this am and had increased left rib pain.  Pt up independently.

## 2023-11-29 NOTE — ED TRIAGE NOTES
Last night, pt had accidentally slid off a chair. As he was falling, pt braced himself against the wooden arm rest of the chair. He stopped his fall, but hit L rib cage. Pt is on blood thinners for hx of blood clots. Pt did not land on floor and denies hitting head.     Pain is worsened when taking deep breaths, coughing, and sneezing. Currently pain 2/10 and described as sharp. Pt denies CP.      Triage Assessment (Adult)       Row Name 11/29/23 0821          Triage Assessment    Airway WDL WDL        Respiratory WDL    Respiratory WDL WDL        Skin Circulation/Temperature WDL    Skin Circulation/Temperature WDL WDL        Cardiac WDL    Cardiac WDL WDL        Peripheral/Neurovascular WDL    Peripheral Neurovascular WDL WDL        Cognitive/Neuro/Behavioral WDL    Cognitive/Neuro/Behavioral WDL WDL
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
## 2023-12-09 ENCOUNTER — HEALTH MAINTENANCE LETTER (OUTPATIENT)
Age: 68
End: 2023-12-09

## 2023-12-15 ENCOUNTER — OFFICE VISIT (OUTPATIENT)
Dept: UROLOGY | Facility: CLINIC | Age: 68
End: 2023-12-15
Payer: MEDICARE

## 2023-12-15 VITALS
HEART RATE: 97 BPM | BODY MASS INDEX: 36.05 KG/M2 | HEIGHT: 73 IN | WEIGHT: 272 LBS | SYSTOLIC BLOOD PRESSURE: 175 MMHG | DIASTOLIC BLOOD PRESSURE: 105 MMHG | OXYGEN SATURATION: 97 %

## 2023-12-15 DIAGNOSIS — R39.15 URINARY URGENCY: Primary | ICD-10-CM

## 2023-12-15 PROCEDURE — 99213 OFFICE O/P EST LOW 20 MIN: CPT | Performed by: STUDENT IN AN ORGANIZED HEALTH CARE EDUCATION/TRAINING PROGRAM

## 2023-12-15 RX ORDER — OXYBUTYNIN CHLORIDE 5 MG/1
5 TABLET, EXTENDED RELEASE ORAL DAILY
Qty: 90 TABLET | Refills: 1 | Status: SHIPPED | OUTPATIENT
Start: 2023-12-15 | End: 2024-02-13

## 2023-12-15 NOTE — PATIENT INSTRUCTIONS
We will have you stop solifenacin and start the extended release version of oxybutynin instead. If this is effective, we could potentially increase the dose if needed.   If symptoms are persistent, further testing may be helpful. Often we will complete a test call urodynamics to better determine the cause of your urinary symptoms. If this shows an obstructing prostate, prostate surgery to improve urination could be considered.
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
## 2023-12-17 ENCOUNTER — MYC MEDICAL ADVICE (OUTPATIENT)
Dept: CARDIOLOGY | Facility: CLINIC | Age: 68
End: 2023-12-17
Payer: MEDICARE

## 2023-12-18 NOTE — TELEPHONE ENCOUNTER
Routing grazyna beltran to Gayle López, PAC -    See elevated BP readings along with symptoms. Do you recommend med change, clinic visit, testing?    Shameka Pompa RN

## 2024-01-02 ENCOUNTER — APPOINTMENT (OUTPATIENT)
Dept: CT IMAGING | Facility: CLINIC | Age: 69
End: 2024-01-02
Attending: STUDENT IN AN ORGANIZED HEALTH CARE EDUCATION/TRAINING PROGRAM
Payer: MEDICARE

## 2024-01-02 ENCOUNTER — HOSPITAL ENCOUNTER (EMERGENCY)
Facility: CLINIC | Age: 69
Discharge: HOME | End: 2024-01-03
Attending: STUDENT IN AN ORGANIZED HEALTH CARE EDUCATION/TRAINING PROGRAM | Admitting: STUDENT IN AN ORGANIZED HEALTH CARE EDUCATION/TRAINING PROGRAM
Payer: MEDICARE

## 2024-01-02 DIAGNOSIS — R51.9 ACUTE NONINTRACTABLE HEADACHE, UNSPECIFIED HEADACHE TYPE: ICD-10-CM

## 2024-01-02 DIAGNOSIS — I10 HTN (HYPERTENSION): Primary | ICD-10-CM

## 2024-01-02 DIAGNOSIS — I10 HYPERTENSION, UNSPECIFIED TYPE: ICD-10-CM

## 2024-01-02 LAB
ANION GAP SERPL CALCULATED.3IONS-SCNC: 7 MMOL/L (ref 7–15)
BASOPHILS # BLD AUTO: 0 10E3/UL (ref 0–0.2)
BASOPHILS NFR BLD AUTO: 1 %
BUN SERPL-MCNC: 13.8 MG/DL (ref 8–23)
CALCIUM SERPL-MCNC: 9.4 MG/DL (ref 8.8–10.2)
CHLORIDE SERPL-SCNC: 105 MMOL/L (ref 98–107)
CREAT SERPL-MCNC: 0.8 MG/DL (ref 0.67–1.17)
DEPRECATED HCO3 PLAS-SCNC: 28 MMOL/L (ref 22–29)
EGFRCR SERPLBLD CKD-EPI 2021: >90 ML/MIN/1.73M2
EOSINOPHIL # BLD AUTO: 0.2 10E3/UL (ref 0–0.7)
EOSINOPHIL NFR BLD AUTO: 3 %
ERYTHROCYTE [DISTWIDTH] IN BLOOD BY AUTOMATED COUNT: 13.2 % (ref 10–15)
GLUCOSE SERPL-MCNC: 140 MG/DL (ref 70–99)
HCT VFR BLD AUTO: 43.6 % (ref 40–53)
HGB BLD-MCNC: 15 G/DL (ref 13.3–17.7)
HOLD SPECIMEN: NORMAL
IMM GRANULOCYTES # BLD: 0 10E3/UL
IMM GRANULOCYTES NFR BLD: 1 %
LYMPHOCYTES # BLD AUTO: 1.3 10E3/UL (ref 0.8–5.3)
LYMPHOCYTES NFR BLD AUTO: 24 %
MCH RBC QN AUTO: 29.8 PG (ref 26.5–33)
MCHC RBC AUTO-ENTMCNC: 34.4 G/DL (ref 31.5–36.5)
MCV RBC AUTO: 87 FL (ref 78–100)
MONOCYTES # BLD AUTO: 0.5 10E3/UL (ref 0–1.3)
MONOCYTES NFR BLD AUTO: 10 %
NEUTROPHILS # BLD AUTO: 3.4 10E3/UL (ref 1.6–8.3)
NEUTROPHILS NFR BLD AUTO: 61 %
NRBC # BLD AUTO: 0 10E3/UL
NRBC BLD AUTO-RTO: 0 /100
PLATELET # BLD AUTO: 158 10E3/UL (ref 150–450)
POTASSIUM SERPL-SCNC: 3.9 MMOL/L (ref 3.4–5.3)
RBC # BLD AUTO: 5.04 10E6/UL (ref 4.4–5.9)
SODIUM SERPL-SCNC: 140 MMOL/L (ref 135–145)
WBC # BLD AUTO: 5.4 10E3/UL (ref 4–11)

## 2024-01-02 PROCEDURE — 85025 COMPLETE CBC W/AUTO DIFF WBC: CPT | Performed by: STUDENT IN AN ORGANIZED HEALTH CARE EDUCATION/TRAINING PROGRAM

## 2024-01-02 PROCEDURE — 80048 BASIC METABOLIC PNL TOTAL CA: CPT | Performed by: STUDENT IN AN ORGANIZED HEALTH CARE EDUCATION/TRAINING PROGRAM

## 2024-01-02 PROCEDURE — 85025 COMPLETE CBC W/AUTO DIFF WBC: CPT | Performed by: FAMILY MEDICINE

## 2024-01-02 PROCEDURE — 93010 ELECTROCARDIOGRAM REPORT: CPT | Performed by: STUDENT IN AN ORGANIZED HEALTH CARE EDUCATION/TRAINING PROGRAM

## 2024-01-02 PROCEDURE — 96374 THER/PROPH/DIAG INJ IV PUSH: CPT | Performed by: STUDENT IN AN ORGANIZED HEALTH CARE EDUCATION/TRAINING PROGRAM

## 2024-01-02 PROCEDURE — 250N000011 HC RX IP 250 OP 636: Performed by: STUDENT IN AN ORGANIZED HEALTH CARE EDUCATION/TRAINING PROGRAM

## 2024-01-02 PROCEDURE — 250N000013 HC RX MED GY IP 250 OP 250 PS 637: Performed by: STUDENT IN AN ORGANIZED HEALTH CARE EDUCATION/TRAINING PROGRAM

## 2024-01-02 PROCEDURE — 99285 EMERGENCY DEPT VISIT HI MDM: CPT | Mod: 25 | Performed by: STUDENT IN AN ORGANIZED HEALTH CARE EDUCATION/TRAINING PROGRAM

## 2024-01-02 PROCEDURE — 36415 COLL VENOUS BLD VENIPUNCTURE: CPT | Performed by: FAMILY MEDICINE

## 2024-01-02 PROCEDURE — 93005 ELECTROCARDIOGRAM TRACING: CPT | Performed by: STUDENT IN AN ORGANIZED HEALTH CARE EDUCATION/TRAINING PROGRAM

## 2024-01-02 PROCEDURE — 70450 CT HEAD/BRAIN W/O DYE: CPT | Mod: ME

## 2024-01-02 PROCEDURE — 99284 EMERGENCY DEPT VISIT MOD MDM: CPT | Mod: 25 | Performed by: STUDENT IN AN ORGANIZED HEALTH CARE EDUCATION/TRAINING PROGRAM

## 2024-01-02 RX ORDER — DILTIAZEM HYDROCHLORIDE 240 MG/1
240 CAPSULE, COATED, EXTENDED RELEASE ORAL DAILY
Status: DISCONTINUED | OUTPATIENT
Start: 2024-01-02 | End: 2024-01-03 | Stop reason: HOSPADM

## 2024-01-02 RX ORDER — DILTIAZEM HYDROCHLORIDE EXTENDED-RELEASE TABLETS 240 MG/1
240 TABLET, EXTENDED RELEASE ORAL DAILY
Qty: 30 TABLET | Refills: 3 | Status: SHIPPED | OUTPATIENT
Start: 2024-01-02 | End: 2024-01-08

## 2024-01-02 RX ORDER — DILTIAZEM HCL 60 MG
240 TABLET ORAL EVERY 6 HOURS SCHEDULED
Status: DISCONTINUED | OUTPATIENT
Start: 2024-01-03 | End: 2024-01-02 | Stop reason: DRUGHIGH

## 2024-01-02 RX ORDER — LISINOPRIL 10 MG/1
10 TABLET ORAL DAILY
Qty: 30 TABLET | Refills: 3 | Status: SHIPPED | OUTPATIENT
Start: 2024-01-02 | End: 2024-01-10

## 2024-01-02 RX ORDER — LABETALOL HYDROCHLORIDE 5 MG/ML
10 INJECTION, SOLUTION INTRAVENOUS ONCE
Status: COMPLETED | OUTPATIENT
Start: 2024-01-02 | End: 2024-01-02

## 2024-01-02 RX ADMIN — DILTIAZEM HYDROCHLORIDE 240 MG: 240 CAPSULE, EXTENDED RELEASE ORAL at 23:19

## 2024-01-02 RX ADMIN — LABETALOL HYDROCHLORIDE 10 MG: 5 INJECTION, SOLUTION INTRAVENOUS at 22:10

## 2024-01-02 ASSESSMENT — ACTIVITIES OF DAILY LIVING (ADL)
ADLS_ACUITY_SCORE: 37
ADLS_ACUITY_SCORE: 37

## 2024-01-02 NOTE — PROGRESS NOTES
-Per DR Andre:  I JUST SAW HIS MED LIST  SEE PRIOR NOTE  STOP VERAPAMIL  GO DILT 240  LISINOPRIL 10  CAN INCREASE HIS IMDUR TO 60 IF HE WISHES  OV WITH NP IN 2 WEEKS .  Pt has no Chest pain did not change Imdur. DAVE Gordon RN

## 2024-01-03 VITALS
SYSTOLIC BLOOD PRESSURE: 171 MMHG | TEMPERATURE: 97.5 F | OXYGEN SATURATION: 95 % | RESPIRATION RATE: 20 BRPM | DIASTOLIC BLOOD PRESSURE: 91 MMHG | HEART RATE: 60 BPM

## 2024-01-03 NOTE — ED TRIAGE NOTES
Pt had BP medication changes and presents with a HA with double vision. Pt denied double vision at this time. Tylenol taken at 1900 PTA.      Triage Assessment (Adult)       Row Name 01/02/24 2030          Triage Assessment    Airway WDL WDL        Respiratory WDL    Respiratory WDL WDL        Skin Circulation/Temperature WDL    Skin Circulation/Temperature WDL WDL        Cardiac WDL    Cardiac WDL WDL        Peripheral/Neurovascular WDL    Peripheral Neurovascular WDL WDL        Cognitive/Neuro/Behavioral WDL    Cognitive/Neuro/Behavioral WDL WDL

## 2024-01-03 NOTE — DISCHARGE INSTRUCTIONS
Today you were seen and evaluated in the emergency department for elevated blood pressure and headache.  You had a CAT scan of your head that was abnormal.  You were given 2 separate medications for your blood pressure and your blood pressure improved and your headache improved.  I suspect that your headache is due to your uncontrolled high blood pressure.   the new medication from the pharmacy and begin taking it as prescribed.  Take all your other medications as prescribed.  Follow-up with your regular doctor soon as you are able.  Return to the ER with any new or worsening symptoms.   n/a
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
## 2024-01-03 NOTE — ED NOTES
"Pt c/o headache all day that became worse at 4 pm.  No relief with tylenol.  Pt also \"having issues with high blood pressure and change in medications for high blood pressure.\"  Pt and wife state pt's balance is unsteady and had a few minutes of double vision around 7 pm.  Pt A&O x3.  "
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
## 2024-01-08 ENCOUNTER — HOSPITAL ENCOUNTER (EMERGENCY)
Facility: CLINIC | Age: 69
Discharge: HOME | End: 2024-01-08
Attending: EMERGENCY MEDICINE | Admitting: EMERGENCY MEDICINE
Payer: MEDICARE

## 2024-01-08 VITALS
HEART RATE: 60 BPM | OXYGEN SATURATION: 96 % | BODY MASS INDEX: 35.89 KG/M2 | DIASTOLIC BLOOD PRESSURE: 96 MMHG | SYSTOLIC BLOOD PRESSURE: 158 MMHG | TEMPERATURE: 96.9 F | WEIGHT: 272 LBS | RESPIRATION RATE: 16 BRPM

## 2024-01-08 DIAGNOSIS — R06.00 DYSPNEA, UNSPECIFIED TYPE: ICD-10-CM

## 2024-01-08 DIAGNOSIS — I10 HYPERTENSION, UNSPECIFIED TYPE: ICD-10-CM

## 2024-01-08 DIAGNOSIS — R60.0 BILATERAL LEG EDEMA: ICD-10-CM

## 2024-01-08 DIAGNOSIS — I10 HTN (HYPERTENSION): ICD-10-CM

## 2024-01-08 LAB
ALBUMIN SERPL BCG-MCNC: 4.1 G/DL (ref 3.5–5.2)
ALP SERPL-CCNC: 84 U/L (ref 40–150)
ALT SERPL W P-5'-P-CCNC: 32 U/L (ref 0–70)
ANION GAP SERPL CALCULATED.3IONS-SCNC: 7 MMOL/L (ref 7–15)
AST SERPL W P-5'-P-CCNC: 26 U/L (ref 0–45)
BASOPHILS # BLD AUTO: 0 10E3/UL (ref 0–0.2)
BASOPHILS NFR BLD AUTO: 1 %
BILIRUB SERPL-MCNC: 0.4 MG/DL
BUN SERPL-MCNC: 16.6 MG/DL (ref 8–23)
CALCIUM SERPL-MCNC: 9.2 MG/DL (ref 8.8–10.2)
CHLORIDE SERPL-SCNC: 105 MMOL/L (ref 98–107)
CREAT SERPL-MCNC: 0.83 MG/DL (ref 0.67–1.17)
DEPRECATED HCO3 PLAS-SCNC: 28 MMOL/L (ref 22–29)
EGFRCR SERPLBLD CKD-EPI 2021: >90 ML/MIN/1.73M2
EOSINOPHIL # BLD AUTO: 0.2 10E3/UL (ref 0–0.7)
EOSINOPHIL NFR BLD AUTO: 4 %
ERYTHROCYTE [DISTWIDTH] IN BLOOD BY AUTOMATED COUNT: 13.2 % (ref 10–15)
GLUCOSE SERPL-MCNC: 148 MG/DL (ref 70–99)
HCT VFR BLD AUTO: 42.3 % (ref 40–53)
HGB BLD-MCNC: 14.6 G/DL (ref 13.3–17.7)
HOLD SPECIMEN: NORMAL
IMM GRANULOCYTES # BLD: 0 10E3/UL
IMM GRANULOCYTES NFR BLD: 0 %
LYMPHOCYTES # BLD AUTO: 1.5 10E3/UL (ref 0.8–5.3)
LYMPHOCYTES NFR BLD AUTO: 26 %
MCH RBC QN AUTO: 29.7 PG (ref 26.5–33)
MCHC RBC AUTO-ENTMCNC: 34.5 G/DL (ref 31.5–36.5)
MCV RBC AUTO: 86 FL (ref 78–100)
MONOCYTES # BLD AUTO: 0.7 10E3/UL (ref 0–1.3)
MONOCYTES NFR BLD AUTO: 13 %
NEUTROPHILS # BLD AUTO: 3.2 10E3/UL (ref 1.6–8.3)
NEUTROPHILS NFR BLD AUTO: 56 %
NRBC # BLD AUTO: 0 10E3/UL
NRBC BLD AUTO-RTO: 0 /100
NT-PROBNP SERPL-MCNC: 43 PG/ML (ref 0–900)
PLATELET # BLD AUTO: 164 10E3/UL (ref 150–450)
POTASSIUM SERPL-SCNC: 3.8 MMOL/L (ref 3.4–5.3)
PROT SERPL-MCNC: 7.2 G/DL (ref 6.4–8.3)
RBC # BLD AUTO: 4.91 10E6/UL (ref 4.4–5.9)
SODIUM SERPL-SCNC: 140 MMOL/L (ref 135–145)
TROPONIN T SERPL HS-MCNC: 19 NG/L
WBC # BLD AUTO: 5.7 10E3/UL (ref 4–11)

## 2024-01-08 PROCEDURE — 83880 ASSAY OF NATRIURETIC PEPTIDE: CPT | Performed by: EMERGENCY MEDICINE

## 2024-01-08 PROCEDURE — 93005 ELECTROCARDIOGRAM TRACING: CPT | Performed by: EMERGENCY MEDICINE

## 2024-01-08 PROCEDURE — 36415 COLL VENOUS BLD VENIPUNCTURE: CPT | Performed by: EMERGENCY MEDICINE

## 2024-01-08 PROCEDURE — 99284 EMERGENCY DEPT VISIT MOD MDM: CPT | Performed by: EMERGENCY MEDICINE

## 2024-01-08 PROCEDURE — 250N000013 HC RX MED GY IP 250 OP 250 PS 637: Performed by: EMERGENCY MEDICINE

## 2024-01-08 PROCEDURE — 93010 ELECTROCARDIOGRAM REPORT: CPT | Performed by: EMERGENCY MEDICINE

## 2024-01-08 PROCEDURE — 84484 ASSAY OF TROPONIN QUANT: CPT | Performed by: EMERGENCY MEDICINE

## 2024-01-08 PROCEDURE — 80053 COMPREHEN METABOLIC PANEL: CPT | Performed by: EMERGENCY MEDICINE

## 2024-01-08 PROCEDURE — 85025 COMPLETE CBC W/AUTO DIFF WBC: CPT | Performed by: EMERGENCY MEDICINE

## 2024-01-08 PROCEDURE — 99284 EMERGENCY DEPT VISIT MOD MDM: CPT | Mod: 25 | Performed by: EMERGENCY MEDICINE

## 2024-01-08 RX ORDER — FUROSEMIDE 20 MG
20 TABLET ORAL DAILY
Qty: 5 TABLET | Refills: 0 | Status: SHIPPED | OUTPATIENT
Start: 2024-01-08 | End: 2024-02-13

## 2024-01-08 RX ORDER — FUROSEMIDE 20 MG
40 TABLET ORAL ONCE
Status: COMPLETED | OUTPATIENT
Start: 2024-01-08 | End: 2024-01-08

## 2024-01-08 RX ORDER — POTASSIUM CHLORIDE 1500 MG/1
20 TABLET, EXTENDED RELEASE ORAL 2 TIMES DAILY
Qty: 10 TABLET | Refills: 0 | Status: SHIPPED | OUTPATIENT
Start: 2024-01-08 | End: 2024-01-09

## 2024-01-08 RX ADMIN — FUROSEMIDE 40 MG: 20 TABLET ORAL at 04:56

## 2024-01-08 ASSESSMENT — ENCOUNTER SYMPTOMS
LIGHT-HEADEDNESS: 1
FEVER: 0
COUGH: 0
SHORTNESS OF BREATH: 1
DIARRHEA: 0
STRIDOR: 0
PALPITATIONS: 0
CHEST TIGHTNESS: 0
NAUSEA: 0
VOMITING: 0
WOUND: 0
ABDOMINAL PAIN: 0
RHINORRHEA: 0
WHEEZING: 0
FATIGUE: 0
SORE THROAT: 0
CHILLS: 0
ACTIVITY CHANGE: 0
HEADACHES: 0
BACK PAIN: 0

## 2024-01-08 NOTE — DISCHARGE INSTRUCTIONS
Start taking your lisinopril twice a day. If your blood pressure goes higher, you may switch to 20mg twice a day.

## 2024-01-08 NOTE — ED TRIAGE NOTES
Reports sudden onset shortness of breath when he awoke today. States his provider has been switching around medications to control his hypertension. Recently started diltiazem. Patient noted swelling in his legs and hands yesterday.     Triage Assessment (Adult)       Row Name 01/08/24 0313          Triage Assessment    Airway WDL WDL        Respiratory WDL    Respiratory WDL X;rhythm/pattern     Rhythm/Pattern, Respiratory shortness of breath        Skin Circulation/Temperature WDL    Skin Circulation/Temperature WDL WDL        Cardiac WDL    Cardiac WDL WDL        Peripheral/Neurovascular WDL    Peripheral Neurovascular WDL WDL        Cognitive/Neuro/Behavioral WDL    Cognitive/Neuro/Behavioral WDL WDL
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
## 2024-01-08 NOTE — ED NOTES
Chart accessed to assist pharmacy with discharge prescriptions. Dr. Harper approved changes from potassium tablets to liquid potassium.

## 2024-01-09 ENCOUNTER — OFFICE VISIT (OUTPATIENT)
Dept: CARDIOLOGY | Facility: CLINIC | Age: 69
End: 2024-01-09
Attending: PHYSICIAN ASSISTANT
Payer: MEDICARE

## 2024-01-09 VITALS
DIASTOLIC BLOOD PRESSURE: 88 MMHG | SYSTOLIC BLOOD PRESSURE: 162 MMHG | BODY MASS INDEX: 35.57 KG/M2 | HEIGHT: 73 IN | OXYGEN SATURATION: 96 % | WEIGHT: 268.4 LBS | HEART RATE: 74 BPM | RESPIRATION RATE: 18 BRPM

## 2024-01-09 DIAGNOSIS — Z95.0 CARDIAC PACEMAKER IN SITU: ICD-10-CM

## 2024-01-09 DIAGNOSIS — I20.1 CORONARY VASOSPASM (H): ICD-10-CM

## 2024-01-09 DIAGNOSIS — I49.5 SINUS NODE DYSFUNCTION (H): ICD-10-CM

## 2024-01-09 DIAGNOSIS — I48.0 PAROXYSMAL ATRIAL FIBRILLATION (H): ICD-10-CM

## 2024-01-09 PROCEDURE — 99214 OFFICE O/P EST MOD 30 MIN: CPT | Performed by: PHYSICIAN ASSISTANT

## 2024-01-09 NOTE — PATIENT INSTRUCTIONS
Arya - it was nice to see you and Genna today!     At your visit today we reviewed:    Changes in medications have caused all sorts of trouble  Will update allergy list  Hold off on starting anything     Medication Changes:    Continue the lisinopril 10 mg twice a day    Recommendations:    BP checks daily - even just once daily  Hydrate  Let us know if spasming chest pain comes back  Could add L-arginine (supplement)  Could increase isosorbide  Could add back Verapamil    Follow-up:    See us for cardiology follow up in depending on call we make after  device check but CALL Cardiology nurses Shameka & Clotilde @ [phone] for any issues, questions or concerns in the interim.      To schedule a future appointment, we kindly ask that you call cardiology scheduling at [phone] three months prior to requested visit date.    Important Phone Numbers for [facility] ([place]):    [place] [facility] Shameka Mabry: [phone]  Cardiology [phone]  [place] [facility] [phone]  [place] [facility] [phone]  [place] [facility]: [phone]
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
## 2024-01-10 ENCOUNTER — MYC MEDICAL ADVICE (OUTPATIENT)
Dept: CARDIOLOGY | Facility: CLINIC | Age: 69
End: 2024-01-10
Payer: MEDICARE

## 2024-01-10 DIAGNOSIS — I10 HTN (HYPERTENSION): ICD-10-CM

## 2024-01-10 RX ORDER — LISINOPRIL 10 MG/1
10 TABLET ORAL 2 TIMES DAILY
Qty: 180 TABLET | Refills: 3 | Status: SHIPPED | OUTPATIENT
Start: 2024-01-10 | End: 2024-01-11

## 2024-01-10 NOTE — TELEPHONE ENCOUNTER
01/10/24 [facility] Refill Guide Reviewed     Received refill request for: Lisinopril 10 mg BID  Last OV was: 01/09/24  Labs/EKG: N/A  Rx sent to:  [facility]  Pt updated via [facility]   [facility] am

## 2024-01-19 ENCOUNTER — HOSPITAL ENCOUNTER (EMERGENCY)
Facility: CLINIC | Age: 69
Discharge: HOME | End: 2024-01-20
Attending: EMERGENCY MEDICINE | Admitting: EMERGENCY MEDICINE
Payer: MEDICARE

## 2024-01-19 ENCOUNTER — DOCUMENTATION ONLY (OUTPATIENT)
Dept: CARDIOLOGY | Facility: CLINIC | Age: 69
End: 2024-01-19

## 2024-01-19 DIAGNOSIS — R51.9 CHRONIC DAILY HEADACHE: ICD-10-CM

## 2024-01-19 DIAGNOSIS — R40.4 UNRESPONSIVE EPISODE: ICD-10-CM

## 2024-01-19 DIAGNOSIS — I10 PRIMARY HYPERTENSION: ICD-10-CM

## 2024-01-19 LAB
ANION GAP SERPL CALCULATED.3IONS-SCNC: 9 MMOL/L (ref 7–15)
BASOPHILS # BLD AUTO: 0 10E3/UL (ref 0–0.2)
BASOPHILS NFR BLD AUTO: 1 %
BUN SERPL-MCNC: 15 MG/DL (ref 8–23)
CALCIUM SERPL-MCNC: 9.3 MG/DL (ref 8.8–10.2)
CHLORIDE SERPL-SCNC: 104 MMOL/L (ref 98–107)
CREAT SERPL-MCNC: 0.93 MG/DL (ref 0.67–1.17)
DEPRECATED HCO3 PLAS-SCNC: 25 MMOL/L (ref 22–29)
EGFRCR SERPLBLD CKD-EPI 2021: >90 ML/MIN/1.73M2
EOSINOPHIL # BLD AUTO: 0.2 10E3/UL (ref 0–0.7)
EOSINOPHIL NFR BLD AUTO: 3 %
ERYTHROCYTE [DISTWIDTH] IN BLOOD BY AUTOMATED COUNT: 13 % (ref 10–15)
GLUCOSE SERPL-MCNC: 134 MG/DL (ref 70–99)
HCT VFR BLD AUTO: 42.5 % (ref 40–53)
HGB BLD-MCNC: 14.5 G/DL (ref 13.3–17.7)
HOLD SPECIMEN: NORMAL
IMM GRANULOCYTES # BLD: 0 10E3/UL
IMM GRANULOCYTES NFR BLD: 0 %
LYMPHOCYTES # BLD AUTO: 1.3 10E3/UL (ref 0.8–5.3)
LYMPHOCYTES NFR BLD AUTO: 24 %
MCH RBC QN AUTO: 29.5 PG (ref 26.5–33)
MCHC RBC AUTO-ENTMCNC: 34.1 G/DL (ref 31.5–36.5)
MCV RBC AUTO: 86 FL (ref 78–100)
MONOCYTES # BLD AUTO: 0.6 10E3/UL (ref 0–1.3)
MONOCYTES NFR BLD AUTO: 11 %
NEUTROPHILS # BLD AUTO: 3.3 10E3/UL (ref 1.6–8.3)
NEUTROPHILS NFR BLD AUTO: 61 %
NRBC # BLD AUTO: 0 10E3/UL
NRBC BLD AUTO-RTO: 0 /100
PLATELET # BLD AUTO: 175 10E3/UL (ref 150–450)
POTASSIUM SERPL-SCNC: 4 MMOL/L (ref 3.4–5.3)
RBC # BLD AUTO: 4.92 10E6/UL (ref 4.4–5.9)
SODIUM SERPL-SCNC: 138 MMOL/L (ref 135–145)
TROPONIN T SERPL HS-MCNC: 15 NG/L
TROPONIN T SERPL HS-MCNC: 18 NG/L
WBC # BLD AUTO: 5.4 10E3/UL (ref 4–11)

## 2024-01-19 PROCEDURE — 93308 TTE F-UP OR LMTD: CPT | Performed by: EMERGENCY MEDICINE

## 2024-01-19 PROCEDURE — 93005 ELECTROCARDIOGRAM TRACING: CPT | Mod: 59 | Performed by: EMERGENCY MEDICINE

## 2024-01-19 PROCEDURE — 99285 EMERGENCY DEPT VISIT HI MDM: CPT | Mod: 25 | Performed by: EMERGENCY MEDICINE

## 2024-01-19 PROCEDURE — 80048 BASIC METABOLIC PNL TOTAL CA: CPT | Performed by: EMERGENCY MEDICINE

## 2024-01-19 PROCEDURE — 93308 TTE F-UP OR LMTD: CPT | Mod: 26 | Performed by: EMERGENCY MEDICINE

## 2024-01-19 PROCEDURE — 99284 EMERGENCY DEPT VISIT MOD MDM: CPT | Mod: 25 | Performed by: EMERGENCY MEDICINE

## 2024-01-19 PROCEDURE — 36415 COLL VENOUS BLD VENIPUNCTURE: CPT | Performed by: EMERGENCY MEDICINE

## 2024-01-19 PROCEDURE — 84484 ASSAY OF TROPONIN QUANT: CPT | Performed by: EMERGENCY MEDICINE

## 2024-01-19 PROCEDURE — 93010 ELECTROCARDIOGRAM REPORT: CPT | Mod: 59 | Performed by: EMERGENCY MEDICINE

## 2024-01-19 PROCEDURE — 85025 COMPLETE CBC W/AUTO DIFF WBC: CPT | Performed by: EMERGENCY MEDICINE

## 2024-01-19 ASSESSMENT — ACTIVITIES OF DAILY LIVING (ADL): ADLS_ACUITY_SCORE: 37

## 2024-01-20 VITALS
OXYGEN SATURATION: 96 % | SYSTOLIC BLOOD PRESSURE: 151 MMHG | TEMPERATURE: 97.4 F | BODY MASS INDEX: 35.36 KG/M2 | DIASTOLIC BLOOD PRESSURE: 86 MMHG | HEART RATE: 60 BPM | RESPIRATION RATE: 24 BRPM | WEIGHT: 268 LBS

## 2024-01-20 NOTE — ED TRIAGE NOTES
Arrives via EMS from home with syncopal episode. When EMS arrived patient had returned to baseline, tried to stand & had 2nd syncopal episode, BPs initially very hypertensive-194/120, dropped about 20 points systolic right after 2nd syncopal episode. Notes episode of sharp chest pain after dinner that lasted maybe a couple mins & resolved on its own.   C/o ongoing headaches, memory seems foggy     Triage Assessment (Adult)       Row Name 01/19/24 1939          Triage Assessment    Airway WDL WDL        Respiratory WDL    Respiratory WDL WDL        Skin Circulation/Temperature WDL    Skin Circulation/Temperature WDL WDL        Cardiac WDL    Cardiac WDL WDL        Peripheral/Neurovascular WDL    Peripheral Neurovascular WDL WDL        Cognitive/Neuro/Behavioral WDL    Cognitive/Neuro/Behavioral WDL WDL        Alfredo Coma Scale    Best Eye Response 4-->(E4) spontaneous     Best Motor Response 6-->(M6) obeys commands     Best Verbal Response 5-->(V5) oriented     Alfredo Coma Scale Score 15

## 2024-01-20 NOTE — DISCHARGE INSTRUCTIONS
I am not sure what is causing these episodes but so far the tests are reassuring.  Continue your home medications.  Follow-up with your cardiologist regarding your blood pressure.  Follow-up in neurology clinic for recheck regarding your daily headaches to see if there are other medications we could try to help with your symptoms.  Return to the emergency department for chest pain, difficulty breathing, worsening episodes, or other concerns.
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
## 2024-01-21 ENCOUNTER — MYC MEDICAL ADVICE (OUTPATIENT)
Dept: CARDIOLOGY | Facility: CLINIC | Age: 69
End: 2024-01-21
Payer: MEDICARE

## 2024-01-21 DIAGNOSIS — I10 HYPERTENSION, UNSPECIFIED TYPE: Primary | ICD-10-CM

## 2024-01-26 RX ORDER — LABETALOL 100 MG/1
100 TABLET, FILM COATED ORAL 2 TIMES DAILY
Qty: 60 TABLET | Refills: 5 | Status: SHIPPED | OUTPATIENT
Start: 2024-01-26 | End: 2024-02-05

## 2024-02-04 PROBLEM — N50.812 LEFT TESTICULAR PAIN: Status: RESOLVED | Noted: 2023-09-21 | Resolved: 2024-02-04

## 2024-02-12 ENCOUNTER — MYC MEDICAL ADVICE (OUTPATIENT)
Dept: CARDIOLOGY | Facility: CLINIC | Age: 69
End: 2024-02-12
Payer: MEDICARE

## 2024-02-12 NOTE — CONFIDENTIAL NOTE
"Call received from pt to review remote interrogation, pt states he experienced chest was \"pounding extremely hard, woke me up out of a dead sleep...could feel it settling down and went back to sleep.  Felt like a big base drum.\"  Reiterated DAYAN Dunn (Dr Andre nurse) and encouraged pt to visit ER if feeling returns and is accompanied by pain, SOB, lightheadedness, ect.      Transmission not received, requested pt to resend.  Will continue to monitor.    DAYAN Tinoco  "

## 2024-02-12 NOTE — TELEPHONE ENCOUNTER
Called Pt asked him to send in a device check to see what his rhythm was this am. Gave the phone number for EP nurse to find out what was seen . Pt presently doing fine and is a symptomatic. Reviewed if develops fast heart rate again, and symptomatic, with CP, light headedness or dizziness he needs to go to [facility]. DAVE Trejo RN

## 2024-02-12 NOTE — TELEPHONE ENCOUNTER
Remote transmission received.  No events or arrhythmias noted, device working appropriately.  Pt updated.    DAYAN Tinoco    [employer] [place] (D) Remote PPM Device Check - Courtesy check for symptoms  AP: 96.4%  : 0.1%  Mode: AAIR<->DDDR 70/130  Presenting Rhythm: AP/VS  Historical underlying rhythm: SR in the 60's with occasional SD beats dropping into the 40-50s as of 7/2023  Heart Rate: adequate rates per histograms   Sensing: stable   Pacing Threshold: stable   Impedance: stable   Battery Status:  Estimated 11.1 years remaining until CARMITA/RRT  Atrial Arrhythmia: 9 mode switches recorded 1/19/24-2/12/24, V rates controlled 's bpm.  None correlate to reported smptoms.  AT/AF burden <0.1%. Taking xarelto.  Ventricular Arrhythmia: 0    Care Plan: No events or arrhythmias noted, device working appropriately.  Pt updated.    DAYAN Tinoco

## 2024-02-13 ENCOUNTER — OFFICE VISIT (OUTPATIENT)
Dept: UROLOGY | Facility: CLINIC | Age: 69
End: 2024-02-13
Payer: MEDICARE

## 2024-02-13 VITALS
BODY MASS INDEX: 35.52 KG/M2 | HEIGHT: 73 IN | DIASTOLIC BLOOD PRESSURE: 84 MMHG | OXYGEN SATURATION: 94 % | WEIGHT: 268 LBS | TEMPERATURE: 97.3 F | HEART RATE: 87 BPM | SYSTOLIC BLOOD PRESSURE: 154 MMHG

## 2024-02-13 DIAGNOSIS — R33.9 BLADDER RETENTION: ICD-10-CM

## 2024-02-13 DIAGNOSIS — R39.15 URINARY URGENCY: ICD-10-CM

## 2024-02-13 PROCEDURE — 99213 OFFICE O/P EST LOW 20 MIN: CPT | Performed by: STUDENT IN AN ORGANIZED HEALTH CARE EDUCATION/TRAINING PROGRAM

## 2024-02-13 RX ORDER — TAMSULOSIN HYDROCHLORIDE 0.4 MG/1
0.4 CAPSULE ORAL DAILY
Qty: 90 CAPSULE | Refills: 3 | Status: SHIPPED | OUTPATIENT
Start: 2024-02-13

## 2024-02-13 RX ORDER — FINASTERIDE 5 MG/1
5 TABLET, FILM COATED ORAL DAILY
Qty: 90 TABLET | Refills: 3 | Status: SHIPPED | OUTPATIENT
Start: 2024-02-13

## 2024-02-13 RX ORDER — OXYBUTYNIN CHLORIDE 5 MG/1
5 TABLET, EXTENDED RELEASE ORAL DAILY
Qty: 90 TABLET | Refills: 3 | Status: SHIPPED | OUTPATIENT
Start: 2024-02-13 | End: 2024-07-09

## 2024-02-13 ASSESSMENT — PAIN SCALES - GENERAL: PAINLEVEL: NO PAIN (0)
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
## 2024-02-13 NOTE — NURSING NOTE
"Initial BP (!) 154/84   Pulse 87   Temp 97.3  F (36.3  C) (Tympanic)   Ht 1.854 m (6' 1\")   Wt 121.6 kg (268 lb)   SpO2 94%   BMI 35.36 kg/m   Estimated body mass index is 35.36 kg/m  as calculated from the following:    Height as of this encounter: 1.854 m (6' 1\").    Weight as of this encounter: 121.6 kg (268 lb). .  Meri PEREZ CMA 02/13/24 9:58 AM  "

## 2024-02-19 ENCOUNTER — ANCILLARY PROCEDURE (OUTPATIENT)
Dept: CARDIOLOGY | Facility: CLINIC | Age: 69
End: 2024-02-19
Attending: INTERNAL MEDICINE
Payer: MEDICARE

## 2024-02-19 DIAGNOSIS — I49.5 SINUS NODE DYSFUNCTION (H): ICD-10-CM

## 2024-02-19 DIAGNOSIS — Z95.0 CARDIAC PACEMAKER IN SITU: ICD-10-CM

## 2024-02-19 PROCEDURE — 93296 REM INTERROG EVL PM/IDS: CPT | Performed by: INTERNAL MEDICINE

## 2024-02-19 PROCEDURE — 93294 REM INTERROG EVL PM/LDLS PM: CPT | Performed by: INTERNAL MEDICINE

## 2024-02-22 ENCOUNTER — OFFICE VISIT (OUTPATIENT)
Dept: AUDIOLOGY | Facility: CLINIC | Age: 69
End: 2024-02-22
Payer: MEDICARE

## 2024-02-22 DIAGNOSIS — H90.3 SENSORINEURAL HEARING LOSS (SNHL) OF BOTH EARS: Primary | ICD-10-CM

## 2024-02-22 PROCEDURE — V5299 HEARING SERVICE: HCPCS | Performed by: AUDIOLOGIST

## 2024-02-22 NOTE — PROGRESS NOTES
AUDIOLOGY REPORT: HEARING AID RECHECK    SUBJECTIVE: Stiven Nguyễn is a 69 year old male, :  1955, was seen in the Audiology Clinic at [facility] on 24 for a return check of their hearing aids. Patient was unaccompanied to today's visit.     Background:   Patient is here today with the complaint of needing to connect his hearing aids to his new phone.     Procedures:                             SIDE: Both                          : Re5ult                          TYPE: Linx 9 RITE                          S/N:                                       R: [id number]                                      L: [id number]                          WARRANTY: [facility]    The hearing aids were connected to the patient's new Android phone. Connection was tested and found to be intact. Hearing aids were able to be manipulated through the Re5ult Ish.     Plan:   Patient will return as needed for hearing aid concerns.     González Murrell CCC-A  Licensed Audiologist #[id number]  2024

## 2024-02-26 LAB
MDC_IDC_LEAD_CONNECTION_STATUS: NORMAL
MDC_IDC_LEAD_CONNECTION_STATUS: NORMAL
MDC_IDC_LEAD_IMPLANT_DT: NORMAL
MDC_IDC_LEAD_IMPLANT_DT: NORMAL
MDC_IDC_LEAD_LOCATION: NORMAL
MDC_IDC_LEAD_LOCATION: NORMAL
MDC_IDC_LEAD_LOCATION_DETAIL_1: NORMAL
MDC_IDC_LEAD_LOCATION_DETAIL_1: NORMAL
MDC_IDC_LEAD_MFG: NORMAL
MDC_IDC_LEAD_MFG: NORMAL
MDC_IDC_LEAD_MODEL: NORMAL
MDC_IDC_LEAD_MODEL: NORMAL
MDC_IDC_LEAD_POLARITY_TYPE: NORMAL
MDC_IDC_LEAD_POLARITY_TYPE: NORMAL
MDC_IDC_LEAD_SERIAL: NORMAL
MDC_IDC_LEAD_SERIAL: NORMAL
MDC_IDC_LEAD_SPECIAL_FUNCTION: NORMAL
MDC_IDC_LEAD_SPECIAL_FUNCTION: NORMAL
MDC_IDC_MSMT_BATTERY_DTM: NORMAL
MDC_IDC_MSMT_BATTERY_REMAINING_LONGEVITY: 132 MO
MDC_IDC_MSMT_BATTERY_RRT_TRIGGER: 2.62
MDC_IDC_MSMT_BATTERY_STATUS: NORMAL
MDC_IDC_MSMT_BATTERY_VOLTAGE: 3.02 V
MDC_IDC_MSMT_LEADCHNL_RA_IMPEDANCE_VALUE: 342 OHM
MDC_IDC_MSMT_LEADCHNL_RA_IMPEDANCE_VALUE: 437 OHM
MDC_IDC_MSMT_LEADCHNL_RA_PACING_THRESHOLD_AMPLITUDE: 0.75 V
MDC_IDC_MSMT_LEADCHNL_RA_PACING_THRESHOLD_PULSEWIDTH: 0.4 MS
MDC_IDC_MSMT_LEADCHNL_RA_SENSING_INTR_AMPL: 2.5 MV
MDC_IDC_MSMT_LEADCHNL_RA_SENSING_INTR_AMPL: 2.5 MV
MDC_IDC_MSMT_LEADCHNL_RV_IMPEDANCE_VALUE: 380 OHM
MDC_IDC_MSMT_LEADCHNL_RV_IMPEDANCE_VALUE: 456 OHM
MDC_IDC_MSMT_LEADCHNL_RV_PACING_THRESHOLD_AMPLITUDE: 0.88 V
MDC_IDC_MSMT_LEADCHNL_RV_PACING_THRESHOLD_PULSEWIDTH: 0.4 MS
MDC_IDC_MSMT_LEADCHNL_RV_SENSING_INTR_AMPL: 19.12 MV
MDC_IDC_MSMT_LEADCHNL_RV_SENSING_INTR_AMPL: 19.12 MV
MDC_IDC_PG_IMPLANT_DTM: NORMAL
MDC_IDC_PG_MFG: NORMAL
MDC_IDC_PG_MODEL: NORMAL
MDC_IDC_PG_SERIAL: NORMAL
MDC_IDC_PG_TYPE: NORMAL
MDC_IDC_SESS_CLINIC_NAME: NORMAL
MDC_IDC_SESS_DTM: NORMAL
MDC_IDC_SESS_TYPE: NORMAL
MDC_IDC_SET_BRADY_AT_MODE_SWITCH_RATE: 171 {BEATS}/MIN
MDC_IDC_SET_BRADY_HYSTRATE: NORMAL
MDC_IDC_SET_BRADY_LOWRATE: 70 {BEATS}/MIN
MDC_IDC_SET_BRADY_MAX_SENSOR_RATE: 130 {BEATS}/MIN
MDC_IDC_SET_BRADY_MAX_TRACKING_RATE: 130 {BEATS}/MIN
MDC_IDC_SET_BRADY_MODE: NORMAL
MDC_IDC_SET_BRADY_NIGHT_RATE: 60 {BEATS}/MIN
MDC_IDC_SET_BRADY_PAV_DELAY_LOW: 180 MS
MDC_IDC_SET_BRADY_SAV_DELAY_LOW: 150 MS
MDC_IDC_SET_LEADCHNL_RA_PACING_AMPLITUDE: 1.5 V
MDC_IDC_SET_LEADCHNL_RA_PACING_ANODE_ELECTRODE_1: NORMAL
MDC_IDC_SET_LEADCHNL_RA_PACING_ANODE_LOCATION_1: NORMAL
MDC_IDC_SET_LEADCHNL_RA_PACING_CAPTURE_MODE: NORMAL
MDC_IDC_SET_LEADCHNL_RA_PACING_CATHODE_ELECTRODE_1: NORMAL
MDC_IDC_SET_LEADCHNL_RA_PACING_CATHODE_LOCATION_1: NORMAL
MDC_IDC_SET_LEADCHNL_RA_PACING_POLARITY: NORMAL
MDC_IDC_SET_LEADCHNL_RA_PACING_PULSEWIDTH: 0.4 MS
MDC_IDC_SET_LEADCHNL_RA_SENSING_ANODE_ELECTRODE_1: NORMAL
MDC_IDC_SET_LEADCHNL_RA_SENSING_ANODE_LOCATION_1: NORMAL
MDC_IDC_SET_LEADCHNL_RA_SENSING_CATHODE_ELECTRODE_1: NORMAL
MDC_IDC_SET_LEADCHNL_RA_SENSING_CATHODE_LOCATION_1: NORMAL
MDC_IDC_SET_LEADCHNL_RA_SENSING_POLARITY: NORMAL
MDC_IDC_SET_LEADCHNL_RA_SENSING_SENSITIVITY: 0.3 MV
MDC_IDC_SET_LEADCHNL_RV_PACING_AMPLITUDE: 2 V
MDC_IDC_SET_LEADCHNL_RV_PACING_ANODE_ELECTRODE_1: NORMAL
MDC_IDC_SET_LEADCHNL_RV_PACING_ANODE_LOCATION_1: NORMAL
MDC_IDC_SET_LEADCHNL_RV_PACING_CAPTURE_MODE: NORMAL
MDC_IDC_SET_LEADCHNL_RV_PACING_CATHODE_ELECTRODE_1: NORMAL
MDC_IDC_SET_LEADCHNL_RV_PACING_CATHODE_LOCATION_1: NORMAL
MDC_IDC_SET_LEADCHNL_RV_PACING_POLARITY: NORMAL
MDC_IDC_SET_LEADCHNL_RV_PACING_PULSEWIDTH: 0.4 MS
MDC_IDC_SET_LEADCHNL_RV_SENSING_ANODE_ELECTRODE_1: NORMAL
MDC_IDC_SET_LEADCHNL_RV_SENSING_ANODE_LOCATION_1: NORMAL
MDC_IDC_SET_LEADCHNL_RV_SENSING_CATHODE_ELECTRODE_1: NORMAL
MDC_IDC_SET_LEADCHNL_RV_SENSING_CATHODE_LOCATION_1: NORMAL
MDC_IDC_SET_LEADCHNL_RV_SENSING_POLARITY: NORMAL
MDC_IDC_SET_LEADCHNL_RV_SENSING_SENSITIVITY: 0.9 MV
MDC_IDC_SET_ZONE_DETECTION_INTERVAL: 350 MS
MDC_IDC_SET_ZONE_DETECTION_INTERVAL: 400 MS
MDC_IDC_SET_ZONE_STATUS: NORMAL
MDC_IDC_SET_ZONE_STATUS: NORMAL
MDC_IDC_SET_ZONE_TYPE: NORMAL
MDC_IDC_SET_ZONE_VENDOR_TYPE: NORMAL
MDC_IDC_STAT_AT_BURDEN_PERCENT: 0 %
MDC_IDC_STAT_AT_DTM_END: NORMAL
MDC_IDC_STAT_AT_DTM_START: NORMAL
MDC_IDC_STAT_BRADY_AP_VP_PERCENT: 0.4 %
MDC_IDC_STAT_BRADY_AP_VS_PERCENT: 98.09 %
MDC_IDC_STAT_BRADY_AS_VP_PERCENT: 0 %
MDC_IDC_STAT_BRADY_AS_VS_PERCENT: 1.51 %
MDC_IDC_STAT_BRADY_DTM_END: NORMAL
MDC_IDC_STAT_BRADY_DTM_START: NORMAL
MDC_IDC_STAT_BRADY_RA_PERCENT_PACED: 98.46 %
MDC_IDC_STAT_BRADY_RV_PERCENT_PACED: 0.4 %
MDC_IDC_STAT_EPISODE_RECENT_COUNT: 0
MDC_IDC_STAT_EPISODE_RECENT_COUNT_DTM_END: NORMAL
MDC_IDC_STAT_EPISODE_RECENT_COUNT_DTM_START: NORMAL
MDC_IDC_STAT_EPISODE_TOTAL_COUNT: 0
MDC_IDC_STAT_EPISODE_TOTAL_COUNT: 18
MDC_IDC_STAT_EPISODE_TOTAL_COUNT: 7
MDC_IDC_STAT_EPISODE_TOTAL_COUNT_DTM_END: NORMAL
MDC_IDC_STAT_EPISODE_TOTAL_COUNT_DTM_START: NORMAL
MDC_IDC_STAT_EPISODE_TYPE: NORMAL
MDC_IDC_STAT_TACHYTHERAPY_RECENT_DTM_END: NORMAL
MDC_IDC_STAT_TACHYTHERAPY_RECENT_DTM_START: NORMAL
MDC_IDC_STAT_TACHYTHERAPY_TOTAL_DTM_END: NORMAL
MDC_IDC_STAT_TACHYTHERAPY_TOTAL_DTM_START: NORMAL

## 2024-02-28 NOTE — PROGRESS NOTES
[facility] received on 1/19/24 from [facility] after patient presented to the ED following a syncopal and unresponsive episode. PPM was interrogated, results as below:    [employer] [place] (D) PPM - [facility]  Presenting Rhythm: AP-VS 60s  Battery Status: 11.2 years  Leads: stable    Mode: AAIR-DDDR     AP: 95.8%    : <0.1%    Episodes: none  Heart Rate: excellent variability, mostly from 60-100s per histogram    Stable device check.  DAYAN Escamilla

## 2024-03-01 ENCOUNTER — TELEPHONE (OUTPATIENT)
Dept: CARDIOLOGY | Facility: CLINIC | Age: 69
End: 2024-03-01
Payer: MEDICARE

## 2024-03-01 NOTE — TELEPHONE ENCOUNTER
Called Diann after leaving messages on [email or web address] x 2 first without answer.     Confirmed that Verapamil  mg daily (capsule) was Rx'd on 2/20/2023, #90, 1 refill ([facility]). Unsure why Verapamil SR was delivered.    [place] (sp?) Pharmacist at [facility] indicated Verapamil  mg capsule (generic for Verelan) was a 12h formulation and the SR capsules that were sent were a 24h formulation. This is contrary to [facility] pharmacy tab indicating this is a 24 hour extended release capsule         Due to this discrepancy, called [facility] and spoke with pharmacist, who confirmed that Verapamil ER (Verelan) capsule WAS a 24 hour formulation.     Called back to [place] pharmacist at [facility]     ** Calan SR ** is extended tablet but can be given one-two times daily, similar to Veralan ER capsule.    Tablet would be less expensive, so when done with current Verapamil SR capsule (sent today) can switch back to SR tablet (can be less expensive, can take 1-2x/day)    BPs 2/27 and 2/29  176/110  165/100  No headaches.  BPs not dropping. No falls/syncope.    75 minutes spent.  March 1, 2024 at 6:08 PM

## 2024-03-01 NOTE — TELEPHONE ENCOUNTER
"Pt would like you to call him directly to discuss his verapamil.     [facility] \"verapamil SR\" which is not what is ordered and not what was e-prescribed to them on 2/20/2024.     [facility] was informed [facility] that the ER and SR were not interchangable and the SR is significantly more expensive than the ER.     RN reviewed last clinic note and chart and informed pt that the ER is what was ordered and sent to pharmacy.     Pt would like provider to call him directly to discuss this. Pt states that he is leaving town this evening for several days, no access to emails and [email or web address], but can receive phone calls. Pt reports that he has enough medication to get him thru his trip and will need the correct medication filled for him upon his return home.     Chasity Serrano RN    "

## 2024-03-11 ENCOUNTER — DOCUMENTATION ONLY (OUTPATIENT)
Dept: CARDIOLOGY | Facility: CLINIC | Age: 69
End: 2024-03-11
Payer: MEDICARE

## 2024-03-13 ENCOUNTER — MYC MEDICAL ADVICE (OUTPATIENT)
Dept: CARDIOLOGY | Facility: CLINIC | Age: 69
End: 2024-03-13
Payer: MEDICARE

## 2024-03-13 DIAGNOSIS — I10 HYPERTENSION, UNSPECIFIED TYPE: ICD-10-CM

## 2024-03-15 ENCOUNTER — OFFICE VISIT (OUTPATIENT)
Dept: UROLOGY | Facility: CLINIC | Age: 69
End: 2024-03-15
Payer: MEDICARE

## 2024-03-15 VITALS
BODY MASS INDEX: 35.52 KG/M2 | HEART RATE: 89 BPM | SYSTOLIC BLOOD PRESSURE: 163 MMHG | WEIGHT: 268 LBS | OXYGEN SATURATION: 95 % | DIASTOLIC BLOOD PRESSURE: 83 MMHG | HEIGHT: 73 IN | TEMPERATURE: 97.6 F

## 2024-03-15 DIAGNOSIS — R39.15 URINARY URGENCY: Primary | ICD-10-CM

## 2024-03-15 PROCEDURE — 51798 US URINE CAPACITY MEASURE: CPT | Performed by: STUDENT IN AN ORGANIZED HEALTH CARE EDUCATION/TRAINING PROGRAM

## 2024-03-15 PROCEDURE — 99214 OFFICE O/P EST MOD 30 MIN: CPT | Mod: 25 | Performed by: STUDENT IN AN ORGANIZED HEALTH CARE EDUCATION/TRAINING PROGRAM

## 2024-03-15 ASSESSMENT — PAIN SCALES - GENERAL: PAINLEVEL: NO PAIN (0)
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
## 2024-03-15 NOTE — NURSING NOTE
"Initial BP (!) 163/83   Pulse 89   Temp 97.6  F (36.4  C) (Tympanic)   Ht 1.854 m (6' 1\")   Wt 121.6 kg (268 lb)   SpO2 95%   BMI 35.36 kg/m   Estimated body mass index is 35.36 kg/m  as calculated from the following:    Height as of this encounter: 1.854 m (6' 1\").    Weight as of this encounter: 121.6 kg (268 lb). .  Active order to obtain bladder scan? Yes   Name of ordering provider:  Carmen Jin  Bladder scan preformed post void Yes about 10 minutes prior.  Bladder scan reveled 61ML  Provider notified?  Yes    Meri PEREZ CMA        "

## 2024-03-18 RX ORDER — LABETALOL 300 MG/1
300 TABLET, FILM COATED ORAL 3 TIMES DAILY
Qty: 90 TABLET | Refills: 1 | Status: SHIPPED | OUTPATIENT
Start: 2024-03-18 | End: 2024-03-25

## 2024-03-18 RX ORDER — LABETALOL 300 MG/1
300 TABLET, FILM COATED ORAL 3 TIMES DAILY
Qty: 60 TABLET | Refills: 1 | OUTPATIENT
Start: 2024-03-18

## 2024-03-19 ENCOUNTER — APPOINTMENT (OUTPATIENT)
Dept: CT IMAGING | Facility: CLINIC | Age: 69
End: 2024-03-19
Attending: EMERGENCY MEDICINE
Payer: MEDICARE

## 2024-03-19 ENCOUNTER — HOSPITAL ENCOUNTER (EMERGENCY)
Facility: CLINIC | Age: 69
Discharge: HOME | End: 2024-03-19
Attending: EMERGENCY MEDICINE | Admitting: EMERGENCY MEDICINE
Payer: MEDICARE

## 2024-03-19 VITALS
OXYGEN SATURATION: 96 % | HEART RATE: 74 BPM | RESPIRATION RATE: 18 BRPM | WEIGHT: 267.09 LBS | TEMPERATURE: 98 F | HEIGHT: 73 IN | DIASTOLIC BLOOD PRESSURE: 98 MMHG | BODY MASS INDEX: 35.4 KG/M2 | SYSTOLIC BLOOD PRESSURE: 177 MMHG

## 2024-03-19 DIAGNOSIS — R10.9 LEFT FLANK PAIN: ICD-10-CM

## 2024-03-19 LAB
ALBUMIN UR-MCNC: 30 MG/DL
ANION GAP SERPL CALCULATED.3IONS-SCNC: 8 MMOL/L (ref 7–15)
APPEARANCE UR: ABNORMAL
BASOPHILS # BLD AUTO: 0 10E3/UL (ref 0–0.2)
BASOPHILS NFR BLD AUTO: 1 %
BILIRUB UR QL STRIP: NEGATIVE
BUN SERPL-MCNC: 18.8 MG/DL (ref 8–23)
CALCIUM SERPL-MCNC: 9.5 MG/DL (ref 8.8–10.2)
CHLORIDE SERPL-SCNC: 105 MMOL/L (ref 98–107)
CK SERPL-CCNC: 256 U/L (ref 39–308)
COLOR UR AUTO: YELLOW
CREAT SERPL-MCNC: 0.99 MG/DL (ref 0.67–1.17)
DEPRECATED HCO3 PLAS-SCNC: 28 MMOL/L (ref 22–29)
EGFRCR SERPLBLD CKD-EPI 2021: 83 ML/MIN/1.73M2
EOSINOPHIL # BLD AUTO: 0.2 10E3/UL (ref 0–0.7)
EOSINOPHIL NFR BLD AUTO: 3 %
ERYTHROCYTE [DISTWIDTH] IN BLOOD BY AUTOMATED COUNT: 13.5 % (ref 10–15)
GLUCOSE SERPL-MCNC: 122 MG/DL (ref 70–99)
GLUCOSE UR STRIP-MCNC: NEGATIVE MG/DL
HCT VFR BLD AUTO: 42.6 % (ref 40–53)
HGB BLD-MCNC: 14.8 G/DL (ref 13.3–17.7)
HGB UR QL STRIP: ABNORMAL
HOLD SPECIMEN: NORMAL
HOLD SPECIMEN: NORMAL
IMM GRANULOCYTES # BLD: 0 10E3/UL
IMM GRANULOCYTES NFR BLD: 0 %
KETONES UR STRIP-MCNC: NEGATIVE MG/DL
LEUKOCYTE ESTERASE UR QL STRIP: NEGATIVE
LYMPHOCYTES # BLD AUTO: 1.4 10E3/UL (ref 0.8–5.3)
LYMPHOCYTES NFR BLD AUTO: 25 %
MCH RBC QN AUTO: 30.1 PG (ref 26.5–33)
MCHC RBC AUTO-ENTMCNC: 34.7 G/DL (ref 31.5–36.5)
MCV RBC AUTO: 87 FL (ref 78–100)
MONOCYTES # BLD AUTO: 0.5 10E3/UL (ref 0–1.3)
MONOCYTES NFR BLD AUTO: 9 %
MUCOUS THREADS #/AREA URNS LPF: PRESENT /LPF
NEUTROPHILS # BLD AUTO: 3.5 10E3/UL (ref 1.6–8.3)
NEUTROPHILS NFR BLD AUTO: 62 %
NITRATE UR QL: NEGATIVE
NRBC # BLD AUTO: 0 10E3/UL
NRBC BLD AUTO-RTO: 0 /100
PH UR STRIP: 5 [PH] (ref 5–7)
PLATELET # BLD AUTO: 171 10E3/UL (ref 150–450)
POTASSIUM SERPL-SCNC: 4.4 MMOL/L (ref 3.4–5.3)
RBC # BLD AUTO: 4.91 10E6/UL (ref 4.4–5.9)
RBC URINE: 84 /HPF
SODIUM SERPL-SCNC: 141 MMOL/L (ref 135–145)
SP GR UR STRIP: 1.02 (ref 1–1.03)
SQUAMOUS EPITHELIAL: <1 /HPF
UROBILINOGEN UR STRIP-MCNC: 2 MG/DL
WBC # BLD AUTO: 5.7 10E3/UL (ref 4–11)
WBC URINE: 2 /HPF

## 2024-03-19 PROCEDURE — 80048 BASIC METABOLIC PNL TOTAL CA: CPT | Performed by: EMERGENCY MEDICINE

## 2024-03-19 PROCEDURE — 82550 ASSAY OF CK (CPK): CPT | Performed by: EMERGENCY MEDICINE

## 2024-03-19 PROCEDURE — 250N000013 HC RX MED GY IP 250 OP 250 PS 637: Performed by: EMERGENCY MEDICINE

## 2024-03-19 PROCEDURE — 74176 CT ABD & PELVIS W/O CONTRAST: CPT | Mod: MG

## 2024-03-19 PROCEDURE — 85049 AUTOMATED PLATELET COUNT: CPT | Performed by: EMERGENCY MEDICINE

## 2024-03-19 PROCEDURE — 81001 URINALYSIS AUTO W/SCOPE: CPT | Performed by: EMERGENCY MEDICINE

## 2024-03-19 PROCEDURE — 99284 EMERGENCY DEPT VISIT MOD MDM: CPT | Performed by: EMERGENCY MEDICINE

## 2024-03-19 PROCEDURE — 36415 COLL VENOUS BLD VENIPUNCTURE: CPT | Performed by: EMERGENCY MEDICINE

## 2024-03-19 PROCEDURE — 99284 EMERGENCY DEPT VISIT MOD MDM: CPT | Mod: 25 | Performed by: EMERGENCY MEDICINE

## 2024-03-19 RX ORDER — LIDOCAINE 4 G/G
2 PATCH TOPICAL
Status: DISCONTINUED | OUTPATIENT
Start: 2024-03-19 | End: 2024-03-20 | Stop reason: HOSPADM

## 2024-03-19 RX ORDER — OXYCODONE AND ACETAMINOPHEN 5; 325 MG/1; MG/1
1 TABLET ORAL EVERY 6 HOURS PRN
Qty: 6 TABLET | Refills: 0 | Status: SHIPPED | OUTPATIENT
Start: 2024-03-19 | End: 2024-07-14

## 2024-03-19 RX ADMIN — LIDOCAINE 2 PATCH: 4 PATCH TOPICAL at 23:16

## 2024-03-19 ASSESSMENT — COLUMBIA-SUICIDE SEVERITY RATING SCALE - C-SSRS
6. HAVE YOU EVER DONE ANYTHING, STARTED TO DO ANYTHING, OR PREPARED TO DO ANYTHING TO END YOUR LIFE?: NO
1. IN THE PAST MONTH, HAVE YOU WISHED YOU WERE DEAD OR WISHED YOU COULD GO TO SLEEP AND NOT WAKE UP?: NO
2. HAVE YOU ACTUALLY HAD ANY THOUGHTS OF KILLING YOURSELF IN THE PAST MONTH?: NO

## 2024-03-19 ASSESSMENT — ACTIVITIES OF DAILY LIVING (ADL)
ADLS_ACUITY_SCORE: 37
ADLS_ACUITY_SCORE: 35
ADLS_ACUITY_SCORE: 37
ADLS_ACUITY_SCORE: 37

## 2024-03-20 NOTE — DISCHARGE INSTRUCTIONS
Return if symptoms worsen or new symptoms develop.  Follow-up with primary care physician next available.  Take Tylenol for pain.  Take Percocet for severe pain.  No evidence of kidney stone is present at this time.  If any fevers not controlled with ibuprofen or Tylenol worsening abdominal pain nausea vomiting decreased urine output or other symptoms present please return for recheck this could be musculoskeletal or neurogenic in nature and also could be related to a medication.  I discussed with urologist this week about your medications.
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
## 2024-03-20 NOTE — ED TRIAGE NOTES
Pt presents with left flank pain that began 1 hr ago. Pt reports hx of kidney stones. Pt endorses pain staying in same location does not radiate to front. Denies hematuria.      Triage Assessment (Adult)       Row Name 03/19/24 1910          Triage Assessment    Airway WDL WDL        Respiratory WDL    Respiratory WDL WDL        Skin Circulation/Temperature WDL    Skin Circulation/Temperature WDL WDL        Cardiac WDL    Cardiac WDL WDL        Peripheral/Neurovascular WDL    Peripheral Neurovascular WDL WDL        Cognitive/Neuro/Behavioral WDL    Cognitive/Neuro/Behavioral WDL WDL

## 2024-04-02 ENCOUNTER — MYC MEDICAL ADVICE (OUTPATIENT)
Dept: CARDIOLOGY | Facility: CLINIC | Age: 69
End: 2024-04-02
Payer: MEDICARE

## 2024-04-02 DIAGNOSIS — I10 BENIGN ESSENTIAL HYPERTENSION: ICD-10-CM

## 2024-04-02 RX ORDER — LISINOPRIL 20 MG/1
20 TABLET ORAL 2 TIMES DAILY
Qty: 180 TABLET | Refills: 1 | Status: SHIPPED | OUTPATIENT
Start: 2024-04-02 | End: 2024-04-12

## 2024-04-11 RX ORDER — LISINOPRIL 20 MG/1
20 TABLET ORAL 2 TIMES DAILY
Qty: 180 TABLET | Refills: 3 | Status: CANCELLED | OUTPATIENT
Start: 2024-04-11

## 2024-04-12 ENCOUNTER — TELEPHONE (OUTPATIENT)
Dept: CARDIOLOGY | Facility: CLINIC | Age: 69
End: 2024-04-12
Payer: MEDICARE

## 2024-04-12 RX ORDER — LISINOPRIL 20 MG/1
20 TABLET ORAL 2 TIMES DAILY
Qty: 180 TABLET | Refills: 3 | Status: SHIPPED | OUTPATIENT
Start: 2024-04-12

## 2024-04-12 RX ORDER — VERAPAMIL HYDROCHLORIDE 180 MG/1
180 TABLET, EXTENDED RELEASE ORAL 2 TIMES DAILY
Qty: 180 TABLET | Refills: 3 | Status: SHIPPED | OUTPATIENT
Start: 2024-04-12 | End: 2024-09-26 | Stop reason: ALTCHOICE

## 2024-04-12 NOTE — TELEPHONE ENCOUNTER
Oh dear - weaning is always tricky, so I'm glad he caught the error in labetalol. Always better to wean more slowly than too quickly, so it's OK!    No other changes needed - agree with lisinopril 40 mg DAILY (or 20 mg BID, which is what I thought he was taking and just updated the Rx for). Labetalol 150 mg ONCE a day 4/11-4/18 , then stop 4/19 as in [id number] message from 4/2    Gayle  April 12, 2024 at 10:29 AM

## 2024-04-12 NOTE — TELEPHONE ENCOUNTER
Spoke with pt. Pt reassured that the medication error was caught early and that he will be OK to start correct dosing this evening with Lisinopril and correcting dosing with both Lisinopril and Labetalol tomorrow AM.     Pt will continue to monitor BP and heart rate and make note of any other symptoms with the readings to have those at his clinic appointments.     Pt will call with any further questions or concerns.    Chasity Serrano RN

## 2024-04-12 NOTE — TELEPHONE ENCOUNTER
"Pt called this morning with concerns for medication error at home.     Pt reports that with the dosage change of lisinopril, he has accidentally been taking 40mg in the morning and the evening since 4/2/2024.  .  Pt also reports that the weaning off of the labetalol \"got goofed up a little bit too, but I will start cutting them in half to be at 150mg tomorrow\".     Pt concerned for any potential issues with this. Pt denies any adverse symptoms.   BP last evening was 160/88, HR 73. No headache, no shorteness of breath, no dizziness, no chest pain.     Please advise of anything else that should be done at this time. RN encouraged pt to start correct dosing of lisinopril this evening and 150mg labetalol tomorrow morning since he already took his AM meds prior to calling.     Pt's next clinic appointment is in July.     Chasity Serrano RN    "

## 2024-05-13 ENCOUNTER — MYC MEDICAL ADVICE (OUTPATIENT)
Dept: CARDIOLOGY | Facility: CLINIC | Age: 69
End: 2024-05-13
Payer: MEDICARE

## 2024-05-13 DIAGNOSIS — Z95.0 CARDIAC PACEMAKER IN SITU: ICD-10-CM

## 2024-05-13 DIAGNOSIS — I49.5 SICK SINUS SYNDROME (H): Primary | ICD-10-CM

## 2024-05-14 NOTE — TELEPHONE ENCOUNTER
Device -could we please get a remote device check on Arya?  He has been having more pounding, and has had arrhythmias in the past despite flecainide therapy.  He had a lot of pounding on 5/13 @ ~0730.    THANKS!    Gayle

## 2024-05-15 NOTE — TELEPHONE ENCOUNTER
Gayle NIÑO requested a courtesy remote PPM check. Called patient. Asked him to send a remote check and explained how to send manually. Pt is sending now.     ADDENDUM 10:05am.     Remote check received. Presenting rhythm sows AP/VS. No arrhythmias recorded since last check on 2/18/2024. AP 96%,  0.1%. PVC counters show 5 PVC Singles/hour and 0.2 PVC Runs/hour (a PVC run is defined as 2-4 beats).     Called pt back. I told him his pacemaker check was completely normal and there were no arrhythmias. Pt asked what could be causing his heart pounding. He said when he feels it, it lasts for a couple minutes at a time. I speculated that he might be feeling PVCs. I explained what PVCs are, and I told him that overall he has a low number of PVCs, but perhaps sometimes they come and go, and he might feel it when he is having more. I told pt I will send this information to Gayle NIÑO to see what she thinks.

## 2024-06-04 ENCOUNTER — ANCILLARY PROCEDURE (OUTPATIENT)
Dept: CARDIOLOGY | Facility: CLINIC | Age: 69
End: 2024-06-04
Attending: INTERNAL MEDICINE
Payer: MEDICARE

## 2024-06-04 DIAGNOSIS — Z95.0 CARDIAC PACEMAKER IN SITU: ICD-10-CM

## 2024-06-04 DIAGNOSIS — I49.5 SINUS NODE DYSFUNCTION (H): ICD-10-CM

## 2024-06-04 DIAGNOSIS — I49.5 SINUS NODE DYSFUNCTION (H): Primary | ICD-10-CM

## 2024-06-04 PROCEDURE — 93294 REM INTERROG EVL PM/LDLS PM: CPT | Performed by: INTERNAL MEDICINE

## 2024-06-04 PROCEDURE — 93296 REM INTERROG EVL PM/IDS: CPT | Performed by: INTERNAL MEDICINE

## 2024-06-05 LAB
MDC_IDC_LEAD_CONNECTION_STATUS: NORMAL
MDC_IDC_LEAD_CONNECTION_STATUS: NORMAL
MDC_IDC_LEAD_IMPLANT_DT: NORMAL
MDC_IDC_LEAD_IMPLANT_DT: NORMAL
MDC_IDC_LEAD_LOCATION: NORMAL
MDC_IDC_LEAD_LOCATION: NORMAL
MDC_IDC_LEAD_LOCATION_DETAIL_1: NORMAL
MDC_IDC_LEAD_LOCATION_DETAIL_1: NORMAL
MDC_IDC_LEAD_MFG: NORMAL
MDC_IDC_LEAD_MFG: NORMAL
MDC_IDC_LEAD_MODEL: NORMAL
MDC_IDC_LEAD_MODEL: NORMAL
MDC_IDC_LEAD_POLARITY_TYPE: NORMAL
MDC_IDC_LEAD_POLARITY_TYPE: NORMAL
MDC_IDC_LEAD_SERIAL: NORMAL
MDC_IDC_LEAD_SERIAL: NORMAL
MDC_IDC_LEAD_SPECIAL_FUNCTION: NORMAL
MDC_IDC_LEAD_SPECIAL_FUNCTION: NORMAL
MDC_IDC_MSMT_BATTERY_DTM: NORMAL
MDC_IDC_MSMT_BATTERY_REMAINING_LONGEVITY: 128 MO
MDC_IDC_MSMT_BATTERY_RRT_TRIGGER: 2.62
MDC_IDC_MSMT_BATTERY_STATUS: NORMAL
MDC_IDC_MSMT_BATTERY_VOLTAGE: 3.02 V
MDC_IDC_MSMT_LEADCHNL_RA_IMPEDANCE_VALUE: 323 OHM
MDC_IDC_MSMT_LEADCHNL_RA_IMPEDANCE_VALUE: 418 OHM
MDC_IDC_MSMT_LEADCHNL_RA_PACING_THRESHOLD_AMPLITUDE: 0.5 V
MDC_IDC_MSMT_LEADCHNL_RA_PACING_THRESHOLD_PULSEWIDTH: 0.4 MS
MDC_IDC_MSMT_LEADCHNL_RA_SENSING_INTR_AMPL: 2.75 MV
MDC_IDC_MSMT_LEADCHNL_RA_SENSING_INTR_AMPL: 2.75 MV
MDC_IDC_MSMT_LEADCHNL_RV_IMPEDANCE_VALUE: 380 OHM
MDC_IDC_MSMT_LEADCHNL_RV_IMPEDANCE_VALUE: 456 OHM
MDC_IDC_MSMT_LEADCHNL_RV_PACING_THRESHOLD_AMPLITUDE: 0.75 V
MDC_IDC_MSMT_LEADCHNL_RV_PACING_THRESHOLD_PULSEWIDTH: 0.4 MS
MDC_IDC_MSMT_LEADCHNL_RV_SENSING_INTR_AMPL: 18.62 MV
MDC_IDC_MSMT_LEADCHNL_RV_SENSING_INTR_AMPL: 18.62 MV
MDC_IDC_PG_IMPLANT_DTM: NORMAL
MDC_IDC_PG_MFG: NORMAL
MDC_IDC_PG_MODEL: NORMAL
MDC_IDC_PG_SERIAL: NORMAL
MDC_IDC_PG_TYPE: NORMAL
MDC_IDC_SESS_CLINIC_NAME: NORMAL
MDC_IDC_SESS_DTM: NORMAL
MDC_IDC_SESS_TYPE: NORMAL
MDC_IDC_SET_BRADY_AT_MODE_SWITCH_RATE: 171 {BEATS}/MIN
MDC_IDC_SET_BRADY_HYSTRATE: NORMAL
MDC_IDC_SET_BRADY_LOWRATE: 70 {BEATS}/MIN
MDC_IDC_SET_BRADY_MAX_SENSOR_RATE: 130 {BEATS}/MIN
MDC_IDC_SET_BRADY_MAX_TRACKING_RATE: 130 {BEATS}/MIN
MDC_IDC_SET_BRADY_MODE: NORMAL
MDC_IDC_SET_BRADY_NIGHT_RATE: 60 {BEATS}/MIN
MDC_IDC_SET_BRADY_PAV_DELAY_LOW: 180 MS
MDC_IDC_SET_BRADY_SAV_DELAY_LOW: 150 MS
MDC_IDC_SET_LEADCHNL_RA_PACING_AMPLITUDE: 1.5 V
MDC_IDC_SET_LEADCHNL_RA_PACING_ANODE_ELECTRODE_1: NORMAL
MDC_IDC_SET_LEADCHNL_RA_PACING_ANODE_LOCATION_1: NORMAL
MDC_IDC_SET_LEADCHNL_RA_PACING_CAPTURE_MODE: NORMAL
MDC_IDC_SET_LEADCHNL_RA_PACING_CATHODE_ELECTRODE_1: NORMAL
MDC_IDC_SET_LEADCHNL_RA_PACING_CATHODE_LOCATION_1: NORMAL
MDC_IDC_SET_LEADCHNL_RA_PACING_POLARITY: NORMAL
MDC_IDC_SET_LEADCHNL_RA_PACING_PULSEWIDTH: 0.4 MS
MDC_IDC_SET_LEADCHNL_RA_SENSING_ANODE_ELECTRODE_1: NORMAL
MDC_IDC_SET_LEADCHNL_RA_SENSING_ANODE_LOCATION_1: NORMAL
MDC_IDC_SET_LEADCHNL_RA_SENSING_CATHODE_ELECTRODE_1: NORMAL
MDC_IDC_SET_LEADCHNL_RA_SENSING_CATHODE_LOCATION_1: NORMAL
MDC_IDC_SET_LEADCHNL_RA_SENSING_POLARITY: NORMAL
MDC_IDC_SET_LEADCHNL_RA_SENSING_SENSITIVITY: 0.3 MV
MDC_IDC_SET_LEADCHNL_RV_PACING_AMPLITUDE: 2 V
MDC_IDC_SET_LEADCHNL_RV_PACING_ANODE_ELECTRODE_1: NORMAL
MDC_IDC_SET_LEADCHNL_RV_PACING_ANODE_LOCATION_1: NORMAL
MDC_IDC_SET_LEADCHNL_RV_PACING_CAPTURE_MODE: NORMAL
MDC_IDC_SET_LEADCHNL_RV_PACING_CATHODE_ELECTRODE_1: NORMAL
MDC_IDC_SET_LEADCHNL_RV_PACING_CATHODE_LOCATION_1: NORMAL
MDC_IDC_SET_LEADCHNL_RV_PACING_POLARITY: NORMAL
MDC_IDC_SET_LEADCHNL_RV_PACING_PULSEWIDTH: 0.4 MS
MDC_IDC_SET_LEADCHNL_RV_SENSING_ANODE_ELECTRODE_1: NORMAL
MDC_IDC_SET_LEADCHNL_RV_SENSING_ANODE_LOCATION_1: NORMAL
MDC_IDC_SET_LEADCHNL_RV_SENSING_CATHODE_ELECTRODE_1: NORMAL
MDC_IDC_SET_LEADCHNL_RV_SENSING_CATHODE_LOCATION_1: NORMAL
MDC_IDC_SET_LEADCHNL_RV_SENSING_POLARITY: NORMAL
MDC_IDC_SET_LEADCHNL_RV_SENSING_SENSITIVITY: 0.9 MV
MDC_IDC_SET_ZONE_DETECTION_INTERVAL: 350 MS
MDC_IDC_SET_ZONE_DETECTION_INTERVAL: 400 MS
MDC_IDC_SET_ZONE_STATUS: NORMAL
MDC_IDC_SET_ZONE_STATUS: NORMAL
MDC_IDC_SET_ZONE_TYPE: NORMAL
MDC_IDC_SET_ZONE_VENDOR_TYPE: NORMAL
MDC_IDC_STAT_AT_BURDEN_PERCENT: 0 %
MDC_IDC_STAT_AT_DTM_END: NORMAL
MDC_IDC_STAT_AT_DTM_START: NORMAL
MDC_IDC_STAT_BRADY_AP_VP_PERCENT: 0.04 %
MDC_IDC_STAT_BRADY_AP_VS_PERCENT: 93.18 %
MDC_IDC_STAT_BRADY_AS_VP_PERCENT: 0.01 %
MDC_IDC_STAT_BRADY_AS_VS_PERCENT: 6.77 %
MDC_IDC_STAT_BRADY_DTM_END: NORMAL
MDC_IDC_STAT_BRADY_DTM_START: NORMAL
MDC_IDC_STAT_BRADY_RA_PERCENT_PACED: 93.21 %
MDC_IDC_STAT_BRADY_RV_PERCENT_PACED: 0.05 %
MDC_IDC_STAT_EPISODE_RECENT_COUNT: 0
MDC_IDC_STAT_EPISODE_RECENT_COUNT_DTM_END: NORMAL
MDC_IDC_STAT_EPISODE_RECENT_COUNT_DTM_START: NORMAL
MDC_IDC_STAT_EPISODE_TOTAL_COUNT: 0
MDC_IDC_STAT_EPISODE_TOTAL_COUNT: 18
MDC_IDC_STAT_EPISODE_TOTAL_COUNT: 7
MDC_IDC_STAT_EPISODE_TOTAL_COUNT_DTM_END: NORMAL
MDC_IDC_STAT_EPISODE_TOTAL_COUNT_DTM_START: NORMAL
MDC_IDC_STAT_EPISODE_TYPE: NORMAL
MDC_IDC_STAT_TACHYTHERAPY_RECENT_DTM_END: NORMAL
MDC_IDC_STAT_TACHYTHERAPY_RECENT_DTM_START: NORMAL
MDC_IDC_STAT_TACHYTHERAPY_TOTAL_DTM_END: NORMAL
MDC_IDC_STAT_TACHYTHERAPY_TOTAL_DTM_START: NORMAL

## 2024-06-10 DIAGNOSIS — I20.1 CORONARY VASOSPASM (H): ICD-10-CM

## 2024-06-10 RX ORDER — ISOSORBIDE MONONITRATE 60 MG/1
60 TABLET, EXTENDED RELEASE ORAL DAILY
Qty: 90 TABLET | Refills: 3 | Status: SHIPPED | OUTPATIENT
Start: 2024-06-10 | End: 2024-07-23

## 2024-06-10 NOTE — TELEPHONE ENCOUNTER
[facility] Cardiology Refill Guideline reviewed.  Medication meets criteria for refill. Clotilde Lauren RN Cardiology Mary 10, 2024, 10:06 AM

## 2024-06-20 DIAGNOSIS — R06.02 SOB (SHORTNESS OF BREATH): ICD-10-CM

## 2024-06-20 DIAGNOSIS — I10 HYPERTENSION, UNSPECIFIED TYPE: ICD-10-CM

## 2024-06-20 DIAGNOSIS — R73.09 ABNORMAL BLOOD SUGAR: ICD-10-CM

## 2024-06-20 DIAGNOSIS — I49.5 SINUS NODE DYSFUNCTION (H): Primary | ICD-10-CM

## 2024-06-20 DIAGNOSIS — R55 SYNCOPE, UNSPECIFIED SYNCOPE TYPE: ICD-10-CM

## 2024-06-20 NOTE — PROGRESS NOTES
Per Dr. Andre, labs ordered to be done before their visit 7/9    bmp  lipid profile   hbA1c     Message sent to Scheduling to have set up in [place]    Gayle

## 2024-07-06 ENCOUNTER — HEALTH MAINTENANCE LETTER (OUTPATIENT)
Age: 69
End: 2024-07-06

## 2024-07-08 ENCOUNTER — LAB (OUTPATIENT)
Dept: LAB | Facility: CLINIC | Age: 69
End: 2024-07-08
Payer: MEDICARE

## 2024-07-08 ENCOUNTER — ANCILLARY PROCEDURE (OUTPATIENT)
Dept: CARDIOLOGY | Facility: CLINIC | Age: 69
End: 2024-07-08
Attending: INTERNAL MEDICINE
Payer: MEDICARE

## 2024-07-08 ENCOUNTER — MYC MEDICAL ADVICE (OUTPATIENT)
Dept: CARDIOLOGY | Facility: CLINIC | Age: 69
End: 2024-07-08

## 2024-07-08 DIAGNOSIS — I10 HYPERTENSION, UNSPECIFIED TYPE: ICD-10-CM

## 2024-07-08 DIAGNOSIS — Z95.0 CARDIAC PACEMAKER IN SITU: ICD-10-CM

## 2024-07-08 DIAGNOSIS — R73.09 ABNORMAL BLOOD SUGAR: ICD-10-CM

## 2024-07-08 DIAGNOSIS — R55 SYNCOPE, UNSPECIFIED SYNCOPE TYPE: ICD-10-CM

## 2024-07-08 DIAGNOSIS — I49.5 SINUS NODE DYSFUNCTION (H): ICD-10-CM

## 2024-07-08 DIAGNOSIS — R06.02 SOB (SHORTNESS OF BREATH): ICD-10-CM

## 2024-07-08 LAB
ALT SERPL W P-5'-P-CCNC: 21 U/L (ref 0–70)
ANION GAP SERPL CALCULATED.3IONS-SCNC: 10 MMOL/L (ref 7–15)
BUN SERPL-MCNC: 19.2 MG/DL (ref 8–23)
CALCIUM SERPL-MCNC: 9.3 MG/DL (ref 8.8–10.2)
CHLORIDE SERPL-SCNC: 103 MMOL/L (ref 98–107)
CHOLEST SERPL-MCNC: 131 MG/DL
CREAT SERPL-MCNC: 1.03 MG/DL (ref 0.67–1.17)
DEPRECATED HCO3 PLAS-SCNC: 25 MMOL/L (ref 22–29)
EGFRCR SERPLBLD CKD-EPI 2021: 80 ML/MIN/1.73M2
FASTING STATUS PATIENT QL REPORTED: YES
FASTING STATUS PATIENT QL REPORTED: YES
GLUCOSE SERPL-MCNC: 131 MG/DL (ref 70–99)
HBA1C MFR BLD: 6.8 % (ref 0–5.6)
HDLC SERPL-MCNC: 41 MG/DL
LDLC SERPL CALC-MCNC: 76 MG/DL
NONHDLC SERPL-MCNC: 90 MG/DL
POTASSIUM SERPL-SCNC: 3.5 MMOL/L (ref 3.4–5.3)
SODIUM SERPL-SCNC: 138 MMOL/L (ref 135–145)
TRIGL SERPL-MCNC: 69 MG/DL

## 2024-07-08 PROCEDURE — 84460 ALANINE AMINO (ALT) (SGPT): CPT | Performed by: PHYSICIAN ASSISTANT

## 2024-07-08 PROCEDURE — 36415 COLL VENOUS BLD VENIPUNCTURE: CPT | Performed by: PHYSICIAN ASSISTANT

## 2024-07-08 PROCEDURE — 83036 HEMOGLOBIN GLYCOSYLATED A1C: CPT | Performed by: PHYSICIAN ASSISTANT

## 2024-07-08 PROCEDURE — 80061 LIPID PANEL: CPT | Performed by: PHYSICIAN ASSISTANT

## 2024-07-08 PROCEDURE — 80048 BASIC METABOLIC PNL TOTAL CA: CPT | Performed by: PHYSICIAN ASSISTANT

## 2024-07-09 ENCOUNTER — OFFICE VISIT (OUTPATIENT)
Dept: CARDIOLOGY | Facility: CLINIC | Age: 69
End: 2024-07-09
Payer: MEDICARE

## 2024-07-09 VITALS
DIASTOLIC BLOOD PRESSURE: 82 MMHG | WEIGHT: 264 LBS | SYSTOLIC BLOOD PRESSURE: 136 MMHG | OXYGEN SATURATION: 96 % | HEIGHT: 73 IN | HEART RATE: 72 BPM | BODY MASS INDEX: 34.99 KG/M2

## 2024-07-09 DIAGNOSIS — I10 BENIGN ESSENTIAL HYPERTENSION: Primary | ICD-10-CM

## 2024-07-09 LAB
MDC_IDC_LEAD_CONNECTION_STATUS: NORMAL
MDC_IDC_LEAD_CONNECTION_STATUS: NORMAL
MDC_IDC_LEAD_IMPLANT_DT: NORMAL
MDC_IDC_LEAD_IMPLANT_DT: NORMAL
MDC_IDC_LEAD_LOCATION: NORMAL
MDC_IDC_LEAD_LOCATION: NORMAL
MDC_IDC_LEAD_LOCATION_DETAIL_1: NORMAL
MDC_IDC_LEAD_LOCATION_DETAIL_1: NORMAL
MDC_IDC_LEAD_MFG: NORMAL
MDC_IDC_LEAD_MFG: NORMAL
MDC_IDC_LEAD_MODEL: NORMAL
MDC_IDC_LEAD_MODEL: NORMAL
MDC_IDC_LEAD_POLARITY_TYPE: NORMAL
MDC_IDC_LEAD_POLARITY_TYPE: NORMAL
MDC_IDC_LEAD_SERIAL: NORMAL
MDC_IDC_LEAD_SERIAL: NORMAL
MDC_IDC_LEAD_SPECIAL_FUNCTION: NORMAL
MDC_IDC_LEAD_SPECIAL_FUNCTION: NORMAL
MDC_IDC_MSMT_BATTERY_DTM: NORMAL
MDC_IDC_MSMT_BATTERY_REMAINING_LONGEVITY: 128 MO
MDC_IDC_MSMT_BATTERY_RRT_TRIGGER: 2.62
MDC_IDC_MSMT_BATTERY_STATUS: NORMAL
MDC_IDC_MSMT_BATTERY_VOLTAGE: 3.02 V
MDC_IDC_MSMT_LEADCHNL_RA_IMPEDANCE_VALUE: 342 OHM
MDC_IDC_MSMT_LEADCHNL_RA_IMPEDANCE_VALUE: 437 OHM
MDC_IDC_MSMT_LEADCHNL_RA_PACING_THRESHOLD_AMPLITUDE: 0.62 V
MDC_IDC_MSMT_LEADCHNL_RA_PACING_THRESHOLD_PULSEWIDTH: 0.4 MS
MDC_IDC_MSMT_LEADCHNL_RA_SENSING_INTR_AMPL: 2.75 MV
MDC_IDC_MSMT_LEADCHNL_RA_SENSING_INTR_AMPL: 2.75 MV
MDC_IDC_MSMT_LEADCHNL_RV_IMPEDANCE_VALUE: 380 OHM
MDC_IDC_MSMT_LEADCHNL_RV_IMPEDANCE_VALUE: 475 OHM
MDC_IDC_MSMT_LEADCHNL_RV_PACING_THRESHOLD_AMPLITUDE: 0.88 V
MDC_IDC_MSMT_LEADCHNL_RV_PACING_THRESHOLD_PULSEWIDTH: 0.4 MS
MDC_IDC_MSMT_LEADCHNL_RV_SENSING_INTR_AMPL: 20.38 MV
MDC_IDC_MSMT_LEADCHNL_RV_SENSING_INTR_AMPL: 20.38 MV
MDC_IDC_PG_IMPLANT_DTM: NORMAL
MDC_IDC_PG_MFG: NORMAL
MDC_IDC_PG_MODEL: NORMAL
MDC_IDC_PG_SERIAL: NORMAL
MDC_IDC_PG_TYPE: NORMAL
MDC_IDC_SESS_CLINIC_NAME: NORMAL
MDC_IDC_SESS_DTM: NORMAL
MDC_IDC_SESS_TYPE: NORMAL
MDC_IDC_SET_BRADY_AT_MODE_SWITCH_RATE: 171 {BEATS}/MIN
MDC_IDC_SET_BRADY_HYSTRATE: NORMAL
MDC_IDC_SET_BRADY_LOWRATE: 70 {BEATS}/MIN
MDC_IDC_SET_BRADY_MAX_SENSOR_RATE: 130 {BEATS}/MIN
MDC_IDC_SET_BRADY_MAX_TRACKING_RATE: 130 {BEATS}/MIN
MDC_IDC_SET_BRADY_MODE: NORMAL
MDC_IDC_SET_BRADY_NIGHT_RATE: 60 {BEATS}/MIN
MDC_IDC_SET_BRADY_PAV_DELAY_LOW: 180 MS
MDC_IDC_SET_BRADY_SAV_DELAY_LOW: 150 MS
MDC_IDC_SET_LEADCHNL_RA_PACING_AMPLITUDE: 1.5 V
MDC_IDC_SET_LEADCHNL_RA_PACING_ANODE_ELECTRODE_1: NORMAL
MDC_IDC_SET_LEADCHNL_RA_PACING_ANODE_LOCATION_1: NORMAL
MDC_IDC_SET_LEADCHNL_RA_PACING_CAPTURE_MODE: NORMAL
MDC_IDC_SET_LEADCHNL_RA_PACING_CATHODE_ELECTRODE_1: NORMAL
MDC_IDC_SET_LEADCHNL_RA_PACING_CATHODE_LOCATION_1: NORMAL
MDC_IDC_SET_LEADCHNL_RA_PACING_POLARITY: NORMAL
MDC_IDC_SET_LEADCHNL_RA_PACING_PULSEWIDTH: 0.4 MS
MDC_IDC_SET_LEADCHNL_RA_SENSING_ANODE_ELECTRODE_1: NORMAL
MDC_IDC_SET_LEADCHNL_RA_SENSING_ANODE_LOCATION_1: NORMAL
MDC_IDC_SET_LEADCHNL_RA_SENSING_CATHODE_ELECTRODE_1: NORMAL
MDC_IDC_SET_LEADCHNL_RA_SENSING_CATHODE_LOCATION_1: NORMAL
MDC_IDC_SET_LEADCHNL_RA_SENSING_POLARITY: NORMAL
MDC_IDC_SET_LEADCHNL_RA_SENSING_SENSITIVITY: 0.3 MV
MDC_IDC_SET_LEADCHNL_RV_PACING_AMPLITUDE: 2 V
MDC_IDC_SET_LEADCHNL_RV_PACING_ANODE_ELECTRODE_1: NORMAL
MDC_IDC_SET_LEADCHNL_RV_PACING_ANODE_LOCATION_1: NORMAL
MDC_IDC_SET_LEADCHNL_RV_PACING_CAPTURE_MODE: NORMAL
MDC_IDC_SET_LEADCHNL_RV_PACING_CATHODE_ELECTRODE_1: NORMAL
MDC_IDC_SET_LEADCHNL_RV_PACING_CATHODE_LOCATION_1: NORMAL
MDC_IDC_SET_LEADCHNL_RV_PACING_POLARITY: NORMAL
MDC_IDC_SET_LEADCHNL_RV_PACING_PULSEWIDTH: 0.4 MS
MDC_IDC_SET_LEADCHNL_RV_SENSING_ANODE_ELECTRODE_1: NORMAL
MDC_IDC_SET_LEADCHNL_RV_SENSING_ANODE_LOCATION_1: NORMAL
MDC_IDC_SET_LEADCHNL_RV_SENSING_CATHODE_ELECTRODE_1: NORMAL
MDC_IDC_SET_LEADCHNL_RV_SENSING_CATHODE_LOCATION_1: NORMAL
MDC_IDC_SET_LEADCHNL_RV_SENSING_POLARITY: NORMAL
MDC_IDC_SET_LEADCHNL_RV_SENSING_SENSITIVITY: 0.9 MV
MDC_IDC_SET_ZONE_DETECTION_INTERVAL: 350 MS
MDC_IDC_SET_ZONE_DETECTION_INTERVAL: 400 MS
MDC_IDC_SET_ZONE_STATUS: NORMAL
MDC_IDC_SET_ZONE_STATUS: NORMAL
MDC_IDC_SET_ZONE_TYPE: NORMAL
MDC_IDC_SET_ZONE_VENDOR_TYPE: NORMAL
MDC_IDC_STAT_AT_BURDEN_PERCENT: 0 %
MDC_IDC_STAT_AT_DTM_END: NORMAL
MDC_IDC_STAT_AT_DTM_START: NORMAL
MDC_IDC_STAT_BRADY_AP_VP_PERCENT: 0.02 %
MDC_IDC_STAT_BRADY_AP_VS_PERCENT: 89.85 %
MDC_IDC_STAT_BRADY_AS_VP_PERCENT: 0.01 %
MDC_IDC_STAT_BRADY_AS_VS_PERCENT: 10.11 %
MDC_IDC_STAT_BRADY_DTM_END: NORMAL
MDC_IDC_STAT_BRADY_DTM_START: NORMAL
MDC_IDC_STAT_BRADY_RA_PERCENT_PACED: 89.87 %
MDC_IDC_STAT_BRADY_RV_PERCENT_PACED: 0.04 %
MDC_IDC_STAT_EPISODE_RECENT_COUNT: 0
MDC_IDC_STAT_EPISODE_RECENT_COUNT_DTM_END: NORMAL
MDC_IDC_STAT_EPISODE_RECENT_COUNT_DTM_START: NORMAL
MDC_IDC_STAT_EPISODE_TOTAL_COUNT: 0
MDC_IDC_STAT_EPISODE_TOTAL_COUNT: 18
MDC_IDC_STAT_EPISODE_TOTAL_COUNT: 7
MDC_IDC_STAT_EPISODE_TOTAL_COUNT_DTM_END: NORMAL
MDC_IDC_STAT_EPISODE_TOTAL_COUNT_DTM_START: NORMAL
MDC_IDC_STAT_EPISODE_TYPE: NORMAL
MDC_IDC_STAT_TACHYTHERAPY_RECENT_DTM_END: NORMAL
MDC_IDC_STAT_TACHYTHERAPY_RECENT_DTM_START: NORMAL
MDC_IDC_STAT_TACHYTHERAPY_TOTAL_DTM_END: NORMAL
MDC_IDC_STAT_TACHYTHERAPY_TOTAL_DTM_START: NORMAL

## 2024-07-09 PROCEDURE — 99215 OFFICE O/P EST HI 40 MIN: CPT | Performed by: INTERNAL MEDICINE

## 2024-07-09 RX ORDER — INDAPAMIDE 1.25 MG/1
1.25 TABLET ORAL EVERY MORNING
Qty: 30 TABLET | Refills: 3 | Status: ON HOLD | OUTPATIENT
Start: 2024-07-09 | End: 2024-07-16
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
## 2024-07-14 ENCOUNTER — APPOINTMENT (OUTPATIENT)
Dept: CT IMAGING | Facility: CLINIC | Age: 69
DRG: 069 | End: 2024-07-14
Attending: FAMILY MEDICINE
Payer: MEDICARE

## 2024-07-14 ENCOUNTER — APPOINTMENT (OUTPATIENT)
Dept: CT IMAGING | Facility: CLINIC | Age: 69
DRG: 069 | End: 2024-07-14
Payer: MEDICARE

## 2024-07-14 ENCOUNTER — HOSPITAL ENCOUNTER (EMERGENCY)
Facility: CLINIC | Age: 69
Discharge: OTHER INSTITUTION | DRG: 069 | End: 2024-07-15
Attending: FAMILY MEDICINE | Admitting: FAMILY MEDICINE
Payer: MEDICARE

## 2024-07-14 DIAGNOSIS — R41.82 ALTERED MENTAL STATUS, UNSPECIFIED ALTERED MENTAL STATUS TYPE: ICD-10-CM

## 2024-07-14 DIAGNOSIS — R29.898 LEFT ARM WEAKNESS: ICD-10-CM

## 2024-07-14 LAB
ANION GAP SERPL CALCULATED.3IONS-SCNC: 10 MMOL/L (ref 7–15)
APTT PPP: 30 SECONDS (ref 22–38)
BASOPHILS # BLD AUTO: 0 10E3/UL (ref 0–0.2)
BASOPHILS NFR BLD AUTO: 0 %
BUN SERPL-MCNC: 18.6 MG/DL (ref 8–23)
CALCIUM SERPL-MCNC: 9.5 MG/DL (ref 8.8–10.2)
CHLORIDE SERPL-SCNC: 105 MMOL/L (ref 98–107)
CREAT SERPL-MCNC: 0.99 MG/DL (ref 0.67–1.17)
DEPRECATED HCO3 PLAS-SCNC: 25 MMOL/L (ref 22–29)
EGFRCR SERPLBLD CKD-EPI 2021: 83 ML/MIN/1.73M2
EOSINOPHIL # BLD AUTO: 0.2 10E3/UL (ref 0–0.7)
EOSINOPHIL NFR BLD AUTO: 3 %
ERYTHROCYTE [DISTWIDTH] IN BLOOD BY AUTOMATED COUNT: 12.9 % (ref 10–15)
ETHANOL SERPL-MCNC: <0.01 G/DL
GLUCOSE BLDC GLUCOMTR-MCNC: 129 MG/DL (ref 70–99)
GLUCOSE SERPL-MCNC: 140 MG/DL (ref 70–99)
HCT VFR BLD AUTO: 41.7 % (ref 40–53)
HGB BLD-MCNC: 14.8 G/DL (ref 13.3–17.7)
IMM GRANULOCYTES # BLD: 0 10E3/UL
IMM GRANULOCYTES NFR BLD: 0 %
INR PPP: 1.18 (ref 0.85–1.15)
LYMPHOCYTES # BLD AUTO: 1.3 10E3/UL (ref 0.8–5.3)
LYMPHOCYTES NFR BLD AUTO: 18 %
MCH RBC QN AUTO: 30.4 PG (ref 26.5–33)
MCHC RBC AUTO-ENTMCNC: 35.5 G/DL (ref 31.5–36.5)
MCV RBC AUTO: 86 FL (ref 78–100)
MONOCYTES # BLD AUTO: 0.8 10E3/UL (ref 0–1.3)
MONOCYTES NFR BLD AUTO: 11 %
NEUTROPHILS # BLD AUTO: 5 10E3/UL (ref 1.6–8.3)
NEUTROPHILS NFR BLD AUTO: 69 %
NRBC # BLD AUTO: 0 10E3/UL
NRBC BLD AUTO-RTO: 0 /100
PLATELET # BLD AUTO: 176 10E3/UL (ref 150–450)
POTASSIUM SERPL-SCNC: 4.2 MMOL/L (ref 3.4–5.3)
RBC # BLD AUTO: 4.87 10E6/UL (ref 4.4–5.9)
SODIUM SERPL-SCNC: 140 MMOL/L (ref 135–145)
TROPONIN T SERPL HS-MCNC: 22 NG/L
TROPONIN T SERPL HS-MCNC: 23 NG/L
WBC # BLD AUTO: 7.3 10E3/UL (ref 4–11)

## 2024-07-14 PROCEDURE — 85610 PROTHROMBIN TIME: CPT | Performed by: FAMILY MEDICINE

## 2024-07-14 PROCEDURE — 250N000009 HC RX 250: Performed by: FAMILY MEDICINE

## 2024-07-14 PROCEDURE — 36415 COLL VENOUS BLD VENIPUNCTURE: CPT | Performed by: FAMILY MEDICINE

## 2024-07-14 PROCEDURE — 250N000013 HC RX MED GY IP 250 OP 250 PS 637: Performed by: FAMILY MEDICINE

## 2024-07-14 PROCEDURE — 250N000011 HC RX IP 250 OP 636: Performed by: FAMILY MEDICINE

## 2024-07-14 PROCEDURE — 72125 CT NECK SPINE W/O DYE: CPT | Mod: MG

## 2024-07-14 PROCEDURE — 70450 CT HEAD/BRAIN W/O DYE: CPT | Mod: MA

## 2024-07-14 PROCEDURE — 84484 ASSAY OF TROPONIN QUANT: CPT | Performed by: FAMILY MEDICINE

## 2024-07-14 PROCEDURE — 70496 CT ANGIOGRAPHY HEAD: CPT | Mod: MA

## 2024-07-14 PROCEDURE — 71260 CT THORAX DX C+: CPT | Mod: MG

## 2024-07-14 PROCEDURE — 70491 CT SOFT TISSUE NECK W/DYE: CPT | Mod: MG

## 2024-07-14 PROCEDURE — 80048 BASIC METABOLIC PNL TOTAL CA: CPT | Performed by: FAMILY MEDICINE

## 2024-07-14 PROCEDURE — 99291 CRITICAL CARE FIRST HOUR: CPT | Mod: 25 | Performed by: FAMILY MEDICINE

## 2024-07-14 PROCEDURE — G0426 INPT/ED TELECONSULT50: HCPCS | Mod: G0 | Performed by: PSYCHIATRY & NEUROLOGY

## 2024-07-14 PROCEDURE — 85025 COMPLETE CBC W/AUTO DIFF WBC: CPT | Performed by: FAMILY MEDICINE

## 2024-07-14 PROCEDURE — 82962 GLUCOSE BLOOD TEST: CPT

## 2024-07-14 PROCEDURE — 85730 THROMBOPLASTIN TIME PARTIAL: CPT | Performed by: FAMILY MEDICINE

## 2024-07-14 PROCEDURE — 82077 ASSAY SPEC XCP UR&BREATH IA: CPT | Performed by: FAMILY MEDICINE

## 2024-07-14 RX ORDER — ACETAMINOPHEN 500 MG
1000 TABLET ORAL ONCE
Status: COMPLETED | OUTPATIENT
Start: 2024-07-14 | End: 2024-07-14

## 2024-07-14 RX ORDER — IOPAMIDOL 755 MG/ML
72 INJECTION, SOLUTION INTRAVASCULAR ONCE
Status: COMPLETED | OUTPATIENT
Start: 2024-07-14 | End: 2024-07-14

## 2024-07-14 RX ORDER — IOPAMIDOL 755 MG/ML
67 INJECTION, SOLUTION INTRAVASCULAR ONCE
Status: COMPLETED | OUTPATIENT
Start: 2024-07-14 | End: 2024-07-14

## 2024-07-14 RX ADMIN — IOPAMIDOL 72 ML: 755 INJECTION, SOLUTION INTRAVENOUS at 14:09

## 2024-07-14 RX ADMIN — ACETAMINOPHEN 1000 MG: 500 TABLET, FILM COATED ORAL at 15:32

## 2024-07-14 RX ADMIN — SODIUM CHLORIDE 100 ML: 9 INJECTION, SOLUTION INTRAVENOUS at 13:22

## 2024-07-14 RX ADMIN — SODIUM CHLORIDE 80 ML: 9 INJECTION, SOLUTION INTRAVENOUS at 14:10

## 2024-07-14 RX ADMIN — IOPAMIDOL 67 ML: 755 INJECTION, SOLUTION INTRAVENOUS at 13:22

## 2024-07-14 ASSESSMENT — ACTIVITIES OF DAILY LIVING (ADL)
ADLS_ACUITY_SCORE: 37

## 2024-07-14 ASSESSMENT — COLUMBIA-SUICIDE SEVERITY RATING SCALE - C-SSRS
1. IN THE PAST MONTH, HAVE YOU WISHED YOU WERE DEAD OR WISHED YOU COULD GO TO SLEEP AND NOT WAKE UP?: NO
6. HAVE YOU EVER DONE ANYTHING, STARTED TO DO ANYTHING, OR PREPARED TO DO ANYTHING TO END YOUR LIFE?: NO
2. HAVE YOU ACTUALLY HAD ANY THOUGHTS OF KILLING YOURSELF IN THE PAST MONTH?: NO

## 2024-07-14 NOTE — CONSULTS
"[facility]     Stroke Code Note          History of Present Illness     Chief Complaint: Cerebrovascular Accident      Stiven Nguyễn is a 69 year old male hypertension, coronary disease, type 2 diabetes, previous PEs on Xarelto(last dose today at 10:30 AM) and the chest pain and was given nitro at 12:30 PM on 7/14/2024.  After that EMS noted that he was having slow speech and right upper extremity weakness.  In the ED he was noted to have right upper and lower extremity weakness without any antigravity movements and his speech was slow with dysarthria.  No actual aphasia noted.Last known well 12:30 PM, 7/14/24.  As per RN he also had an episode of eyes rolling back around 1:40 PM.         Past Medical History     Stroke risk factors: hypertension, coronary disease, type 2 diabetes    Preadmission antithrombotic regimen: Xarelto    Modified Hughes Score (Pre-morbid)    -  0                   Assessment and Plan       1.  Possible acute ischemic stroke.  Rule out epidural hematoma     Intravenous Thrombolysis  Not given due to:   - Improving symptoms     Endovascular Treatment  Not initiated due to absence of proximal vessel occlusion     Plan:  -Transfer to the [facility] for MRI brain with and without contrast and MRI C-spine with and without contrast.  -Hold anticoagulation until imaging is done     ___________________________________________________________________    The Stroke Staff is VICK Dimas MD  Vascular Neurology Fellow    To page me or covering stroke neurology team member, click here: AMCOM  Choose \"On Call\" tab at top, then select \"NEUROLOGY/ALL SITES\" from middle drop-down box, press Enter, then look for \"stroke\" or \"telestroke\" for your site.  ___________________________________________________________________        Imaging/Labs   (personally reviewed yes)    CT head: No hemorrhage  CTA head/neck: No clear " large vessel occlusion  CT Perfusion head: n/a         Physical Examination     BP: (!) 147/81   Pulse: 71   Resp: 12   Temp: 98.3  F (36.8  C)   Temp src: Oral   SpO2: 94 %   O2 Device: None (Room air)   Weight: 120.2 kg (265 lb)    Wt Readings from Last 2 Encounters:   07/14/24 120.2 kg (265 lb)   07/09/24 119.7 kg (264 lb)       Neuro Exam  Mental Status:  alert, oriented x 3, follows commands, speech clear and fluent, naming and repetition normal  Cranial Nerves:  visual fields intact (tested by nurse), EOMI with normal smooth pursuit, facial sensation intact and symmetric (tested by nurse), facial movements symmetric, hearing not formally tested but intact to conversation, no dysarthria, shoulder shrug equal bilaterally, tongue protrusion midline  Motor:  no abnormal movements, antigravity in the right upper and right lower extremity.  No drift in right upper and lower extremity.  Reflexes:  unable to test (telestroke)  Sensory:  light touch sensation intact and symmetric throughout upper and lower extremities (assessed by nurse), no extinction on double simultaneous stimulation (assessed by nurse)  Coordination:  normal finger-to-nose and heel-to-shin bilaterally without dysmetria, rapid alternating movements symmetric  Station/Gait:  unable to test due to telestroke        Stroke Scales       NIHSS  1a. Level of Consciousness 0-->Alert, keenly responsive   1b. LOC Questions 0-->Answers both questions correctly   1c. LOC Commands 0-->Performs both tasks correctly   2.   Best Gaze 0-->Normal   3.   Visual 0-->No visual loss   4.   Facial Palsy 0-->Normal symmetrical movements   5a. Motor Arm, Left 0-->No drift, limb holds 90 (or 45) degrees for full 10 secs   5b. Motor Arm, Right 1-->Drift, limb holds 90 (or 45) degrees, but drifts down before full 10 secs, does not hit bed or other support   6a. Motor Leg, Left 0-->No drift, leg holds 30 degree position for full 5 secs   6b. Motor Leg, right 1-->Drift, leg  falls by the end of the 5-sec period but does not hit bed   7.   Limb Ataxia 0-->Absent   8.   Sensory 0-->Normal, no sensory loss   9.   Best Language 0-->No aphasia, normal   10. Dysarthria 0-->Normal   11. Extinction and Inattention  0-->No abnormality   Total 2 (07/14/24 1344)            Labs     CBC  Lab Results   Component Value Date    HGB 14.8 07/14/2024    HCT 41.7 07/14/2024    WBC 7.3 07/14/2024     07/14/2024       BMP  Lab Results   Component Value Date     07/14/2024    POTASSIUM 4.2 07/14/2024    CHLORIDE 105 07/14/2024    CO2 25 07/14/2024    BUN 18.6 07/14/2024    CR 0.99 07/14/2024     (H) 07/14/2024    NICKO 9.5 07/14/2024       INR  INR   Date Value Ref Range Status   07/14/2024 1.18 (H) 0.85 - 1.15 Final   02/18/2022 1.33 (H) 0.85 - 1.15 Final   01/07/2022 1.32 (H) 0.85 - 1.15 Final       Data   Stroke Code Data  (for stroke code with tele)  Stroke code activated 07/15/24  1318   First stroke provider response 07/14/24  1320   Video start time        Video end time        Last known normal 07/14/24  1230   Time of discovery (or onset of symptoms)  07/14/24  1230   Head CT read by Stroke Neuro Provider 07/14/24  1341   Was stroke code de-escalated? Yes  07/14/24  1600     Telestroke Service Details  Type of service telemedicine diagnostic assessment of acute neurological changes   Reason telemedicine is appropriate patient requires assessment with a specialist for diagnosis and treatment of neurological symptoms   Mode of transmission secure interactive audio and video communication per Carla   Originating site (patient location) [facility]    Distant site (provider location) [facility]        Clinically Significant Risk Factors Present on Admission               # Drug Induced Coagulation Defect: home medication list includes an anticoagulant medication    # Hypertension: Noted on problem list            # DMII: A1C = 6.8 % (Ref  "range: 0.0 - 5.6 %) within past 6 months    # Obesity: Estimated body mass index is 34.96 kg/m  as calculated from the following:    Height as of this encounter: 1.854 m (6' 1\").    Weight as of this encounter: 120.2 kg (265 lb).        # Pacemaker present         Time Spent on this Encounter   Billing:    "

## 2024-07-14 NOTE — MEDICATION SCRIBE - ADMISSION MEDICATION HISTORY
Medication Scribe Admission Medication History    Admission medication history is complete. The information provided in this note is only as accurate as the sources available at the time of the update.    Information Source(s): Patient, [facility] records[facility] via  with patient in room with med list on his phone and finished at desk.    Pertinent Information: He had his med list on his phone with times of day that he takes them.  Has not checked his blood glucose at home in a couple weeks.    Changes made to PTA medication list:  Added: None  Deleted: Oxycodone-Acetaminophen 5-325 mg.  Changed: Metformin to taking in pm and Xarelto to taking in AM.    Allergies reviewed with patient and updates made in EHR: yes, no change.    Medication History Completed By: Ciara Schmitt 7/14/2024 2:50 PM    Prior to Admission medications    Medication Sig Last Dose Taking? Auth Provider Long Term End Date   Acetaminophen (TYLENOL PO) Take 1,000 mg by mouth every 8 hours as needed for mild pain or fever  Unknown at prn Yes Reported, Patient     atorvastatin (LIPITOR) 10 MG tablet Take 1 tablet by mouth every evening 7/13/2024 at pm Yes Reported, Patient Yes    blood glucose monitoring (NO BRAND SPECIFIED) meter device kit Use to test blood sugar 1-2 times daily or as directed. Past Month Yes Reported, Patient     EPINEPHrine (ANY BX GENERIC EQUIV) 0.3 MG/0.3ML injection 2-pack Inject 0.3 mLs (0.3 mg) into the muscle as needed for anaphylaxis May repeat one time in 5-15 minutes if response to initial dose is inadequate. never used at on hand if needed Yes Ezequiel Dawson MD     fexofenadine (ALLEGRA) 180 MG tablet Take 1 tablet by mouth every evening 7/13/2024 at pm Yes Reported, Patient     finasteride (PROSCAR) 5 MG tablet Take 1 tablet (5 mg) by mouth daily 7/13/2024 at pm Yes Carmen Jin PA-C     flecainide (TAMBOCOR) 100 MG tablet Take 1 tablet (100 mg) by mouth 2 times daily 7/14/2024 at am  Yes Blanca López PA-C Yes    indapamide (LOZOL) 1.25 MG tablet Take 1 tablet (1.25 mg) by mouth every morning 7/14/2024 at am Yes Narendra Andre MD Yes    isosorbide mononitrate (IMDUR) 60 MG 24 hr tablet Take 1 tablet (60 mg) by mouth daily 7/14/2024 at am Yes Blanca López PA-C Yes    lisinopril (ZESTRIL) 20 MG tablet Take 1 tablet (20 mg) by mouth 2 times daily 7/14/2024 at am Yes Blanca López PA-C Yes    metFORMIN (GLUCOPHAGE XR) 500 MG 24 hr tablet Take 500 mg by mouth daily (with dinner) 7/13/2024 at pm Yes Reported, Patient Yes    nitroGLYcerin (NITROSTAT) 0.4 MG sublingual tablet For chest pain place 1 tablet under the tongue every 5 minutes for 3 doses. If symptoms persist 5 minutes after 1st dose call 911. Unknown at Unknown Yes Blanca López PA-C Yes    omeprazole (PRILOSEC) 20 MG DR capsule Take 20 mg by mouth daily 7/14/2024 at am Yes Reported, Patient     rivaroxaban ANTICOAGULANT (XARELTO) 20 MG TABS tablet Take 20 mg by mouth daily 7/14/2024 at am Yes Reported, Patient No    tamsulosin (FLOMAX) 0.4 MG capsule Take 1 capsule (0.4 mg) by mouth daily 7/14/2024 at am Yes Carmen Jin PA-C     verapamil ER (CALAN-SR) 180 MG CR tablet Take 1 tablet (180 mg) by mouth 2 times daily 7/14/2024 at am Yes Blanca López PA-C Yes    vibegron (GEMTESA) 75 MG TABS tablet Take 1 tablet (75 mg) by mouth daily 7/14/2024 at am Yes Carmen Jin PA-C

## 2024-07-14 NOTE — ED PROVIDER NOTES
Emergency Department Patient Sign-out       Brief HPI:  This is a 69 year old male signed out to me by Dr. Huddleston.  See initial ED Provider note for details of the presentation.         HPI  Stiven Nguyễn is a 69 year old male, past medical history is significant for cardiac pacemaker in situ, PTSD, fatty liver, GERD, history of DVT, urolithiasis, pulmonary embolism, TBI, postconcussion syndrome, type 2 diabetes, vasospastic angina, ASCVD, sleep apnea, hypertension, hyperlipidemia, TIAs, narcolepsy with cataplexy, presents to the emergency department with concerns of possible CVA.  I was asked to see this patient emergently as a stroke evaluation and did so promptly.  History is obtained primarily from EMS who picked the patient up from home after his wife called with concerns of chest pain that began at rest and improved with nitroglycerin given at home and resolved during transport to the ED here.  While at the scene and after the patient had had nitroglycerin EMS felt that the patient had some difficulties expressing himself perhaps some mild dysarthria and when the assessed him found that he was having considerable difficulties moving his right upper extremity as well.  This all began around 12:30 AM with the patient's presentation shortly after 1 PM.  Minimal history is obtained from the patient and what is is clearly slow dysarthria although he seems to use words appropriately and there is no aphasia expressively or receptively at this time.  The patient denies any chest pain at this time.  He denies any headache or visual change.  He denies any paresthesias.     He was signed out to me to follow-up with CT of the cervical spine and CTA of the chest.  I independently reviewed these and agree with radiology findings of no acute abnormality noted.  I again discussed the case with stroke neurology team and they recommended after reviewing everything and noting patient had had 2 episodes of  "syncope/altered mental status with some numbness and unwell he was here these only lasted only about 15 seconds and patient returned to normal state of alertness shortly after.  It was recommended patient can be transferred to [facility] and neuro bed for further evaluation and care.  Patient has a pacemaker and therefore we cannot do MRIs and our establishment.  I did to Dr. Lemos with hospitalist service at [facility] and he is in agreement with excepting the patient in transfer but at this time there are no neuro beds available.  We are awaiting transfer at this time.      Exam:   Patient Vitals for the past 24 hrs:   BP Temp Temp src Pulse Resp SpO2 Height Weight   07/14/24 1545 (!) 143/81 -- -- 69 12 96 % -- --   07/14/24 1515 (!) 140/80 -- -- 71 17 96 % -- --   07/14/24 1500 (!) 143/82 -- -- 70 15 95 % -- --   07/14/24 1458 (!) 143/82 -- -- 70 11 95 % -- --   07/14/24 1445 -- -- -- -- 15 95 % -- --   07/14/24 1443 (!) 140/77 -- -- -- 22 94 % -- --   07/14/24 1442 -- -- -- -- 11 95 % -- --   07/14/24 1434 -- -- -- 71 11 95 % -- --   07/14/24 1433 (!) 147/84 -- -- 70 -- 94 % -- --   07/14/24 1428 (!) 146/80 -- -- 70 -- 97 % -- --   07/14/24 1413 (!) 147/82 -- -- 71 17 93 % -- --   07/14/24 1358 (!) 147/81 -- -- 70 13 -- -- --   07/14/24 1356 (!) 147/81 -- -- 71 12 -- -- --   07/14/24 1352 133/69 -- -- 70 21 -- -- --   07/14/24 1316 127/68 98.3  F (36.8  C) Oral 77 18 94 % 1.854 m (6' 1\") 120.2 kg (265 lb)           ED RESULTS:   Results for orders placed or performed during the hospital encounter of 07/14/24 (from the past 24 hour(s))   Glucose by meter     Status: Abnormal    Collection Time: 07/14/24  1:17 PM   Result Value Ref Range    GLUCOSE BY METER POCT 129 (H) 70 - 99 mg/dL   CBC with Platelets & Differential     Status: None    Collection Time: 07/14/24  1:19 PM    Narrative    The following orders were created for panel order CBC with Platelets & Differential.  Procedure                               " Abnormality         Status                     ---------                               -----------         ------                     CBC with platelets and d...[[id number]]                      Final result                 Please view results for these tests on the individual orders.   Basic metabolic panel     Status: Abnormal    Collection Time: 07/14/24  1:19 PM   Result Value Ref Range    Sodium 140 135 - 145 mmol/L    Potassium 4.2 3.4 - 5.3 mmol/L    Chloride 105 98 - 107 mmol/L    Carbon Dioxide (CO2) 25 22 - 29 mmol/L    Anion Gap 10 7 - 15 mmol/L    Urea Nitrogen 18.6 8.0 - 23.0 mg/dL    Creatinine 0.99 0.67 - 1.17 mg/dL    GFR Estimate 83 >60 mL/min/1.73m2    Calcium 9.5 8.8 - 10.2 mg/dL    Glucose 140 (H) 70 - 99 mg/dL   INR     Status: Abnormal    Collection Time: 07/14/24  1:19 PM   Result Value Ref Range    INR 1.18 (H) 0.85 - 1.15   Partial thromboplastin time     Status: Normal    Collection Time: 07/14/24  1:19 PM   Result Value Ref Range    aPTT 30 22 - 38 Seconds   Troponin T, High Sensitivity     Status: Abnormal    Collection Time: 07/14/24  1:19 PM   Result Value Ref Range    Troponin T, High Sensitivity 23 (H) <=22 ng/L   CBC with platelets and differential     Status: None    Collection Time: 07/14/24  1:19 PM   Result Value Ref Range    WBC Count 7.3 4.0 - 11.0 10e3/uL    RBC Count 4.87 4.40 - 5.90 10e6/uL    Hemoglobin 14.8 13.3 - 17.7 g/dL    Hematocrit 41.7 40.0 - 53.0 %    MCV 86 78 - 100 fL    MCH 30.4 26.5 - 33.0 pg    MCHC 35.5 31.5 - 36.5 g/dL    RDW 12.9 10.0 - 15.0 %    Platelet Count 176 150 - 450 10e3/uL    % Neutrophils 69 %    % Lymphocytes 18 %    % Monocytes 11 %    % Eosinophils 3 %    % Basophils 0 %    % Immature Granulocytes 0 %    NRBCs per 100 WBC 0 <1 /100    Absolute Neutrophils 5.0 1.6 - 8.3 10e3/uL    Absolute Lymphocytes 1.3 0.8 - 5.3 10e3/uL    Absolute Monocytes 0.8 0.0 - 1.3 10e3/uL    Absolute Eosinophils 0.2 0.0 - 0.7 10e3/uL    Absolute Basophils 0.0 0.0 -  0.2 10e3/uL    Absolute Immature Granulocytes 0.0 <=0.4 10e3/uL    Absolute NRBCs 0.0 10e3/uL   Alcohol level blood     Status: Normal    Collection Time: 07/14/24  1:19 PM   Result Value Ref Range    Alcohol ethyl <0.01 <=0.01 g/dL   CT Head w/o Contrast     Status: None    Collection Time: 07/14/24  1:32 PM    Narrative    EXAM: CTA HEAD NECK W CONTRAST, CT HEAD W/O CONTRAST  LOCATION: [facility]  DATE: 7/14/2024    INDICATION: Code Stroke to evaluate for potential thrombolysis and thrombectomy. PLEASE READ IMMEDIATELY.  COMPARISON: None.  CONTRAST: 67mL isovue 370  TECHNIQUE: Head and neck CT angiogram with IV contrast. Noncontrast head CT followed by axial helical CT images of the head and neck vessels obtained during the arterial phase of intravenous contrast administration. Axial 2D reconstructed images and   multiplanar 3D MIP reconstructed images of the head and neck vessels were performed by the technologist. Dose reduction techniques were used. All stenosis measurements made according to NASCET criteria unless otherwise specified.    FINDINGS:   NONCONTRAST HEAD CT:   INTRACRANIAL CONTENTS: No intracranial hemorrhage, extraaxial collection, or mass effect.  No CT evidence of acute infarct. Normal parenchymal attenuation. Normal ventricles and sulci.     VISUALIZED ORBITS/SINUSES/MASTOIDS: No intraorbital abnormality. No paranasal sinus mucosal disease. No middle ear or mastoid effusion.    BONES/SOFT TISSUES: No acute abnormality.    HEAD CTA:  ANTERIOR CIRCULATION: No stenosis/occlusion, aneurysm, or high flow vascular malformation. Fetal origin of the right posterior cerebral artery from the anterior circulation.    POSTERIOR CIRCULATION: No stenosis/occlusion, aneurysm, or high flow vascular malformation. Dominant right and smaller left vertebral artery contribute to a normal basilar artery.     DURAL VENOUS SINUSES: Expected enhancement of the major dural venous  sinuses.    NECK CTA:  RIGHT CAROTID: No measurable stenosis or dissection.    LEFT CAROTID: No measurable stenosis or dissection.    VERTEBRAL ARTERIES: No focal stenosis or dissection. Dominant right and smaller left vertebral arteries.    AORTIC ARCH: Classic aortic arch anatomy with no significant stenosis at the origin of the great vessels.    NONVASCULAR STRUCTURES: 10 mm left thyroid lobe nodule. No acute or aggressive abnormality otherwise.      Impression    IMPRESSION:   HEAD CT:  1.  No acute intracranial process.    HEAD CTA:   1.  No large vessel occlusion or significant stenosis    NECK CTA:  1.  No hemodynamically significant stenosis in the neck vessels.   2.  No evidence for dissection.    Findings discussed with Dr. Huddleston at 1:40 PM on 7/14/2024.   Troponin T, High Sensitivity     Status: Normal    Collection Time: 07/14/24  3:21 PM   Result Value Ref Range    Troponin T, High Sensitivity 22 <=22 ng/L       ED MEDICATIONS:   Medications   iopamidol (ISOVUE-370) solution 67 mL (67 mLs Intravenous $Given 7/14/24 1322)     And   sodium chloride 0.9 % bag for CT scan flush use (100 mLs As instructed $Given 7/14/24 1322)   iopamidol (ISOVUE-370) solution 72 mL (72 mLs Intravenous $Given 7/14/24 1409)   sodium chloride 0.9 % bag 500mL for CT scan flush use (80 mLs As instructed $Given 7/14/24 1410)   acetaminophen (TYLENOL) tablet 1,000 mg (1,000 mg Oral $Given 7/14/24 1532)         Impression:    ICD-10-CM    1. Left arm weakness  R29.898       2. Altered mental status, unspecified altered mental status type  R41.82           Plan:      Transfer to [facility] neurology for further evaluation and care    MD Juanita Soler David Charles, MD  07/15/24 3755
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
## 2024-07-14 NOTE — ED NOTES
Writer was having conversation with pt and mid conversation pt's eyes rolled back and pt's head went back, pt seemed to be coming in and out of consciousness, pt was not responding to nurse questions. MD was called in and pt improved shortly after episode started. Stroke neuro on phone

## 2024-07-15 ENCOUNTER — HOSPITAL ENCOUNTER (INPATIENT)
Facility: CLINIC | Age: 69
LOS: 1 days | Discharge: HOME | DRG: 069 | End: 2024-07-16
Attending: INTERNAL MEDICINE | Admitting: STUDENT IN AN ORGANIZED HEALTH CARE EDUCATION/TRAINING PROGRAM
Payer: MEDICARE

## 2024-07-15 ENCOUNTER — HOSPITAL ENCOUNTER (INPATIENT)
Dept: NEUROLOGY | Facility: CLINIC | Age: 69
Discharge: HOME | DRG: 069 | End: 2024-07-15
Attending: NURSE PRACTITIONER | Admitting: STUDENT IN AN ORGANIZED HEALTH CARE EDUCATION/TRAINING PROGRAM
Payer: MEDICARE

## 2024-07-15 ENCOUNTER — TELEPHONE (OUTPATIENT)
Dept: CARDIOLOGY | Facility: CLINIC | Age: 69
End: 2024-07-15
Payer: MEDICARE

## 2024-07-15 VITALS
HEIGHT: 73 IN | TEMPERATURE: 98.3 F | BODY MASS INDEX: 35.12 KG/M2 | OXYGEN SATURATION: 94 % | WEIGHT: 265 LBS | DIASTOLIC BLOOD PRESSURE: 95 MMHG | RESPIRATION RATE: 10 BRPM | SYSTOLIC BLOOD PRESSURE: 148 MMHG | HEART RATE: 70 BPM

## 2024-07-15 DIAGNOSIS — G45.9 TIA (TRANSIENT ISCHEMIC ATTACK): Primary | ICD-10-CM

## 2024-07-15 LAB
GLUCOSE BLDC GLUCOMTR-MCNC: 122 MG/DL (ref 70–99)
GLUCOSE BLDC GLUCOMTR-MCNC: 147 MG/DL (ref 70–99)
GLUCOSE BLDC GLUCOMTR-MCNC: 187 MG/DL (ref 70–99)
MAGNESIUM SERPL-MCNC: 2 MG/DL (ref 1.7–2.3)
POTASSIUM SERPL-SCNC: 3.9 MMOL/L (ref 3.4–5.3)

## 2024-07-15 PROCEDURE — 36415 COLL VENOUS BLD VENIPUNCTURE: CPT | Performed by: STUDENT IN AN ORGANIZED HEALTH CARE EDUCATION/TRAINING PROGRAM

## 2024-07-15 PROCEDURE — 83735 ASSAY OF MAGNESIUM: CPT | Performed by: STUDENT IN AN ORGANIZED HEALTH CARE EDUCATION/TRAINING PROGRAM

## 2024-07-15 PROCEDURE — 120N000001 HC R&B MED SURG/OB

## 2024-07-15 PROCEDURE — 250N000013 HC RX MED GY IP 250 OP 250 PS 637: Performed by: EMERGENCY MEDICINE

## 2024-07-15 PROCEDURE — 84132 ASSAY OF SERUM POTASSIUM: CPT | Performed by: STUDENT IN AN ORGANIZED HEALTH CARE EDUCATION/TRAINING PROGRAM

## 2024-07-15 PROCEDURE — 99223 1ST HOSP IP/OBS HIGH 75: CPT | Performed by: INTERNAL MEDICINE

## 2024-07-15 PROCEDURE — 99223 1ST HOSP IP/OBS HIGH 75: CPT | Mod: FS | Performed by: NURSE PRACTITIONER

## 2024-07-15 PROCEDURE — 99223 1ST HOSP IP/OBS HIGH 75: CPT | Mod: AI

## 2024-07-15 PROCEDURE — 999N000052 EEG VIDEO 2-12 HRS INTERMITTENT MONITORING

## 2024-07-15 PROCEDURE — 250N000013 HC RX MED GY IP 250 OP 250 PS 637

## 2024-07-15 PROCEDURE — 99418 PROLNG IP/OBS E/M EA 15 MIN: CPT

## 2024-07-15 PROCEDURE — 99207 EEG VIDEO 2-12 HRS INTERMITTENT MONITORING: CPT | Performed by: PSYCHIATRY & NEUROLOGY

## 2024-07-15 PROCEDURE — 95718 EEG PHYS/QHP 2-12 HR W/VEEG: CPT | Performed by: PSYCHIATRY & NEUROLOGY

## 2024-07-15 RX ORDER — ACETAMINOPHEN 650 MG/1
650 SUPPOSITORY RECTAL EVERY 4 HOURS PRN
Status: DISCONTINUED | OUTPATIENT
Start: 2024-07-15 | End: 2024-07-16 | Stop reason: HOSPADM

## 2024-07-15 RX ORDER — ATORVASTATIN CALCIUM 10 MG/1
10 TABLET, FILM COATED ORAL EVERY EVENING
Status: DISCONTINUED | OUTPATIENT
Start: 2024-07-15 | End: 2024-07-16 | Stop reason: HOSPADM

## 2024-07-15 RX ORDER — ONDANSETRON 4 MG/1
4 TABLET, ORALLY DISINTEGRATING ORAL EVERY 6 HOURS PRN
Status: DISCONTINUED | OUTPATIENT
Start: 2024-07-15 | End: 2024-07-16 | Stop reason: HOSPADM

## 2024-07-15 RX ORDER — ACETAMINOPHEN 325 MG/1
650 TABLET ORAL EVERY 4 HOURS PRN
Status: DISCONTINUED | OUTPATIENT
Start: 2024-07-15 | End: 2024-07-16 | Stop reason: HOSPADM

## 2024-07-15 RX ORDER — CALCIUM CARBONATE 500 MG/1
1000 TABLET, CHEWABLE ORAL 4 TIMES DAILY PRN
Status: DISCONTINUED | OUTPATIENT
Start: 2024-07-15 | End: 2024-07-16 | Stop reason: HOSPADM

## 2024-07-15 RX ORDER — DEXTROSE MONOHYDRATE 25 G/50ML
25-50 INJECTION, SOLUTION INTRAVENOUS
Status: DISCONTINUED | OUTPATIENT
Start: 2024-07-15 | End: 2024-07-16 | Stop reason: HOSPADM

## 2024-07-15 RX ORDER — TAMSULOSIN HYDROCHLORIDE 0.4 MG/1
0.4 CAPSULE ORAL DAILY
Status: DISCONTINUED | OUTPATIENT
Start: 2024-07-15 | End: 2024-07-15 | Stop reason: HOSPADM

## 2024-07-15 RX ORDER — FEXOFENADINE HCL 180 MG/1
180 TABLET ORAL EVERY EVENING
Status: DISCONTINUED | OUTPATIENT
Start: 2024-07-15 | End: 2024-07-15

## 2024-07-15 RX ORDER — ATORVASTATIN CALCIUM 10 MG/1
10 TABLET, FILM COATED ORAL EVERY EVENING
Status: DISCONTINUED | OUTPATIENT
Start: 2024-07-15 | End: 2024-07-15 | Stop reason: HOSPADM

## 2024-07-15 RX ORDER — FLECAINIDE ACETATE 50 MG/1
100 TABLET ORAL 2 TIMES DAILY
Status: DISCONTINUED | OUTPATIENT
Start: 2024-07-15 | End: 2024-07-15

## 2024-07-15 RX ORDER — ONDANSETRON 2 MG/ML
4 INJECTION INTRAMUSCULAR; INTRAVENOUS EVERY 6 HOURS PRN
Status: DISCONTINUED | OUTPATIENT
Start: 2024-07-15 | End: 2024-07-16 | Stop reason: HOSPADM

## 2024-07-15 RX ORDER — AMOXICILLIN 250 MG
1 CAPSULE ORAL 2 TIMES DAILY PRN
Status: DISCONTINUED | OUTPATIENT
Start: 2024-07-15 | End: 2024-07-16 | Stop reason: HOSPADM

## 2024-07-15 RX ORDER — VERAPAMIL HYDROCHLORIDE 180 MG/1
180 TABLET, EXTENDED RELEASE ORAL 2 TIMES DAILY
Status: DISCONTINUED | OUTPATIENT
Start: 2024-07-15 | End: 2024-07-15 | Stop reason: HOSPADM

## 2024-07-15 RX ORDER — LIDOCAINE 40 MG/G
CREAM TOPICAL
Status: DISCONTINUED | OUTPATIENT
Start: 2024-07-15 | End: 2024-07-16 | Stop reason: HOSPADM

## 2024-07-15 RX ORDER — LISINOPRIL 10 MG/1
20 TABLET ORAL 2 TIMES DAILY
Status: DISCONTINUED | OUTPATIENT
Start: 2024-07-15 | End: 2024-07-15

## 2024-07-15 RX ORDER — AMOXICILLIN 250 MG
2 CAPSULE ORAL 2 TIMES DAILY PRN
Status: DISCONTINUED | OUTPATIENT
Start: 2024-07-15 | End: 2024-07-16 | Stop reason: HOSPADM

## 2024-07-15 RX ORDER — FLECAINIDE ACETATE 50 MG/1
100 TABLET ORAL 2 TIMES DAILY
Status: DISCONTINUED | OUTPATIENT
Start: 2024-07-15 | End: 2024-07-15 | Stop reason: HOSPADM

## 2024-07-15 RX ORDER — NICOTINE POLACRILEX 4 MG
15-30 LOZENGE BUCCAL
Status: DISCONTINUED | OUTPATIENT
Start: 2024-07-15 | End: 2024-07-16 | Stop reason: HOSPADM

## 2024-07-15 RX ORDER — PANTOPRAZOLE SODIUM 40 MG/1
40 TABLET, DELAYED RELEASE ORAL
Status: DISCONTINUED | OUTPATIENT
Start: 2024-07-15 | End: 2024-07-16 | Stop reason: HOSPADM

## 2024-07-15 RX ORDER — FINASTERIDE 5 MG/1
5 TABLET, FILM COATED ORAL AT BEDTIME
Status: DISCONTINUED | OUTPATIENT
Start: 2024-07-15 | End: 2024-07-15 | Stop reason: HOSPADM

## 2024-07-15 RX ORDER — LISINOPRIL 10 MG/1
20 TABLET ORAL 2 TIMES DAILY
Status: DISCONTINUED | OUTPATIENT
Start: 2024-07-15 | End: 2024-07-15 | Stop reason: HOSPADM

## 2024-07-15 RX ORDER — PANTOPRAZOLE SODIUM 40 MG/1
40 TABLET, DELAYED RELEASE ORAL EVERY EVENING
Status: DISCONTINUED | OUTPATIENT
Start: 2024-07-15 | End: 2024-07-15 | Stop reason: HOSPADM

## 2024-07-15 RX ORDER — FEXOFENADINE HCL 180 MG/1
180 TABLET ORAL EVERY EVENING
Status: DISCONTINUED | OUTPATIENT
Start: 2024-07-15 | End: 2024-07-15 | Stop reason: HOSPADM

## 2024-07-15 RX ORDER — ATORVASTATIN CALCIUM 10 MG/1
10 TABLET, FILM COATED ORAL EVERY EVENING
Status: DISCONTINUED | OUTPATIENT
Start: 2024-07-15 | End: 2024-07-15

## 2024-07-15 RX ORDER — INDAPAMIDE 1.25 MG/1
1.25 TABLET ORAL EVERY MORNING
Status: DISCONTINUED | OUTPATIENT
Start: 2024-07-15 | End: 2024-07-15 | Stop reason: HOSPADM

## 2024-07-15 RX ORDER — ISOSORBIDE MONONITRATE 30 MG/1
30 TABLET, EXTENDED RELEASE ORAL
Status: DISCONTINUED | OUTPATIENT
Start: 2024-07-15 | End: 2024-07-15 | Stop reason: HOSPADM

## 2024-07-15 RX ADMIN — FLECAINIDE ACETATE 100 MG: 50 TABLET ORAL at 08:55

## 2024-07-15 RX ADMIN — LISINOPRIL 20 MG: 10 TABLET ORAL at 02:36

## 2024-07-15 RX ADMIN — FLECAINIDE ACETATE 100 MG: 50 TABLET ORAL at 01:45

## 2024-07-15 RX ADMIN — VERAPAMIL HYDROCHLORIDE 180 MG: 180 TABLET, FILM COATED, EXTENDED RELEASE ORAL at 08:55

## 2024-07-15 RX ADMIN — FEXOFENADINE HYDROCHLORIDE 180 MG: 180 TABLET ORAL at 01:45

## 2024-07-15 RX ADMIN — FINASTERIDE 5 MG: 5 TABLET, FILM COATED ORAL at 02:35

## 2024-07-15 RX ADMIN — LISINOPRIL 20 MG: 10 TABLET ORAL at 08:54

## 2024-07-15 RX ADMIN — PANTOPRAZOLE SODIUM 40 MG: 40 TABLET, DELAYED RELEASE ORAL at 02:35

## 2024-07-15 RX ADMIN — RIVAROXABAN 20 MG: 10 TABLET, FILM COATED ORAL at 08:55

## 2024-07-15 RX ADMIN — VERAPAMIL HYDROCHLORIDE 180 MG: 180 TABLET, FILM COATED, EXTENDED RELEASE ORAL at 02:35

## 2024-07-15 RX ADMIN — ATORVASTATIN CALCIUM 10 MG: 10 TABLET, FILM COATED ORAL at 20:44

## 2024-07-15 RX ADMIN — ATORVASTATIN CALCIUM 10 MG: 10 TABLET, FILM COATED ORAL at 01:44

## 2024-07-15 RX ADMIN — TAMSULOSIN HYDROCHLORIDE 0.4 MG: 0.4 CAPSULE ORAL at 08:53

## 2024-07-15 RX ADMIN — ISOSORBIDE MONONITRATE 30 MG: 30 TABLET, EXTENDED RELEASE ORAL at 08:55

## 2024-07-15 RX ADMIN — INDAPAMIDE 1.25 MG: 1.25 TABLET, FILM COATED ORAL at 08:54

## 2024-07-15 ASSESSMENT — ACTIVITIES OF DAILY LIVING (ADL)
ADLS_ACUITY_SCORE: 22
ADLS_ACUITY_SCORE: 37
ADLS_ACUITY_SCORE: 22
ADLS_ACUITY_SCORE: 37
ADLS_ACUITY_SCORE: 37
ADLS_ACUITY_SCORE: 22
ADLS_ACUITY_SCORE: 22
ADLS_ACUITY_SCORE: 37
ADLS_ACUITY_SCORE: 22
ADLS_ACUITY_SCORE: 37
ADLS_ACUITY_SCORE: 22
ADLS_ACUITY_SCORE: 22
ADLS_ACUITY_SCORE: 35
ADLS_ACUITY_SCORE: 37

## 2024-07-15 NOTE — TELEPHONE ENCOUNTER
Received call from MRI requesting MRI Cardiology Clearance for patient.  Message routed to the device team to complete MRI Safety Clearance Form.    Inpatient MRI Phone number: [phone].

## 2024-07-15 NOTE — CONSULTS
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 wall hematoma.    3.  Suboptimal opacification of the pulmonary arteries. No definite central or large pulmonary embolism.         Echo:  No results found for this or any previous visit (from the past 4320 hour(s)).    Clinically Significant Risk Factors Present on Admission               # Drug Induced Coagulation Defect: home medication list includes an anticoagulant medication    # Hypertension: Noted on problem list            # DMII: A1C = 6.8 % (Ref range: 0.0 - 5.6 %) within past 6 months    # Obesity: Estimated body mass index is 34.96 kg/m  as calculated from the following:    Height as of 7/14/24: 1.854 m (6' 1\").    Weight as of 7/14/24: 120.2 kg (265 lb).        # Pacemaker present          CKD POA List: Stage 2 (GFR 60-89)      "

## 2024-07-15 NOTE — PHARMACY-ADMISSION MEDICATION HISTORY
Pharmacist Admission Medication History    Admission medication history was completed yesterday at [facility]. The information provided in this note is only as accurate as the sources available at the time of the update.    Information Source(s):  [facility] medication history from yesterday afternoon.   via  medication scribe note from 7/14/24.     Pertinent Information: none    Changes made to PTA medication list:  Added: None  Deleted: None  Changed: None    Allergies reviewed with patient and updates made in EHR: unable to assess    Medication History Completed By: Candace Webster RPH 7/15/2024 11:53 AM    No outpatient medications have been marked as taking for the 7/15/24 encounter (Hospital Encounter).

## 2024-07-15 NOTE — ED PROVIDER NOTES
Emergency Department Patient Sign-out       Brief HPI:  This is a 69 year old male signed out to me by Dr. SHIRA Mckeon at shift change at 6am see Dr. Mckeon's ED note for further details of care provided prior to shift change and handoff.  In brief by report at handoff this is a 69-year-old male who is awaiting transfer to [facility] for further care including MRI imaging to assess for the possibility of stroke after presenting with chest discomfort right arm pain and weakness.  Initial neuroimaging did not reveal any acute stroke symptoms.  After consultation with stroke neurology plans to transfer to [facility] for pacemaker safe MRI for further completion of workup. See stroke neurology consult note for further details. Awaiting bed and handoff to admitting provider.       Significant Events after my assuming care:   Spoke with Dr.S Meadows- admitting provider at [facility] at 8.01am. who agreed to accept patient for further care.  Reviewed history relayed at shift change and handoff and rationale for transfer.      Exam:   Patient Vitals for the past 24 hrs:   BP Pulse Resp SpO2   07/15/24 0854 (!) 148/95 -- -- --   07/15/24 0836 -- 70 10 94 %   07/15/24 0835 (!) 144/92 70 -- --   07/15/24 0735 (!) 144/88 60 -- 94 %   07/15/24 0700 -- 60 15 94 %   07/15/24 0630 138/86 60 14 95 %   07/15/24 0600 -- 60 13 93 %   07/15/24 0548 -- 60 14 93 %   07/15/24 0530 136/80 60 15 94 %   07/15/24 0500 -- 60 -- 94 %   07/15/24 0430 (!) 145/86 60 12 94 %   07/15/24 0400 -- 60 11 93 %   07/15/24 0330 (!) 126/90 60 -- 94 %   07/15/24 0300 -- 60 -- 93 %   07/15/24 0230 -- 60 11 94 %   07/15/24 0200 -- 60 15 93 %   07/15/24 0100 -- 60 10 --   07/15/24 0030 (!) 130/94 60 11 --   07/15/24 0000 -- 61 -- --   07/14/24 2330 (!) 147/93 60 14 93 %   07/14/24 2300 (!) 149/122 65 13 92 %   07/14/24 2230 (!) 155/95 70 15 93 %   07/14/24 2200 (!) 162/98 70 17 93 %   07/14/24 2130 (!) 149/96 71 20 94 %   07/14/24 2100 (!) 162/90 69 19 92 %    07/14/24 2030 (!) 164/93 70 26 94 %   07/14/24 2000 (!) 131/92 69 -- 95 %   07/14/24 1930 (!) 152/89 70 13 94 %   07/14/24 1900 (!) 154/89 70 23 --   07/14/24 1830 (!) 146/91 70 17 --   07/14/24 1815 -- 70 19 --   07/14/24 1800 (!) 161/91 70 15 --   07/14/24 1730 (!) 145/94 71 (!) 7 --   07/14/24 1715 (!) 145/88 70 12 94 %   07/14/24 1645 (!) 152/86 70 (!) 7 95 %   07/14/24 1630 -- 70 10 95 %   07/14/24 1615 (!) 143/83 70 13 91 %   07/14/24 1545 (!) 143/81 69 12 96 %   07/14/24 1515 (!) 140/80 71 17 96 %           ED RESULTS:   Results for orders placed or performed during the hospital encounter of 07/14/24 (from the past 24 hour(s))   Glucose by meter     Status: Abnormal    Collection Time: 07/14/24  1:17 PM   Result Value Ref Range    GLUCOSE BY METER POCT 129 (H) 70 - 99 mg/dL   CBC with Platelets & Differential     Status: None    Collection Time: 07/14/24  1:19 PM    Narrative    The following orders were created for panel order CBC with Platelets & Differential.  Procedure                               Abnormality         Status                     ---------                               -----------         ------                     CBC with platelets and d...[[id number]]                      Final result                 Please view results for these tests on the individual orders.   Basic metabolic panel     Status: Abnormal    Collection Time: 07/14/24  1:19 PM   Result Value Ref Range    Sodium 140 135 - 145 mmol/L    Potassium 4.2 3.4 - 5.3 mmol/L    Chloride 105 98 - 107 mmol/L    Carbon Dioxide (CO2) 25 22 - 29 mmol/L    Anion Gap 10 7 - 15 mmol/L    Urea Nitrogen 18.6 8.0 - 23.0 mg/dL    Creatinine 0.99 0.67 - 1.17 mg/dL    GFR Estimate 83 >60 mL/min/1.73m2    Calcium 9.5 8.8 - 10.2 mg/dL    Glucose 140 (H) 70 - 99 mg/dL   INR     Status: Abnormal    Collection Time: 07/14/24  1:19 PM   Result Value Ref Range    INR 1.18 (H) 0.85 - 1.15   Partial thromboplastin time     Status: Normal    Collection  Time: 07/14/24  1:19 PM   Result Value Ref Range    aPTT 30 22 - 38 Seconds   Troponin T, High Sensitivity     Status: Abnormal    Collection Time: 07/14/24  1:19 PM   Result Value Ref Range    Troponin T, High Sensitivity 23 (H) <=22 ng/L   CBC with platelets and differential     Status: None    Collection Time: 07/14/24  1:19 PM   Result Value Ref Range    WBC Count 7.3 4.0 - 11.0 10e3/uL    RBC Count 4.87 4.40 - 5.90 10e6/uL    Hemoglobin 14.8 13.3 - 17.7 g/dL    Hematocrit 41.7 40.0 - 53.0 %    MCV 86 78 - 100 fL    MCH 30.4 26.5 - 33.0 pg    MCHC 35.5 31.5 - 36.5 g/dL    RDW 12.9 10.0 - 15.0 %    Platelet Count 176 150 - 450 10e3/uL    % Neutrophils 69 %    % Lymphocytes 18 %    % Monocytes 11 %    % Eosinophils 3 %    % Basophils 0 %    % Immature Granulocytes 0 %    NRBCs per 100 WBC 0 <1 /100    Absolute Neutrophils 5.0 1.6 - 8.3 10e3/uL    Absolute Lymphocytes 1.3 0.8 - 5.3 10e3/uL    Absolute Monocytes 0.8 0.0 - 1.3 10e3/uL    Absolute Eosinophils 0.2 0.0 - 0.7 10e3/uL    Absolute Basophils 0.0 0.0 - 0.2 10e3/uL    Absolute Immature Granulocytes 0.0 <=0.4 10e3/uL    Absolute NRBCs 0.0 10e3/uL   Alcohol level blood     Status: Normal    Collection Time: 07/14/24  1:19 PM   Result Value Ref Range    Alcohol ethyl <0.01 <=0.01 g/dL   CT Head w/o Contrast     Status: None    Collection Time: 07/14/24  1:32 PM    Narrative    EXAM: CTA HEAD NECK W CONTRAST, CT HEAD W/O CONTRAST  LOCATION: [facility]  DATE: 7/14/2024    INDICATION: Code Stroke to evaluate for potential thrombolysis and thrombectomy. PLEASE READ IMMEDIATELY.  COMPARISON: None.  CONTRAST: 67mL isovue 370  TECHNIQUE: Head and neck CT angiogram with IV contrast. Noncontrast head CT followed by axial helical CT images of the head and neck vessels obtained during the arterial phase of intravenous contrast administration. Axial 2D reconstructed images and   multiplanar 3D MIP reconstructed images of the head and neck vessels  were performed by the technologist. Dose reduction techniques were used. All stenosis measurements made according to NASCET criteria unless otherwise specified.    FINDINGS:   NONCONTRAST HEAD CT:   INTRACRANIAL CONTENTS: No intracranial hemorrhage, extraaxial collection, or mass effect.  No CT evidence of acute infarct. Normal parenchymal attenuation. Normal ventricles and sulci.     VISUALIZED ORBITS/SINUSES/MASTOIDS: No intraorbital abnormality. No paranasal sinus mucosal disease. No middle ear or mastoid effusion.    BONES/SOFT TISSUES: No acute abnormality.    HEAD CTA:  ANTERIOR CIRCULATION: No stenosis/occlusion, aneurysm, or high flow vascular malformation. Fetal origin of the right posterior cerebral artery from the anterior circulation.    POSTERIOR CIRCULATION: No stenosis/occlusion, aneurysm, or high flow vascular malformation. Dominant right and smaller left vertebral artery contribute to a normal basilar artery.     DURAL VENOUS SINUSES: Expected enhancement of the major dural venous sinuses.    NECK CTA:  RIGHT CAROTID: No measurable stenosis or dissection.    LEFT CAROTID: No measurable stenosis or dissection.    VERTEBRAL ARTERIES: No focal stenosis or dissection. Dominant right and smaller left vertebral arteries.    AORTIC ARCH: Classic aortic arch anatomy with no significant stenosis at the origin of the great vessels.    NONVASCULAR STRUCTURES: 10 mm left thyroid lobe nodule. No acute or aggressive abnormality otherwise.      Impression    IMPRESSION:   HEAD CT:  1.  No acute intracranial process.    HEAD CTA:   1.  No large vessel occlusion or significant stenosis    NECK CTA:  1.  No hemodynamically significant stenosis in the neck vessels.   2.  No evidence for dissection.    Findings discussed with Dr. Huddleston at 1:40 PM on 7/14/2024.   CTA Head Neck with Contrast     Status: None    Collection Time: 07/14/24  1:33 PM    Narrative    EXAM: CTA HEAD NECK W CONTRAST, CT HEAD W/O  CONTRAST  LOCATION: [facility]  DATE: 7/14/2024    INDICATION: Code Stroke to evaluate for potential thrombolysis and thrombectomy. PLEASE READ IMMEDIATELY.  COMPARISON: None.  CONTRAST: 67mL isovue 370  TECHNIQUE: Head and neck CT angiogram with IV contrast. Noncontrast head CT followed by axial helical CT images of the head and neck vessels obtained during the arterial phase of intravenous contrast administration. Axial 2D reconstructed images and   multiplanar 3D MIP reconstructed images of the head and neck vessels were performed by the technologist. Dose reduction techniques were used. All stenosis measurements made according to NASCET criteria unless otherwise specified.    FINDINGS:   NONCONTRAST HEAD CT:   INTRACRANIAL CONTENTS: No intracranial hemorrhage, extraaxial collection, or mass effect.  No CT evidence of acute infarct. Normal parenchymal attenuation. Normal ventricles and sulci.     VISUALIZED ORBITS/SINUSES/MASTOIDS: No intraorbital abnormality. No paranasal sinus mucosal disease. No middle ear or mastoid effusion.    BONES/SOFT TISSUES: No acute abnormality.    HEAD CTA:  ANTERIOR CIRCULATION: No stenosis/occlusion, aneurysm, or high flow vascular malformation. Fetal origin of the right posterior cerebral artery from the anterior circulation.    POSTERIOR CIRCULATION: No stenosis/occlusion, aneurysm, or high flow vascular malformation. Dominant right and smaller left vertebral artery contribute to a normal basilar artery.     DURAL VENOUS SINUSES: Expected enhancement of the major dural venous sinuses.    NECK CTA:  RIGHT CAROTID: No measurable stenosis or dissection.    LEFT CAROTID: No measurable stenosis or dissection.    VERTEBRAL ARTERIES: No focal stenosis or dissection. Dominant right and smaller left vertebral arteries.    AORTIC ARCH: Classic aortic arch anatomy with no significant stenosis at the origin of the great vessels.    NONVASCULAR STRUCTURES: 10 mm  left thyroid lobe nodule. No acute or aggressive abnormality otherwise.      Impression    IMPRESSION:   HEAD CT:  1.  No acute intracranial process.    HEAD CTA:   1.  No large vessel occlusion or significant stenosis    NECK CTA:  1.  No hemodynamically significant stenosis in the neck vessels.   2.  No evidence for dissection.    Findings discussed with Dr. Huddleston at 1:40 PM on 7/14/2024.   CT Cervical Spine w/o Contrast     Status: None    Collection Time: 07/14/24  2:38 PM    Narrative    CT CERVICAL SPINE W/O CONTRAST  [facility]  7/14/2024 2:38 PM CDT    INDICATION: Right upper extremity weakness.  TECHNIQUE: CT scan of the cervical spine without contrast. Dose reduction techniques were used.  COMPARISON: None.    FINDINGS:  Implanted electronic cardiac pacing or defibrillating device noted. Straightening of the usual cervical lordosis. Cervical vertebral body heights preserved.  No acute cervical spine fracture. No prevertebral soft tissue swelling. Multilevel degenerative   changes without evidence for high-grade spinal canal narrowing. Multilevel mild spinal canal narrowing. Degenerative neural foraminal narrowing most pronounced at C3-C4 and C4-C5. No CT evidence of epidural hematoma.      Impression    IMPRESSION:   1.  No CT evidence of epidural hematoma. MRI would be more sensitive. Implanted electronic cardiac pacing or defibrillating device noted.  2.  No acute cervical spine fracture.  3.  Degenerative changes most pronounced at C3-C4 and C4-C5.     Soft tissue neck CT w contrast     Status: None    Collection Time: 07/14/24  2:38 PM    Narrative    EXAM: CT SOFT TISSUE NECK W CONTRAST  LOCATION: [facility]  DATE: 7/14/2024    INDICATION: Right sided weakness, CT neck requested by stroke neurology  COMPARISON: Neck CTA 7/14/2024, cervical spine CT 8/20/2020 2023  CONTRAST: 70 mL Isovue 370 intravenous contrast  TECHNIQUE: Routine CT Soft Tissue Neck  with IV contrast. Multiplanar reformats. Dose reduction techniques were used.    FINDINGS:   Lower visualized intraorbital and intracranial structures unremarkable. Clear sinuses, mastoids and middle ears. Normal salivary glands. Subcentimeter hypodense left thyroid nodule. Clear visualized upper lungs. Implanted electronic cardiac pacing or   defibrillating device noted. Patent vessels. Unremarkable aerodigestive tract. No mass or pathologic adenopathy in the neck.      Impression    IMPRESSION:   1.  No mass or pathologic adenopathy in the neck.  2.  Subcentimeter hypodense left thyroid nodule.     CT Aortic Survey w Contrast     Status: None    Collection Time: 07/14/24  2:39 PM    Narrative    EXAM: CT AORTIC SURVEY W CONTRAST  LOCATION: [facility]  DATE: 7/14/2024    INDICATION: Pain. Chest pain, stroke neurology requests CTA chest for dissection protocol  COMPARISON: CTA chest 9/10/2021. CT abdomen-pelvis 3/19/2024.  TECHNIQUE: CT angiogram chest abdomen pelvis during arterial phase of injection of IV contrast. 2D and 3D MIP reconstructions were performed by the CT technologist. Dose reduction techniques were used.   CONTRAST: 72mL isovue 370    FINDINGS:   CT ANGIOGRAM CHEST, ABDOMEN, AND PELVIS: Nonaneurysmal aorta without dissection. Patent two-vessel aortic arch. Patent celiac, SMA, JOYCELYN and renal arteries. Small accessory right renal artery. Mild atherosclerosis.     Suboptimal opacification of the pulmonary arteries. No definite central or large pulmonary embolism.    LUNGS AND PLEURA: Minimal basilar atelectasis or scarring. No pleural effusion or pneumothorax.    MEDIASTINUM/AXILLAE: No adenopathy. Normal heart size. No pericardial effusion. Dual-chamber pacer.    CORONARY ARTERY CALCIFICATION: Mild.    HEPATOBILIARY: Borderline hepatic steatosis. No opaque gallstones.    PANCREAS: Normal.    SPLEEN: Normal.    ADRENAL GLANDS: Normal.    KIDNEYS/BLADDER: Normal.    BOWEL:  Negative bowel and appendix.    LYMPH NODES: No abdominal or pelvic adenopathy.    PELVIC ORGANS: Mild prostatomegaly.    MUSCULOSKELETAL: Nothing acute.      Impression    IMPRESSION:    1.  No acute findings.    2.  Nonaneurysmal aorta without dissection. No wall hematoma.    3.  Suboptimal opacification of the pulmonary arteries. No definite central or large pulmonary embolism.     Troponin T, High Sensitivity     Status: Normal    Collection Time: 07/14/24  3:21 PM   Result Value Ref Range    Troponin T, High Sensitivity 22 <=22 ng/L       ED MEDICATIONS:   Medications   atorvastatin (LIPITOR) tablet 10 mg (10 mg Oral $Given 7/15/24 0144)   fexofenadine (ALLEGRA) tablet 180 mg (180 mg Oral $Given 7/15/24 0145)   flecainide (TAMBOCOR) tablet 100 mg (100 mg Oral $Given 7/15/24 0145)   finasteride (PROSCAR) tablet 5 mg (5 mg Oral $Given 7/15/24 0235)   verapamil ER (CALAN-SR) CR tablet 180 mg (180 mg Oral $Given 7/15/24 0235)   pantoprazole (PROTONIX) EC tablet 40 mg (40 mg Oral $Given 7/15/24 0235)   lisinopril (ZESTRIL) tablet 20 mg (20 mg Oral $Given 7/15/24 0236)   rivaroxaban ANTICOAGULANT (XARELTO) tablet 20 mg (has no administration in time range)   vibegron (GEMTESA) tablet 75 mg (has no administration in time range)   isosorbide mononitrate (IMDUR) 24 hr tablet 30 mg (has no administration in time range)   tamsulosin (FLOMAX) capsule 0.4 mg (has no administration in time range)   indapamide (LOZOL) tablet 1.25 mg (has no administration in time range)   iopamidol (ISOVUE-370) solution 67 mL (67 mLs Intravenous $Given 7/14/24 1322)     And   sodium chloride 0.9 % bag for CT scan flush use (100 mLs As instructed $Given 7/14/24 1322)   iopamidol (ISOVUE-370) solution 72 mL (72 mLs Intravenous $Given 7/14/24 1409)   sodium chloride 0.9 % bag 500mL for CT scan flush use (80 mLs As instructed $Given 7/14/24 2847)   acetaminophen (TYLENOL) tablet 1,000 mg (1,000 mg Oral $Given 7/14/24 2006)       Plan:    Transfer  to [facility] for further imaging pacemaker safe MRI as directed by stroke neurology. Syncope vs stroke syndrome.      MD Eunice Ring Ebenezer Tope, MD  07/15/24 7007
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
## 2024-07-15 NOTE — TELEPHONE ENCOUNTER
Is the implanted device safe for MRI Exam? Yes  Is this device 3T compatible? Yes  Device Type: Pacemaker      Device Information: Medtronic, PPM [place] (D)      Cardiology Orders for Device Programming     -- Yes -- The patient has a MRI conditional pulse generator and leads from the same     -- Yes -- The pulse generator and leads have been implanted for at least 6 weeks. (Does not apply to [employer] devices)    -- Yes-- The device is implanted in the right or left pectoral region    -- No, there are none-- There are not any additional active cardiac devices, abandoned leads, lead extenders or adapters    -- Yes -- Lead impedances are within the normal range per  guidelines.    -- Yes -- If the patient is pacemaker dependent the thresholds are less than or equal to 4.0 V @ 1.0 ms    -- Yes -- RA and RV leads are programmed to bipolar pacing operation or pacing off. If no, MRI TO CONTACT THE DEVICE REP FOR PROGRAMMING     Date of last Remote Device check: 7/8/24  Results of last Device check:  1.   Right atrium impedance: 437  2.   Right ventricle impedance: 475  3.   Left ventricle impedance: NA     4.   Right atrium threshold: 0.625 V @ 0.4 ms  5.   Right ventricle threshold: 0.875 V @ 0.4 ms  6.   Left ventricle threshold: NA     Device programming during the scan guidelines   Pacing Mode (check one): AAO  Pacing Rate: 70  bpm or    Medtronic MRI Access mendez capable

## 2024-07-16 ENCOUNTER — APPOINTMENT (OUTPATIENT)
Dept: CARDIOLOGY | Facility: CLINIC | Age: 69
DRG: 069 | End: 2024-07-16
Payer: MEDICARE

## 2024-07-16 ENCOUNTER — DOCUMENTATION ONLY (OUTPATIENT)
Dept: CARDIOLOGY | Facility: CLINIC | Age: 69
End: 2024-07-16

## 2024-07-16 ENCOUNTER — TRANSFERRED RECORDS (OUTPATIENT)
Dept: HEALTH INFORMATION MANAGEMENT | Facility: CLINIC | Age: 69
End: 2024-07-16

## 2024-07-16 ENCOUNTER — DOCUMENTATION ONLY (OUTPATIENT)
Dept: CARDIOLOGY | Facility: CLINIC | Age: 69
End: 2024-07-16
Payer: MEDICARE

## 2024-07-16 ENCOUNTER — APPOINTMENT (OUTPATIENT)
Dept: MRI IMAGING | Facility: CLINIC | Age: 69
DRG: 069 | End: 2024-07-16
Attending: NURSE PRACTITIONER
Payer: MEDICARE

## 2024-07-16 VITALS
DIASTOLIC BLOOD PRESSURE: 92 MMHG | HEART RATE: 73 BPM | SYSTOLIC BLOOD PRESSURE: 161 MMHG | OXYGEN SATURATION: 94 % | RESPIRATION RATE: 16 BRPM | TEMPERATURE: 98 F

## 2024-07-16 LAB
ANION GAP SERPL CALCULATED.3IONS-SCNC: 7 MMOL/L (ref 7–15)
BUN SERPL-MCNC: 19.7 MG/DL (ref 8–23)
CALCIUM SERPL-MCNC: 9.7 MG/DL (ref 8.8–10.2)
CHLORIDE SERPL-SCNC: 102 MMOL/L (ref 98–107)
CREAT SERPL-MCNC: 1.14 MG/DL (ref 0.67–1.17)
EGFRCR SERPLBLD CKD-EPI 2021: 70 ML/MIN/1.73M2
ERYTHROCYTE [DISTWIDTH] IN BLOOD BY AUTOMATED COUNT: 12.8 % (ref 10–15)
GLUCOSE BLDC GLUCOMTR-MCNC: 133 MG/DL (ref 70–99)
GLUCOSE BLDC GLUCOMTR-MCNC: 135 MG/DL (ref 70–99)
GLUCOSE BLDC GLUCOMTR-MCNC: 158 MG/DL (ref 70–99)
GLUCOSE SERPL-MCNC: 124 MG/DL (ref 70–99)
HCO3 SERPL-SCNC: 30 MMOL/L (ref 22–29)
HCT VFR BLD AUTO: 44.7 % (ref 40–53)
HGB BLD-MCNC: 15.4 G/DL (ref 13.3–17.7)
LVEF ECHO: NORMAL
MAGNESIUM SERPL-MCNC: 2 MG/DL (ref 1.7–2.3)
MCH RBC QN AUTO: 30.1 PG (ref 26.5–33)
MCHC RBC AUTO-ENTMCNC: 34.5 G/DL (ref 31.5–36.5)
MCV RBC AUTO: 88 FL (ref 78–100)
PLATELET # BLD AUTO: 183 10E3/UL (ref 150–450)
POTASSIUM SERPL-SCNC: 4.3 MMOL/L (ref 3.4–5.3)
RBC # BLD AUTO: 5.11 10E6/UL (ref 4.4–5.9)
SODIUM SERPL-SCNC: 139 MMOL/L (ref 135–145)
WBC # BLD AUTO: 7.4 10E3/UL (ref 4–11)

## 2024-07-16 PROCEDURE — 85027 COMPLETE CBC AUTOMATED: CPT

## 2024-07-16 PROCEDURE — 93306 TTE W/DOPPLER COMPLETE: CPT | Mod: 26 | Performed by: INTERNAL MEDICINE

## 2024-07-16 PROCEDURE — 255N000002 HC RX 255 OP 636: Performed by: STUDENT IN AN ORGANIZED HEALTH CARE EDUCATION/TRAINING PROGRAM

## 2024-07-16 PROCEDURE — 36415 COLL VENOUS BLD VENIPUNCTURE: CPT

## 2024-07-16 PROCEDURE — 84295 ASSAY OF SERUM SODIUM: CPT

## 2024-07-16 PROCEDURE — 70553 MRI BRAIN STEM W/O & W/DYE: CPT | Mod: MG

## 2024-07-16 PROCEDURE — G1010 CDSM STANSON: HCPCS

## 2024-07-16 PROCEDURE — 999N000208 ECHOCARDIOGRAM COMPLETE

## 2024-07-16 PROCEDURE — C8929 TTE W OR WO FOL WCON,DOPPLER: HCPCS

## 2024-07-16 PROCEDURE — A9585 GADOBUTROL INJECTION: HCPCS | Performed by: STUDENT IN AN ORGANIZED HEALTH CARE EDUCATION/TRAINING PROGRAM

## 2024-07-16 PROCEDURE — 250N000013 HC RX MED GY IP 250 OP 250 PS 637

## 2024-07-16 PROCEDURE — 250N000013 HC RX MED GY IP 250 OP 250 PS 637: Performed by: NURSE PRACTITIONER

## 2024-07-16 PROCEDURE — 99232 SBSQ HOSP IP/OBS MODERATE 35: CPT | Performed by: NURSE PRACTITIONER

## 2024-07-16 PROCEDURE — 93288 INTERROG EVL PM/LDLS PM IP: CPT

## 2024-07-16 PROCEDURE — 82374 ASSAY BLOOD CARBON DIOXIDE: CPT

## 2024-07-16 PROCEDURE — 83735 ASSAY OF MAGNESIUM: CPT | Performed by: INTERNAL MEDICINE

## 2024-07-16 PROCEDURE — 99239 HOSP IP/OBS DSCHRG MGMT >30: CPT | Performed by: NURSE PRACTITIONER

## 2024-07-16 RX ORDER — GADOBUTROL 604.72 MG/ML
12 INJECTION INTRAVENOUS ONCE
Status: COMPLETED | OUTPATIENT
Start: 2024-07-16 | End: 2024-07-16

## 2024-07-16 RX ORDER — LISINOPRIL 20 MG/1
20 TABLET ORAL 2 TIMES DAILY
Status: DISCONTINUED | OUTPATIENT
Start: 2024-07-16 | End: 2024-07-16 | Stop reason: HOSPADM

## 2024-07-16 RX ADMIN — RIVAROXABAN 20 MG: 20 TABLET, FILM COATED ORAL at 17:01

## 2024-07-16 RX ADMIN — GADOBUTROL 12 ML: 604.72 INJECTION INTRAVENOUS at 09:49

## 2024-07-16 RX ADMIN — HUMAN ALBUMIN MICROSPHERES AND PERFLUTREN 9 ML: 10; .22 INJECTION, SOLUTION INTRAVENOUS at 11:17

## 2024-07-16 RX ADMIN — ACETAMINOPHEN 650 MG: 325 TABLET, FILM COATED ORAL at 14:41

## 2024-07-16 RX ADMIN — PANTOPRAZOLE SODIUM 40 MG: 40 TABLET, DELAYED RELEASE ORAL at 06:45

## 2024-07-16 RX ADMIN — ACETAMINOPHEN 650 MG: 325 TABLET, FILM COATED ORAL at 10:00

## 2024-07-16 RX ADMIN — LISINOPRIL 20 MG: 20 TABLET ORAL at 15:02

## 2024-07-16 ASSESSMENT — ACTIVITIES OF DAILY LIVING (ADL)
ADLS_ACUITY_SCORE: 22

## 2024-07-16 NOTE — PLAN OF CARE
Goal Outcome Evaluation:         Reason for Admission: TIA    Cognitive/Mentation: A/Ox 4  Neuros/CMS: Intact  VS: VSS on RA. SBP < 180   Tele: NSR, A paced.  /GI: Continent. Last BM 7/15.   Pulmonary: LS clear. CPAP at night.  Pain: denies.     Drains/Lines: PIV-SL  Skin: intact  Activity: Independent  Diet: Regular with thin liquids. Takes pills whole.     Therapies recs: pending  Discharge: pending    Aggression Stoplight Tool: green    End of shift summary: echo and MRI scheduled pending clearance of pacemaker, pt has PTSD so approach pt at feet and gently wake, he states he may swing his arms when waking

## 2024-07-16 NOTE — PROGRESS NOTES
Chart Check  Brief Cardiology Note  Pt: Stiven JOSEPH 1955     Echocardiogram shows EF 60-65% with no wall motion abnormalities or significant valvular abnormalities. No change since echo 3/13/2023  Device interrogation shows no events  Follow-up  is arranged with cardiology on   OK to discharge    HAZEL Lopes, CNP   2:56 PM 2024   [facility]  Pager: [phone]

## 2024-07-16 NOTE — DISCHARGE SUMMARY
"[facility]  Hospitalist Discharge Summary      Date of Admission:  7/15/2024  Date of Discharge:  7/16/2024  Discharging Provider: HAZEL Alvarado CNP  Discharge Service: Hospitalist Service    Discharge Diagnoses   Concern for TIA  Syncope  HTN  CAD  DM 2  H/o PE and DVT  BPH  GERD    Clinically Significant Risk Factors     # DMII: A1C = 6.8 % (Ref range: 0.0 - 5.6 %) within past 6 months  # Obesity: Estimated body mass index is 34.96 kg/m  as calculated from the following:    Height as of 7/14/24: 1.854 m (6' 1\").    Weight as of 7/14/24: 120.2 kg (265 lb).       Follow-ups Needed After Discharge   Follow-up Appointments     Follow-up and recommended labs and tests       Follow up with primary care provider, Princess Mcgill, within 7 days for   hospital follow- up.  No follow up labs or test are needed.    Follow up with general neurology in 6-8 weeks.     Follow up with cardiology as planned, Tuesday, July 23.            Discharge Disposition   Discharged to home  Condition at discharge: Stable    Hospital Course   Stiven Nguyễn is a 69 year old male admitted on 7/15/2024. He presents as a direct admission from [facility] with transient right arm and leg weakness concerning for TIA and possible episode of syncope.  Requires admission to [facility] for MRI capabilities given PPM.      Concern for TIA  Patient began to feel lightheaded and sat on the ground with progressive loss of function to his right upper extremity and head dysarthric speech.  He had progressively worsening loss of function to his right lower extremity.  These right-sided symptoms and speech cleared upon arrival to [facility] ED.  *Code stroke called at outside hospital.  Negative workup.  -Admit to inpatient neuroscience  -Consult stroke neurology  -MRI brain and MR cervical spine with no acute pathology  -Neurochecks completed every 4 hours. Symptoms resolved 1 hour after they started " "and patient felt back to baseline by the time of discharge.   -EEG ordered per stroke neuro, within normal limits.      Syncope  Patient has longstanding history of, \"fainting/dizzy spells.\"  Per patient and wife patient has multiple titrations of diuretics and BP meds with his outpatient cardiology team including Dr. Caceres and Gayle López.  *Episode of lightheadedness and fainting with deep inspiration during exam 7/15  -PPM interrogation shows no evemts   -Continuous telemetry monitoring  -Echocardiogram completed 7/16/2024. No cardiac source of embolus was seen. EF 60-65% with no wall motion abnormalities. No change since echo 3/2023.  -Fall precautions  - Cardiology follow up 7/23     Hypertension  Allow for permissive hypertension.  -Held  mg verapamil ER, resumed at discharge   -Held PTA 20 mg lisinopril twice daily, resumed at discharge  -Educated patient to continue home blood pressure log   -Has follow-up with cardiologist Tuesday, July 23rd.      CAD  -Continue PTA statin  -Held PTA 1.25 mg indapamide. Do not resume until seem by cardiologist as above.   -Held PTA 60 mg Imdur, resumed at discharge.      DM 2  PTA regimen 500 mg metformin every evening  -Hold metformin while inpatient, resumed at discharge.   -Moderate resistance sliding scale insulin 3 times daily with meals and at bedtime  -Hypoglycemia protocol in place     History of PE and DVT  Diagnosed in 2021.  On Xarelto  -Continue PTA Xarelto 20 mg daily     DEEPIKA  -CPAP at night with home settings patient brought his own machine     BPH  -Held PTA finasteride, Flomax, and gemtesa, resumed at discharge.      GERD  -Continue PTA 20 mg omeprazole daily       Consultations This Hospital Stay   CARDIOLOGY IP CONSULT  NEUROLOGY IP STROKE CONSULT    Code Status   Full Code    Time Spent on this Encounter   Francisca PEREZ APRN CNP, personally saw the patient today and spent greater than 30 minutes discharging this patient.       Francisca " HAZEL Quintanilla CNP  [facility] NEUROSCIENCE UN[facility]  [address] [place] [address]  Phone: [phone]  ______________________________________________________________________    Physical Exam   Vital Signs: Temp: 97.4  F (36.3  C) Temp src: Oral BP: (!) 158/94 Pulse: 70   Resp: 16 SpO2: 95 % O2 Device: None (Room air)    Weight: 0 lbs 0 oz  Physical Exam  Vitals and nursing note reviewed.   Constitutional:       Appearance: He is obese.   HENT:      Mouth/Throat:      Mouth: Mucous membranes are moist.   Eyes:      Pupils: Pupils are equal, round, and reactive to light.   Cardiovascular:      Rate and Rhythm: Normal rate and regular rhythm.      Pulses: Normal pulses.      Heart sounds: Normal heart sounds.   Pulmonary:      Effort: Pulmonary effort is normal.      Breath sounds: Normal breath sounds.   Abdominal:      General: There is no distension.      Palpations: Abdomen is soft.      Tenderness: There is no abdominal tenderness.   Musculoskeletal:         General: Normal range of motion.      Cervical back: Normal range of motion.   Skin:     General: Skin is warm and dry.      Capillary Refill: Capillary refill takes less than 2 seconds.   Neurological:      Mental Status: He is alert and oriented to person, place, and time.             Primary Care Physician   Princess Mcgill    Discharge Orders      Primary Care - Care Coordination Referral      Adult Neurology [facility] Referral      Reason for your hospital stay    You were hospitalized for     Follow-up and recommended labs and tests     Follow up with primary care provider, Pirncess Mcgill, within 7 days for hospital follow- up.  No follow up labs or test are needed.    Follow up with general neurology in 6-8 weeks.     Follow up with cardiology as planned, Tuesday, July 23.     Activity    Your activity upon discharge: activity as tolerated and no driving for today     Diet    Follow this diet upon discharge: Orders Placed This Encounter       Combination Diet Regular Diet Adult     Stroke Hospital Follow Up    Please be aware that coverage of these services is subject to the terms and limitations of your health insurance plan.  Call member services at your health plan with any benefit or coverage questions.  [employer] will call you to coordinate care as prescribed by your provider. If you don t hear from a representative within 2 business days, please call [phone].         Significant Results and Procedures   Results for orders placed or performed during the hospital encounter of 07/15/24   MR Brain w/o & w Contrast    Narrative    MRI OF THE BRAIN WITHOUT AND WITH CONTRAST July 16, 2024 9:54 AM     HISTORY: Right-sided weakness.     TECHNIQUE: Axial diffusion-weighted with ADC map, axial T2-weighted  with fat saturation, axial T1-weighted, axial turboFLAIR and coronal  T1-weighted images of the brain were acquired without intravenous  contrast. Following intravenous administration of gadolinium (12 mL  Gadavist), axial T1-weighted images of the brain were acquired.     COMPARISON: Head CT 7/14/2024.    FINDINGS: There is mild diffuse cerebral volume loss. There are a few  scattered focal and minimal patchy periventricular areas of abnormal  T2 signal hyperintensity in the cerebral white matter bilaterally that  are consistent with sequela of chronic small vessel ischemic disease.     The ventricles and basal cisterns are within normal limits in  configuration given the degree of cerebral volume loss. There is no  midline shift. There are no extra-axial fluid collections. There is no  evidence for stroke or acute intracranial hemorrhage. There is no  abnormal contrast enhancement in the brain or its coverings.    There is no sinusitis or mastoiditis.      Impression    IMPRESSION: Diffuse cerebral volume loss and cerebral white matter  changes consistent with chronic small vessel ischemic disease. No  evidence for acute intracranial  pathology.    DEBORAH PIZANO MD         SYSTEM ID:  [id number]   MR Cervical Spine w/o & w Contrast    Narrative    MRI OF THE CERVICAL SPINE WITHOUT AND WITH CONTRAST 7/16/2024 9:54 AM     COMPARISON: Cervical spine CT scan 7/14/2024.    HISTORY: Right-sided weakness; Neck pain; Neurologic deficit,  non-traumatic; None of the following: Babinski/clonus, balance/gait  abnormality, Rich's sign, hyperreflexia, or UE weakness.    TECHNIQUE: Multiplanar, multisequence MRI images of the cervical spine  were acquired without and with 12 mL Gadavist gadolinium IV contrast.     FINDINGS: There is normal alignment of the cervical vertebrae.  Vertebral body heights of the cervical spine are normal. Marrow signal  throughout the cervical vertebrae is within normal limits. There is no  evidence for fracture or pathologic bony lesion of the cervical spine.      There is loss of disc height, disc desiccation and posterior disc  bulging to varying degrees at all levels of the cervical spine.      The cervical spinal cord is normal in contour. There is no abnormal  signal in the cervical spinal cord. There is no evidence for  intrathecal abnormality. No evidence for epidural fluid collection.    Level by level:    C2-C3: There is facet arthropathy bilaterally, uncinate hypertrophy  bilaterally and a posterior broad-based disc-osteophyte complex. No  spinal canal stenosis. Moderate left foraminal stenosis. No right  foraminal stenosis.    C3-C4: There is facet arthropathy bilaterally, uncinate hypertrophy  bilaterally and a posterior broad-based disc-osteophyte complex. No  spinal canal stenosis. Moderate left foraminal stenosis. Moderate  right foraminal stenosis.    C4-C5: There is facet arthropathy bilaterally, uncinate hypertrophy  bilaterally and a posterior broad-based disc-osteophyte complex. No  spinal canal stenosis. Moderate left foraminal stenosis. Moderate  right foraminal stenosis.    C5-C6: There is facet arthropathy  bilaterally, uncinate hypertrophy  bilaterally and a posterior broad-based disc-osteophyte complex. No  spinal canal stenosis. Moderate left foraminal stenosis. Moderate  right foraminal stenosis.    C6-C7: There is facet arthropathy bilaterally, uncinate hypertrophy  bilaterally and a posterior broad-based disc-osteophyte complex. No  spinal canal stenosis. Mild left foraminal narrowing. Mild right  foraminal narrowing.    C7-T1: Normal disc height and contour. Mild facet arthropathy  bilaterally. No spinal canal stenosis. No foraminal stenosis on either  side.       Impression    IMPRESSION:  1. Diffuse degenerative change of the cervical spine as detailed  above.  2. No high-grade spinal canal stenosis of the cervical spine.  3. Moderate degenerative neural foraminal stenosis on the left at  C2-C3, bilaterally C3-C4, bilaterally C4-C5, and bilaterally C5-C6.  4. No evidence for epidural fluid collection.    DEBORAH PIZANO MD         SYSTEM ID:  [id number]   Echocardiogram Complete     Value    LVEF  60-65%    Narrative    [id number]  [id number]  [id number]  [id number]^ACE^DEREK^FERCHO     [facility]  Echocardiography Laboratory  [address] [place] [address]     Name: MARKEL HEADLEY  MRN: [id number]  : 1955  Study Date: 2024 10:56 AM  Age: 69 yrs  Gender: Male  Patient Location: [facility]  Reason For Study: TIA  Ordering Physician: DEREK BELLO  Referring Physician: JESSE WESTBROOK  Performed By: Sharon Ray     BSA: 2.4 m2  Height: 73 in  Weight: 265 lb  HR: 75  BP: 160/89 mmHg  ______________________________________________________________________________  Procedure  Complete Portable Echo Adult. Optison (NDC #[id number]) given intravenously.  Contrast Optison.  ______________________________________________________________________________  Interpretation Summary     A cardiac source of embolus was not identified.  Sinus rhythm was noted.  Left ventricular systolic  function is normal.  The visual ejection fraction is 60-65%.  The left ventricle is normal in size.  The right ventricle is normal in structure, function and size.  There is a pacemaker lead in the right ventricle.  There is no atrial shunt seen.  Doppler interrogation does not demonstrate signficant stenosis or  insufficieincy involvng cardiac valves.     No change since 3/13/2023  ______________________________________________________________________________  Left Ventricle  The left ventricle is normal in size. There is normal left ventricular wall  thickness. Left ventricular systolic function is normal. The visual ejection  fraction is 60-65%. Grade I or early diastolic dysfunction. Diastolic Doppler  findings (E/E' ratio and/or other parameters) suggest left ventricular filling  pressures are normal. No regional wall motion abnormalities noted. There is no  thrombus seen in the left ventricle.     Right Ventricle  The right ventricle is normal in structure, function and size. There is a  pacemaker lead in the right ventricle.     Atria  Normal left atrial size. Pacer wire in right atrium. There is no atrial shunt  seen. The left atrial appendage is not well visualized.     Mitral Valve  The mitral valve leaflets appear normal. There is no evidence of stenosis,  fluttering, or prolapse. There is no mitral regurgitation noted. There is no  mitral valve stenosis.     Tricuspid Valve  Normal tricuspid valve. This degree of valvular regurgitation is within normal  limits. There is no tricuspid stenosis.     Aortic Valve  The aortic valve is trileaflet. No aortic regurgitation is present. No aortic  stenosis is present.     Pulmonic Valve  Normal pulmonic valve. There is no pulmonic valvular regurgitation. There is  no pulmonic valvular stenosis.     Vessels  The aortic root is normal size. Normal size ascending aorta. The inferior vena  cava is normal. The pulmonary artery is normal size.     Pericardium  The  pericardium appears normal. There is no pleural effusion.     Rhythm  Sinus rhythm was noted.  ______________________________________________________________________________  MMode/2D Measurements & Calculations  IVSd: 0.80 cm     LVIDd: 5.2 cm  LVIDs: 3.4 cm  LVPWd: 1.0 cm  FS: 35.5 %  LV mass(C)d: 169.0 grams  LV mass(C)dI: 69.7 grams/m2  Ao root diam: 3.3 cm  asc Aorta Diam: 3.5 cm  LVOT diam: 2.1 cm  LVOT area: 3.5 cm2  Ao root diam index Ht(cm/m): 1.8  Ao root diam index BSA (cm/m2): 1.4  Asc Ao diam index BSA (cm/m2): 1.4  Asc Ao diam index Ht(cm/m): 1.9  LA Volume (BP): 49.8 ml     LA Volume Index (BP): 20.5 ml/m2  RV Base: 3.9 cm  RWT: 0.38  TAPSE: 2.7 cm     Doppler Measurements & Calculations  MV E max yong: 85.4 cm/sec  MV A max yong: 108.5 cm/sec  MV E/A: 0.79  MV dec slope: 367.6 cm/sec2  MV dec time: 0.23 sec  PA acc time: 0.12 sec  E/E' av.2  Lateral E/e': 8.1  Medial E/e': 10.3  RV S Yong: 14.9 cm/sec     ______________________________________________________________________________  Report approved by: Dr. Dudley Christie 2024 01:41 PM             Discharge Medications   Current Discharge Medication List        CONTINUE these medications which have NOT CHANGED    Details   Acetaminophen (TYLENOL PO) Take 1,000 mg by mouth every 8 hours as needed for mild pain or fever       atorvastatin (LIPITOR) 10 MG tablet Take 1 tablet by mouth every evening      blood glucose monitoring (NO BRAND SPECIFIED) meter device kit Use to test blood sugar 1-2 times daily or as directed.      EPINEPHrine (ANY BX GENERIC EQUIV) 0.3 MG/0.3ML injection 2-pack Inject 0.3 mLs (0.3 mg) into the muscle as needed for anaphylaxis May repeat one time in 5-15 minutes if response to initial dose is inadequate.  Qty: 2 each, Refills: 0      fexofenadine (ALLEGRA) 180 MG tablet Take 1 tablet by mouth every evening      finasteride (PROSCAR) 5 MG tablet Take 1 tablet (5 mg) by mouth daily  Qty: 90 tablet, Refills: 3     Associated Diagnoses: Urinary urgency; Bladder retention      flecainide (TAMBOCOR) 100 MG tablet Take 1 tablet (100 mg) by mouth 2 times daily  Qty: 180 tablet, Refills: 2    Associated Diagnoses: Carotid sinus syndrome; NSTEMI (non-ST elevated myocardial infarction) (H)      isosorbide mononitrate (IMDUR) 60 MG 24 hr tablet Take 1 tablet (60 mg) by mouth daily  Qty: 90 tablet, Refills: 3    Comments: Increased dose  Associated Diagnoses: Coronary vasospasm (H24)      lisinopril (ZESTRIL) 20 MG tablet Take 1 tablet (20 mg) by mouth 2 times daily  Qty: 180 tablet, Refills: 3    Associated Diagnoses: Benign essential hypertension      metFORMIN (GLUCOPHAGE XR) 500 MG 24 hr tablet Take 500 mg by mouth daily (with dinner)      nitroGLYcerin (NITROSTAT) 0.4 MG sublingual tablet For chest pain place 1 tablet under the tongue every 5 minutes for 3 doses. If symptoms persist 5 minutes after 1st dose call 911.  Qty: 90 tablet, Refills: 3    Associated Diagnoses: Coronary vasospasm (H24)      omeprazole (PRILOSEC) 20 MG DR capsule Take 20 mg by mouth daily      rivaroxaban ANTICOAGULANT (XARELTO) 20 MG TABS tablet Take 20 mg by mouth daily      tamsulosin (FLOMAX) 0.4 MG capsule Take 1 capsule (0.4 mg) by mouth daily  Qty: 90 capsule, Refills: 3    Associated Diagnoses: Urinary urgency      verapamil ER (CALAN-SR) 180 MG CR tablet Take 1 tablet (180 mg) by mouth 2 times daily  Qty: 180 tablet, Refills: 3    Comments: Replaces previous doses of verapamil. Pt requires a TABLET (capsule has been more expensive in the past). Please call me if this is not available in tablet form. [phone]  Associated Diagnoses: Benign essential hypertension      vibegron (GEMTESA) 75 MG TABS tablet Take 1 tablet (75 mg) by mouth daily  Qty: 90 tablet, Refills: 1    Associated Diagnoses: Urinary urgency           STOP taking these medications       indapamide (LOZOL) 1.25 MG tablet Comments:   Reason for Stopping:             Allergies    Allergies   Allergen Reactions    Gabapentin Other (See Comments)     Suicidal affects.      Diltiazem Swelling and Rash     Retried 12/2023 and noted palmar rash, swelling and SOB    Adhesive Tape Other (See Comments)     Some kind of tape from surgery-bad rash and hives    Bees     Chlorhexidine Hives     Possible rash and hives from binder/tape in surgery    Cialis [Tadalafil] Other (See Comments)     Back ached and horrible pressure behind eyes.    Cyclobenzaprine Fatigue     Flexeril-Sever Fatigue      Vardenafil Other (See Comments)     Back ached and horrible pressure behind eyes     Venlafaxine Other (See Comments)     Significant worsening of presumed cataplexy.    Biaxin [Clarithromycin] Rash

## 2024-07-16 NOTE — PROGRESS NOTES
[facility] EEG #  2 hr. Video, ordered by Amie Chaney CNP.    Pt awake and drowsy on recording.     Photic stimulation performed.   Pt had event during photic stimulation where he looked slowly to right at wife; appeared to have brief altered consciousness.   His wife said this was diagnosed previously as NEE/PTSD events.    (He served in the past in [employer] war).   Event button was pressed at 18:39.

## 2024-07-16 NOTE — UTILIZATION REVIEW
Admission Status; Secondary Review Determination    Under the authority of the Utilization Management Committee, the utilization review process indicated a secondary review on the above patient. The review outcome is based on review of the medical records, discussions with staff, and applying clinical experience noted on the date of the review.    (x) Inpatient Status Appropriate - This patient's medical care is consistent with medical management for inpatient care and reasonable inpatient medical practice.    RATIONALE FOR DETERMINATION: Complex 69-year-old male with past medical history is significant for cardiac pacemaker in situ, PTSD, fatty liver, GERD, history of DVT, urolithiasis, pulmonary embolism, TBI, postconcussion syndrome, type 2 diabetes, vasospastic angina, ASCVD, sleep apnea, hypertension, hyperlipidemia, TIAs, narcolepsy with cataplexy, presents to the emergency department with initial chest discomfort that improved with nitroglycerin but then developed difficulty expressing himself with mild dysarthria and had new right upper extremity weakness.  These neurological symptoms lasted for approximately 1 hour.  While in the emergency room patient had 2 episodes of acute loss of consciousness with patient's eyes rolled back as well as head and appeared to go in and out of consciousness, not responding to nurses questions with spontaneous resolution.  During these episodes patient's blood pressure was 120 systolic with normal rhythm on telemetry.  With patient's complex medical problems presenting with chest pressure and then TIA symptoms with extremity weakness sparing facial involvement need to rule out CT spine etiologies as well as brain in addition to working out patient's episodic loss of consciousness and did not appear to be related to arrhythmias or hypotension.  Patient thus requiring multiple nights in the hospital for this monitoring and evaluation with significant risk of adverse events  related to the TIA and multiple episodes of loss of consciousness appropriate for inpatient care.    At the time of admission with the information available to the attending physician more than 2 nights Hospital complex care was anticipated, based on patient risk of adverse outcome if treated as outpatient and complex care required. Inpatient admission is appropriate based on the Medicare guidelines.    This document was produced using voice recognition software    The information on this document is developed by the utilization review team in order for the business office to ensure compliance. This only denotes the appropriateness of proper admission status and does not reflect the quality of care rendered.    The definitions of Inpatient Status and Observation Status used in making the determination above are those provided in the CMS Coverage Manual, Chapter 1 and Chapter 6, section 70.4.    Sincerely,    Gonzalez Espinosa MD  Utilization Review  Physician Advisor  [facility].

## 2024-07-16 NOTE — PROGRESS NOTES
"      [facility]    Stroke Progress Note    Interval Events  - SBPs 148-173, HR 67-80, afebrile   - MRI brain and c spine are unrevealing     HPI Summary  Stiven Nguyễn is a 69 year old man with history significant for HTN, CAD, DM2, PE, and A-fib on Xarelto.  He presented to the [facility] ED [facility]/14/2024 for evaluation of right-sided weakness.  He took nitroglycerin for some chest discomfort around 1230 and sat down because he felt lightheaded.  While sitting, his right arm suddenly \"went dead\"; he could not lift it or manipulate it.  He did not have any pain.  He did not notice weakness in the right leg but reports that EMS did.  In total, right arm weakness lasted ~ 1 hour. While in the ED, RN reported an episode of unresponsiveness with spontaneous recovery.  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  He was not a candidate for TNK due to DOAC use and low NIHSS.  Given right arm and leg weakness without facial involvement, he underwent aortic survey and CT C-spine, which were unremarkable.  He was transferred to [facility] for pacemaker compatible MRI.     Stroke Evaluation Summarized    MRI/Head CT No acute pathology    Intracranial Vasculature No LVO, no high grade stenosis, fetal right PCA    Cervical Vasculature No high grade stenosis, no dissection, dominant right vertebral artery      Echocardiogram EF 60-65%, no embolus, no atrial shunt, normal atrial sizes    EKG/Telemetry Junctional rhythm, incomplete RBBB    Other Testing Cardiac device check: in process     C Spine MRI:   1. Diffuse degenerative change of the cervical spine as detailed above.  2. No high-grade spinal canal stenosis of the cervical spine.  3. Moderate degenerative neural foraminal stenosis on the left at C2-C3, bilaterally C3-C4, bilaterally C4-C5, and bilaterally C5-C6.  4. No evidence for epidural fluid collection.    EEG: "   IMPRESSION: Within normal limits.  1) background activity was normal.  2) There were no epileptiform discharges or seizures.  3) Staff and possibly family pointed out two spells occurring during hyperventilation and photic stimulation in which patient appeared to reduce ongoing activity. He was not examined so it is not clear whether impairment occurred. EEG during both spells was normal and did not show seizure activity. It is not clear whether these spells represented target events or not; clinical correlation is needed.    CT Aortic Survey:   1.  No acute findings.  2.  Nonaneurysmal aorta without dissection. No wall hematoma.  3.  Suboptimal opacification of the pulmonary arteries. No definite central or large pulmonary embolism.    CT soft tissue neck:   1.  No mass or pathologic adenopathy in the neck.  2.  Subcentimeter hypodense left thyroid nodule.    CT Cervical Spine:   1.  No CT evidence of epidural hematoma. MRI would be more sensitive. Implanted electronic cardiac pacing or defibrillating device noted.  2.  No acute cervical spine fracture.  3.  Degenerative changes most pronounced at C3-C4 and C4-C5.     LDL  7/8/2024: 76 mg/dL   A1C  7/8/2024: 6.8 %   Troponin 7/14/2024: 22 ng/L       Impression   Transient (~ 1 hr) RUE weakness concerning for TIA (despite appropriate anticoagulation)    Plan  - continue xarelto, reminding patient to take with food   - continue PTA atorvastatin 10 mg daily   - goal A1c < 7.0  - blood pressure goal per cardiology   - cardiac device check pending   - therapies not required due to resolution of symptoms     Patient Follow-up    - in the next 1-2 week(s) with PCP  - in 6-8 weeks with general neurology ([phone]) (ordered)   - as planned with cardiology     No further stroke evaluation is recommended, so we will sign off. Please contact us with any additional questions.      Sharon Zavaleta NP  Vascular Neurology    To page me or covering stroke neurology team  "member, click here: [facility]  Choose \"On Call\" tab at top, then select \"NEUROLOGY/ALL SITES\" from middle drop-down box, press Enter, then look for \"stroke\" or \"telestroke\" for your site.  ______________________________________________________    Clinically Significant Risk Factors Present on Admission               # Drug Induced Coagulation Defect: home medication list includes an anticoagulant medication    # Hypertension: Noted on problem list            # DMII: A1C = 6.8 % (Ref range: 0.0 - 5.6 %) within past 6 months    # Obesity: Estimated body mass index is 34.96 kg/m  as calculated from the following:    Height as of 7/14/24: 1.854 m (6' 1\").    Weight as of 7/14/24: 120.2 kg (265 lb).        # Pacemaker present       Medications   Scheduled Meds  Current Facility-Administered Medications   Medication Dose Route Frequency Provider Last Rate Last Admin    atorvastatin (LIPITOR) tablet 10 mg  10 mg Oral QPM Chiquis Good NP   10 mg at 07/15/24 2044    insulin aspart (NovoLOG) injection (RAPID ACTING)  1-7 Units Subcutaneous TID AC Chiquis Good NP        insulin aspart (NovoLOG) injection (RAPID ACTING)  1-5 Units Subcutaneous At Bedtime Chiquis Good NP        pantoprazole (PROTONIX) EC tablet 40 mg  40 mg Oral QAM AC Candace Webster, [facility]   40 mg at 07/16/24 0645    rivaroxaban ANTICOAGULANT (XARELTO) tablet 20 mg  20 mg Oral Daily Chiquis Good NP        sodium chloride (PF) 0.9% PF flush 3 mL  3 mL Intracatheter Q8H Chiquis Good NP   3 mL at 07/15/24 2044       Infusion Meds  Current Facility-Administered Medications   Medication Dose Route Frequency Provider Last Rate Last Admin    Patient is already receiving anticoagulation with heparin, enoxaparin (LOVENOX), warfarin (COUMADIN)  or other anticoagulant medication   Does not apply Continuous PRN Chiquis Good NP           PRN Meds  Current Facility-Administered Medications   Medication Dose Route Frequency Provider Last " Rate Last Admin    acetaminophen (TYLENOL) tablet 650 mg  650 mg Oral Q4H PRN Chiquis Good NP        Or    acetaminophen (TYLENOL) Suppository 650 mg  650 mg Rectal Q4H PRChiquis Cota NP        calcium carbonate (TUMS) chewable tablet 1,000 mg  1,000 mg Oral 4x Daily PRN Chiquis Good NP        glucose gel 15-30 g  15-30 g Oral Q15 Min PRChiquis Cota NP        Or    dextrose 50 % injection 25-50 mL  25-50 mL Intravenous Q15 Min PRChiquis Cota NP        Or    glucagon injection 1 mg  1 mg Subcutaneous Q15 Min Chiquis Alvarez NP        lidocaine (LMX4) cream   Topical Q1H PRChiquis Cota NP        lidocaine 1 % 0.1-1 mL  0.1-1 mL Other Q1H PRChiquis Cota NP        ondansetron (ZOFRAN ODT) ODT tab 4 mg  4 mg Oral Q6H PRChiquis Cota NP        Or    ondansetron (ZOFRAN) injection 4 mg  4 mg Intravenous Q6H PRN Chiquis Good NP        Patient is already receiving anticoagulation with heparin, enoxaparin (LOVENOX), warfarin (COUMADIN)  or other anticoagulant medication   Does not apply Continuous Chiquis Alvarez NP        senna-docusate (SENOKOT-S/PERICOLACE) 8.6-50 MG per tablet 1 tablet  1 tablet Oral BID PRChiquis Cota NP        Or    senna-docusate (SENOKOT-S/PERICOLACE) 8.6-50 MG per tablet 2 tablet  2 tablet Oral BID PRN Chiquis Good NP        sodium chloride (PF) 0.9% PF flush 3 mL  3 mL Intracatheter q1 min Chiquis Alvarez NP              PHYSICAL EXAMINATION  Temp:  [97.4  F (36.3  C)-98.4  F (36.9  C)] 98  F (36.7  C)  Pulse:  [67-80] 67  Resp:  [10-16] 16  BP: (144-173)/(86-96) 160/89  SpO2:  [93 %-98 %] 95 %      Neurologic  Mental Status:  alert, oriented x 3, follows commands, speech clear and fluent, naming and repetition normal  Cranial Nerves:  visual fields intact, PERRL, EOMI with normal smooth pursuit, facial sensation intact and symmetric, facial movements symmetric, hard of hearing, no  dysarthria, shoulder shrug strong bilaterally, tongue protrusion midline  Motor:  normal muscle tone and bulk, no abnormal movements, able to move all limbs spontaneously, strength 5/5 throughout upper and lower extremities, no pronator drift  Sensory:  light touch sensation intact and symmetric throughout upper and lower extremities, no extinction on double simultaneous stimulation   Coordination:  normal finger-to-nose and heel-to-shin bilaterally without dysmetria  Station/Gait:  deferred    Stroke Scales    NIHSS  1a. Level of Consciousness 0-->Alert, keenly responsive   1b. LOC Questions 0-->Answers both questions correctly   1c. LOC Commands 0-->Performs both tasks correctly   2.   Best Gaze 0-->Normal   3.   Visual 0-->No visual loss   4.   Facial Palsy 0-->Normal symmetrical movements   5a. Motor Arm, Left 0-->No drift, limb holds 90 (or 45) degrees for full 10 secs   5b. Motor Arm, Right 0-->No drift, limb holds 90 (or 45) degrees for full 10 secs   6a. Motor Leg, Left 0-->No drift, leg holds 30 degree position for full 5 secs   6b. Motor Leg, right 0-->No drift, leg holds 30 degree position for full 5 secs   7.   Limb Ataxia 0-->Absent   8.   Sensory 0-->Normal, no sensory loss   9.   Best Language 0-->No aphasia, normal   10. Dysarthria 0-->Normal   11. Extinction and Inattention  0-->No abnormality   Total 0 (07/16/24 1229)       Modified Izard Score (Discharge)  0 - No symptoms.    Imaging  I personally reviewed all imaging; relevant findings per HPI.     Lab Results Data   CBC  Recent Labs   Lab 07/16/24  0711 07/14/24  1319   WBC 7.4 7.3   RBC 5.11 4.87   HGB 15.4 14.8   HCT 44.7 41.7    176     Basic Metabolic Panel    Recent Labs   Lab 07/16/24  0759 07/16/24  0711 07/16/24  0223 07/15/24  1835 07/15/24  1421 07/15/24  1316 07/14/24  1319   NA  --  139  --   --   --   --  140   POTASSIUM  --  4.3  --   --  3.9  --  4.2   CHLORIDE  --  102  --   --   --   --  105   CO2  --  30*  --   --   --   " --  25   BUN  --  19.7  --   --   --   --  18.6   CR  --  1.14  --   --   --   --  0.99   * 124* 135*   < >  --    < > 140*   NICKO  --  9.7  --   --   --   --  9.5    < > = values in this interval not displayed.     Liver Panel  No results for input(s): \"PROTTOTAL\", \"ALBUMIN\", \"BILITOTAL\", \"ALKPHOS\", \"AST\", \"ALT\", \"BILIDIRECT\" in the last 168 hours.  INR    Recent Labs   Lab Test 07/14/24  1319 02/18/22  1645 01/07/22  1807   INR 1.18* 1.33* 1.32*      Lipid Profile    Recent Labs   Lab Test 07/08/24  0723 05/08/23  1500 02/07/23  0718   CHOL 131 136 134   HDL 41 43 46   LDL 76 76 69   TRIG 69 84 95     A1C    Recent Labs   Lab Test 07/08/24  0723 04/07/21  0000 01/04/21  2225   A1C 6.8* 6.1* 6.5*     Troponin    Recent Labs   Lab 07/14/24  1521 07/14/24  1319   CTROPT 22 23*          Data     I have personally spent a total of 40 minutes providing care today, time spent in reviewing medical records and devising the plan as recorded above.   "

## 2024-07-16 NOTE — PROGRESS NOTES
Received call from Kati SINGH. Pt is inpatient and needs a device check to look for arrhythmias in the last 48-72 hours, or lead measurement issues. Pt had syncope.     Order is in Epic. I called EKG at [phone], asked them to do a [facility]. EKG staff is aware and will complete the device check.

## 2024-07-16 NOTE — PLAN OF CARE
Goal Outcome Evaluation:       Pt here with TIA. A&O x4. Neuros intact, symptoms of R side weakness and speech difficulties resolved. VSS. Tele NSR. reg diet, thin liquids. Takes pills whole with water. Up independently. Denies pain. Pt scoring green on the Aggression Stop Light Tool. Plan for Echo and MRI (pending pacemaker clearance). Discharge pending. If pt is asleep, do not stand next to his front body/face, pt has PTSD and may swing his arms at you, so stay in front of his feet and gently call his name and/or rub his feet until he recognize you.

## 2024-07-17 NOTE — PLAN OF CARE
Goal Outcome Evaluation:  A and O x4. Neuros intact except for HA that began during MRI, fair relief from Tylenol. VSS. No pain. MRI, pacemaker interrogation, and  echo completed. Wife at bedside. Up independently. Good appetite on regular diet. Voiding without difficulty. Pt will discharge home this evening with wife. Scoring green on aggression screening tool.

## 2024-07-18 ENCOUNTER — TELEPHONE (OUTPATIENT)
Dept: CARDIOLOGY | Facility: CLINIC | Age: 69
End: 2024-07-18
Payer: MEDICARE
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
## 2024-07-18 NOTE — TELEPHONE ENCOUNTER
Patient was admitted to [facility] 7/15-7/16/24    PMH: hypertension, vasospastic angina, coronary artery disease, hyperlipidemia, TIAs, sleep apnea, type 2 diabetes, pulmonary embolism on Xarelto, and narcolepsy with cataplexy who presents with chest pain treated with nitroglycerin followed by right upper extremity weakness and possible slowed speech.     7/16/24 Echo showed   A cardiac source of embolus was not identified.  Sinus rhythm was noted.  Left ventricular systolic function is normal.  The visual ejection fraction is 60-65%.  The left ventricle is normal in size.  The right ventricle is normal in structure, function and size.  There is a pacemaker lead in the right ventricle.  There is no atrial shunt seen.  Doppler interrogation does not demonstrate signficant stenosis or  insufficieincy involvng cardiac valves.  No change since 3/13/2023    Recommendations:  PPM interrogation   Check orthostatics  Hold indapamide until seen back in cardiology  Echo pending   Follow up with cardiology scheduled on 7/23  Pending acute findings on echocardiogram or ppm interrogation, cardiology will sign off    Called patient to discuss any post hospital d/c questions he may have, review medication changes, and confirm f/u appts. Patient denied any questions regarding new medications or changes with their PTA medications.    Appointments in Next Year      Jul 23, 2024 11:00 AM  LAB with [facility]  [facility] ([facility] ) [phone]     Jul 23, 2024 11:45 AM  (Arrive by 11:40 AM)  Return EP with Blanca López PA-C  [facility] ([facility] ) [phone]     Sep 09, 2024 10:15 AM  (Arrive by 10:00 AM)  Return Visit with Dudley Bradley MD  [facility] ([facility] ) [phone]     Sep 19, 2024 12:00 AM  CARDIAC DEVICE CHECK - REMOTE with [address]  [facility] ([facility] ) [phone]     Oct 03, 2024 9:00 AM  (Arrive by 8:45 AM)  Hospital Follow-Up Stroke with HAZEL Alves CNP  [facility] [place] ([facility] [phone]           Pt has appt to see PCP Monday afternoon.     Patient advised to call clinic with any cardiac related questions or concerns prior to this mendez't. Patient verbalized understanding and agreed with plan. Adiel HANLEY

## 2024-07-23 ENCOUNTER — LAB (OUTPATIENT)
Dept: LAB | Facility: CLINIC | Age: 69
End: 2024-07-23
Payer: MEDICARE

## 2024-07-23 ENCOUNTER — OFFICE VISIT (OUTPATIENT)
Dept: CARDIOLOGY | Facility: CLINIC | Age: 69
End: 2024-07-23
Payer: MEDICARE

## 2024-07-23 ENCOUNTER — TELEPHONE (OUTPATIENT)
Dept: CARDIOLOGY | Facility: CLINIC | Age: 69
End: 2024-07-23

## 2024-07-23 VITALS
RESPIRATION RATE: 20 BRPM | WEIGHT: 265.1 LBS | OXYGEN SATURATION: 97 % | BODY MASS INDEX: 35.14 KG/M2 | SYSTOLIC BLOOD PRESSURE: 159 MMHG | HEART RATE: 74 BPM | DIASTOLIC BLOOD PRESSURE: 94 MMHG | HEIGHT: 73 IN

## 2024-07-23 DIAGNOSIS — I20.1 CORONARY VASOSPASM (H): ICD-10-CM

## 2024-07-23 DIAGNOSIS — I10 BENIGN ESSENTIAL HYPERTENSION: ICD-10-CM

## 2024-07-23 LAB
ANION GAP SERPL CALCULATED.3IONS-SCNC: 9 MMOL/L (ref 7–15)
BUN SERPL-MCNC: 15.8 MG/DL (ref 8–23)
CALCIUM SERPL-MCNC: 9.6 MG/DL (ref 8.8–10.4)
CHLORIDE SERPL-SCNC: 104 MMOL/L (ref 98–107)
CREAT SERPL-MCNC: 1.01 MG/DL (ref 0.67–1.17)
EGFRCR SERPLBLD CKD-EPI 2021: 82 ML/MIN/1.73M2
GLUCOSE SERPL-MCNC: 159 MG/DL (ref 70–99)
HCO3 SERPL-SCNC: 27 MMOL/L (ref 22–29)
POTASSIUM SERPL-SCNC: 4.3 MMOL/L (ref 3.4–5.3)
SODIUM SERPL-SCNC: 140 MMOL/L (ref 135–145)

## 2024-07-23 PROCEDURE — 80048 BASIC METABOLIC PNL TOTAL CA: CPT | Performed by: INTERNAL MEDICINE

## 2024-07-23 PROCEDURE — 99214 OFFICE O/P EST MOD 30 MIN: CPT | Performed by: PHYSICIAN ASSISTANT

## 2024-07-23 PROCEDURE — 36415 COLL VENOUS BLD VENIPUNCTURE: CPT | Performed by: INTERNAL MEDICINE

## 2024-07-23 PROCEDURE — G2211 COMPLEX E/M VISIT ADD ON: HCPCS | Performed by: PHYSICIAN ASSISTANT

## 2024-07-23 RX ORDER — ISOSORBIDE MONONITRATE 60 MG/1
60 TABLET, EXTENDED RELEASE ORAL DAILY
COMMUNITY
Start: 2024-07-23 | End: 2024-09-25

## 2024-07-23 RX ORDER — INDAPAMIDE 1.25 MG/1
1.25 TABLET ORAL EVERY MORNING
Status: SHIPPED
Start: 2024-07-23 | End: 2024-08-01
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
## 2024-07-23 NOTE — PATIENT INSTRUCTIONS
Arya - it was nice to see you and Genna today!     At your visit today we reviewed:    Events at the hospital  Echo looked good  CTA neck showed no blockages - we'll send to Dr. Mcgill for her records     Medication Changes:    ADD BACK indapamide 2.5 mg daily    Recommendations:    Keep track of all the BPs  STOP indapamide if you can ANY hint of that arm/leg stuff again, then call me!  Blood work in ~7-10 days  Will work on the Autonomic Dysfunction    Follow-up:    See me for cardiology follow up in ~Oct but CALL Cardiology nurses Shameka & Clotilde @ [phone] for any issues, questions or concerns in the interim.      To schedule a future appointment, we kindly ask that you call cardiology scheduling at [phone] three months prior to requested visit date.    Important Phone Numbers for [facility]):    [facility] Shameka Mabry: [phone]  Cardiology [phone]  [facility] [phone]  [facility] [phone]  [facility]: [phone]

## 2024-07-23 NOTE — TELEPHONE ENCOUNTER
Pt called back and confirmed he is taking 60mg isosorbide a day. Currently using up his 30mg tabs by taking 2 tabs together same time of day to equal 60mg a day.     Epic med list updated.     Shameka Pompa RN

## 2024-07-23 NOTE — TELEPHONE ENCOUNTER
Attempted to call pt to verify isosorbide dosing per Gayle López's request.     No answer. Left message for pt to call back.    Chasity Serrano RN

## 2024-07-26 ENCOUNTER — TELEPHONE (OUTPATIENT)
Dept: CARDIOLOGY | Facility: CLINIC | Age: 69
End: 2024-07-26
Payer: MEDICARE

## 2024-07-26 NOTE — TELEPHONE ENCOUNTER
"Pt called with concerns for being woken up around 0500 \"with my heart pounding like a bass drum\". It lasted just a few minutes and pt fell back to sleep.   Pt reports no chest pain or shortness of breath at that time.     Pt reports slightly less water intake than usual yesterday. Pt reports not missing any doses of medications the last several days.     Pt encouraged to call again and/or go to ER for evaluation if episodes happen more frequently or last longer with symptoms of chest pain, shortness of breath, dizziness, vision changes, etc. Pt agreeable with this plan.     Pt will check BP and heart rate at time of episodes at home if he notices them during the day while he up and about.     Chasity Serrano RN          "

## 2024-07-30 ENCOUNTER — MEDICAL CORRESPONDENCE (OUTPATIENT)
Dept: HEALTH INFORMATION MANAGEMENT | Facility: CLINIC | Age: 69
End: 2024-07-30

## 2024-08-01 ENCOUNTER — DOCUMENTATION ONLY (OUTPATIENT)
Dept: CARDIOLOGY | Facility: CLINIC | Age: 69
End: 2024-08-01

## 2024-08-01 ENCOUNTER — LAB (OUTPATIENT)
Dept: LAB | Facility: CLINIC | Age: 69
End: 2024-08-01
Payer: MEDICARE

## 2024-08-01 DIAGNOSIS — I10 BENIGN ESSENTIAL HYPERTENSION: ICD-10-CM

## 2024-08-01 DIAGNOSIS — I10 HYPERTENSION, UNSPECIFIED TYPE: Primary | ICD-10-CM

## 2024-08-01 LAB
ANION GAP SERPL CALCULATED.3IONS-SCNC: 10 MMOL/L (ref 7–15)
BUN SERPL-MCNC: 20.1 MG/DL (ref 8–23)
CALCIUM SERPL-MCNC: 9.5 MG/DL (ref 8.8–10.4)
CHLORIDE SERPL-SCNC: 101 MMOL/L (ref 98–107)
CREAT SERPL-MCNC: 1.1 MG/DL (ref 0.67–1.17)
EGFRCR SERPLBLD CKD-EPI 2021: 74 ML/MIN/1.73M2
GLUCOSE SERPL-MCNC: 206 MG/DL (ref 70–99)
HCO3 SERPL-SCNC: 28 MMOL/L (ref 22–29)
POTASSIUM SERPL-SCNC: 3.8 MMOL/L (ref 3.4–5.3)
SODIUM SERPL-SCNC: 139 MMOL/L (ref 135–145)

## 2024-08-01 PROCEDURE — 80048 BASIC METABOLIC PNL TOTAL CA: CPT | Performed by: PHYSICIAN ASSISTANT

## 2024-08-01 PROCEDURE — 36415 COLL VENOUS BLD VENIPUNCTURE: CPT | Performed by: PHYSICIAN ASSISTANT

## 2024-08-01 RX ORDER — INDAPAMIDE 1.25 MG/1
2.5 TABLET ORAL EVERY MORNING
COMMUNITY
Start: 2024-08-01 | End: 2024-08-02 | Stop reason: DRUGHIGH

## 2024-08-01 NOTE — PROGRESS NOTES
"Shameka -     Pls let Arya and Genna know that BMP after restarting Lozol 1.25 mg (indapamide)looks great!    Any issues?  How are BPs?  Any \"spells?    Also let him know you've been working on referral to [facility] (and share you found it was evaluated with Neuro, not Cards).    Thx Gayle      Component      Latest Ref Rng 7/14/2024  1:19 PM 7/16/2024  7:11 AM 7/23/2024  10:48 AM 8/1/2024  8:18 AM   Sodium      135 - 145 mmol/L 140  139  140  139    Potassium      3.4 - 5.3 mmol/L 4.2  4.3  4.3  3.8    Chloride      98 - 107 mmol/L 105  102  104  101    Carbon Dioxide (CO2)      22 - 29 mmol/L 25  30 (H)  27  28    Anion Gap      7 - 15 mmol/L 10  7  9  10    Urea Nitrogen      8.0 - 23.0 mg/dL 18.6  19.7  15.8  20.1    Creatinine      0.67 - 1.17 mg/dL 0.99  1.14  1.01  1.10    GFR Estimate      >60 mL/min/1.73m2 83  70  82  74    Calcium      8.8 - 10.4 mg/dL 9.5  9.7  9.6  9.5    Glucose      70 - 99 mg/dL 140 (H)  124 (H)  159 (H)  206 (H)      "

## 2024-08-01 NOTE — CONFIDENTIAL NOTE
"Called and spoke with pt.     Pt reports \"no spells\". Pt reports an episode of chest discomfort on  in the afternoon, sometime between 1300 and 1600 for a short period of time.     Pt updated that the [facility] referral is in process.     BP readings done laying, sitting and standin/25 at 1130  148/80, 148/82, 121/68   at 1916  170/85, 173/86, 172/82   at 1216  151/86, 152/78, 148/117   at 2041  179/89, 158/90, 166/83   at 2212  176/92, 159/90, 169/92    at 0932   135/80, 130/77, 121/63    Pt reports that BP is better earlier in the day after taking his AM medications. BP goes up throughout the day when he checks it. Pt reports that he lays down for a few minutes before starting his BP readings.     Any further orders or recommendations?    Chasity Serrano RN    "

## 2024-08-01 NOTE — PROGRESS NOTES
Attempted to call pt, no answer. Left message with detailed instructions and the RN line phone number for him to call with any further questions.     Medication list is updated per provider request and BMP orders placed. Pt to call and sent up appointment for labs the end of next week.     Chasity Serrano RN

## 2024-08-01 NOTE — PROGRESS NOTES
"Thx for update. BP still running a bit high - only dropped with standing a few times twice.    I'd like him to increase indapamide to 2.5 mg daily (double current 1.25 mg). Pls update in EPIC - he should just take 2 of the current 1.25 mg tabs before sending in a new Rx as not sure he'll tolerate the higher dose    BMP in 7-10 days. Pls order.  When call with results will get another update.    Pedro Araujo          Called and spoke with pt.      Pt reports \"no spells\". Pt reports an episode of chest discomfort on  in the afternoon, sometime between 1300 and 1600 for a short period of time.      Pt updated that the [place] referral is in process.      BP readings done laying, sitting and standin/25 at 1130  148/80, 148/82, 121/68   at 1916  170/85, 173/86, 172/82   at 1216  151/86, 152/78, 148/117   at 2041  179/89, 158/90, 166/83   at 2212  176/92, 159/90, 169/92    at 0932   135/80, 130/77, 121/63     Pt reports that BP is better earlier in the day after taking his AM medications. BP goes up throughout the day when he checks it. Pt reports that he lays down for a few minutes before starting his BP readings.      Any further orders or recommendations?     Chasity Serrano RN        "

## 2024-08-02 RX ORDER — INDAPAMIDE 2.5 MG/1
2.5 TABLET ORAL EVERY MORNING
Qty: 90 TABLET | Refills: 1 | Status: SHIPPED | OUTPATIENT
Start: 2024-08-02 | End: 2024-08-05 | Stop reason: DRUGHIGH

## 2024-08-02 NOTE — PROGRESS NOTES
Called and spoke with pt to make sure he got message. Pt states he did but is asking for refill as almost out of 1.25mg tabs. New rx sent for 2.5mg tabs.   Pt will call to schedule lab visit in 7-10 days     Pt verbalized an understanding.     Shameka Pompa RN

## 2024-08-05 RX ORDER — INDAPAMIDE 1.25 MG/1
1.25 TABLET ORAL EVERY MORNING
Qty: 90 TABLET | Refills: 1 | Status: SHIPPED | OUTPATIENT
Start: 2024-08-05 | End: 2024-08-10

## 2024-08-05 NOTE — PROGRESS NOTES
Pt called to ask if he still needed the lab appt on Monday 8/12/24 as he was not going to increase indapamide. He does not. Appt cancelled. Clotilde Lauren RN Cardiology August 5, 2024, 12:10 PM

## 2024-08-05 NOTE — PROGRESS NOTES
Called and notified pt of providers recommendations. Pt did take dose today so he will skip dose tomorrow and decrease back to 1.25mg (1 tab) a day on Wed. 8/7/24.   He will keep us updated if lightheadedness/faintness continues or worsens and will call his in a few weeks with BP readings.     Pt verbalized an understanding.     Shameka Pompa RN

## 2024-08-05 NOTE — PROGRESS NOTES
Routing call to Gayle López, PAC -    Pt states yesterday he was out in the garden pulling weeds and when he went to bend over to pull the weeds he became very lightheaded and faint. He had to sit out and take a break.   Today before taking his morning medications, was at the kitchen table eating and when he got up to bring his bowl to the sink he again become very lightheaded and faint to where he had to go lay down of a while.  After getting up from laying down he went to help his grandson put on his jacket and when he bent over to assist became lightheaded.     Asked pt if he had checked his BP when these episodes/symptoms occurred and he did not.     Pt states he drinks 75-100oz of water a day     Yesterday at noon his BP was   Laying - 128/67  Sitting- 127/70  Standing 104/57    9:30pm   Laying - 158/88  Sitting- 164/90  Standing- 162/86    Pt has been taking the Lozol 2.5mg in am    Recommendations?     Shameka Pompa RN

## 2024-08-05 NOTE — PROGRESS NOTES
I'm so glad he called - likely d/t increase in Lozol last week.    If he's not taken Lozol/indapamide today, HOLD IT  If he's taken it today, SKIP TOMORROW, then restart at just 1.25 mg daily    Update EPIC med list.    Let us know if more lightheadedness, dizziness, faint and what BPs are running in a few weeks.    Pedro Araujo  August 5, 2024 at 11:43 AM

## 2024-08-06 ENCOUNTER — LAB REQUISITION (OUTPATIENT)
Dept: LAB | Facility: CLINIC | Age: 69
End: 2024-08-06
Payer: MEDICARE

## 2024-08-06 DIAGNOSIS — E11.65 TYPE 2 DIABETES MELLITUS WITH HYPERGLYCEMIA (H): ICD-10-CM

## 2024-08-06 LAB
ALBUMIN SERPL BCG-MCNC: 4.1 G/DL (ref 3.5–5.2)
ALP SERPL-CCNC: 79 U/L (ref 40–150)
ALT SERPL W P-5'-P-CCNC: 28 U/L (ref 0–70)
ANION GAP SERPL CALCULATED.3IONS-SCNC: 10 MMOL/L (ref 7–15)
AST SERPL W P-5'-P-CCNC: 24 U/L (ref 0–45)
BILIRUB SERPL-MCNC: 0.4 MG/DL
BUN SERPL-MCNC: 20.7 MG/DL (ref 8–23)
CALCIUM SERPL-MCNC: 9.3 MG/DL (ref 8.8–10.4)
CHLORIDE SERPL-SCNC: 103 MMOL/L (ref 98–107)
CREAT SERPL-MCNC: 1.11 MG/DL (ref 0.67–1.17)
EGFRCR SERPLBLD CKD-EPI 2021: 73 ML/MIN/1.73M2
GLUCOSE SERPL-MCNC: 182 MG/DL (ref 70–99)
HCO3 SERPL-SCNC: 28 MMOL/L (ref 22–29)
POTASSIUM SERPL-SCNC: 3.9 MMOL/L (ref 3.4–5.3)
PROT SERPL-MCNC: 7.1 G/DL (ref 6.4–8.3)
SODIUM SERPL-SCNC: 141 MMOL/L (ref 135–145)

## 2024-08-06 PROCEDURE — 80053 COMPREHEN METABOLIC PANEL: CPT | Mod: ORL | Performed by: FAMILY MEDICINE

## 2024-08-07 ENCOUNTER — MYC MEDICAL ADVICE (OUTPATIENT)
Dept: CARDIOLOGY | Facility: CLINIC | Age: 69
End: 2024-08-07
Payer: MEDICARE

## 2024-08-07 DIAGNOSIS — I10 BENIGN ESSENTIAL HYPERTENSION: ICD-10-CM

## 2024-08-09 NOTE — TELEPHONE ENCOUNTER
Pt called because he hadn't gotten a response back from Gayle NIÑO. States has lightheadedness (room is NOT spinning) whenever he bends over and stands up or gets up from his knees (he is in the process of building a deer stand). He confirmed he is NOT getting on any ladders for this project. He also confirms he is drinking a lot of water and staying well hydrated. He has a refill of the indapamide ready to be picked up if she wants him to stay on it. Instructed him to not pick it up until after I hear back from Gayle. Clotilde Lauren RN Cardiology August 9, 2024, 9:27 AM

## 2024-08-12 RX ORDER — INDAPAMIDE 1.25 MG/1
0.62 TABLET ORAL EVERY MORNING
Qty: 45 TABLET | Refills: 1 | Status: SHIPPED | OUTPATIENT
Start: 2024-08-12 | End: 2024-08-22

## 2024-08-20 NOTE — PROGRESS NOTES
[facility] express received from [facility] on 7/16/24.  Patient admitted for transient right arm and leg weakness concerning for TIA and possible syncope, PPM interrogation requested. MRI brain and MR cervical spince w/ no acute pathology.    Presenting rhythm: AP/VS  Battery: 10 years remaining   Lead Measurements: [facility]  Atrial Arrhythmias: non   Ventricular Arrhythmias: none      Follow-up Care: Routine remote PPM scheduled 9/19/24.    RACHELE Bae

## 2024-08-22 ENCOUNTER — TELEPHONE (OUTPATIENT)
Dept: CARDIOLOGY | Facility: CLINIC | Age: 69
End: 2024-08-22
Payer: MEDICARE

## 2024-08-22 NOTE — TELEPHONE ENCOUNTER
Routing call to PA team.     Please complete PA for medication Bystolic.     See Active[employer] message dated 8/21/24 with all information for this medication.     Shameka Pompa RN

## 2024-09-09 ENCOUNTER — OFFICE VISIT (OUTPATIENT)
Dept: DERMATOLOGY | Facility: CLINIC | Age: 69
End: 2024-09-09
Payer: MEDICARE

## 2024-09-09 DIAGNOSIS — D18.01 ANGIOMA OF SKIN: ICD-10-CM

## 2024-09-09 DIAGNOSIS — L82.0 INFLAMED SEBORRHEIC KERATOSIS: ICD-10-CM

## 2024-09-09 DIAGNOSIS — L82.1 SEBORRHEIC KERATOSES: ICD-10-CM

## 2024-09-09 DIAGNOSIS — Z85.828 HISTORY OF SKIN CANCER: ICD-10-CM

## 2024-09-09 DIAGNOSIS — D23.9 DERMAL NEVUS: Primary | ICD-10-CM

## 2024-09-09 DIAGNOSIS — L81.4 LENTIGO: ICD-10-CM

## 2024-09-09 PROCEDURE — 99213 OFFICE O/P EST LOW 20 MIN: CPT | Mod: 25 | Performed by: DERMATOLOGY

## 2024-09-09 PROCEDURE — 17110 DESTRUCTION B9 LES UP TO 14: CPT | Performed by: DERMATOLOGY
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
## 2024-09-09 NOTE — PATIENT INSTRUCTIONS
Brown spots on Right Cheek: Seborrheic Keratoses, not cancerous. Frozen off today.    Rough spots on both cheeks: Actinic Keratosis, not cancerous but have a higher chance of turning into skin cancer down the road. Let us know if you would like to do the cream treatment for this.       WOUND CARE INSTRUCTIONS   FOR CRYOSURGERY   This area treated with liquid nitrogen should form a blister (areas treated may or may not blister-skin may just turn dark and slough off). You do not need to bandage the area unless a blister forms and breaks (which may be a few days). When the blister breaks, begin daily dressing changes as follows:   1) Clean and dry the area with tap water using clean Q-tip or sterile gauze pad.   2) Apply Polysporin ointment or Bacitracin ointment over entire wound. Do NOT use Neosporin ointment.   3) Cover the wound with a band-aid or sterile non-stick gauze pad and micropore paper tape.   REPEAT THESE INSTRUCTIONS AT LEAST ONCE A DAY UNTIL THE WOUND HAS COMPLETELY HEALED.   It is an old wives tale that a wound heals better when it is exposed to air and allowed to dry out. The wound will heal faster with a better cosmetic result if it is kept moist with ointment and covered with a bandage.   *Do not let the wound dry out.   Supplies Needed:   *Cotton tipped applicators (Q-tips)   *Polysporin ointment or Bacitracin ointment (NOT NEOSPORIN)   *Band-aids, or non stick gauze pads and micropore paper tape   PATIENT INFORMATION   During the healing process you will notice a number of changes. All wounds develop a small halo of redness surrounding the wound. This means healing is occurring. Severe itching with extensive redness usually indicates sensitivity to the ointment or bandage tape used to dress the wound. You should call our office if this develops.   Swelling and/or discoloration around your surgical site is common, particularly when performed around the eye.   All wounds normally drain. The larger  the wound the more drainage there will be. After 7-10 days, you will notice the wound beginning to shrink and new skin will begin to grow. The wound is healed when you can see skin has formed over the entire area. A healed wound has a healthy, shiny look to the surface and is red to dark pink in color to normalize. Wounds may take approximately 4-6 weeks to heal. Larger wounds may take 6-8 weeks. After the wound is healed you may discontinue dressing changes.   You may experience a sensation of tightness as your wound heals. This is normal and will gradually subside.   Your healed wound may be sensitive to temperature changes. This sensitivity improves with time, but if you re having a lot of discomfort, try to avoid temperature extremes.   Patients frequently experience itching after their wound appears to have healed because of the continue healing under the skin. Plain Vaseline will help relieve the itching.                Proper skin care from [facility]:    -Eliminate harsh soaps as they strip the natural oils from the skin, often resulting in dry itchy skin ( i.e. Dial, Zest, Delisa Spring)  -Use mild soaps such as Cetaphil or Dove Sensitive Skin in the shower. You do not need to use soap on arms, legs, and trunk every time you shower unless visibly soiled.   -Avoid hot or cold showers.  -After showering, lightly dry off and apply moisturizing within 2-3 minutes. This will help trap moisture in the skin.   -Aggressive use of a moisturizer at least 1-2 times a day to the entire body (including -Vanicream, Cetaphil, Aquaphor or Cerave) and moisturize hands after every washing.  -We recommend using moisturizers that come in a tub that needs to be scooped out, not a pump. This has more of an oil base. It will hold moisture in your skin much better than a water base moisturizer. The above recommended are non-pore clogging.      Wear a sunscreen with at least SPF 30 on your face, ears, neck and V of the  chest daily. Wear sunscreen on other areas of the body if those areas are exposed to the sun throughout the day. Sunscreens can contain physical and/or chemical blockers. Physical blockers are less likely to clog pores, these include zinc oxide and titanium dioxide. Reapply every two hour and after swimming.     Sunscreen examples: https://www.ewg.org/sunscreen/    UV radiation  UVA radiation remains constant throughout the day and throughout the year. It is a longer wavelength than UVB and therefore penetrates deeper into the skin leading to immediate and delayed tanning, photoaging, and skin cancer. 70-80% of UVA and UVB radiation occurs between the hours of 10am-2pm.  UVB radiation  UVB radiation causes the most harmful effects and is more significant during the summer months. However, snow and ice can reflect UVB radiation leading to skin damage during the winter months as well. UVB radiation is responsible for tanning, burning, inflammation, delayed erythema (pinkness), pigmentation (brown spots), and skin cancer.     I recommend self monthly full body exams and yearly full body exams with a dermatology provider. If you develop a new or changing lesion please follow up for examination. Most skin cancers are pink and scaly or pink and pearly. However, we do see blue/brown/black skin cancers.  Consider the ABCDEs of melanoma when giving yourself your monthly full body exam ( don't forget the groin, buttocks, feet, toes, etc). A-asymmetry, B-borders, C-color, D-diameter, E-elevation or evolving. If you see any of these changes please follow up in clinic. If you cannot see your back I recommend purchasing a hand held mirror to use with a larger wall mirror.       Checking for Skin Cancer  You can find cancer early by checking your skin each month. There are 3 kinds of skin cancer. They are melanoma, basal cell carcinoma, and squamous cell carcinoma. Doing monthly skin checks is the best way to find new marks or  skin changes. Follow the instructions below for checking your skin.   The ABCDEs of checking moles for melanoma   Check your moles or growths for signs of melanoma using ABCDE:   Asymmetry: the sides of the mole or growth don t match  Border: the edges are ragged, notched, or blurred  Color: the color within the mole or growth varies  Diameter: the mole or growth is larger than 6 mm (size of a pencil eraser)  Evolving: the size, shape, or color of the mole or growth is changing (evolving is not shown in the images below)    Checking for other types of skin cancer  Basal cell carcinoma or squamous cell carcinoma have symptoms such as:     A spot or mole that looks different from all other marks on your skin  Changes in how an area feels, such as itching, tenderness, or pain  Changes in the skin's surface, such as oozing, bleeding, or scaliness  A sore that does not heal  New swelling or redness beyond the border of a mole    Who s at risk?  Anyone can get skin cancer. But you are at greater risk if you have:   Fair skin, light-colored hair, or light-colored eyes  Many moles or abnormal moles on your skin  A history of sunburns from sunlight or tanning beds  A family history of skin cancer  A history of exposure to radiation or chemicals  A weakened immune system  If you have had skin cancer in the past, you are at risk for recurring skin cancer.   How to check your skin  Do your monthly skin checkups in front of a full-length mirror. Check all parts of your body, including your:   Head (ears, face, neck, and scalp)  Torso (front, back, and sides)  Arms (tops, undersides, upper, and lower armpits)  Hands (palms, backs, and fingers, including under the nails)  Buttocks and genitals  Legs (front, back, and sides)  Feet (tops, soles, toes, including under the nails, and between toes)  If you have a lot of moles, take digital photos of them each month. Make sure to take photos both up close and from a distance. These can  help you see if any moles change over time.   Most skin changes are not cancer. But if you see any changes in your skin, call your doctor right away. Only he or she can diagnose a problem. If you have skin cancer, seeing your doctor can be the first step toward getting the treatment that could save your life.   [employer] last reviewed this educational content on 4/1/2019 2000-2020 [employer]. [address] [place] [address], [place] [address]. All rights reserved. This information is not intended as a substitute for professional medical care. Always follow your healthcare professional's instructions.       When should I call my doctor?  If you are worsening or not improving, please, contact us or seek urgent care as noted below.     Who should I call with questions (adults)?    [facility] [phone]  For urgent needs outside of business hours call the [facility] at [phone] and ask for the dermatology resident on call to be paged  If this is a medical emergency and you are unable to reach an ER, Call [phone]      If you need a prescription refill, please contact your pharmacy. Refills are approved or denied by our Physicians during normal business hours, Monday through Fridays  Per office policy, refills will not be granted if you have not been seen within the past year (or sooner depending on your child's condition)
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
## 2024-09-12 DIAGNOSIS — R39.15 URINARY URGENCY: ICD-10-CM

## 2024-09-12 NOTE — TELEPHONE ENCOUNTER
Hi, patient had called and requested a new script for Gemtesa.He only wants 30 days at this time due to the fact he will have to use mail order for this in the future. If you could send us ([facility] pharmacy) a new script for only one month that would be great!    .Thank You,   Jannie Medley CPhT  [facility], [place]

## 2024-09-16 ENCOUNTER — MYC MEDICAL ADVICE (OUTPATIENT)
Dept: CARDIOLOGY | Facility: CLINIC | Age: 69
End: 2024-09-16
Payer: MEDICARE

## 2024-09-16 DIAGNOSIS — I10 HYPERTENSION, UNSPECIFIED TYPE: Primary | ICD-10-CM

## 2024-09-19 ENCOUNTER — ANCILLARY PROCEDURE (OUTPATIENT)
Dept: CARDIOLOGY | Facility: CLINIC | Age: 69
End: 2024-09-19
Attending: INTERNAL MEDICINE
Payer: MEDICARE

## 2024-09-19 DIAGNOSIS — I49.5 SINUS NODE DYSFUNCTION (H): Primary | ICD-10-CM

## 2024-09-19 DIAGNOSIS — Z95.0 CARDIAC PACEMAKER IN SITU: ICD-10-CM

## 2024-09-19 DIAGNOSIS — I49.5 SINUS NODE DYSFUNCTION (H): ICD-10-CM

## 2024-09-19 LAB
MDC_IDC_LEAD_CONNECTION_STATUS: NORMAL
MDC_IDC_LEAD_CONNECTION_STATUS: NORMAL
MDC_IDC_LEAD_IMPLANT_DT: NORMAL
MDC_IDC_LEAD_IMPLANT_DT: NORMAL
MDC_IDC_LEAD_LOCATION: NORMAL
MDC_IDC_LEAD_LOCATION: NORMAL
MDC_IDC_LEAD_LOCATION_DETAIL_1: NORMAL
MDC_IDC_LEAD_LOCATION_DETAIL_1: NORMAL
MDC_IDC_LEAD_MFG: NORMAL
MDC_IDC_LEAD_MFG: NORMAL
MDC_IDC_LEAD_MODEL: NORMAL
MDC_IDC_LEAD_MODEL: NORMAL
MDC_IDC_LEAD_POLARITY_TYPE: NORMAL
MDC_IDC_LEAD_POLARITY_TYPE: NORMAL
MDC_IDC_LEAD_SERIAL: NORMAL
MDC_IDC_LEAD_SERIAL: NORMAL
MDC_IDC_LEAD_SPECIAL_FUNCTION: NORMAL
MDC_IDC_LEAD_SPECIAL_FUNCTION: NORMAL
MDC_IDC_MSMT_BATTERY_DTM: NORMAL
MDC_IDC_MSMT_BATTERY_REMAINING_LONGEVITY: 125 MO
MDC_IDC_MSMT_BATTERY_RRT_TRIGGER: 2.62
MDC_IDC_MSMT_BATTERY_STATUS: NORMAL
MDC_IDC_MSMT_BATTERY_VOLTAGE: 3.01 V
MDC_IDC_MSMT_LEADCHNL_RA_IMPEDANCE_VALUE: 342 OHM
MDC_IDC_MSMT_LEADCHNL_RA_IMPEDANCE_VALUE: 437 OHM
MDC_IDC_MSMT_LEADCHNL_RA_PACING_THRESHOLD_AMPLITUDE: 0.75 V
MDC_IDC_MSMT_LEADCHNL_RA_PACING_THRESHOLD_PULSEWIDTH: 0.4 MS
MDC_IDC_MSMT_LEADCHNL_RA_SENSING_INTR_AMPL: 3 MV
MDC_IDC_MSMT_LEADCHNL_RA_SENSING_INTR_AMPL: 3 MV
MDC_IDC_MSMT_LEADCHNL_RV_IMPEDANCE_VALUE: 380 OHM
MDC_IDC_MSMT_LEADCHNL_RV_IMPEDANCE_VALUE: 456 OHM
MDC_IDC_MSMT_LEADCHNL_RV_PACING_THRESHOLD_AMPLITUDE: 0.88 V
MDC_IDC_MSMT_LEADCHNL_RV_PACING_THRESHOLD_PULSEWIDTH: 0.4 MS
MDC_IDC_MSMT_LEADCHNL_RV_SENSING_INTR_AMPL: 18.88 MV
MDC_IDC_MSMT_LEADCHNL_RV_SENSING_INTR_AMPL: 18.88 MV
MDC_IDC_PG_IMPLANT_DTM: NORMAL
MDC_IDC_PG_MFG: NORMAL
MDC_IDC_PG_MODEL: NORMAL
MDC_IDC_PG_SERIAL: NORMAL
MDC_IDC_PG_TYPE: NORMAL
MDC_IDC_SESS_CLINIC_NAME: NORMAL
MDC_IDC_SESS_DTM: NORMAL
MDC_IDC_SESS_TYPE: NORMAL
MDC_IDC_SET_BRADY_AT_MODE_SWITCH_RATE: 171 {BEATS}/MIN
MDC_IDC_SET_BRADY_HYSTRATE: NORMAL
MDC_IDC_SET_BRADY_LOWRATE: 70 {BEATS}/MIN
MDC_IDC_SET_BRADY_MAX_SENSOR_RATE: 130 {BEATS}/MIN
MDC_IDC_SET_BRADY_MAX_TRACKING_RATE: 130 {BEATS}/MIN
MDC_IDC_SET_BRADY_MODE: NORMAL
MDC_IDC_SET_BRADY_NIGHT_RATE: 60 {BEATS}/MIN
MDC_IDC_SET_BRADY_PAV_DELAY_LOW: 180 MS
MDC_IDC_SET_BRADY_SAV_DELAY_LOW: 150 MS
MDC_IDC_SET_LEADCHNL_RA_PACING_AMPLITUDE: 1.5 V
MDC_IDC_SET_LEADCHNL_RA_PACING_ANODE_ELECTRODE_1: NORMAL
MDC_IDC_SET_LEADCHNL_RA_PACING_ANODE_LOCATION_1: NORMAL
MDC_IDC_SET_LEADCHNL_RA_PACING_CAPTURE_MODE: NORMAL
MDC_IDC_SET_LEADCHNL_RA_PACING_CATHODE_ELECTRODE_1: NORMAL
MDC_IDC_SET_LEADCHNL_RA_PACING_CATHODE_LOCATION_1: NORMAL
MDC_IDC_SET_LEADCHNL_RA_PACING_POLARITY: NORMAL
MDC_IDC_SET_LEADCHNL_RA_PACING_PULSEWIDTH: 0.4 MS
MDC_IDC_SET_LEADCHNL_RA_SENSING_ANODE_ELECTRODE_1: NORMAL
MDC_IDC_SET_LEADCHNL_RA_SENSING_ANODE_LOCATION_1: NORMAL
MDC_IDC_SET_LEADCHNL_RA_SENSING_CATHODE_ELECTRODE_1: NORMAL
MDC_IDC_SET_LEADCHNL_RA_SENSING_CATHODE_LOCATION_1: NORMAL
MDC_IDC_SET_LEADCHNL_RA_SENSING_POLARITY: NORMAL
MDC_IDC_SET_LEADCHNL_RA_SENSING_SENSITIVITY: 0.3 MV
MDC_IDC_SET_LEADCHNL_RV_PACING_AMPLITUDE: 2 V
MDC_IDC_SET_LEADCHNL_RV_PACING_ANODE_ELECTRODE_1: NORMAL
MDC_IDC_SET_LEADCHNL_RV_PACING_ANODE_LOCATION_1: NORMAL
MDC_IDC_SET_LEADCHNL_RV_PACING_CAPTURE_MODE: NORMAL
MDC_IDC_SET_LEADCHNL_RV_PACING_CATHODE_ELECTRODE_1: NORMAL
MDC_IDC_SET_LEADCHNL_RV_PACING_CATHODE_LOCATION_1: NORMAL
MDC_IDC_SET_LEADCHNL_RV_PACING_POLARITY: NORMAL
MDC_IDC_SET_LEADCHNL_RV_PACING_PULSEWIDTH: 0.4 MS
MDC_IDC_SET_LEADCHNL_RV_SENSING_ANODE_ELECTRODE_1: NORMAL
MDC_IDC_SET_LEADCHNL_RV_SENSING_ANODE_LOCATION_1: NORMAL
MDC_IDC_SET_LEADCHNL_RV_SENSING_CATHODE_ELECTRODE_1: NORMAL
MDC_IDC_SET_LEADCHNL_RV_SENSING_CATHODE_LOCATION_1: NORMAL
MDC_IDC_SET_LEADCHNL_RV_SENSING_POLARITY: NORMAL
MDC_IDC_SET_LEADCHNL_RV_SENSING_SENSITIVITY: 0.9 MV
MDC_IDC_SET_ZONE_DETECTION_INTERVAL: 350 MS
MDC_IDC_SET_ZONE_DETECTION_INTERVAL: 400 MS
MDC_IDC_SET_ZONE_STATUS: NORMAL
MDC_IDC_SET_ZONE_STATUS: NORMAL
MDC_IDC_SET_ZONE_TYPE: NORMAL
MDC_IDC_SET_ZONE_VENDOR_TYPE: NORMAL
MDC_IDC_STAT_AT_BURDEN_PERCENT: 0 %
MDC_IDC_STAT_AT_DTM_END: NORMAL
MDC_IDC_STAT_AT_DTM_START: NORMAL
MDC_IDC_STAT_BRADY_AP_VP_PERCENT: 0.16 %
MDC_IDC_STAT_BRADY_AP_VS_PERCENT: 93.44 %
MDC_IDC_STAT_BRADY_AS_VP_PERCENT: 0.01 %
MDC_IDC_STAT_BRADY_AS_VS_PERCENT: 6.39 %
MDC_IDC_STAT_BRADY_DTM_END: NORMAL
MDC_IDC_STAT_BRADY_DTM_START: NORMAL
MDC_IDC_STAT_BRADY_RA_PERCENT_PACED: 93.57 %
MDC_IDC_STAT_BRADY_RV_PERCENT_PACED: 0.17 %
MDC_IDC_STAT_EPISODE_RECENT_COUNT: 0
MDC_IDC_STAT_EPISODE_RECENT_COUNT_DTM_END: NORMAL
MDC_IDC_STAT_EPISODE_RECENT_COUNT_DTM_START: NORMAL
MDC_IDC_STAT_EPISODE_TOTAL_COUNT: 0
MDC_IDC_STAT_EPISODE_TOTAL_COUNT: 18
MDC_IDC_STAT_EPISODE_TOTAL_COUNT: 7
MDC_IDC_STAT_EPISODE_TOTAL_COUNT_DTM_END: NORMAL
MDC_IDC_STAT_EPISODE_TOTAL_COUNT_DTM_START: NORMAL
MDC_IDC_STAT_EPISODE_TYPE: NORMAL
MDC_IDC_STAT_TACHYTHERAPY_RECENT_DTM_END: NORMAL
MDC_IDC_STAT_TACHYTHERAPY_RECENT_DTM_START: NORMAL
MDC_IDC_STAT_TACHYTHERAPY_TOTAL_DTM_END: NORMAL
MDC_IDC_STAT_TACHYTHERAPY_TOTAL_DTM_START: NORMAL

## 2024-09-19 PROCEDURE — 93296 REM INTERROG EVL PM/IDS: CPT | Performed by: INTERNAL MEDICINE

## 2024-09-19 PROCEDURE — 93294 REM INTERROG EVL PM/LDLS PM: CPT | Performed by: INTERNAL MEDICINE

## 2024-09-25 DIAGNOSIS — I20.1 CORONARY VASOSPASM (H): ICD-10-CM

## 2024-09-25 NOTE — TELEPHONE ENCOUNTER
Thank you,  Janelle Dawson - Pharmacy Technician  [facility]  [phone]

## 2024-09-25 NOTE — TELEPHONE ENCOUNTER
Pt called in to report orthostatic BPs since stopping nebivolol on 9/21/24. States he is feeling terrible and doesn't know what to do. BPs as follows:     Lying  Sitting  Standing  9/21  8:30am  159/96  160/90  174/84   (Later that day was at the store, got dizzy, wife drove home but pt got lost on the way home and didn't know where he was. Thankfully wife was driving)  9/22   10am 184/105 167/96  153/85  9/23   1:08p 147/79  127/70  135/66  9/24    6pm 165/93  160/86  142/82  9/25    8:20a 183/104 179/100 168/95   (Got very dizzy while in the bathroom showering. Had to sit on the floor until it passed. States if he sits still he is ok, but the minute he starts moving, he gets lightheaded).  Will discuss with Gayle NIÑO for recommendations. Clotilde Lauren RN Cardiology September 25, 2024, 11:04 AM

## 2024-09-26 RX ORDER — AMLODIPINE BESYLATE 2.5 MG/1
2.5 TABLET ORAL DAILY
Qty: 30 TABLET | Refills: 11 | Status: SHIPPED | OUTPATIENT
Start: 2024-09-26

## 2024-09-26 RX ORDER — ISOSORBIDE MONONITRATE 60 MG/1
60 TABLET, EXTENDED RELEASE ORAL DAILY
Qty: 90 TABLET | Refills: 3 | Status: SHIPPED | OUTPATIENT
Start: 2024-09-26

## 2024-09-26 NOTE — TELEPHONE ENCOUNTER
BP still drops quite a bit (180--150s, 180--160s). In the past, dizziness, lightheadedness has not been related to his BP (high or low).    He's being eval'd for Autonomic Dysfunction at [place] (Nephrology)    Dr. Andre has recommended he switch Verapamil  mg BID to amlodipine.   Would start amlodipine 2.5 mg (lowest dose and ramp up over the next 2 weeks if BP still high).    Stop Verapamil  mg BID  Start amlodipine 2.5 mg daily at night IF BP while standing is >150mmHg     3. See me 10/15 as planned with list of nighttime BPs (going to be checked to see if he can take the amlodipine 2.5)    Kassieshelby Gayle  September 26, 2024 at 7:39 AM
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
## 2024-10-03 ENCOUNTER — OFFICE VISIT (OUTPATIENT)
Dept: NEUROLOGY | Facility: CLINIC | Age: 69
End: 2024-10-03
Attending: NURSE PRACTITIONER
Payer: MEDICARE

## 2024-10-03 VITALS
WEIGHT: 265 LBS | HEIGHT: 73 IN | HEART RATE: 84 BPM | BODY MASS INDEX: 35.12 KG/M2 | DIASTOLIC BLOOD PRESSURE: 84 MMHG | SYSTOLIC BLOOD PRESSURE: 153 MMHG

## 2024-10-03 DIAGNOSIS — G45.9 TIA (TRANSIENT ISCHEMIC ATTACK): ICD-10-CM

## 2024-10-03 PROCEDURE — 99215 OFFICE O/P EST HI 40 MIN: CPT

## 2024-10-03 NOTE — PATIENT INSTRUCTIONS
Plan:     Secondary prevention  -Continue with Xarelto as prescribed     Lifestyle  - mediterranean diet  - exercise    Risk Factors   Elevated blood Pressure  BP: 153/84  - Goal <130/80  - Continue preventive therapy:Imdur, amlodipine, flecainide, and lisinopril as prescribed     Elevated cholesterol levels   LDL: 76  - Goal < 70 mg/dl  - Continue preventive therapy: Atorvastatin as prescribed     Diabetes   A1c: 6.8%  - Goal: < 7.0%  - Continue preventive therapy: Metformin as prescribed     Obstructive sleep apnea  -Wear CPAP nightly    --- Plan to follow-up with your primary care provider to manage your vascular risk factors  --- Plan on follow up in the Neurology Clinic on an as needed basis.  --- Please feel free to reach out if you have any further questions or concerns.  --- Seek immediate medical attention if an emergency arises or if your health becomes progressively worse.     For any acute neurologic deficits he was advised to  go directly to the hospital rather than call the clinic.    It was a pleasure to meet you today!

## 2024-10-03 NOTE — NURSING NOTE
Chief Complaint   Patient presents with    Stroke     Patient states he has no concerns. Hospital follow up.     .Lauren Rebolledo on 10/3/2024 at 8:52 AM
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
## 2024-10-15 ENCOUNTER — HOSPITAL ENCOUNTER (OUTPATIENT)
Dept: CARDIOLOGY | Facility: CLINIC | Age: 69
Discharge: HOME | End: 2024-10-15
Attending: INTERNAL MEDICINE | Admitting: INTERNAL MEDICINE
Payer: MEDICARE

## 2024-10-15 ENCOUNTER — OFFICE VISIT (OUTPATIENT)
Dept: CARDIOLOGY | Facility: CLINIC | Age: 69
End: 2024-10-15
Attending: PHYSICIAN ASSISTANT
Payer: MEDICARE

## 2024-10-15 VITALS
RESPIRATION RATE: 20 BRPM | WEIGHT: 269.6 LBS | BODY MASS INDEX: 35.73 KG/M2 | SYSTOLIC BLOOD PRESSURE: 144 MMHG | HEIGHT: 73 IN | HEART RATE: 75 BPM | DIASTOLIC BLOOD PRESSURE: 81 MMHG | OXYGEN SATURATION: 95 %

## 2024-10-15 DIAGNOSIS — I49.5 SICK SINUS SYNDROME (H): Primary | ICD-10-CM

## 2024-10-15 DIAGNOSIS — Z95.0 CARDIAC PACEMAKER IN SITU: ICD-10-CM

## 2024-10-15 DIAGNOSIS — I10 HYPERTENSION, UNSPECIFIED TYPE: ICD-10-CM

## 2024-10-15 DIAGNOSIS — I49.5 SINUS NODE DYSFUNCTION (H): ICD-10-CM

## 2024-10-15 DIAGNOSIS — I10 BENIGN ESSENTIAL HYPERTENSION: ICD-10-CM

## 2024-10-15 LAB
MDC_IDC_EPISODE_DTM: NORMAL
MDC_IDC_EPISODE_DURATION: 0 S
MDC_IDC_EPISODE_ID: 30
MDC_IDC_EPISODE_TYPE: NORMAL
MDC_IDC_EPISODE_TYPE_INDUCED: NO
MDC_IDC_LEAD_CONNECTION_STATUS: NORMAL
MDC_IDC_LEAD_CONNECTION_STATUS: NORMAL
MDC_IDC_LEAD_IMPLANT_DT: NORMAL
MDC_IDC_LEAD_IMPLANT_DT: NORMAL
MDC_IDC_LEAD_LOCATION: NORMAL
MDC_IDC_LEAD_LOCATION: NORMAL
MDC_IDC_LEAD_LOCATION_DETAIL_1: NORMAL
MDC_IDC_LEAD_LOCATION_DETAIL_1: NORMAL
MDC_IDC_LEAD_MFG: NORMAL
MDC_IDC_LEAD_MFG: NORMAL
MDC_IDC_LEAD_MODEL: NORMAL
MDC_IDC_LEAD_MODEL: NORMAL
MDC_IDC_LEAD_POLARITY_TYPE: NORMAL
MDC_IDC_LEAD_POLARITY_TYPE: NORMAL
MDC_IDC_LEAD_SERIAL: NORMAL
MDC_IDC_LEAD_SERIAL: NORMAL
MDC_IDC_LEAD_SPECIAL_FUNCTION: NORMAL
MDC_IDC_LEAD_SPECIAL_FUNCTION: NORMAL
MDC_IDC_MSMT_BATTERY_DTM: NORMAL
MDC_IDC_MSMT_BATTERY_REMAINING_LONGEVITY: 125 MO
MDC_IDC_MSMT_BATTERY_RRT_TRIGGER: 2.62
MDC_IDC_MSMT_BATTERY_STATUS: NORMAL
MDC_IDC_MSMT_BATTERY_VOLTAGE: 3.01 V
MDC_IDC_MSMT_LEADCHNL_RA_IMPEDANCE_VALUE: 342 OHM
MDC_IDC_MSMT_LEADCHNL_RA_IMPEDANCE_VALUE: 456 OHM
MDC_IDC_MSMT_LEADCHNL_RA_PACING_THRESHOLD_AMPLITUDE: 0.62 V
MDC_IDC_MSMT_LEADCHNL_RA_PACING_THRESHOLD_AMPLITUDE: 0.75 V
MDC_IDC_MSMT_LEADCHNL_RA_PACING_THRESHOLD_PULSEWIDTH: 0.4 MS
MDC_IDC_MSMT_LEADCHNL_RA_PACING_THRESHOLD_PULSEWIDTH: 0.4 MS
MDC_IDC_MSMT_LEADCHNL_RA_SENSING_INTR_AMPL: 2.25 MV
MDC_IDC_MSMT_LEADCHNL_RA_SENSING_INTR_AMPL: 2.5 MV
MDC_IDC_MSMT_LEADCHNL_RV_IMPEDANCE_VALUE: 418 OHM
MDC_IDC_MSMT_LEADCHNL_RV_IMPEDANCE_VALUE: 494 OHM
MDC_IDC_MSMT_LEADCHNL_RV_PACING_THRESHOLD_AMPLITUDE: 0.88 V
MDC_IDC_MSMT_LEADCHNL_RV_PACING_THRESHOLD_AMPLITUDE: 1 V
MDC_IDC_MSMT_LEADCHNL_RV_PACING_THRESHOLD_PULSEWIDTH: 0.4 MS
MDC_IDC_MSMT_LEADCHNL_RV_PACING_THRESHOLD_PULSEWIDTH: 0.4 MS
MDC_IDC_MSMT_LEADCHNL_RV_SENSING_INTR_AMPL: 19.25 MV
MDC_IDC_MSMT_LEADCHNL_RV_SENSING_INTR_AMPL: 20.88 MV
MDC_IDC_PG_IMPLANT_DTM: NORMAL
MDC_IDC_PG_MFG: NORMAL
MDC_IDC_PG_MODEL: NORMAL
MDC_IDC_PG_SERIAL: NORMAL
MDC_IDC_PG_TYPE: NORMAL
MDC_IDC_SESS_CLINIC_NAME: NORMAL
MDC_IDC_SESS_DTM: NORMAL
MDC_IDC_SESS_TYPE: NORMAL
MDC_IDC_SET_BRADY_AT_MODE_SWITCH_RATE: 171 {BEATS}/MIN
MDC_IDC_SET_BRADY_HYSTRATE: NORMAL
MDC_IDC_SET_BRADY_LOWRATE: 70 {BEATS}/MIN
MDC_IDC_SET_BRADY_MAX_SENSOR_RATE: 130 {BEATS}/MIN
MDC_IDC_SET_BRADY_MAX_TRACKING_RATE: 130 {BEATS}/MIN
MDC_IDC_SET_BRADY_MODE: NORMAL
MDC_IDC_SET_BRADY_NIGHT_RATE: 60 {BEATS}/MIN
MDC_IDC_SET_BRADY_PAV_DELAY_LOW: 180 MS
MDC_IDC_SET_BRADY_SAV_DELAY_LOW: 150 MS
MDC_IDC_SET_LEADCHNL_RA_PACING_AMPLITUDE: 1.5 V
MDC_IDC_SET_LEADCHNL_RA_PACING_ANODE_ELECTRODE_1: NORMAL
MDC_IDC_SET_LEADCHNL_RA_PACING_ANODE_LOCATION_1: NORMAL
MDC_IDC_SET_LEADCHNL_RA_PACING_CAPTURE_MODE: NORMAL
MDC_IDC_SET_LEADCHNL_RA_PACING_CATHODE_ELECTRODE_1: NORMAL
MDC_IDC_SET_LEADCHNL_RA_PACING_CATHODE_LOCATION_1: NORMAL
MDC_IDC_SET_LEADCHNL_RA_PACING_POLARITY: NORMAL
MDC_IDC_SET_LEADCHNL_RA_PACING_PULSEWIDTH: 0.4 MS
MDC_IDC_SET_LEADCHNL_RA_SENSING_ANODE_ELECTRODE_1: NORMAL
MDC_IDC_SET_LEADCHNL_RA_SENSING_ANODE_LOCATION_1: NORMAL
MDC_IDC_SET_LEADCHNL_RA_SENSING_CATHODE_ELECTRODE_1: NORMAL
MDC_IDC_SET_LEADCHNL_RA_SENSING_CATHODE_LOCATION_1: NORMAL
MDC_IDC_SET_LEADCHNL_RA_SENSING_POLARITY: NORMAL
MDC_IDC_SET_LEADCHNL_RA_SENSING_SENSITIVITY: 0.3 MV
MDC_IDC_SET_LEADCHNL_RV_PACING_AMPLITUDE: 2 V
MDC_IDC_SET_LEADCHNL_RV_PACING_ANODE_ELECTRODE_1: NORMAL
MDC_IDC_SET_LEADCHNL_RV_PACING_ANODE_LOCATION_1: NORMAL
MDC_IDC_SET_LEADCHNL_RV_PACING_CAPTURE_MODE: NORMAL
MDC_IDC_SET_LEADCHNL_RV_PACING_CATHODE_ELECTRODE_1: NORMAL
MDC_IDC_SET_LEADCHNL_RV_PACING_CATHODE_LOCATION_1: NORMAL
MDC_IDC_SET_LEADCHNL_RV_PACING_POLARITY: NORMAL
MDC_IDC_SET_LEADCHNL_RV_PACING_PULSEWIDTH: 0.4 MS
MDC_IDC_SET_LEADCHNL_RV_SENSING_ANODE_ELECTRODE_1: NORMAL
MDC_IDC_SET_LEADCHNL_RV_SENSING_ANODE_LOCATION_1: NORMAL
MDC_IDC_SET_LEADCHNL_RV_SENSING_CATHODE_ELECTRODE_1: NORMAL
MDC_IDC_SET_LEADCHNL_RV_SENSING_CATHODE_LOCATION_1: NORMAL
MDC_IDC_SET_LEADCHNL_RV_SENSING_POLARITY: NORMAL
MDC_IDC_SET_LEADCHNL_RV_SENSING_SENSITIVITY: 0.9 MV
MDC_IDC_SET_ZONE_DETECTION_INTERVAL: 350 MS
MDC_IDC_SET_ZONE_DETECTION_INTERVAL: 400 MS
MDC_IDC_SET_ZONE_STATUS: NORMAL
MDC_IDC_SET_ZONE_STATUS: NORMAL
MDC_IDC_SET_ZONE_TYPE: NORMAL
MDC_IDC_SET_ZONE_VENDOR_TYPE: NORMAL
MDC_IDC_STAT_AT_BURDEN_PERCENT: 0 %
MDC_IDC_STAT_AT_DTM_END: NORMAL
MDC_IDC_STAT_AT_DTM_START: NORMAL
MDC_IDC_STAT_BRADY_AP_VP_PERCENT: 0.14 %
MDC_IDC_STAT_BRADY_AP_VS_PERCENT: 93.1 %
MDC_IDC_STAT_BRADY_AS_VP_PERCENT: 0.01 %
MDC_IDC_STAT_BRADY_AS_VS_PERCENT: 6.75 %
MDC_IDC_STAT_BRADY_DTM_END: NORMAL
MDC_IDC_STAT_BRADY_DTM_START: NORMAL
MDC_IDC_STAT_BRADY_RA_PERCENT_PACED: 93.22 %
MDC_IDC_STAT_BRADY_RV_PERCENT_PACED: 0.15 %
MDC_IDC_STAT_EPISODE_RECENT_COUNT: 0
MDC_IDC_STAT_EPISODE_RECENT_COUNT: 1
MDC_IDC_STAT_EPISODE_RECENT_COUNT_DTM_END: NORMAL
MDC_IDC_STAT_EPISODE_RECENT_COUNT_DTM_START: NORMAL
MDC_IDC_STAT_EPISODE_TOTAL_COUNT: 0
MDC_IDC_STAT_EPISODE_TOTAL_COUNT: 19
MDC_IDC_STAT_EPISODE_TOTAL_COUNT: 7
MDC_IDC_STAT_EPISODE_TOTAL_COUNT_DTM_END: NORMAL
MDC_IDC_STAT_EPISODE_TOTAL_COUNT_DTM_START: NORMAL
MDC_IDC_STAT_EPISODE_TYPE: NORMAL
MDC_IDC_STAT_TACHYTHERAPY_RECENT_DTM_END: NORMAL
MDC_IDC_STAT_TACHYTHERAPY_RECENT_DTM_START: NORMAL
MDC_IDC_STAT_TACHYTHERAPY_TOTAL_DTM_END: NORMAL
MDC_IDC_STAT_TACHYTHERAPY_TOTAL_DTM_START: NORMAL

## 2024-10-15 PROCEDURE — 99214 OFFICE O/P EST MOD 30 MIN: CPT | Performed by: PHYSICIAN ASSISTANT

## 2024-10-15 PROCEDURE — 93280 PM DEVICE PROGR EVAL DUAL: CPT

## 2024-10-15 PROCEDURE — G2211 COMPLEX E/M VISIT ADD ON: HCPCS | Performed by: PHYSICIAN ASSISTANT

## 2024-10-15 RX ORDER — AMLODIPINE BESYLATE 2.5 MG/1
2.5 TABLET ORAL 2 TIMES DAILY
Qty: 60 TABLET | Refills: 5 | Status: SHIPPED | OUTPATIENT
Start: 2024-10-15
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
## 2024-10-15 NOTE — PATIENT INSTRUCTIONS
Arya - it was nice to see you and Genna today!     At your visit today we reviewed:    BP too high  Lots of testing at [facility] with Nephrology  I've seen 24 hour Holter and CXR and Dr. Jackson's notes  Pacer check today looked OK - some fast rhythms occasionally, but nothing new or prolonged.     Medication Changes:    Increase amlodipine to 2.5 mg twice daily   Take 1st dose with lunch to see if BP in afternoon/evening responds. Take 2nd dose at night before bed with 2nd dose of lisinopril    Recommendations:    Continue testing at [facility]  Agree with stopping flecainide if needed - we'll continue to monitor pacer to assess for arrhythmias    Follow-up:    See me  for cardiology follow up in 2025 but CALL Cardiology nurses Shameka & Clotilde @ [phone] for any issues, questions or concerns in the interim.      To schedule a future appointment, we kindly ask that you call cardiology scheduling at [phone] three months prior to requested visit date.    Important Phone Numbers for [facility]):    [facility] Shameka Mabry: [phone]  Cardiology [phone]  [facility] [phone]  [facility] [phone]  [facility]: [phone]

## 2024-11-04 ENCOUNTER — HOSPITAL ENCOUNTER (OUTPATIENT)
Dept: ULTRASOUND IMAGING | Facility: CLINIC | Age: 69
Discharge: HOME | End: 2024-11-04
Attending: NURSE PRACTITIONER | Admitting: NURSE PRACTITIONER
Payer: MEDICARE

## 2024-11-04 DIAGNOSIS — G45.9 TIA (TRANSIENT ISCHEMIC ATTACK): ICD-10-CM

## 2024-11-04 PROCEDURE — 93880 EXTRACRANIAL BILAT STUDY: CPT

## 2024-11-05 ENCOUNTER — TRANSFERRED RECORDS (OUTPATIENT)
Dept: HEALTH INFORMATION MANAGEMENT | Facility: CLINIC | Age: 69
End: 2024-11-05

## 2024-11-06 ENCOUNTER — TRANSFERRED RECORDS (OUTPATIENT)
Dept: HEALTH INFORMATION MANAGEMENT | Facility: CLINIC | Age: 69
End: 2024-11-06

## 2024-11-07 ENCOUNTER — TRANSFERRED RECORDS (OUTPATIENT)
Dept: HEALTH INFORMATION MANAGEMENT | Facility: CLINIC | Age: 69
End: 2024-11-07

## 2024-11-07 LAB — EJECTION FRACTION: 60 %

## 2024-11-11 ENCOUNTER — TRANSFERRED RECORDS (OUTPATIENT)
Dept: HEALTH INFORMATION MANAGEMENT | Facility: CLINIC | Age: 69
End: 2024-11-11

## 2024-12-11 DIAGNOSIS — R39.15 URINARY URGENCY: ICD-10-CM

## 2024-12-11 NOTE — TELEPHONE ENCOUNTER
Last ov with urology 03-15-24.    Plan:  Continue tamsulosin 0.4 mg, finasteride 5 mg, and oxybutynin XR 5 mg daily.   Start Gemtesa 75 mg daily pending insurance approval.   Follow up in 2-3 months, sooner if concerns.     No future office visit scheduled with Urology.  Does not meet rx refill protocol.  To provider to authorize additional refills.    Khadra Casiano  [facility] RN

## 2024-12-11 NOTE — TELEPHONE ENCOUNTER
Requested Prescriptions   Pending Prescriptions Disp Refills    vibegron (GEMTESA) 75 MG TABS tablet 90 tablet 0     Sig: Take 1 tablet (75 mg) by mouth daily. Call [phone] to schedule an appointment for further refills.       There is no refill protocol information for this order          Last office visit: 3/15/2024 ; last virtual visit: Visit date not found with prescribing provider:  Carmen Jin     Future Office Visit:        Yahaira Hirsch   [facility] [address] [place]   [facility]  [phone]

## 2024-12-16 ENCOUNTER — TELEPHONE (OUTPATIENT)
Dept: CARDIOLOGY | Facility: CLINIC | Age: 69
End: 2024-12-16
Payer: MEDICARE

## 2024-12-16 DIAGNOSIS — I10 HYPERTENSION, UNSPECIFIED TYPE: ICD-10-CM

## 2024-12-16 RX ORDER — AMLODIPINE BESYLATE 5 MG/1
5 TABLET ORAL 2 TIMES DAILY
Qty: 180 TABLET | Refills: 1 | Status: SHIPPED | OUTPATIENT
Start: 2024-12-16

## 2024-12-16 NOTE — TELEPHONE ENCOUNTER
Pt called to say that Dr Jackson at [facility] increased amlodipine to 5 mg BID. He needs a refill and states Dr Jackson wants us to send in refill. Done. Clotilde Lauren RN Cardiology December 16, 2024, 11:38 AM

## 2025-01-07 DIAGNOSIS — G90.01 CAROTID SINUS SYNDROME: ICD-10-CM

## 2025-01-07 DIAGNOSIS — I21.4 NSTEMI (NON-ST ELEVATED MYOCARDIAL INFARCTION) (H): ICD-10-CM

## 2025-01-07 RX ORDER — FLECAINIDE ACETATE 100 MG/1
100 TABLET ORAL 2 TIMES DAILY
Qty: 180 TABLET | Refills: 0 | Status: SHIPPED | OUTPATIENT
Start: 2025-01-07

## 2025-01-07 NOTE — TELEPHONE ENCOUNTER
[facility] Cardiology Refill Guideline reviewed.  Medication meets criteria for refill.    Chasity Serrano, RN

## 2025-01-07 NOTE — TELEPHONE ENCOUNTER
Last Office Visit: 10/15/24 [facility]   Next Office Visit: 1/21/25 [facility]   Last Fill Date: Not listed on the fax     Harleen Mccarty CMA 1/7/2025 8:03 AM

## 2025-01-11 ENCOUNTER — HEALTH MAINTENANCE LETTER (OUTPATIENT)
Age: 70
End: 2025-01-11

## 2025-01-12 ENCOUNTER — DOCUMENTATION ONLY (OUTPATIENT)
Dept: CARDIOLOGY | Facility: CLINIC | Age: 70
End: 2025-01-12

## 2025-01-12 ENCOUNTER — HOSPITAL ENCOUNTER (EMERGENCY)
Facility: CLINIC | Age: 70
Discharge: HOME | End: 2025-01-13
Attending: EMERGENCY MEDICINE
Payer: MEDICARE

## 2025-01-12 ENCOUNTER — APPOINTMENT (OUTPATIENT)
Dept: CT IMAGING | Facility: CLINIC | Age: 70
End: 2025-01-12
Attending: EMERGENCY MEDICINE
Payer: MEDICARE

## 2025-01-12 DIAGNOSIS — R07.1 CHEST PAIN ON BREATHING: ICD-10-CM

## 2025-01-12 LAB
ALBUMIN SERPL BCG-MCNC: 4.2 G/DL (ref 3.5–5.2)
ALP SERPL-CCNC: 84 U/L (ref 40–150)
ALT SERPL W P-5'-P-CCNC: 33 U/L (ref 0–70)
ANION GAP SERPL CALCULATED.3IONS-SCNC: 12 MMOL/L (ref 7–15)
AST SERPL W P-5'-P-CCNC: 29 U/L (ref 0–45)
BASOPHILS # BLD AUTO: 0 10E3/UL (ref 0–0.2)
BASOPHILS NFR BLD AUTO: 1 %
BILIRUB SERPL-MCNC: 0.4 MG/DL
BUN SERPL-MCNC: 18.4 MG/DL (ref 8–23)
CALCIUM SERPL-MCNC: 9.3 MG/DL (ref 8.8–10.4)
CHLORIDE SERPL-SCNC: 101 MMOL/L (ref 98–107)
CREAT SERPL-MCNC: 1.05 MG/DL (ref 0.67–1.17)
CRP SERPL-MCNC: 4.4 MG/L
EGFRCR SERPLBLD CKD-EPI 2021: 78 ML/MIN/1.73M2
EOSINOPHIL # BLD AUTO: 0.2 10E3/UL (ref 0–0.7)
EOSINOPHIL NFR BLD AUTO: 2 %
ERYTHROCYTE [DISTWIDTH] IN BLOOD BY AUTOMATED COUNT: 12.6 % (ref 10–15)
ERYTHROCYTE [SEDIMENTATION RATE] IN BLOOD BY WESTERGREN METHOD: 11 MM/HR (ref 0–20)
GLUCOSE SERPL-MCNC: 203 MG/DL (ref 70–99)
HCO3 SERPL-SCNC: 25 MMOL/L (ref 22–29)
HCT VFR BLD AUTO: 41.6 % (ref 40–53)
HGB BLD-MCNC: 14.3 G/DL (ref 13.3–17.7)
HOLD SPECIMEN: NORMAL
IMM GRANULOCYTES # BLD: 0 10E3/UL
IMM GRANULOCYTES NFR BLD: 0 %
LYMPHOCYTES # BLD AUTO: 1.4 10E3/UL (ref 0.8–5.3)
LYMPHOCYTES NFR BLD AUTO: 19 %
MCH RBC QN AUTO: 29.9 PG (ref 26.5–33)
MCHC RBC AUTO-ENTMCNC: 34.4 G/DL (ref 31.5–36.5)
MCV RBC AUTO: 87 FL (ref 78–100)
MONOCYTES # BLD AUTO: 0.8 10E3/UL (ref 0–1.3)
MONOCYTES NFR BLD AUTO: 11 %
NEUTROPHILS # BLD AUTO: 4.7 10E3/UL (ref 1.6–8.3)
NEUTROPHILS NFR BLD AUTO: 66 %
NRBC # BLD AUTO: 0 10E3/UL
NRBC BLD AUTO-RTO: 0 /100
PLATELET # BLD AUTO: 174 10E3/UL (ref 150–450)
POTASSIUM SERPL-SCNC: 4.1 MMOL/L (ref 3.4–5.3)
PROT SERPL-MCNC: 7.4 G/DL (ref 6.4–8.3)
RBC # BLD AUTO: 4.78 10E6/UL (ref 4.4–5.9)
SODIUM SERPL-SCNC: 138 MMOL/L (ref 135–145)
TROPONIN T SERPL HS-MCNC: 18 NG/L
TROPONIN T SERPL HS-MCNC: 19 NG/L
WBC # BLD AUTO: 7.1 10E3/UL (ref 4–11)

## 2025-01-12 PROCEDURE — 36415 COLL VENOUS BLD VENIPUNCTURE: CPT | Performed by: EMERGENCY MEDICINE

## 2025-01-12 PROCEDURE — 99285 EMERGENCY DEPT VISIT HI MDM: CPT | Mod: 25

## 2025-01-12 PROCEDURE — 250N000013 HC RX MED GY IP 250 OP 250 PS 637: Performed by: EMERGENCY MEDICINE

## 2025-01-12 PROCEDURE — 86140 C-REACTIVE PROTEIN: CPT | Performed by: EMERGENCY MEDICINE

## 2025-01-12 PROCEDURE — 250N000009 HC RX 250: Performed by: EMERGENCY MEDICINE

## 2025-01-12 PROCEDURE — 93010 ELECTROCARDIOGRAM REPORT: CPT | Performed by: EMERGENCY MEDICINE

## 2025-01-12 PROCEDURE — 99284 EMERGENCY DEPT VISIT MOD MDM: CPT | Performed by: EMERGENCY MEDICINE

## 2025-01-12 PROCEDURE — 85652 RBC SED RATE AUTOMATED: CPT | Performed by: EMERGENCY MEDICINE

## 2025-01-12 PROCEDURE — 84484 ASSAY OF TROPONIN QUANT: CPT | Performed by: EMERGENCY MEDICINE

## 2025-01-12 PROCEDURE — 85004 AUTOMATED DIFF WBC COUNT: CPT | Performed by: EMERGENCY MEDICINE

## 2025-01-12 PROCEDURE — 250N000011 HC RX IP 250 OP 636: Performed by: EMERGENCY MEDICINE

## 2025-01-12 PROCEDURE — 80053 COMPREHEN METABOLIC PANEL: CPT | Performed by: EMERGENCY MEDICINE

## 2025-01-12 PROCEDURE — 93005 ELECTROCARDIOGRAM TRACING: CPT

## 2025-01-12 PROCEDURE — 71275 CT ANGIOGRAPHY CHEST: CPT

## 2025-01-12 RX ORDER — ASPIRIN 325 MG
325 TABLET ORAL ONCE
Status: COMPLETED | OUTPATIENT
Start: 2025-01-12 | End: 2025-01-12

## 2025-01-12 RX ORDER — IOPAMIDOL 755 MG/ML
94 INJECTION, SOLUTION INTRAVASCULAR ONCE
Status: COMPLETED | OUTPATIENT
Start: 2025-01-12 | End: 2025-01-12

## 2025-01-12 RX ORDER — NITROGLYCERIN 0.4 MG/1
0.4 TABLET SUBLINGUAL EVERY 5 MIN PRN
Status: DISCONTINUED | OUTPATIENT
Start: 2025-01-12 | End: 2025-01-13 | Stop reason: HOSPADM

## 2025-01-12 RX ADMIN — ASPIRIN 325 MG ORAL TABLET 325 MG: 325 PILL ORAL at 22:14

## 2025-01-12 RX ADMIN — IOPAMIDOL 94 ML: 755 INJECTION, SOLUTION INTRAVENOUS at 21:47

## 2025-01-12 RX ADMIN — SODIUM CHLORIDE 100 ML: 9 INJECTION, SOLUTION INTRAVENOUS at 21:47

## 2025-01-12 ASSESSMENT — ACTIVITIES OF DAILY LIVING (ADL)
ADLS_ACUITY_SCORE: 56

## 2025-01-12 ASSESSMENT — COLUMBIA-SUICIDE SEVERITY RATING SCALE - C-SSRS
6. HAVE YOU EVER DONE ANYTHING, STARTED TO DO ANYTHING, OR PREPARED TO DO ANYTHING TO END YOUR LIFE?: NO
2. HAVE YOU ACTUALLY HAD ANY THOUGHTS OF KILLING YOURSELF IN THE PAST MONTH?: NO
1. IN THE PAST MONTH, HAVE YOU WISHED YOU WERE DEAD OR WISHED YOU COULD GO TO SLEEP AND NOT WAKE UP?: NO

## 2025-01-13 ENCOUNTER — TELEPHONE (OUTPATIENT)
Dept: CARDIOLOGY | Facility: CLINIC | Age: 70
End: 2025-01-13
Payer: MEDICARE

## 2025-01-13 ENCOUNTER — APPOINTMENT (OUTPATIENT)
Dept: GENERAL RADIOLOGY | Facility: CLINIC | Age: 70
End: 2025-01-13
Attending: EMERGENCY MEDICINE
Payer: MEDICARE

## 2025-01-13 VITALS
HEIGHT: 73 IN | HEART RATE: 61 BPM | DIASTOLIC BLOOD PRESSURE: 87 MMHG | RESPIRATION RATE: 8 BRPM | SYSTOLIC BLOOD PRESSURE: 158 MMHG | BODY MASS INDEX: 35.65 KG/M2 | OXYGEN SATURATION: 96 % | TEMPERATURE: 97.7 F | WEIGHT: 269 LBS

## 2025-01-13 DIAGNOSIS — I10 HYPERTENSION, UNSPECIFIED TYPE: ICD-10-CM

## 2025-01-13 DIAGNOSIS — Z95.0 CARDIAC PACEMAKER IN SITU: Primary | ICD-10-CM

## 2025-01-13 DIAGNOSIS — R07.81 PLEURITIC CHEST PAIN: ICD-10-CM

## 2025-01-13 DIAGNOSIS — I25.10 CORONARY ARTERY DISEASE INVOLVING NATIVE HEART WITHOUT ANGINA PECTORIS, UNSPECIFIED VESSEL OR LESION TYPE: ICD-10-CM

## 2025-01-13 LAB
ATRIAL RATE - MUSE: 71 BPM
DIASTOLIC BLOOD PRESSURE - MUSE: NORMAL MMHG
INTERPRETATION ECG - MUSE: NORMAL
P AXIS - MUSE: NORMAL DEGREES
PR INTERVAL - MUSE: 198 MS
QRS DURATION - MUSE: 104 MS
QT - MUSE: 404 MS
QTC - MUSE: 439 MS
R AXIS - MUSE: -14 DEGREES
SYSTOLIC BLOOD PRESSURE - MUSE: NORMAL MMHG
T AXIS - MUSE: 45 DEGREES
VENTRICULAR RATE- MUSE: 71 BPM

## 2025-01-13 PROCEDURE — 71046 X-RAY EXAM CHEST 2 VIEWS: CPT

## 2025-01-13 NOTE — TELEPHONE ENCOUNTER
Pt called in to report he was in the ED last night for chest pain. CT PE negativge for pneumonia or PE. States ED doc told him it could be pleuritic or musculoskeletal in nature. Inflammatory markers negative. I had him lean over and take a deep breath--it did provoke the pain, but not nearly to the degree as last night. States last night's pain scared him. Pt is on Xarelto so cannot take NSAIDs. Has an appointment to see Gayle NIÑO on 1/21/25. Will discuss with Gayle to see if she would like anything further done prior to visit. Clotilde Lauren RN Cardiology January 13, 2025, 1:12 PM

## 2025-01-13 NOTE — TELEPHONE ENCOUNTER
Scheduled for remote device check on 1/20/25. LM for patient to call back to discuss. Clotilde Lauren RN Cardiology January 13, 2025, 2:03 PM

## 2025-01-13 NOTE — ED PROVIDER NOTES
"[facility]  Emergency Department Visit Note    PATIENT:  Stiven Nguyễn     69 year old     male      [id number]    Chief complaint:  Chief Complaint   Patient presents with    Chest Pain        History of present illness:  Patient is a 69 year old male with a medical history significant for recurrent syncope and presyncope.  This was thought to be possibly carotid sinus syndrome.  He had a dual-chamber [employer] pacemaker placed in October 2021.  He is presenting for evaluation of severe sharp chest pain that started tonight.  Patient reports that he was sitting with his wife at home when he felt a sudden very sharp 3 pokes in his chest that was very bad chest pain.  He localizes it over the left side of his chest.  Patient reports that this pain was debilitating for him.  No precipitating etiology.  Otherwise has been feeling well and taking all of his medications as prescribed.  Does have a history of PEs however has been taking his Xarelto and not missed any doses.  He follows at [facility] for autonomic dysfunction testing and they are considering adjusting his workup for additional carotid issues.  No vomiting, diarrhea, recent runny nose, coughing anything up, shortness of breath.  He notes that he cannot precipitate his chest pain by taking a very deep breath so he is breathing more shallowly and it does not happen.  If he does take a deep breath it is a very severe and sharp pain in the left side of his chest that goes on to the left chest wall.  No recent trauma and has otherwise been healthy.     Review of Systems:  As in HPI above    BP (!) 198/71   Pulse 71   Temp 97.7  F (36.5  C) (Oral)   Resp 16   Ht 1.854 m (6' 1\")   Wt 122 kg (269 lb)   SpO2 97%   BMI 35.49 kg/m      Physical Exam  Constitutional: laying in hospital bed, alert, oriented, in no apparent distress, conversant, and answering questions appropriately  HEENT: normocephalic, atraumatic, pupils 3mm, equal, round, and " reactive to light, sclerae anicteric, extraocular motions intact, and moist mucous membranes  Neck: able to fully range  Cardiovascular: regular rate and rhythm  Pulmonary: breathing comfortably on room air and lungs clear to auscultation bilaterally  Abdominal: soft, non-tender, non-distended  Genitourinary: deferred  Extremities/MSK: no peripheral edema  Skin: warm, dry and non-diaphoretic  Neurologic: moves all four extremities spontaneously and GCS 15  Psychiatric: calm, appropriate      MDM:  Patient is a 69 year old male with above history presenting for evaluation of chest pain.    Vitals reassuring and within normal limits.  Exam reveals an alert and oriented well-nourished and well-developed gentleman who is breathing shallowly.    Differential diagnosis includes was not limited to ACS, MI, PE, pneumothorax, pericardial tamponade, aortic dissection, esophageal rupture, heart failure, COPD exacerbation, pneumonia, GERD, anxiety, costochondritis, etc.     Patient is higher risk for ACS considering risk factors, will evaluate with labs, chest x-ray, EKG and cardiac monitoring.     Patient's pain is very pleuritic in nature so despite him being on blood thinners I am planning to move forward with a PE study.  Patient is in agreement with this plan.  He does have a history of PEs.    I am most suspicious that his symptoms are related to his pacemaker in some way shape or form because he describes it as sharp pain that gets worse with a deep breath.  Possibly one of his leads is in a different location or maybe his pacemaker is sensing and appropriately.  Planning to do an interrogation.    Dressing clean the patient tells me that he is scheduled for an ablation at [facility] in about a month.  I do not of access to these records but it sounds like he somehow gets these ablations that help control his heart rate.  He first had his pacemaker because he did a tilt table test and his heart did stop.  They then did the  pacemaker and he has been doing well with this.  However they want to go in and do an ablation because he seems to have some sort of pathway that is causing inappropriate heart rates.  This information is obtained via patient's wife who is at bedside.  She is unable to state exactly what is happening with his heart that would require these ablations.  Pending the pacemaker interrogation      I reviewed a cardiology note from 10-.  Patient was instructed to increase his amlodipine at that time because he had been having elevated blood pressures.  He was diagnosed with a history of vasospastic angina and had chronic troponin elevation which has been treated with isosorbide 60 mg daily.  In regards to his atrial fibrillation he had been on metoprolol which was thought to maybe be contributing to his vasospasms, spent some time on verapamil and diltiazem.  All of these have been discontinued now that he has a pacemaker he has been on amlodipine for as well pressures.    Previous note, the plan was to continue patient on his isosorbide and consider L-arginine 2 g 3 times daily with recurrent chest discomfort.    Was taken off flecainide at that time.    Ultimately I am unsure as to what is causing patient's symptoms today but I am worried about a PE despite him being on Xarelto.  Will order a PE study.    Remainder of ED course below.    ED COURSE:  ED Course as of 01/16/25 2300   Sun Jan 12, 2025 2115 EKG 12-lead, tracing only  ECG:  - Ventricular rate 71 bpm, regular  - MI, QRS, QT intervals normal  - Axis normal  - no ST segment or T wave changes concerning for acute ischemia  - Comparison to prior ECGs: no changes  - My independent interpretation: overall reassuring in this context, atrial paced rhythm     2230 CT Chest Pulmonary Embolism w Contrast  IMPRESSION:  1.  Negative for pulmonary embolism.     2.  No evidence of pneumonia.     3.  Mild coronary artery disease.     2318 Patient's pacemaker  interrogation shows no episodes of arrhythmia.   Thu Jan 16, 2025   2300 Chest XR,  PA & LAT  IMPRESSION: Lung volume is decreased. Mild streaky atelectasis in the left lung base. Heart size is within upper limits of normal. Mild pulmonary vascular congestion present. No confluent airspace opacity, pleural effusion or pneumothorax. Left chest   wall pacemaker again seen with atrioventricular leads in stable positions.     2300 Family no identifiable cause for patient's symptoms identified on workup today.  Discussed risks and benefits of admission versus discharge home and patient opts for discharge.  Return precautions discussed.  All questions answered and discharged in stable condition.       Encounter Diagnoses:  Final diagnoses:   Chest pain on breathing       Final disposition: discharge    Phuong Colon DO  EM Physician  [facility]       Phuong Colon DO  01/16/25 2300

## 2025-01-13 NOTE — ED TRIAGE NOTES
Patient is brought in by EMS for concerns of chest pain. Pt has taken his nitroglycerin x 2. Has an ablation at [facility] in less than a month. Pt did have an episode of tachycardia for EMS   Triage Assessment (Adult)       Row Name 01/12/25 9485          Triage Assessment    Airway WDL WDL        Respiratory WDL    Respiratory WDL WDL        Skin Circulation/Temperature WDL    Skin Circulation/Temperature WDL WDL        Cardiac WDL    Cardiac WDL chest pain;X     Cardiac Rhythm NSR        Chest Pain Assessment    Chest Pain Location anterior chest, left     Character sharp     Precipitating Factors other (see comments)  deep breath

## 2025-01-13 NOTE — TELEPHONE ENCOUNTER
Thanks for update   On AC so would not expect PE and they got CT PE showing no PE or PNA. No major fluid in lungs despite CXR with some concern for this.    PLAN:  Pls check with him - was device check done in ER? ER note mentions it will be done but I don't see mention of it. IF NOT, pls ask Device RNs to get a remote check    2.   Can get updated limited echo to assess for pericardial effusion to be done before my visit. I have ordered. ** If sxs are improving, I don't feel strongly he needs this but if he's concerned, he can get this arranged.    3.  Despite taking AC, can take 400 mg ibuprofen BID x 2 days to see if that helps resolve discomfort.    Pedro Araujo  January 13, 2025 at 1:58 PM

## 2025-01-13 NOTE — TELEPHONE ENCOUNTER
Pt called back to discuss. He said his device was checked in the ED and nothing abnormal was seen. He states he is feeling good right now so doesn't feel the echo is needed (he is having an ablation at [facility] soon and thinks he will have an echo there very soon). He will also start the 2 day trial of ibuprofen if the discomfort returns. Clotilde Lauren RN Cardiology January 13, 2025, 2:23 PM

## 2025-01-13 NOTE — DISCHARGE INSTRUCTIONS
I am not exactly sure what is causing her symptoms, but you had an extensive workup that all came back reassuring.  Your pacemaker appears to be working appropriately.  It does not appear to be anything emergent or dangerous at this time.  Follow-up with your regular doctor with ongoing concerns.  Return to the ER with new or worsening symptoms.

## 2025-01-20 ENCOUNTER — ANCILLARY PROCEDURE (OUTPATIENT)
Dept: CARDIOLOGY | Facility: CLINIC | Age: 70
End: 2025-01-20
Attending: INTERNAL MEDICINE
Payer: MEDICARE

## 2025-01-20 DIAGNOSIS — Z95.0 CARDIAC PACEMAKER IN SITU: ICD-10-CM

## 2025-01-20 DIAGNOSIS — I49.5 SICK SINUS SYNDROME (H): ICD-10-CM

## 2025-01-20 DIAGNOSIS — I49.5 SINUS NODE DYSFUNCTION (H): ICD-10-CM

## 2025-01-20 LAB
MDC_IDC_LEAD_CONNECTION_STATUS: NORMAL
MDC_IDC_LEAD_CONNECTION_STATUS: NORMAL
MDC_IDC_LEAD_IMPLANT_DT: NORMAL
MDC_IDC_LEAD_IMPLANT_DT: NORMAL
MDC_IDC_LEAD_LOCATION: NORMAL
MDC_IDC_LEAD_LOCATION: NORMAL
MDC_IDC_LEAD_LOCATION_DETAIL_1: NORMAL
MDC_IDC_LEAD_LOCATION_DETAIL_1: NORMAL
MDC_IDC_LEAD_MFG: NORMAL
MDC_IDC_LEAD_MFG: NORMAL
MDC_IDC_LEAD_MODEL: NORMAL
MDC_IDC_LEAD_MODEL: NORMAL
MDC_IDC_LEAD_POLARITY_TYPE: NORMAL
MDC_IDC_LEAD_POLARITY_TYPE: NORMAL
MDC_IDC_LEAD_SERIAL: NORMAL
MDC_IDC_LEAD_SERIAL: NORMAL
MDC_IDC_LEAD_SPECIAL_FUNCTION: NORMAL
MDC_IDC_LEAD_SPECIAL_FUNCTION: NORMAL
MDC_IDC_MSMT_BATTERY_DTM: NORMAL
MDC_IDC_MSMT_BATTERY_REMAINING_LONGEVITY: 121 MO
MDC_IDC_MSMT_BATTERY_RRT_TRIGGER: 2.62
MDC_IDC_MSMT_BATTERY_STATUS: NORMAL
MDC_IDC_MSMT_BATTERY_VOLTAGE: 3.01 V
MDC_IDC_MSMT_LEADCHNL_RA_IMPEDANCE_VALUE: 323 OHM
MDC_IDC_MSMT_LEADCHNL_RA_IMPEDANCE_VALUE: 418 OHM
MDC_IDC_MSMT_LEADCHNL_RA_PACING_THRESHOLD_AMPLITUDE: 0.75 V
MDC_IDC_MSMT_LEADCHNL_RA_PACING_THRESHOLD_PULSEWIDTH: 0.4 MS
MDC_IDC_MSMT_LEADCHNL_RA_SENSING_INTR_AMPL: 2.88 MV
MDC_IDC_MSMT_LEADCHNL_RA_SENSING_INTR_AMPL: 2.88 MV
MDC_IDC_MSMT_LEADCHNL_RV_IMPEDANCE_VALUE: 380 OHM
MDC_IDC_MSMT_LEADCHNL_RV_IMPEDANCE_VALUE: 437 OHM
MDC_IDC_MSMT_LEADCHNL_RV_PACING_THRESHOLD_AMPLITUDE: 0.88 V
MDC_IDC_MSMT_LEADCHNL_RV_PACING_THRESHOLD_PULSEWIDTH: 0.4 MS
MDC_IDC_MSMT_LEADCHNL_RV_SENSING_INTR_AMPL: 17.62 MV
MDC_IDC_MSMT_LEADCHNL_RV_SENSING_INTR_AMPL: 17.62 MV
MDC_IDC_PG_IMPLANT_DTM: NORMAL
MDC_IDC_PG_MFG: NORMAL
MDC_IDC_PG_MODEL: NORMAL
MDC_IDC_PG_SERIAL: NORMAL
MDC_IDC_PG_TYPE: NORMAL
MDC_IDC_SESS_CLINIC_NAME: NORMAL
MDC_IDC_SESS_DTM: NORMAL
MDC_IDC_SESS_TYPE: NORMAL
MDC_IDC_SET_BRADY_AT_MODE_SWITCH_RATE: 171 {BEATS}/MIN
MDC_IDC_SET_BRADY_HYSTRATE: NORMAL
MDC_IDC_SET_BRADY_LOWRATE: 70 {BEATS}/MIN
MDC_IDC_SET_BRADY_MAX_SENSOR_RATE: 130 {BEATS}/MIN
MDC_IDC_SET_BRADY_MAX_TRACKING_RATE: 130 {BEATS}/MIN
MDC_IDC_SET_BRADY_MODE: NORMAL
MDC_IDC_SET_BRADY_NIGHT_RATE: 60 {BEATS}/MIN
MDC_IDC_SET_BRADY_PAV_DELAY_LOW: 180 MS
MDC_IDC_SET_BRADY_SAV_DELAY_LOW: 150 MS
MDC_IDC_SET_LEADCHNL_RA_PACING_AMPLITUDE: 1.5 V
MDC_IDC_SET_LEADCHNL_RA_PACING_ANODE_ELECTRODE_1: NORMAL
MDC_IDC_SET_LEADCHNL_RA_PACING_ANODE_LOCATION_1: NORMAL
MDC_IDC_SET_LEADCHNL_RA_PACING_CAPTURE_MODE: NORMAL
MDC_IDC_SET_LEADCHNL_RA_PACING_CATHODE_ELECTRODE_1: NORMAL
MDC_IDC_SET_LEADCHNL_RA_PACING_CATHODE_LOCATION_1: NORMAL
MDC_IDC_SET_LEADCHNL_RA_PACING_POLARITY: NORMAL
MDC_IDC_SET_LEADCHNL_RA_PACING_PULSEWIDTH: 0.4 MS
MDC_IDC_SET_LEADCHNL_RA_SENSING_ANODE_ELECTRODE_1: NORMAL
MDC_IDC_SET_LEADCHNL_RA_SENSING_ANODE_LOCATION_1: NORMAL
MDC_IDC_SET_LEADCHNL_RA_SENSING_CATHODE_ELECTRODE_1: NORMAL
MDC_IDC_SET_LEADCHNL_RA_SENSING_CATHODE_LOCATION_1: NORMAL
MDC_IDC_SET_LEADCHNL_RA_SENSING_POLARITY: NORMAL
MDC_IDC_SET_LEADCHNL_RA_SENSING_SENSITIVITY: 0.3 MV
MDC_IDC_SET_LEADCHNL_RV_PACING_AMPLITUDE: 2 V
MDC_IDC_SET_LEADCHNL_RV_PACING_ANODE_ELECTRODE_1: NORMAL
MDC_IDC_SET_LEADCHNL_RV_PACING_ANODE_LOCATION_1: NORMAL
MDC_IDC_SET_LEADCHNL_RV_PACING_CAPTURE_MODE: NORMAL
MDC_IDC_SET_LEADCHNL_RV_PACING_CATHODE_ELECTRODE_1: NORMAL
MDC_IDC_SET_LEADCHNL_RV_PACING_CATHODE_LOCATION_1: NORMAL
MDC_IDC_SET_LEADCHNL_RV_PACING_POLARITY: NORMAL
MDC_IDC_SET_LEADCHNL_RV_PACING_PULSEWIDTH: 0.4 MS
MDC_IDC_SET_LEADCHNL_RV_SENSING_ANODE_ELECTRODE_1: NORMAL
MDC_IDC_SET_LEADCHNL_RV_SENSING_ANODE_LOCATION_1: NORMAL
MDC_IDC_SET_LEADCHNL_RV_SENSING_CATHODE_ELECTRODE_1: NORMAL
MDC_IDC_SET_LEADCHNL_RV_SENSING_CATHODE_LOCATION_1: NORMAL
MDC_IDC_SET_LEADCHNL_RV_SENSING_POLARITY: NORMAL
MDC_IDC_SET_LEADCHNL_RV_SENSING_SENSITIVITY: 0.9 MV
MDC_IDC_SET_ZONE_DETECTION_INTERVAL: 350 MS
MDC_IDC_SET_ZONE_DETECTION_INTERVAL: 400 MS
MDC_IDC_SET_ZONE_STATUS: NORMAL
MDC_IDC_SET_ZONE_STATUS: NORMAL
MDC_IDC_SET_ZONE_TYPE: NORMAL
MDC_IDC_SET_ZONE_VENDOR_TYPE: NORMAL
MDC_IDC_STAT_AT_BURDEN_PERCENT: 0 %
MDC_IDC_STAT_AT_DTM_END: NORMAL
MDC_IDC_STAT_AT_DTM_START: NORMAL
MDC_IDC_STAT_BRADY_AP_VP_PERCENT: 0.03 %
MDC_IDC_STAT_BRADY_AP_VS_PERCENT: 94.81 %
MDC_IDC_STAT_BRADY_AS_VP_PERCENT: 0 %
MDC_IDC_STAT_BRADY_AS_VS_PERCENT: 5.16 %
MDC_IDC_STAT_BRADY_DTM_END: NORMAL
MDC_IDC_STAT_BRADY_DTM_START: NORMAL
MDC_IDC_STAT_BRADY_RA_PERCENT_PACED: 94.89 %
MDC_IDC_STAT_BRADY_RV_PERCENT_PACED: 0.03 %
MDC_IDC_STAT_EPISODE_RECENT_COUNT: 0
MDC_IDC_STAT_EPISODE_RECENT_COUNT_DTM_END: NORMAL
MDC_IDC_STAT_EPISODE_RECENT_COUNT_DTM_START: NORMAL
MDC_IDC_STAT_EPISODE_TOTAL_COUNT: 0
MDC_IDC_STAT_EPISODE_TOTAL_COUNT: 19
MDC_IDC_STAT_EPISODE_TOTAL_COUNT: 7
MDC_IDC_STAT_EPISODE_TOTAL_COUNT_DTM_END: NORMAL
MDC_IDC_STAT_EPISODE_TOTAL_COUNT_DTM_START: NORMAL
MDC_IDC_STAT_EPISODE_TYPE: NORMAL
MDC_IDC_STAT_TACHYTHERAPY_RECENT_DTM_END: NORMAL
MDC_IDC_STAT_TACHYTHERAPY_RECENT_DTM_START: NORMAL
MDC_IDC_STAT_TACHYTHERAPY_TOTAL_DTM_END: NORMAL
MDC_IDC_STAT_TACHYTHERAPY_TOTAL_DTM_START: NORMAL

## 2025-01-20 PROCEDURE — 93296 REM INTERROG EVL PM/IDS: CPT | Performed by: INTERNAL MEDICINE

## 2025-01-20 PROCEDURE — 93294 REM INTERROG EVL PM/LDLS PM: CPT | Performed by: INTERNAL MEDICINE
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
## 2025-01-21 ENCOUNTER — OFFICE VISIT (OUTPATIENT)
Dept: CARDIOLOGY | Facility: CLINIC | Age: 70
End: 2025-01-21
Attending: PHYSICIAN ASSISTANT
Payer: MEDICARE

## 2025-01-21 VITALS
DIASTOLIC BLOOD PRESSURE: 82 MMHG | OXYGEN SATURATION: 96 % | BODY MASS INDEX: 36.2 KG/M2 | HEART RATE: 89 BPM | HEIGHT: 73 IN | SYSTOLIC BLOOD PRESSURE: 150 MMHG | WEIGHT: 273.1 LBS | RESPIRATION RATE: 20 BRPM

## 2025-01-21 DIAGNOSIS — I10 HYPERTENSION, UNSPECIFIED TYPE: ICD-10-CM

## 2025-01-21 DIAGNOSIS — I10 BENIGN ESSENTIAL HYPERTENSION: ICD-10-CM

## 2025-01-21 PROCEDURE — 99214 OFFICE O/P EST MOD 30 MIN: CPT | Performed by: PHYSICIAN ASSISTANT

## 2025-01-21 RX ORDER — AMLODIPINE BESYLATE 2.5 MG/1
2.5 TABLET ORAL 2 TIMES DAILY
COMMUNITY
Start: 2025-01-21
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
## 2025-01-21 NOTE — PATIENT INSTRUCTIONS
Arya - it was nice to see you and Genna today!     At your visit today we reviewed:    Upcoming procedures/testing at [facility]  BPs better after changes  ER visit 2025  Pacer check yesterday looked great   CT in ER showed no blood clots     Medication Changes:    NONE    Recommendations:    Continue work up at [facility]    Follow-up:    See me and Dr. Bermudez and me for cardiology follow up in 3 m but CALL Cardiology nurses Shameka & Clotilde @ [phone] for any issues, questions or concerns in the interim.      To schedule a future appointment, we kindly ask that you call cardiology scheduling at [phone] three months prior to requested visit date.    Important Phone Numbers for [facility]):    [facility] Shameka Mabry: [phone]  Cardiology [phone]  [facility] [phone]  [facility] [phone]  [facility]: [phone]

## 2025-02-11 ENCOUNTER — HOSPITAL ENCOUNTER (EMERGENCY)
Facility: CLINIC | Age: 70
Discharge: HOME | End: 2025-02-11
Attending: FAMILY MEDICINE | Admitting: FAMILY MEDICINE
Payer: MEDICARE

## 2025-02-11 VITALS
HEART RATE: 105 BPM | SYSTOLIC BLOOD PRESSURE: 122 MMHG | OXYGEN SATURATION: 93 % | BODY MASS INDEX: 34.96 KG/M2 | TEMPERATURE: 98 F | WEIGHT: 265 LBS | DIASTOLIC BLOOD PRESSURE: 83 MMHG | RESPIRATION RATE: 20 BRPM

## 2025-02-11 DIAGNOSIS — I48.91 ATRIAL FIBRILLATION WITH RVR (H): ICD-10-CM

## 2025-02-11 LAB
ANION GAP SERPL CALCULATED.3IONS-SCNC: 7 MMOL/L (ref 7–15)
BASOPHILS # BLD AUTO: 0 10E3/UL (ref 0–0.2)
BASOPHILS NFR BLD AUTO: 1 %
BUN SERPL-MCNC: 21.4 MG/DL (ref 8–23)
CALCIUM SERPL-MCNC: 9.4 MG/DL (ref 8.8–10.4)
CHLORIDE SERPL-SCNC: 102 MMOL/L (ref 98–107)
CREAT SERPL-MCNC: 1.13 MG/DL (ref 0.67–1.17)
EGFRCR SERPLBLD CKD-EPI 2021: 71 ML/MIN/1.73M2
EOSINOPHIL # BLD AUTO: 0.2 10E3/UL (ref 0–0.7)
EOSINOPHIL NFR BLD AUTO: 3 %
ERYTHROCYTE [DISTWIDTH] IN BLOOD BY AUTOMATED COUNT: 12.7 % (ref 10–15)
GLUCOSE SERPL-MCNC: 187 MG/DL (ref 70–99)
HCO3 SERPL-SCNC: 29 MMOL/L (ref 22–29)
HCT VFR BLD AUTO: 37.6 % (ref 40–53)
HGB BLD-MCNC: 12.6 G/DL (ref 13.3–17.7)
HOLD SPECIMEN: NORMAL
HOLD SPECIMEN: NORMAL
IMM GRANULOCYTES # BLD: 0 10E3/UL
IMM GRANULOCYTES NFR BLD: 1 %
LYMPHOCYTES # BLD AUTO: 1.1 10E3/UL (ref 0.8–5.3)
LYMPHOCYTES NFR BLD AUTO: 17 %
MCH RBC QN AUTO: 29.7 PG (ref 26.5–33)
MCHC RBC AUTO-ENTMCNC: 33.5 G/DL (ref 31.5–36.5)
MCV RBC AUTO: 89 FL (ref 78–100)
MONOCYTES # BLD AUTO: 0.7 10E3/UL (ref 0–1.3)
MONOCYTES NFR BLD AUTO: 10 %
NEUTROPHILS # BLD AUTO: 4.6 10E3/UL (ref 1.6–8.3)
NEUTROPHILS NFR BLD AUTO: 70 %
NRBC # BLD AUTO: 0 10E3/UL
NRBC BLD AUTO-RTO: 0 /100
PLATELET # BLD AUTO: 206 10E3/UL (ref 150–450)
POTASSIUM SERPL-SCNC: 4.4 MMOL/L (ref 3.4–5.3)
RBC # BLD AUTO: 4.24 10E6/UL (ref 4.4–5.9)
SODIUM SERPL-SCNC: 138 MMOL/L (ref 135–145)
TROPONIN T SERPL HS-MCNC: 50 NG/L
TROPONIN T SERPL HS-MCNC: 54 NG/L
WBC # BLD AUTO: 6.6 10E3/UL (ref 4–11)

## 2025-02-11 PROCEDURE — 99284 EMERGENCY DEPT VISIT MOD MDM: CPT | Performed by: FAMILY MEDICINE

## 2025-02-11 PROCEDURE — 82374 ASSAY BLOOD CARBON DIOXIDE: CPT | Performed by: FAMILY MEDICINE

## 2025-02-11 PROCEDURE — 93005 ELECTROCARDIOGRAM TRACING: CPT | Performed by: FAMILY MEDICINE

## 2025-02-11 PROCEDURE — 250N000013 HC RX MED GY IP 250 OP 250 PS 637: Performed by: FAMILY MEDICINE

## 2025-02-11 PROCEDURE — 85004 AUTOMATED DIFF WBC COUNT: CPT | Performed by: FAMILY MEDICINE

## 2025-02-11 PROCEDURE — 84484 ASSAY OF TROPONIN QUANT: CPT | Performed by: FAMILY MEDICINE

## 2025-02-11 PROCEDURE — 82435 ASSAY OF BLOOD CHLORIDE: CPT | Performed by: FAMILY MEDICINE

## 2025-02-11 PROCEDURE — 93010 ELECTROCARDIOGRAM REPORT: CPT | Performed by: FAMILY MEDICINE

## 2025-02-11 PROCEDURE — 80048 BASIC METABOLIC PNL TOTAL CA: CPT | Performed by: FAMILY MEDICINE

## 2025-02-11 PROCEDURE — 36415 COLL VENOUS BLD VENIPUNCTURE: CPT | Performed by: FAMILY MEDICINE

## 2025-02-11 RX ORDER — METOPROLOL TARTRATE 25 MG/1
12.5 TABLET, FILM COATED ORAL 2 TIMES DAILY
Qty: 30 TABLET | Refills: 0 | Status: SHIPPED | OUTPATIENT
Start: 2025-02-11 | End: 2025-03-13

## 2025-02-11 RX ORDER — FLECAINIDE ACETATE 50 MG/1
150 TABLET ORAL ONCE
Status: COMPLETED | OUTPATIENT
Start: 2025-02-11 | End: 2025-02-11

## 2025-02-11 RX ADMIN — FLECAINIDE ACETATE 150 MG: 50 TABLET ORAL at 13:12

## 2025-02-11 ASSESSMENT — ACTIVITIES OF DAILY LIVING (ADL)
ADLS_ACUITY_SCORE: 56

## 2025-02-11 NOTE — ED TRIAGE NOTES
Onset of SOA when making bed this morning, cardiac ablation at [facility] on February 3rd, doing well since than, denies chest pain, history of Afib     Triage Assessment (Adult)       Row Name 02/11/25 1034          Triage Assessment    Airway WDL WDL        Respiratory WDL    Respiratory WDL rhythm/pattern     Rhythm/Pattern, Respiratory shortness of breath        Cardiac WDL    Cardiac WDL WDL        Peripheral/Neurovascular WDL    Peripheral Neurovascular WDL WDL        Cognitive/Neuro/Behavioral WDL    Cognitive/Neuro/Behavioral WDL WDL
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
## 2025-02-11 NOTE — DISCHARGE INSTRUCTIONS
RETURN TO THE EMERGENCY ROOM FOR THE FOLLOWING:    Severely worsened breathing, fainting, new chest pain, or at anytime for any concern.    FOLLOW UP:    Cardiology follow-up recommended.  Expect your EP physician at the [facility] to call you and help schedule an appointment.    TREATMENT RECOMMENDATIONS:    Continue with your previous medications as prescribed.    Start metoprolol 12.5 mg twice a day.    NURSE ADVICE LINE:  [phone] or [phone]

## 2025-02-12 LAB
ATRIAL RATE - MUSE: NORMAL BPM
DIASTOLIC BLOOD PRESSURE - MUSE: NORMAL MMHG
INTERPRETATION ECG - MUSE: NORMAL
P AXIS - MUSE: NORMAL DEGREES
PR INTERVAL - MUSE: NORMAL MS
QRS DURATION - MUSE: 104 MS
QT - MUSE: 356 MS
QTC - MUSE: 492 MS
R AXIS - MUSE: -19 DEGREES
SYSTOLIC BLOOD PRESSURE - MUSE: NORMAL MMHG
T AXIS - MUSE: 24 DEGREES
VENTRICULAR RATE- MUSE: 115 BPM

## 2025-02-21 ENCOUNTER — HOSPITAL ENCOUNTER (EMERGENCY)
Facility: CLINIC | Age: 70
Discharge: HOME | End: 2025-02-21
Attending: NURSE PRACTITIONER | Admitting: NURSE PRACTITIONER
Payer: MEDICARE

## 2025-02-21 ENCOUNTER — APPOINTMENT (OUTPATIENT)
Dept: GENERAL RADIOLOGY | Facility: CLINIC | Age: 70
End: 2025-02-21
Attending: NURSE PRACTITIONER
Payer: MEDICARE

## 2025-02-21 VITALS
OXYGEN SATURATION: 95 % | WEIGHT: 265 LBS | BODY MASS INDEX: 34.96 KG/M2 | SYSTOLIC BLOOD PRESSURE: 154 MMHG | HEART RATE: 50 BPM | TEMPERATURE: 97.6 F | DIASTOLIC BLOOD PRESSURE: 104 MMHG | RESPIRATION RATE: 19 BRPM

## 2025-02-21 DIAGNOSIS — R07.89 CHEST WALL PAIN: ICD-10-CM

## 2025-02-21 DIAGNOSIS — R07.9 RIGHT-SIDED CHEST PAIN: ICD-10-CM

## 2025-02-21 PROBLEM — I48.91 ATRIAL FIBRILLATION (H): Status: ACTIVE | Noted: 2025-01-30

## 2025-02-21 PROBLEM — G47.33 OBSTRUCTIVE SLEEP APNEA SYNDROME IN ADULT: Status: ACTIVE | Noted: 2025-02-21

## 2025-02-21 PROBLEM — M17.10 LOCALIZED OSTEOARTHROSIS, LOWER LEG: Status: ACTIVE | Noted: 2025-02-21

## 2025-02-21 PROBLEM — N18.9 CHRONIC KIDNEY DISEASE: Status: ACTIVE | Noted: 2025-01-30

## 2025-02-21 PROBLEM — G90.01 CAROTID SINUS SYNCOPE: Status: ACTIVE | Noted: 2025-02-03

## 2025-02-21 LAB
ALBUMIN SERPL BCG-MCNC: 4.1 G/DL (ref 3.5–5.2)
ALP SERPL-CCNC: 89 U/L (ref 40–150)
ALT SERPL W P-5'-P-CCNC: 29 U/L (ref 0–70)
ANION GAP SERPL CALCULATED.3IONS-SCNC: 11 MMOL/L (ref 7–15)
AST SERPL W P-5'-P-CCNC: 23 U/L (ref 0–45)
BASOPHILS # BLD AUTO: 0.1 10E3/UL (ref 0–0.2)
BASOPHILS NFR BLD AUTO: 1 %
BILIRUB DIRECT SERPL-MCNC: 0.08 MG/DL (ref 0–0.3)
BILIRUB SERPL-MCNC: 0.3 MG/DL
BUN SERPL-MCNC: 19.8 MG/DL (ref 8–23)
CALCIUM SERPL-MCNC: 9.2 MG/DL (ref 8.8–10.4)
CHLORIDE SERPL-SCNC: 105 MMOL/L (ref 98–107)
CREAT SERPL-MCNC: 1.03 MG/DL (ref 0.67–1.17)
EGFRCR SERPLBLD CKD-EPI 2021: 79 ML/MIN/1.73M2
EOSINOPHIL # BLD AUTO: 0.2 10E3/UL (ref 0–0.7)
EOSINOPHIL NFR BLD AUTO: 3 %
ERYTHROCYTE [DISTWIDTH] IN BLOOD BY AUTOMATED COUNT: 12.7 % (ref 10–15)
GLUCOSE SERPL-MCNC: 167 MG/DL (ref 70–99)
HCO3 SERPL-SCNC: 24 MMOL/L (ref 22–29)
HCT VFR BLD AUTO: 36.9 % (ref 40–53)
HGB BLD-MCNC: 12.8 G/DL (ref 13.3–17.7)
HOLD SPECIMEN: NORMAL
IMM GRANULOCYTES # BLD: 0 10E3/UL
IMM GRANULOCYTES NFR BLD: 1 %
INR PPP: 1.5 (ref 0.85–1.15)
LYMPHOCYTES # BLD AUTO: 1.3 10E3/UL (ref 0.8–5.3)
LYMPHOCYTES NFR BLD AUTO: 21 %
MAGNESIUM SERPL-MCNC: 1.8 MG/DL (ref 1.7–2.3)
MCH RBC QN AUTO: 30.3 PG (ref 26.5–33)
MCHC RBC AUTO-ENTMCNC: 34.7 G/DL (ref 31.5–36.5)
MCV RBC AUTO: 87 FL (ref 78–100)
MONOCYTES # BLD AUTO: 0.7 10E3/UL (ref 0–1.3)
MONOCYTES NFR BLD AUTO: 11 %
NEUTROPHILS # BLD AUTO: 3.8 10E3/UL (ref 1.6–8.3)
NEUTROPHILS NFR BLD AUTO: 64 %
NRBC # BLD AUTO: 0 10E3/UL
NRBC BLD AUTO-RTO: 0 /100
PLATELET # BLD AUTO: 231 10E3/UL (ref 150–450)
POTASSIUM SERPL-SCNC: 4.2 MMOL/L (ref 3.4–5.3)
PROT SERPL-MCNC: 7.4 G/DL (ref 6.4–8.3)
RBC # BLD AUTO: 4.22 10E6/UL (ref 4.4–5.9)
SODIUM SERPL-SCNC: 140 MMOL/L (ref 135–145)
TROPONIN T SERPL HS-MCNC: 23 NG/L
TROPONIN T SERPL HS-MCNC: 24 NG/L
WBC # BLD AUTO: 5.9 10E3/UL (ref 4–11)

## 2025-02-21 PROCEDURE — 99285 EMERGENCY DEPT VISIT HI MDM: CPT | Performed by: NURSE PRACTITIONER

## 2025-02-21 PROCEDURE — 99285 EMERGENCY DEPT VISIT HI MDM: CPT | Mod: 25 | Performed by: NURSE PRACTITIONER

## 2025-02-21 PROCEDURE — 84484 ASSAY OF TROPONIN QUANT: CPT | Performed by: NURSE PRACTITIONER

## 2025-02-21 PROCEDURE — 85610 PROTHROMBIN TIME: CPT | Performed by: NURSE PRACTITIONER

## 2025-02-21 PROCEDURE — 93010 ELECTROCARDIOGRAM REPORT: CPT | Performed by: NURSE PRACTITIONER

## 2025-02-21 PROCEDURE — 71045 X-RAY EXAM CHEST 1 VIEW: CPT

## 2025-02-21 PROCEDURE — 82565 ASSAY OF CREATININE: CPT | Performed by: NURSE PRACTITIONER

## 2025-02-21 PROCEDURE — 93005 ELECTROCARDIOGRAM TRACING: CPT | Performed by: NURSE PRACTITIONER

## 2025-02-21 PROCEDURE — 84075 ASSAY ALKALINE PHOSPHATASE: CPT | Performed by: NURSE PRACTITIONER

## 2025-02-21 PROCEDURE — 83735 ASSAY OF MAGNESIUM: CPT | Performed by: NURSE PRACTITIONER

## 2025-02-21 PROCEDURE — 85048 AUTOMATED LEUKOCYTE COUNT: CPT | Performed by: NURSE PRACTITIONER

## 2025-02-21 PROCEDURE — 85004 AUTOMATED DIFF WBC COUNT: CPT | Performed by: NURSE PRACTITIONER

## 2025-02-21 PROCEDURE — 82947 ASSAY GLUCOSE BLOOD QUANT: CPT | Performed by: NURSE PRACTITIONER

## 2025-02-21 PROCEDURE — 36415 COLL VENOUS BLD VENIPUNCTURE: CPT | Performed by: NURSE PRACTITIONER

## 2025-02-21 RX ORDER — ASPIRIN 81 MG/1
324 TABLET, CHEWABLE ORAL ONCE
Status: COMPLETED | OUTPATIENT
Start: 2025-02-21 | End: 2025-02-21

## 2025-02-21 ASSESSMENT — ACTIVITIES OF DAILY LIVING (ADL)
ADLS_ACUITY_SCORE: 56

## 2025-02-21 NOTE — ED TRIAGE NOTES
Arrives from home concerned for right anterior chest pain, non-radiating, non-reproducible, started this am around [id number] & constant since. Tried SL nitroglycerin without any effect. States he has hx of recent ablation for dizziness & has had coronary spasms for which he takes the nitroglycerin. Still in process of getting this worked up through [facility].     Triage Assessment (Adult)       Row Name 02/21/25 5604          Triage Assessment    Airway WDL WDL        Respiratory WDL    Respiratory WDL WDL        Skin Circulation/Temperature WDL    Skin Circulation/Temperature WDL WDL        Cardiac WDL    Cardiac WDL X        Peripheral/Neurovascular WDL    Peripheral Neurovascular WDL WDL        Cognitive/Neuro/Behavioral WDL    Cognitive/Neuro/Behavioral WDL WDL

## 2025-02-22 LAB
ATRIAL RATE - MUSE: 72 BPM
DIASTOLIC BLOOD PRESSURE - MUSE: NORMAL MMHG
INTERPRETATION ECG - MUSE: NORMAL
P AXIS - MUSE: NORMAL DEGREES
PR INTERVAL - MUSE: 192 MS
QRS DURATION - MUSE: 102 MS
QT - MUSE: 424 MS
QTC - MUSE: 464 MS
R AXIS - MUSE: -10 DEGREES
SYSTOLIC BLOOD PRESSURE - MUSE: NORMAL MMHG
T AXIS - MUSE: 5 DEGREES
VENTRICULAR RATE- MUSE: 72 BPM

## 2025-02-22 NOTE — DISCHARGE INSTRUCTIONS
Reveals an atrial paced rhythm with heart rate 72, ST segment changes that are not new are noted.  No acute ST segment elevation or depression is noted paced rhythm as previously stated is noted.  This is a change from previous EKG of February 11 and more similar to EKG of January 12 I recommend Tylenol 1000 mg by mouth 3 times daily for 1 week.  Tylenol will definitely not hide true cardiac pain or heart attack pain.  Please return to the emergency room if you develop persistent or sudden worsening chest pain with sweats or without sweats and/or shortness of breath.  The [facility] system said that they should contact you shortly as the schedule is not out yet for me.  But they do plan to see you in follow-up in May.    Thank you for coming to the emergency room today.
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
## 2025-03-05 ENCOUNTER — TELEPHONE (OUTPATIENT)
Dept: CARDIOLOGY | Facility: CLINIC | Age: 70
End: 2025-03-05
Payer: MEDICARE

## 2025-03-05 DIAGNOSIS — R39.15 URINARY URGENCY: ICD-10-CM

## 2025-03-05 DIAGNOSIS — I49.5 SSS (SICK SINUS SYNDROME) (H): Primary | ICD-10-CM

## 2025-03-05 DIAGNOSIS — Z95.0 CARDIAC PACEMAKER IN SITU: ICD-10-CM

## 2025-03-05 NOTE — TELEPHONE ENCOUNTER
Requested Prescriptions   Pending Prescriptions Disp Refills    vibegron (GEMTESA) 75 MG TABS tablet 90 tablet 3     Sig: Take 1 tablet (75 mg) by mouth daily.       There is no refill protocol information for this order        Last office visit: 12/27/2024 ; last virtual visit: Visit date not found with prescribing provider:  Carmen Jin     Future Office Visit:          Yahaira Hirsch   Clinic Station    [facility]  [phone]

## 2025-03-05 NOTE — TELEPHONE ENCOUNTER
Received message from patient stating he had just gotten off the phone with the MD at [facility] after his ablation and the MD wants his PPM turned back to a LRL of 70 from 50bpm.  They had decreased it to 50bpm after the ablation and now want it turned back to the previous LRL of 70.  He is wondering if he can have this done at the [facility].     Called and spoke with patient and then next available appointment at [place] is on 3/18/25.  Patient would like to get in sooner than this if possible.  Scheduled for 3/10/25 in [place].      [address]         Patient had a cardioneural ablation at [facility] on 2/3/25:     Final Diagnosis:  1. 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     Post procedure, patient's device check showed underlying rhythm of SR in the 70's.  Previous underlying rhythm was SB at 49bpm per 10/15/24.        Device check at [facility] from 2/10/25:     Results - CARDIOVASCULAR IMPLANTABLE ELECTRONIC DEVICE - NO CHARGE (02/03/2025 5:40 PM CST)  Impressions   02/10/2025 5:26 PM CST   Encounter Impression: Title: Hospital Check  * Patient was seen in hospital in [facility] Pre/Post [place] #12 for EP testing with Dr. Macedo and Dr. Noel  * Reason: Assess RA Burst Induction and RA PES Induction post cardio neural ablation  * Presenting rhythm:  AS/VS @ 70 BPM  *Underlying Rhythm: SR @ 70 BPM  * Heart Rate Histograms reviewed  * Device was not tested as it was already tested earlier today post procedure  *See PDF for RA Burst Induction/RA PES Induction test done with Dr. Bustamante and Dr. Noel      Plan: This patient underwent device interrogation. I agree that the device interrogation was medically indicated to provide appropriate care and continue routine device interrogations as  indicated.  Encounter Summary: This report includes 1 transmission that was received on 2025-02-03. Battery, lead impedance, sensing amplitude and pacing threshold data was reviewed.

## 2025-03-05 NOTE — TELEPHONE ENCOUNTER
vibegron (GEMTESA) 75 MG TABS tablet 90 tablet 3 12/27/2024 -- No   Sig - Route: Take 1 tablet (75 mg) by mouth daily. - Oral   Sent to pharmacy as: Vibegron 75 MG Oral Tablet (GEMTESA)   Class: E-Prescribe   Order: [id number]   E-Prescribing Status: Receipt confirmed by pharmacy (12/27/2024 11:51 AM CST)       1 yr of refills was sent.  Unclear if this requires a new rx for [employer] or PA?    Brook BAIN   [facility] RN

## 2025-03-10 DIAGNOSIS — Z95.0 CARDIAC PACEMAKER IN SITU: Primary | ICD-10-CM

## 2025-03-15 NOTE — PROGRESS NOTES
[facility] from 1/12/2025, pt in ER for chest pain.     Medtronic Juncal (D) PPM  Presenting rhythm: AP/VS  Battery: 10.2 yrs estimated longevity   Lead measurements WNL  Mode: AAIR-->DDDR   AP 94%   0.1%  No arrhythmias since last  check on 11/11/2024 (which we do not have record of)

## 2025-03-16 DIAGNOSIS — I10 HYPERTENSION, UNSPECIFIED TYPE: ICD-10-CM

## 2025-03-16 NOTE — TELEPHONE ENCOUNTER
Pending Prescriptions:                       Disp   Refills    amLODIPine (NORVASC) 2.5 MG tablet                            Sig: Take 1 tablet (2.5 mg) by mouth 2 times daily.    Patient is looking for a refill on Amlodipine,  to be sent to [facility] pharmacy.    Thanks   Annette Schwab   [facility] [place]  [phone]

## 2025-03-17 DIAGNOSIS — R39.15 URINARY URGENCY: ICD-10-CM

## 2025-03-17 RX ORDER — AMLODIPINE BESYLATE 2.5 MG/1
2.5 TABLET ORAL 2 TIMES DAILY
Qty: 180 TABLET | Refills: 3 | Status: SHIPPED | OUTPATIENT
Start: 2025-03-17

## 2025-03-17 NOTE — TELEPHONE ENCOUNTER
Requested Prescriptions   Pending Prescriptions Disp Refills    vibegron (GEMTESA) 75 MG TABS tablet 90 tablet 3     Sig: Take 1 tablet (75 mg) by mouth daily.       There is no refill protocol information for this order          Last office visit: 12/27/2024 ; last virtual visit: Visit date not found with prescribing provider:  Carmen Jin   Future Office Visit:      Thank you,  Jackelin Gandara  [facility]  [address] [place] [address]  Priority line: [phone] (please do not share number with patient)   Employed by [facility]

## 2025-03-17 NOTE — TELEPHONE ENCOUNTER
[facility] Call Center    Phone Message    May a detailed message be left on voicemail: yes     Reason for Call: Medication Refill Request    Has the patient contacted the pharmacy for the refill? Yes   Name of medication being requested: GEMTESA   Provider who prescribed the medication: Carmen Jin PA-C   Pharmacy: [facility]  Date medication is needed: ASAP     Action Taken: Other: [facility]    Travel Screening: Not Applicable

## 2025-03-17 NOTE — TELEPHONE ENCOUNTER
"Refill request for vibegron 75mg tab: 1 tab daily  Last filled 12/27/24 for 12 mo supply was sent to [facility] [place]  This refill request is from [facility]    LOV with Sergiodiane 12/27/24:  \"Plan:  Continue tamsulosin 0.4 mg, finasteride 5 mg, and Gemtesa 75 mg daily.   Follow up in one year, sooner if concerns. \"    This request was initiated by patient himself per the incoming call information of this encounter.  Writer resent the prescription to [facility] per patient preference.    Kishore HANLEY [facility]      "

## 2025-03-25 ENCOUNTER — TELEPHONE (OUTPATIENT)
Dept: UROLOGY | Facility: CLINIC | Age: 70
End: 2025-03-25
Payer: MEDICARE

## 2025-03-25 DIAGNOSIS — R39.15 URINARY URGENCY: ICD-10-CM

## 2025-03-25 NOTE — TELEPHONE ENCOUNTER
[facility] Call Center    Phone Message    May a detailed message be left on voicemail: yes     Reason for Call: Medication Refill Request    Has the patient contacted the pharmacy for the refill? Yes   Name of medication being requested:Gemtesa   Provider who prescribed the medication: Carmen Jin  Ph[facility]acy: [facility] [address] [place] [address]  Date medication is needed: 3/25/25    Patient is asking for more the Rx than last time due to running out   Action Taken: Message routed to:  Other: Radha Calvo    Travel Screening: Not Applicable     Date of Service:

## 2025-04-02 ENCOUNTER — APPOINTMENT (OUTPATIENT)
Dept: CT IMAGING | Facility: CLINIC | Age: 70
End: 2025-04-02
Attending: EMERGENCY MEDICINE
Payer: MEDICARE

## 2025-04-02 ENCOUNTER — HOSPITAL ENCOUNTER (EMERGENCY)
Facility: CLINIC | Age: 70
Discharge: HOME | End: 2025-04-02
Payer: MEDICARE

## 2025-04-02 VITALS
OXYGEN SATURATION: 98 % | TEMPERATURE: 98.1 F | HEART RATE: 74 BPM | DIASTOLIC BLOOD PRESSURE: 81 MMHG | RESPIRATION RATE: 16 BRPM | SYSTOLIC BLOOD PRESSURE: 149 MMHG

## 2025-04-02 DIAGNOSIS — S20.212A CHEST WALL CONTUSION, LEFT, INITIAL ENCOUNTER: ICD-10-CM
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
## 2025-04-02 LAB
ANION GAP SERPL CALCULATED.3IONS-SCNC: 9 MMOL/L (ref 7–15)
BASOPHILS # BLD AUTO: 0 10E3/UL (ref 0–0.2)
BASOPHILS NFR BLD AUTO: 1 %
BUN SERPL-MCNC: 18.7 MG/DL (ref 8–23)
CALCIUM SERPL-MCNC: 9.5 MG/DL (ref 8.8–10.4)
CHLORIDE SERPL-SCNC: 105 MMOL/L (ref 98–107)
CREAT SERPL-MCNC: 0.97 MG/DL (ref 0.67–1.17)
EGFRCR SERPLBLD CKD-EPI 2021: 85 ML/MIN/1.73M2
EOSINOPHIL # BLD AUTO: 0.1 10E3/UL (ref 0–0.7)
EOSINOPHIL NFR BLD AUTO: 2 %
ERYTHROCYTE [DISTWIDTH] IN BLOOD BY AUTOMATED COUNT: 13.4 % (ref 10–15)
GLUCOSE SERPL-MCNC: 201 MG/DL (ref 70–99)
HCO3 SERPL-SCNC: 27 MMOL/L (ref 22–29)
HCT VFR BLD AUTO: 36.9 % (ref 40–53)
HGB BLD-MCNC: 12.7 G/DL (ref 13.3–17.7)
IMM GRANULOCYTES # BLD: 0 10E3/UL
IMM GRANULOCYTES NFR BLD: 1 %
LYMPHOCYTES # BLD AUTO: 1.2 10E3/UL (ref 0.8–5.3)
LYMPHOCYTES NFR BLD AUTO: 21 %
MCH RBC QN AUTO: 30.2 PG (ref 26.5–33)
MCHC RBC AUTO-ENTMCNC: 34.4 G/DL (ref 31.5–36.5)
MCV RBC AUTO: 88 FL (ref 78–100)
MONOCYTES # BLD AUTO: 0.6 10E3/UL (ref 0–1.3)
MONOCYTES NFR BLD AUTO: 11 %
NEUTROPHILS # BLD AUTO: 3.6 10E3/UL (ref 1.6–8.3)
NEUTROPHILS NFR BLD AUTO: 65 %
NRBC # BLD AUTO: 0 10E3/UL
NRBC BLD AUTO-RTO: 0 /100
PLATELET # BLD AUTO: 154 10E3/UL (ref 150–450)
POTASSIUM SERPL-SCNC: 4 MMOL/L (ref 3.4–5.3)
RBC # BLD AUTO: 4.2 10E6/UL (ref 4.4–5.9)
SODIUM SERPL-SCNC: 141 MMOL/L (ref 135–145)
WBC # BLD AUTO: 5.5 10E3/UL (ref 4–11)

## 2025-04-02 PROCEDURE — 99285 EMERGENCY DEPT VISIT HI MDM: CPT | Mod: 25 | Performed by: EMERGENCY MEDICINE

## 2025-04-02 PROCEDURE — 250N000009 HC RX 250: Performed by: EMERGENCY MEDICINE

## 2025-04-02 PROCEDURE — 250N000011 HC RX IP 250 OP 636: Performed by: EMERGENCY MEDICINE

## 2025-04-02 PROCEDURE — 80048 BASIC METABOLIC PNL TOTAL CA: CPT | Performed by: EMERGENCY MEDICINE

## 2025-04-02 PROCEDURE — 85014 HEMATOCRIT: CPT | Performed by: EMERGENCY MEDICINE

## 2025-04-02 PROCEDURE — 36415 COLL VENOUS BLD VENIPUNCTURE: CPT | Performed by: EMERGENCY MEDICINE

## 2025-04-02 PROCEDURE — 71260 CT THORAX DX C+: CPT

## 2025-04-02 PROCEDURE — 99284 EMERGENCY DEPT VISIT MOD MDM: CPT | Performed by: EMERGENCY MEDICINE

## 2025-04-02 PROCEDURE — 85004 AUTOMATED DIFF WBC COUNT: CPT | Performed by: EMERGENCY MEDICINE

## 2025-04-02 PROCEDURE — 96374 THER/PROPH/DIAG INJ IV PUSH: CPT | Mod: 59 | Performed by: EMERGENCY MEDICINE

## 2025-04-02 RX ORDER — OXYCODONE AND ACETAMINOPHEN 5; 325 MG/1; MG/1
1 TABLET ORAL EVERY 6 HOURS PRN
Qty: 6 TABLET | Refills: 0 | Status: SHIPPED | OUTPATIENT
Start: 2025-04-02

## 2025-04-02 RX ORDER — IOPAMIDOL 755 MG/ML
130 INJECTION, SOLUTION INTRAVASCULAR ONCE
Status: COMPLETED | OUTPATIENT
Start: 2025-04-02 | End: 2025-04-02

## 2025-04-02 RX ORDER — HYDROMORPHONE HYDROCHLORIDE 1 MG/ML
0.3 INJECTION, SOLUTION INTRAMUSCULAR; INTRAVENOUS; SUBCUTANEOUS ONCE
Status: COMPLETED | OUTPATIENT
Start: 2025-04-02 | End: 2025-04-02

## 2025-04-02 RX ADMIN — SODIUM CHLORIDE 72 ML: 9 INJECTION, SOLUTION INTRAVENOUS at 16:39

## 2025-04-02 RX ADMIN — IOPAMIDOL 130 ML: 755 INJECTION, SOLUTION INTRAVENOUS at 16:38

## 2025-04-02 RX ADMIN — HYDROMORPHONE HYDROCHLORIDE 0.3 MG: 1 INJECTION, SOLUTION INTRAMUSCULAR; INTRAVENOUS; SUBCUTANEOUS at 16:57

## 2025-04-02 ASSESSMENT — ACTIVITIES OF DAILY LIVING (ADL)
ADLS_ACUITY_SCORE: 56
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
## 2025-04-02 NOTE — DISCHARGE INSTRUCTIONS
Return if symptoms worsen or new symptoms develop.  Follow-up with primary care physician next week.  Try ice on sore areas tonight and use heat tomorrow.  Take pain medication as directed take Tylenol for less severe pain.  If any headache visual changes neck pain shortness of breath worsening chest pain any abdominal pain bowel or bladder dysfunction decreased urine output focal numbness weakness any extremity or other concerns please return for recheck.
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
## 2025-04-15 ENCOUNTER — LAB REQUISITION (OUTPATIENT)
Dept: LAB | Facility: CLINIC | Age: 70
End: 2025-04-15
Payer: MEDICARE

## 2025-04-15 DIAGNOSIS — E11.65 TYPE 2 DIABETES MELLITUS WITH HYPERGLYCEMIA (H): ICD-10-CM

## 2025-04-16 LAB
ANION GAP SERPL CALCULATED.3IONS-SCNC: 9 MMOL/L (ref 7–15)
BUN SERPL-MCNC: 19.9 MG/DL (ref 8–23)
CALCIUM SERPL-MCNC: 9.4 MG/DL (ref 8.8–10.4)
CHLORIDE SERPL-SCNC: 105 MMOL/L (ref 98–107)
CHOLEST SERPL-MCNC: 135 MG/DL
CREAT SERPL-MCNC: 1.08 MG/DL (ref 0.67–1.17)
CREAT UR-MCNC: 188 MG/DL
EGFRCR SERPLBLD CKD-EPI 2021: 75 ML/MIN/1.73M2
FASTING STATUS PATIENT QL REPORTED: ABNORMAL
FASTING STATUS PATIENT QL REPORTED: ABNORMAL
GLUCOSE SERPL-MCNC: 170 MG/DL (ref 70–99)
HCO3 SERPL-SCNC: 26 MMOL/L (ref 22–29)
HDLC SERPL-MCNC: 36 MG/DL
LDLC SERPL CALC-MCNC: 72 MG/DL
MICROALBUMIN UR-MCNC: 280 MG/L
MICROALBUMIN/CREAT UR: 148.94 MG/G CR (ref 0–17)
NONHDLC SERPL-MCNC: 99 MG/DL
POTASSIUM SERPL-SCNC: 4.3 MMOL/L (ref 3.4–5.3)
SODIUM SERPL-SCNC: 140 MMOL/L (ref 135–145)
TRIGL SERPL-MCNC: 136 MG/DL

## 2025-05-06 ENCOUNTER — ANCILLARY PROCEDURE (OUTPATIENT)
Dept: CARDIOLOGY | Facility: CLINIC | Age: 70
End: 2025-05-06
Attending: INTERNAL MEDICINE
Payer: MEDICARE

## 2025-05-06 DIAGNOSIS — R55 SYNCOPE: Primary | ICD-10-CM

## 2025-05-06 DIAGNOSIS — Z95.0 CARDIAC PACEMAKER IN SITU: ICD-10-CM

## 2025-05-07 DIAGNOSIS — R55 SYNCOPE AND COLLAPSE: Primary | ICD-10-CM

## 2025-05-12 ENCOUNTER — TELEPHONE (OUTPATIENT)
Dept: CARDIOLOGY | Facility: CLINIC | Age: 70
End: 2025-05-12
Payer: MEDICARE

## 2025-05-12 DIAGNOSIS — Z95.0 CARDIAC PACEMAKER IN SITU: ICD-10-CM

## 2025-05-12 DIAGNOSIS — I49.5 SSS (SICK SINUS SYNDROME) (H): Primary | ICD-10-CM

## 2025-05-12 NOTE — TELEPHONE ENCOUNTER
Patient LVM wondering if he needed to have another device check tomorrow seeing as he just had one while admitted at [place].    Device report is available in CareEverywhere. Patient is aware that this can be used as his device check so his in clinic check will be canceled. I will complete write up below so that it is available for Gayle to review prior to her OV:    Medtronic Chika (D) Remote PPM Device Check    ++ Check completed by [facility]. Report write up completed by KATIE RN    Mode: AAIR-DDDR     Presenting Rhythm: AP-VS 70's    Underlying Rhythm: delayed SB in the 30's     Pacing/Histogram Data Since: 5/5/25  AP: 92.4%    : <0.1%    Heart Rate: stable, excellent variability       Sensing: WNL    Pacing Threshold: WNL    Impedance: WNL    Battery Status: estimated 9.8 (9.4-10.3) years remaining until RRT       Arrhythmia Data Since: 3/10/25  Atrial Arrhythmia: none    Ventricular Arrhythmia: none       Care Plan: next remote check scheduled on 8/27/25.     I have reviewed and interpreted the device interrogation, settings, programming and nurse's summary. The device is functioning within normal device parameters, unless otherwise noted above. I agree with the current findings, assessment, and plan.

## 2025-05-13 ENCOUNTER — OFFICE VISIT (OUTPATIENT)
Dept: CARDIOLOGY | Facility: CLINIC | Age: 70
End: 2025-05-13
Payer: MEDICARE

## 2025-05-13 VITALS
HEART RATE: 90 BPM | DIASTOLIC BLOOD PRESSURE: 84 MMHG | WEIGHT: 266 LBS | BODY MASS INDEX: 35.25 KG/M2 | OXYGEN SATURATION: 95 % | SYSTOLIC BLOOD PRESSURE: 138 MMHG | HEIGHT: 73 IN | RESPIRATION RATE: 20 BRPM

## 2025-05-13 DIAGNOSIS — I10 HYPERTENSION, UNSPECIFIED TYPE: ICD-10-CM

## 2025-05-13 DIAGNOSIS — I49.5 SSS (SICK SINUS SYNDROME) (H): Primary | ICD-10-CM

## 2025-05-13 DIAGNOSIS — I20.1 CORONARY VASOSPASM: ICD-10-CM

## 2025-05-13 DIAGNOSIS — G90.01 CAROTID SINUS SYNDROME: ICD-10-CM

## 2025-05-13 LAB
MDC_IDC_LEAD_CONNECTION_STATUS: NORMAL
MDC_IDC_LEAD_CONNECTION_STATUS: NORMAL
MDC_IDC_LEAD_IMPLANT_DT: NORMAL
MDC_IDC_LEAD_IMPLANT_DT: NORMAL
MDC_IDC_LEAD_LOCATION: NORMAL
MDC_IDC_LEAD_LOCATION: NORMAL
MDC_IDC_LEAD_LOCATION_DETAIL_1: NORMAL
MDC_IDC_LEAD_LOCATION_DETAIL_1: NORMAL
MDC_IDC_LEAD_MFG: NORMAL
MDC_IDC_LEAD_MFG: NORMAL
MDC_IDC_LEAD_MODEL: NORMAL
MDC_IDC_LEAD_MODEL: NORMAL
MDC_IDC_LEAD_POLARITY_TYPE: NORMAL
MDC_IDC_LEAD_POLARITY_TYPE: NORMAL
MDC_IDC_LEAD_SERIAL: NORMAL
MDC_IDC_LEAD_SERIAL: NORMAL
MDC_IDC_LEAD_SPECIAL_FUNCTION: NORMAL
MDC_IDC_LEAD_SPECIAL_FUNCTION: NORMAL
MDC_IDC_PG_IMPLANT_DTM: NORMAL
MDC_IDC_PG_MFG: NORMAL
MDC_IDC_PG_MODEL: NORMAL
MDC_IDC_PG_SERIAL: NORMAL
MDC_IDC_PG_TYPE: NORMAL
MDC_IDC_SESS_CLINIC_NAME: NORMAL
MDC_IDC_SESS_DTM: NORMAL
MDC_IDC_SESS_TYPE: NORMAL

## 2025-05-13 PROCEDURE — 3079F DIAST BP 80-89 MM HG: CPT | Performed by: PHYSICIAN ASSISTANT

## 2025-05-13 PROCEDURE — 99214 OFFICE O/P EST MOD 30 MIN: CPT | Performed by: PHYSICIAN ASSISTANT

## 2025-05-13 PROCEDURE — 3075F SYST BP GE 130 - 139MM HG: CPT | Performed by: PHYSICIAN ASSISTANT

## 2025-05-13 NOTE — PATIENT INSTRUCTIONS
Arya - it was nice to see you and Genna today!     At your visit today we reviewed:    Underwent ablation - hooray! No more passing out!  AMAZING!  AFib seen 2/11/2025 in ER despite flecainide ... Last device check 5/2025 showed no AFib      Medication Changes:    NONE    Recommendations:  Continue routine follow-up with [place]  Continue Kardia[employer] to check if arrhythmias are causing symptoms     Follow-up:    See Dr. Bermudez for cardiology follow up on 7/16 as planned but CALL Cardiology nurses Shameka & Clotilde @ [phone] for any issues, questions or concerns in the interim.      To schedule a future appointment, we kindly ask that you call cardiology scheduling at [phone] three months prior to requested visit date.    Important Phone Numbers for [facility]):    [place] [facility] Shameka Mabry: [phone]
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
## 2025-06-30 ENCOUNTER — TELEPHONE (OUTPATIENT)
Dept: UROLOGY | Facility: CLINIC | Age: 70
End: 2025-06-30
Payer: MEDICARE

## 2025-06-30 DIAGNOSIS — R39.15 URINARY URGENCY: ICD-10-CM

## 2025-06-30 NOTE — TELEPHONE ENCOUNTER
Pt advised that script was sent for temp fill until [facility]. Pt verbalized understanding.  Kalyn DAWKINS RN BSN PHN  Specialty Clinics

## 2025-06-30 NOTE — TELEPHONE ENCOUNTER
Medication refilled per [facility] RN protocol. Unsure of what happened to years worth of med as it appears to have been discontinued and ordered as a temporary refill to get by until his mail order came.     Kalyn DAWKINS RN BSN PHN  Specialty Clinics

## 2025-06-30 NOTE — TELEPHONE ENCOUNTER
[facility] Call Center    Phone Message    May a detailed message be left on voicemail: yes     Reason for Call: Other: Patient calls for update on Gemtesa prescription. Patient is made aware of order that was sent to [facility]. Patient is wondering if there can be a small fill script sent to [facility] in [place] as it can take 10-14 days for him to receive medications mailed from [facility]. Patient states that he is out of the medication. Patient is requesting a call back.     Action Taken: Message routed to:  Other: Urology    Travel Screening: Not Applicable

## 2025-07-13 ENCOUNTER — HEALTH MAINTENANCE LETTER (OUTPATIENT)
Age: 70
End: 2025-07-13

## 2025-07-15 ENCOUNTER — OFFICE VISIT (OUTPATIENT)
Dept: UROLOGY | Facility: CLINIC | Age: 70
End: 2025-07-15
Payer: MEDICARE

## 2025-07-15 VITALS
DIASTOLIC BLOOD PRESSURE: 89 MMHG | OXYGEN SATURATION: 97 % | TEMPERATURE: 98.2 F | HEART RATE: 75 BPM | SYSTOLIC BLOOD PRESSURE: 160 MMHG | WEIGHT: 265 LBS | BODY MASS INDEX: 34.96 KG/M2

## 2025-07-15 DIAGNOSIS — R39.15 URINARY URGENCY: Primary | ICD-10-CM

## 2025-07-15 DIAGNOSIS — R33.9 BLADDER RETENTION: ICD-10-CM

## 2025-07-15 LAB
ALBUMIN UR-MCNC: 30 MG/DL
APPEARANCE UR: CLEAR
BACTERIA #/AREA URNS HPF: ABNORMAL /HPF
BILIRUB UR QL STRIP: NEGATIVE
COLOR UR AUTO: YELLOW
GLUCOSE UR STRIP-MCNC: NEGATIVE MG/DL
HGB UR QL STRIP: ABNORMAL
KETONES UR STRIP-MCNC: NEGATIVE MG/DL
LEUKOCYTE ESTERASE UR QL STRIP: NEGATIVE
MUCOUS THREADS #/AREA URNS LPF: PRESENT /LPF
NITRATE UR QL: NEGATIVE
PH UR STRIP: 6 [PH] (ref 5–7)
RBC #/AREA URNS AUTO: ABNORMAL /HPF
SP GR UR STRIP: 1.01 (ref 1–1.03)
SQUAMOUS #/AREA URNS AUTO: ABNORMAL /LPF
UROBILINOGEN UR STRIP-ACNC: 1 E.U./DL
WBC #/AREA URNS AUTO: ABNORMAL /HPF

## 2025-07-15 PROCEDURE — 81001 URINALYSIS AUTO W/SCOPE: CPT | Performed by: STUDENT IN AN ORGANIZED HEALTH CARE EDUCATION/TRAINING PROGRAM

## 2025-07-15 RX ORDER — TAMSULOSIN HYDROCHLORIDE 0.4 MG/1
0.4 CAPSULE ORAL DAILY
Qty: 90 CAPSULE | Refills: 3 | Status: SHIPPED | OUTPATIENT
Start: 2025-07-15

## 2025-07-15 RX ORDER — FINASTERIDE 5 MG/1
5 TABLET, FILM COATED ORAL DAILY
Qty: 90 TABLET | Refills: 3 | Status: SHIPPED | OUTPATIENT
Start: 2025-07-15

## 2025-07-15 ASSESSMENT — PAIN SCALES - GENERAL: PAINLEVEL_OUTOF10: NO PAIN (0)

## 2025-07-15 NOTE — PATIENT INSTRUCTIONS
The most common causes of urinary symptoms in men are prostate enlargement and overactive bladder. In order to differentiate between the two, we complete bladder function testing called urodynamics.     For urodynamics testing, see attached handout on urodynamics, labeled #1.     If urodynamics testing shows you have obstructive urination, you would likely benefit from an enlarged prostate procedure.     For enlarged prostate procedure (TURP), see attached handout, labeled #2.     If urodynamics testing instead shows you have overactive bladder, you could be a candidate for Botox injections into the bladder to control symptoms, or an implanted device that acts like a pacemaker for your bladder.     For Botox injections, see [facility] article, labeled #3.     For the implanted device, see [facility] article on sacral nerve stimulation, labeled #4.

## 2025-07-15 NOTE — PROGRESS NOTES
Active order to obtain bladder scan? Yes   Name of ordering provider:  Carmen NIÑO  Bladder scan preformed post void yes.  Bladder scan reveled 168 ML  Provider notified?  Yes          Katelynn Sidhu MA on 7/15/2025 at 10:16 AM
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
## 2025-07-15 NOTE — NURSING NOTE
"Initial BP (!) 160/89   Pulse 75   Temp 98.2  F (36.8  C)   Wt 120.2 kg (265 lb)   SpO2 97%   BMI 34.96 kg/m   Estimated body mass index is 34.96 kg/m  as calculated from the following:    Height as of 5/13/25: 1.854 m (6' 1\").    Weight as of this encounter: 120.2 kg (265 lb). .  Katelynn Sidhu MA on 7/15/2025 at 10:16 AM    "

## 2025-07-16 ENCOUNTER — OFFICE VISIT (OUTPATIENT)
Dept: CARDIOLOGY | Facility: CLINIC | Age: 70
End: 2025-07-16
Attending: PHYSICIAN ASSISTANT
Payer: MEDICARE

## 2025-07-16 VITALS
RESPIRATION RATE: 22 BRPM | OXYGEN SATURATION: 97 % | HEART RATE: 89 BPM | HEIGHT: 73 IN | WEIGHT: 264.8 LBS | BODY MASS INDEX: 35.09 KG/M2 | SYSTOLIC BLOOD PRESSURE: 160 MMHG | DIASTOLIC BLOOD PRESSURE: 99 MMHG

## 2025-07-16 DIAGNOSIS — I10 BENIGN ESSENTIAL HYPERTENSION: ICD-10-CM
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
## 2025-07-22 ENCOUNTER — APPOINTMENT (OUTPATIENT)
Dept: GENERAL RADIOLOGY | Facility: CLINIC | Age: 70
End: 2025-07-22
Payer: MEDICARE

## 2025-07-22 ENCOUNTER — HOSPITAL ENCOUNTER (EMERGENCY)
Facility: CLINIC | Age: 70
Discharge: HOME | End: 2025-07-22
Payer: MEDICARE

## 2025-07-22 VITALS
HEART RATE: 70 BPM | HEIGHT: 73 IN | SYSTOLIC BLOOD PRESSURE: 135 MMHG | TEMPERATURE: 97.7 F | OXYGEN SATURATION: 98 % | BODY MASS INDEX: 35.12 KG/M2 | DIASTOLIC BLOOD PRESSURE: 89 MMHG | RESPIRATION RATE: 16 BRPM | WEIGHT: 265 LBS

## 2025-07-22 DIAGNOSIS — R07.9 CHEST PAIN, UNSPECIFIED TYPE: Primary | ICD-10-CM

## 2025-07-22 LAB
ALBUMIN SERPL BCG-MCNC: 4.1 G/DL (ref 3.5–5.2)
ALP SERPL-CCNC: 86 U/L (ref 40–150)
ALT SERPL W P-5'-P-CCNC: 23 U/L (ref 0–70)
ANION GAP SERPL CALCULATED.3IONS-SCNC: 11 MMOL/L (ref 7–15)
AST SERPL W P-5'-P-CCNC: 22 U/L (ref 0–45)
BASOPHILS # BLD AUTO: 0 10E3/UL (ref 0–0.2)
BASOPHILS NFR BLD AUTO: 1 %
BILIRUB SERPL-MCNC: 0.6 MG/DL
BUN SERPL-MCNC: 13.9 MG/DL (ref 8–23)
CALCIUM SERPL-MCNC: 9.1 MG/DL (ref 8.8–10.4)
CHLORIDE SERPL-SCNC: 104 MMOL/L (ref 98–107)
CREAT SERPL-MCNC: 0.96 MG/DL (ref 0.67–1.17)
EGFRCR SERPLBLD CKD-EPI 2021: 86 ML/MIN/1.73M2
EOSINOPHIL # BLD AUTO: 0.2 10E3/UL (ref 0–0.7)
EOSINOPHIL NFR BLD AUTO: 3 %
ERYTHROCYTE [DISTWIDTH] IN BLOOD BY AUTOMATED COUNT: 13.1 % (ref 10–15)
GLUCOSE SERPL-MCNC: 149 MG/DL (ref 70–99)
HCO3 SERPL-SCNC: 25 MMOL/L (ref 22–29)
HCT VFR BLD AUTO: 39.4 % (ref 40–53)
HGB BLD-MCNC: 13.6 G/DL (ref 13.3–17.7)
HOLD SPECIMEN: NORMAL
IMM GRANULOCYTES # BLD: 0 10E3/UL
IMM GRANULOCYTES NFR BLD: 0 %
LYMPHOCYTES # BLD AUTO: 1 10E3/UL (ref 0.8–5.3)
LYMPHOCYTES NFR BLD AUTO: 19 %
MCH RBC QN AUTO: 30 PG (ref 26.5–33)
MCHC RBC AUTO-ENTMCNC: 34.5 G/DL (ref 31.5–36.5)
MCV RBC AUTO: 87 FL (ref 78–100)
MONOCYTES # BLD AUTO: 0.6 10E3/UL (ref 0–1.3)
MONOCYTES NFR BLD AUTO: 11 %
NEUTROPHILS # BLD AUTO: 3.6 10E3/UL (ref 1.6–8.3)
NEUTROPHILS NFR BLD AUTO: 66 %
NRBC # BLD AUTO: 0 10E3/UL
NRBC BLD AUTO-RTO: 0 /100
PLATELET # BLD AUTO: 160 10E3/UL (ref 150–450)
POTASSIUM SERPL-SCNC: 3.9 MMOL/L (ref 3.4–5.3)
PROT SERPL-MCNC: 7.3 G/DL (ref 6.4–8.3)
RBC # BLD AUTO: 4.54 10E6/UL (ref 4.4–5.9)
SODIUM SERPL-SCNC: 140 MMOL/L (ref 135–145)
TROPONIN T SERPL HS-MCNC: 23 NG/L
TROPONIN T SERPL HS-MCNC: 27 NG/L
WBC # BLD AUTO: 5.5 10E3/UL (ref 4–11)

## 2025-07-22 PROCEDURE — 99284 EMERGENCY DEPT VISIT MOD MDM: CPT

## 2025-07-22 PROCEDURE — 71046 X-RAY EXAM CHEST 2 VIEWS: CPT

## 2025-07-22 PROCEDURE — 36415 COLL VENOUS BLD VENIPUNCTURE: CPT

## 2025-07-22 PROCEDURE — 99285 EMERGENCY DEPT VISIT HI MDM: CPT | Mod: 25

## 2025-07-22 PROCEDURE — 82247 BILIRUBIN TOTAL: CPT

## 2025-07-22 PROCEDURE — 85025 COMPLETE CBC W/AUTO DIFF WBC: CPT

## 2025-07-22 PROCEDURE — 84484 ASSAY OF TROPONIN QUANT: CPT

## 2025-07-22 PROCEDURE — 93005 ELECTROCARDIOGRAM TRACING: CPT

## 2025-07-22 PROCEDURE — 93010 ELECTROCARDIOGRAM REPORT: CPT

## 2025-07-22 ASSESSMENT — ACTIVITIES OF DAILY LIVING (ADL)
ADLS_ACUITY_SCORE: 56

## 2025-07-22 NOTE — ED TRIAGE NOTES
Left anterior chest pain; states it started about 0740 today; pt took 2 nitroglycerin which offered some short-lived relief.       Triage Assessment (Adult)       Row Name 07/22/25 0908          Triage Assessment    Airway WDL WDL        Cardiac WDL    Cardiac WDL X;chest pain        Cognitive/Neuro/Behavioral WDL    Cognitive/Neuro/Behavioral WDL WDL

## 2025-07-22 NOTE — DISCHARGE INSTRUCTIONS
You were seen in the emergency department today for chest pain. We did tests including: blood tests, ECG, and x-ray that showed no clear cause for your symptoms, but was reassuring against a life-threatening cause at this time.  As we discussed, your pain could still be due to the vasospastic chest pain that you have had in the past, or possibly due to narrowing of the blood vessels in the heart.  As such, I recommend that you meet with your heart doctor within the next 1 to 2 weeks.  You should call them to make an appointment.  I did also give you a referral to meet with the cardiologist for further discussions and someone should call you to make sure that you are scheduled with a cardiologist.    Please follow up with your primary doctor also in the next couple of weeks for ongoing evaluation and management of your symptoms.    Return to the emergency department with new or worsening symptoms that you find concerning.  If you have repeat chest pain like this it is certainly reasonable to try a nitroglycerin, but if your pain persists as it did today then I recommend returning to the emergency department.

## 2025-07-22 NOTE — ED PROVIDER NOTES
"[facility]  Emergency Department Visit Note    PATIENT:  Stiven Nguyễn     70 year old     male      [id number]    Chief complaint:  Chief Complaint   Patient presents with    Chest Pain     Left anterior chest pain; states it started about 0740 today; pt took 2 nitroglycerin which offered some short-lived relief.            History of present illness:  Patient is a 70 year old male with CAD with prior NSTEMI, DEEPIKA, vasospastic angina, HTN, type 2 diabetes, prior DVT (rivaroxaban), TIA presenting for evaluation of chest pain.    I reviewed cardiology visit note 7/16/2025.  Last angiogram January 2021 at that time noted to have moderate focal 50% stenosis of the proximal to mid LAD.  Has also had decades of fainting spells s/p pacemaker and cardio neuro ablation with vagal denervation ablation February 2025 with improvement in fainting spells.  Overall impression as of this visit was patient was doing much better from a fainting spell standpoint, no changes to medication regimen at that time.    He presents today for chest pain that started around 740 this morning.  He had just finished loading and unloading the  and was about to make some eggs when he felt onset of left-sided \"sharp\" pain.  This was nonradiating.  It lasted for 7 minutes and when it did not go away he took a nitro which helped transiently, but then returned.  He took a second nitro roughly 10 minutes later with similar response.  He continued to have the left-sided chest pain until about 15 minutes into the emergency department stay.  He is asymptomatic on my history and exam.    Has otherwise generally been feeling well recently.  He felt hot and sweaty yesterday, the wife notes she saw that the temperature was 80 degrees in the house.  He was not having any chest pain at that time.  No fevers, cough, dyspnea, vomiting, abdominal pain, changes in bowel or bladder patterns.    He feels like this pain is similar to " "his prior episodes of vasospastic angina, though previously it was on the right side of his chest, today it was different in the sense that it was on the left side of his chest and more intense.      Review of Systems:  As in HPI above    BP (!) 162/98   Pulse 71   Temp 97.7  F (36.5  C) (Oral)   Resp 16   Ht 1.854 m (6' 1\")   Wt 120.2 kg (265 lb)   SpO2 96%   BMI 34.96 kg/m        Physical Exam:  Constitutional: laying in hospital bed, alert, oriented, in no apparent distress, and answering questions appropriately  HEENT: normocephalic, atraumatic  Neck: no stridor  Cardiovascular: regular rate and rhythm and no murmurs, rubs, or extra heart sounds  Pulmonary: breathing comfortably on room air and lungs clear to auscultation bilaterally  Abdominal: soft, non-tender, non-distended  Extremities/MSK: no peripheral edema  Skin: warm, dry  Neurologic: moves all four extremities spontaneously  Psychiatric: calm, appropriate      MDM:  Patient is a 70 year old male with above history presenting for evaluation of chest pain.    Vitals notable for hypertension, otherwise normal, satting well on room air. Exam overall reassuring, lungs are clear, he is not in distress.    Episode today likely consistent with vasospastic angina.  ACS on differential.  Doubt PE as he is anticoagulated.  Similarly considered dissection but given he is asymptomatic currently this seems less likely.  No infectious signs or symptoms.    Labs, ECG, chest x-ray.    Disposition pending above workup. Remainder of ED course below.    ED COURSE:  ED Course as of 07/22/25 1234   Tue Jul 22, 2025   0914 EKG 12-lead, tracing only  My independent ECG interpretation:  - Ventricular rate 72bpm, regular (A paced)  - AZ, QRS, QT intervals normal  - Axis leftward deviated  - no ST segment or T wave changes concerning for acute ischemia  - Comparison to prior ECGs: No significant change  - My independent interpretation: Atrial paced rhythm, no acute " ischemic changes   0954 Labs reviewed, troponin is elevated but clinically unchanged from his baseline over the past several months.  Electrolytes are normal.  No leukocytosis in this context reassuring against significant systemic infectious or inflammatory process.  No anemia.   1017 Chest XR,  PA & LAT  Image independently reviewed as well as radiology report reviewed, no acute finding to explain chest symptoms   1154 Troponin T, High Sensitivity(!): 23  Repeat troponin downtrending which is reassuring against ACS.   1154 Workup at this point overall is reassuring with nonischemic ECG, troponin at his baseline and downtrending.   1230 I reassessed patient, sitting in bed conversing with wife, vitals overall reassuring throughout emergency department stay.  He has remained symptom-free since I have met him today.    We discussed no evidence currently of ACS, but differential does include paroxysms of vasospastic chest pain versus progression of underlying atherosclerotic heart disease.  We discussed the importance of following up with his cardiologist within the next 1 to 2 weeks, instructed him to call them today to make an appointment.  This time we did discuss that if he is having repeat episodes of chest pain that trialing nitro is reasonable but if he continues to have persistent pain he should return to the emergency department for evaluation.  He expressed understanding of and agreement with this.       Encounter Diagnoses:  Final diagnoses:   Chest pain, unspecified type       Final disposition: discharge    Adama Burnham MD  7/22/2025  9:16 AM   Emergency Medicine  [facility]      Adama Burnham MD  07/22/25 9374

## 2025-07-23 LAB
ATRIAL RATE - MUSE: 72 BPM
DIASTOLIC BLOOD PRESSURE - MUSE: NORMAL MMHG
INTERPRETATION ECG - MUSE: NORMAL
P AXIS - MUSE: -77 DEGREES
PR INTERVAL - MUSE: 240 MS
QRS DURATION - MUSE: 116 MS
QT - MUSE: 432 MS
QTC - MUSE: 473 MS
R AXIS - MUSE: -18 DEGREES
SYSTOLIC BLOOD PRESSURE - MUSE: NORMAL MMHG
T AXIS - MUSE: 13 DEGREES
VENTRICULAR RATE- MUSE: 72 BPM

## 2025-07-30 ENCOUNTER — TELEPHONE (OUTPATIENT)
Dept: UROLOGY | Facility: CLINIC | Age: 70
End: 2025-07-30
Payer: MEDICARE

## 2025-07-30 NOTE — PROGRESS NOTES
PREPROCEDURE DIAGNOSES:    Urinary urgency  Urinary frequency  Urinary incontinence   Weak urinary stream  Nocturia  Incomplete bladder emptying     PROCEDURE:    -Uroflowmetry.  -Sterile urethral catheterization for measurement of postvoid residual urine volume.  -Complex filling cystometrogram with measurement of bladder and rectal pressures.  -Complex voiding cystometrogram with measurement of bladder and rectal pressures.  -Electromyography of the pelvic floor during urodynamics.  -Fluoroscopic imaging of the bladder during urodynamics, at least 3 views.    -Interpretation of urodynamics and flouroscopic imaging.      INDICATIONS FOR PROCEDURE:  Mr. Stiven Nguyễn is a pleasant 70 year old male who presents for video urodynamic assessment. VUDS is requested today by Carmen Jin PA-C to better characterize Mr. Stiven Nguyễn's voiding dysfunction. The patient is currently taking a beta 3 agonist medication (Gemtesa 75 mg).    VOIDING DIARY: Not completed in preparation for today's visit.     DESCRIPTION OF PROCEDURE:  Risks, benefits, and alternatives to urodynamics were discussed with the patient and he wished to proceed.  Urodynamics are planned to better assess the primary etiology for Mr. Meis urologic dysfunction.  After informed consent was obtained, the patient was taken to the procedure room where the study was initiated. Findings below.     PRE-STUDY UROFLOWMETRY: Insufficient volume voided for interpretation (127 mL). Postvoid residual by catheter: 325 mL.    First, uroflow was performed followed by sterile urethral catheterization for measurement of postvoid residual urine volume. Next a 7F double-lumen urodynamics catheter was inserted into the bladder under sterile technique via the urethra.  A 7F abdominal manometry catheter was placed in the rectum.  EMG pads were placed on both sides of the anal verge. With coughing there was an appropriate rise in vesical and abdominal pressures with  "no change in detrusor pressure, confirming good study catheter placement. The bladder was then filled with 100 mL of Omnipaque at 30 mL/minute and serial pressures were recorded. After the Omnipaque finished infusing, we continued to fill the bladder with normal saline until patient reached cystometric capacity. At the completion of the study, all catheters were removed and the patient was brought back into the consultation room to further discuss today's study results.      DURING THE FILLING PHASE:    Sensations: Intact. Urgency reported prior to episode of DO.     Compliance: Good. Pdet remains low throughout filling.     Uninhibited detrusor contractions: Yes. First episode at 14:00 minutes. Patient reported building urge to urinate and was instructed to withhold from urination if possible. This was associated with a rise in detrusor pressure to peak Pdet ~62 cmH2O. After Pdet had returned to baseline, patient reported that he did not feel the desire to urinate. There was no leaking observed during this episode.     Continence: No UUI. No LENY.    EMG: Quiet during filling.    DURING THE VOIDING PHASE:  After the episode of detrusor overactivity occurred, we continued filling until Mr. Nguyễn reported a second, building desire to void again at 21:00 minutes (\"2\" on UDS tracing). He was given permission to void shortly after reporting a strong desire to urinate again (\"3\" on UDS tracing). He produced a detrusor contraction to peak Pdet 50 cmH2O with small output of 63 mL though a small volume was lost to the floor. In an effort to facilitate voiding, patient then transferred to a standing position at the commode where he was left to void in privacy. During this time, patient voided an additional 252 mL with detrusor contraction to peak Pdet 55 cmH2O. At this time, staff presumed that Mr. Nguyễn had emptying almost completely given estimated input (360 mL) and output (>/=315 mL). Dr. Nguyễn was then bladder scanned " multiple times with max volume of 485 mL. He was then subsequently straight catheterized for 550 mL.     Maximum detrusor contraction with void: 55 cmH2O which the patient appears to supplement with minor Valsalva effort/abdominal straining.  Voided volume: 315 mL + small volume lost to floor  Maximum flow rate: 16 mL/sec.  Average flow rate: 4.2 mL/sec.  Postvoid Residual: 485 mL per bladder scan; 550 per straight catheter   EMG activity: Quiet throughout.  Character of voiding curve: Intermittent.  BOOI: 13.1 , suggesting no obstruction (see key below)  [obstructed (DUFFY index [BOOI] >= 40); equivocal (no definite obstruction; BOOI 20-40); and no obstruction (BOOI <= 20)]    FLUOROSCOPIC IMAGING OF THE BLADDER DURING URODYNAMICS:  Please note, image numbers on UDS tracings correlate with iSite series numbers on PACS images. Fluoroscopy during today's procedure demonstrated a mildly to moderately trabeculated bladder wall without obvious diverticula. There was no vesicoureteral reflux observed.  The bladder neck was closed during filling and not well-captured during the voiding phase given patient's positional change during this event.     ASSESSMENT/PLAN:  Mr. Stiven Nguyễn is a pleasant 70 year old male who demonstrated the following findings today on urodynamic evaluation:    Please note: There is a significant discrepancy between the total volume infused (~360 mL) and the total output volume (~800-865 mL) which staff cannot explain. Possible causes include large-quantity fluid intake prior to study, incomplete emptying with straight catheter prior to study start, increased rate of urine production during the study, pump error during infusion, a combination of the above, or other unknown etiology. As such, I do not recommend using the volumes on the urodynamics tracing to guide interpretation.     -Suspect large bladder capacity (estimate >/=800 mL)  -Normal filling sensations  -Good bladder compliance   -DO  to peak Pdet ~60 cmH2O without incontinence  -No reproducible LENY.  -Normal to strong detrusor contraction during voiding to max Pdet 55 cmH2O which the patient supplements with minor Valsalva effort.   -Good flow rate (Qmax 16 mL/s) with intermittent flow curve  -Incomplete bladder emptying (final -550 mL).  -Quiet EMG activity throughout   -BOOI is 13.1 which does not suggest a bladder outlet obstruction.  -Fluoroscopy reveals a mildly to moderately trabeculated bladder wall without obvious diverticula. There was no VUR observed. The bladder neck was closed during filling and poorly captured during the voiding phase given patient's positional change during this event.   - A single Bactrim was provided for UTI prophylaxis following completion of today's study per department protocol.  The risk of UTI with VUDS is low at ~2.5-3%.      At completion of the study, I reviewed preliminary results with the patient and his wife. Today's study today demonstrated evidence of overactive bladder but was not conclusive for a bladder outlet obstruction. We also discussed the large discrepancy between infusion volume and output volume but cannot rationalize a reasonable explanation for this at present. Mr. Nguyễn did not appear to empty well today, and I worry this is secondary to Gemtesa use. If this were the case, his large post-void residual may explain his worsening frequency and urinary urgency/incontinence.     As such, I shared my recommendation that Mr. Nguyễn pursue cystoscopy next. If cystoscopy is unrevealing, I recommend he discontinue Gemtesa and proceed with third-line options for overactive bladder. Given his history of incomplete bladder emptying, I do feel that SNS may be in his best interest.     The patient will schedule follow-up with Camren Jin PA-C to further discuss today's study results and make plans for how best to proceed.      Thank you for allowing me to participate in the care of   Stiven Nguyễn and please don't hesitate to contact me with any questions or concerns.      Mariza Knapp PA-C  Department of Urology

## 2025-07-30 NOTE — TELEPHONE ENCOUNTER
Patient confirmed scheduled appointment:  Date: 7/31  Time: 3pm  Visit type: UDS  Provider: Mariza  Location: [facility]  Testing/imaging:   Additional notes:

## 2025-07-31 ENCOUNTER — ANCILLARY PROCEDURE (OUTPATIENT)
Dept: RADIOLOGY | Facility: AMBULATORY SURGERY CENTER | Age: 70
End: 2025-07-31
Payer: MEDICARE

## 2025-07-31 ENCOUNTER — ALLIED HEALTH/NURSE VISIT (OUTPATIENT)
Dept: UROLOGY | Facility: CLINIC | Age: 70
End: 2025-07-31
Payer: MEDICARE

## 2025-07-31 VITALS — SYSTOLIC BLOOD PRESSURE: 160 MMHG | DIASTOLIC BLOOD PRESSURE: 91 MMHG | HEART RATE: 82 BPM | OXYGEN SATURATION: 98 %

## 2025-07-31 DIAGNOSIS — Z79.2 PROPHYLACTIC ANTIBIOTIC: ICD-10-CM

## 2025-07-31 DIAGNOSIS — R39.15 URINARY URGENCY: Primary | ICD-10-CM

## 2025-07-31 DIAGNOSIS — R33.9 BLADDER RETENTION: ICD-10-CM

## 2025-07-31 DIAGNOSIS — N39.41 URGE INCONTINENCE: ICD-10-CM

## 2025-07-31 DIAGNOSIS — Q64.9 URINARY ANOMALY: ICD-10-CM

## 2025-07-31 RX ORDER — SULFAMETHOXAZOLE AND TRIMETHOPRIM 800; 160 MG/1; MG/1
1 TABLET ORAL ONCE
Status: COMPLETED | OUTPATIENT
Start: 2025-07-31 | End: 2025-07-31

## 2025-07-31 RX ADMIN — SULFAMETHOXAZOLE AND TRIMETHOPRIM 1 TABLET: 800; 160 TABLET ORAL at 15:00

## 2025-07-31 ASSESSMENT — PAIN SCALES - GENERAL: PAINLEVEL_OUTOF10: NO PAIN (0)

## 2025-07-31 NOTE — NURSING NOTE
Chief Complaint   Patient presents with    Urodynamics Study     1. Urinary urgency  2. Urinary frequency  3. Urinary incontinence   4. Weak urinary stream  5. Nocturia  6. Incomplete bladder emptying   7. Feeling of incomplete bladder emptying        Blood pressure (!) 160/91, pulse 82, SpO2 98%. There is no height or weight on file to calculate BMI.    Patient Active Problem List   Diagnosis    Weakness    Narcolepsy with cataplexy    Transient alteration of awareness    Spells    Kidney stones    Prostate cancer screening    Hypertrophy of prostate with urinary obstruction    Bladder retention    Chest tightness    Elevated troponin    Chest pressure    Chest pain    Pulmonary nodules    Coronary artery disease    Hypertension    Hyperlipidemia LDL goal <70    Obstructive sleep apnea syndrome in adult    NSTEMI (non-ST elevated myocardial infarction) (H)    Obesity (BMI 35.0-39.9) with comorbidity (H)    Vasospastic angina    Nonobstructive atherosclerosis of coronary artery    Benign essential hypertension    Diabetes mellitus, type 2 (H)    Umbilical hernia without obstruction and without gangrene    Bilateral inguinal hernia without obstruction or gangrene, recurrence not specified    Syncope, unspecified syncope type    Syncope    Bradycardia    Cardiac pacemaker in situ    Posttraumatic stress disorder    Fatty liver    Gastro-esophageal reflux disease without esophagitis    History of DVT (deep vein thrombosis)    History of nephrolithiasis    History of pulmonary embolism    History of traumatic brain injury    Long term current use of anticoagulant therapy    Postconcussion syndrome    Presbyopia    Pulmonary embolism (H)    Sensorineural hearing loss (SNHL) of both ears    Left inguinal pain    TIA (transient ischemic attack)    Atrial fibrillation (H)    Carotid sinus syncope    Chronic kidney disease    Localized osteoarthrosis, lower leg       Allergies   Allergen Reactions    Gabapentin Other (See  Comments)     Suicidal affects.      Diltiazem Swelling and Rash     Retried 12/2023 and noted palmar rash, swelling and SOB    Adhesive Tape Other (See Comments)     Some kind of tape from surgery-bad rash and hives    Bees     Chlorhexidine Hives     Possible rash and hives from binder/tape in surgery    Cialis [Tadalafil] Other (See Comments)     Back ached and horrible pressure behind eyes.    Cyclobenzaprine Fatigue     Flexeril-Sever Fatigue      Vardenafil Other (See Comments)     Back ached and horrible pressure behind eyes     Venlafaxine Other (See Comments)     Significant worsening of presumed cataplexy.    Biaxin [Clarithromycin] Rash       Current Outpatient Medications   Medication Sig Dispense Refill    Acetaminophen (TYLENOL PO) Take 1,000 mg by mouth every 8 hours as needed for mild pain or fever       amLODIPine (NORVASC) 2.5 MG tablet Take 1 tablet (2.5 mg) by mouth 2 times daily. 180 tablet 3    atorvastatin (LIPITOR) 10 MG tablet Take 1 tablet by mouth every evening      blood glucose monitoring (NO BRAND SPECIFIED) meter device kit Use to test blood sugar 1-2 times daily or as directed.      EPINEPHrine (ANY BX GENERIC EQUIV) 0.3 MG/0.3ML injection 2-pack Inject 0.3 mLs (0.3 mg) into the muscle as needed for anaphylaxis May repeat one time in 5-15 minutes if response to initial dose is inadequate. 2 each 0    fexofenadine (ALLEGRA) 180 MG tablet Take 1 tablet by mouth every evening      finasteride (PROSCAR) 5 MG tablet Take 1 tablet (5 mg) by mouth daily. 90 tablet 3    flecainide (TAMBOCOR) 100 MG tablet Take 1 tablet (100 mg) by mouth 2 times daily. 180 tablet 3    isosorbide mononitrate (IMDUR) 60 MG 24 hr tablet Take 1 tablet (60 mg) by mouth daily. 90 tablet 3    lisinopril (ZESTRIL) 20 MG tablet Take 1 tablet (20 mg) by mouth 2 times daily. 180 tablet 3    metFORMIN (GLUCOPHAGE XR) 500 MG 24 hr tablet Take 500 mg by mouth daily (with dinner)      metoprolol tartrate (LOPRESSOR) 25 MG  tablet Take 0.5 tablets (12.5 mg) by mouth 2 times daily. 30 tablet 0    nitroGLYcerin (NITROSTAT) 0.4 MG sublingual tablet For chest pain place 1 tablet under the tongue every 5 minutes for 3 doses. If symptoms persist 5 minutes after 1st dose call 911. (Patient taking differently: every 5 minutes as needed. For chest pain place 1 tablet under the tongue every 5 minutes for 3 doses. If symptoms persist 5 minutes after 1st dose call 911.) 90 tablet 3    omeprazole (PRILOSEC) 20 MG DR capsule Take 20 mg by mouth daily      rivaroxaban ANTICOAGULANT (XARELTO) 20 MG TABS tablet Take 20 mg by mouth daily      tamsulosin (FLOMAX) 0.4 MG capsule Take 1 capsule (0.4 mg) by mouth daily. 90 capsule 3    vibegron (GEMTESA) 75 MG TABS tablet Take 1 tablet (75 mg) by mouth daily. 90 tablet 3    oxyCODONE-acetaminophen (PERCOCET) 5-325 MG tablet Take 1 tablet by mouth every 6 hours as needed. (Patient not taking: Reported on 2025) 6 tablet 0       Social History     Tobacco Use    Smoking status: Former     Passive exposure: Past    Smokeless tobacco: Former     Types: Snuff     Quit date: 2011   Substance Use Topics    Alcohol use: No    Drug use: No       Invasive Procedure Safety Checklist:    Procedure: Urodynamics    Action: Complete sections and checkboxes as appropriate.  Pre-procedure:  1. Patient ID Verified with 2 identifiers (Mary and  or MRN) : YES    2. Procedure and site verified with patient/designee (when able) : YES    3. Accurate consent documentation in medical record : YES    4. H&P (or appropriate assessment) documented in medical record : N/A  H&P must be up to 30 days prior to procedure an updated within 24 hours of Procedure as applicable.     5. Relevant diagnostic and radiology test results appropriately labeled and displayed as applicable : YES    6. Blood products, implants, devices, and/or special equipment available for the procedure as applicable : YES    7. Procedure site(s) marked  with provider initials [Exclusions: none] : NO    8. Marking not required. Reason : Yes  Procedure does not require site marking    Time Out:     Time-Out performed immediately prior to starting procedure, including verbal and active participation of all team members addressing: YES    1. Correct patient identity.  2. Confirmed that the correct side and site are marked.  3. An accurate procedure to be done.  4. Agreement on the procedure to be done.  5. Correct patient position.  6. Relevant images and results are properly labeled and appropriately displayed.  7. The need to administer antibiotics or fluids for irrigation purposes during the procedure as applicable.  8. Safety precautions based on patient history or medication use.    During Procedure: Verification of correct person, site, and procedure occurs any time the responsibility for care of the patient is transferred to another member of the care team.      The following medication was given:     MEDICATION: Bactrim (Trimethoprim / Sulfamethoxazole)  ROUTE: PO  SITE: Medication was given orally  DOSE: 800mg/160mg  LOT #: [id number]  : [employer]  EXPIRATION DATE: 03/2027  NDC#: 1145-6603-47   Was there drug waste? No    Prior to medication administration, verified patient identity using patient's name and date of birth.  Due to medication administration, patient instructed to remain in clinic for 15 minutes  afterwards, and to report any adverse reaction to me immediately.   Prior to administration, verified patient identity using patient's name and date of birth.  Due to administration, patient instructed to remain in clinic for 15 minutes  afterwards, and to report any adverse reaction to me immediately.    Drug Amount Wasted:  None.  Vial/Syringe: Single dose vial    The following medication was given:     MEDICATION:  Omnipaque (Iohexol Injection) (240mgI/mL)  ROUTE: Provider Administered  SITE: Provider Administered via catheter  DOSE:  100mL  LOT #: [id number]  : [employer]  EXPIRATION DATE: 03/11/2028  NDC#: 8124-8714-20   Was there drug waste? No    Prior to injection, verified patient identity using patient's name and date of birth.  Due to injection administration, patient instructed to remain in clinic for 15 minutes  afterwards, and to report any adverse reaction to me immediately.    Drug Amount Wasted:  None.  Vial/Syringe: Single dose vial      The following medication was given: See Above    Insertion:  14 Fr Coude tipped silicone straight catheter inserted into urethral meatus in the usual sterile fashion without difficulty.  Received 325 ml yellow urine output.     Patient did tolerate procedure well.    Patient instructed as to where to call or go for pain, fever, leakage, or decreased urine flow.     This service provided today was under the direct supervision of SALINAS Anaya, who was available if needed.    Deidra Kwon LPN  7/31/2025  5:06 PM

## 2025-08-19 ENCOUNTER — VIRTUAL VISIT (OUTPATIENT)
Dept: CARDIOLOGY | Facility: CLINIC | Age: 70
End: 2025-08-19
Attending: INTERNAL MEDICINE
Payer: MEDICARE

## 2025-08-19 DIAGNOSIS — I10 BENIGN ESSENTIAL HYPERTENSION: ICD-10-CM

## 2025-08-19 PROCEDURE — 98005 SYNCH AUDIO-VIDEO EST LOW 20: CPT | Performed by: INTERNAL MEDICINE

## 2025-08-21 ENCOUNTER — OFFICE VISIT (OUTPATIENT)
Dept: UROLOGY | Facility: CLINIC | Age: 70
End: 2025-08-21
Payer: MEDICARE

## 2025-08-21 ENCOUNTER — TELEPHONE (OUTPATIENT)
Dept: UROLOGY | Facility: CLINIC | Age: 70
End: 2025-08-21

## 2025-08-21 VITALS
HEART RATE: 91 BPM | TEMPERATURE: 97.7 F | DIASTOLIC BLOOD PRESSURE: 102 MMHG | RESPIRATION RATE: 18 BRPM | OXYGEN SATURATION: 95 % | SYSTOLIC BLOOD PRESSURE: 169 MMHG

## 2025-08-21 DIAGNOSIS — R39.15 URINARY URGENCY: Primary | ICD-10-CM

## 2025-08-26 ENCOUNTER — TELEPHONE (OUTPATIENT)
Dept: UROLOGY | Facility: CLINIC | Age: 70
End: 2025-08-26
Payer: MEDICARE

## 2025-08-27 ENCOUNTER — ANCILLARY PROCEDURE (OUTPATIENT)
Dept: CARDIOLOGY | Facility: CLINIC | Age: 70
End: 2025-08-27
Attending: INTERNAL MEDICINE
Payer: MEDICARE

## 2025-08-27 DIAGNOSIS — Z95.0 CARDIAC PACEMAKER IN SITU: ICD-10-CM

## 2025-08-27 DIAGNOSIS — I49.5 SSS (SICK SINUS SYNDROME) (H): ICD-10-CM

## 2025-08-27 LAB
MDC_IDC_EPISODE_DTM: NORMAL
MDC_IDC_EPISODE_DURATION: 474 S
MDC_IDC_EPISODE_ID: 102
MDC_IDC_EPISODE_TYPE: NORMAL
MDC_IDC_LEAD_CONNECTION_STATUS: NORMAL
MDC_IDC_LEAD_CONNECTION_STATUS: NORMAL
MDC_IDC_LEAD_IMPLANT_DT: NORMAL
MDC_IDC_LEAD_IMPLANT_DT: NORMAL
MDC_IDC_LEAD_LOCATION: NORMAL
MDC_IDC_LEAD_LOCATION: NORMAL
MDC_IDC_LEAD_LOCATION_DETAIL_1: NORMAL
MDC_IDC_LEAD_LOCATION_DETAIL_1: NORMAL
MDC_IDC_LEAD_MFG: NORMAL
MDC_IDC_LEAD_MFG: NORMAL
MDC_IDC_LEAD_MODEL: NORMAL
MDC_IDC_LEAD_MODEL: NORMAL
MDC_IDC_LEAD_POLARITY_TYPE: NORMAL
MDC_IDC_LEAD_POLARITY_TYPE: NORMAL
MDC_IDC_LEAD_SERIAL: NORMAL
MDC_IDC_LEAD_SERIAL: NORMAL
MDC_IDC_LEAD_SPECIAL_FUNCTION: NORMAL
MDC_IDC_LEAD_SPECIAL_FUNCTION: NORMAL
MDC_IDC_MSMT_BATTERY_DTM: NORMAL
MDC_IDC_MSMT_BATTERY_REMAINING_LONGEVITY: 112 MO
MDC_IDC_MSMT_BATTERY_RRT_TRIGGER: 2.62
MDC_IDC_MSMT_BATTERY_STATUS: NORMAL
MDC_IDC_MSMT_BATTERY_VOLTAGE: 3 V
MDC_IDC_MSMT_LEADCHNL_RA_IMPEDANCE_VALUE: 323 OHM
MDC_IDC_MSMT_LEADCHNL_RA_IMPEDANCE_VALUE: 380 OHM
MDC_IDC_MSMT_LEADCHNL_RA_PACING_THRESHOLD_AMPLITUDE: 0.5 V
MDC_IDC_MSMT_LEADCHNL_RA_PACING_THRESHOLD_PULSEWIDTH: 0.4 MS
MDC_IDC_MSMT_LEADCHNL_RA_SENSING_INTR_AMPL: 2.38 MV
MDC_IDC_MSMT_LEADCHNL_RA_SENSING_INTR_AMPL: 2.38 MV
MDC_IDC_MSMT_LEADCHNL_RV_IMPEDANCE_VALUE: 361 OHM
MDC_IDC_MSMT_LEADCHNL_RV_IMPEDANCE_VALUE: 456 OHM
MDC_IDC_MSMT_LEADCHNL_RV_PACING_THRESHOLD_AMPLITUDE: 0.75 V
MDC_IDC_MSMT_LEADCHNL_RV_PACING_THRESHOLD_PULSEWIDTH: 0.4 MS
MDC_IDC_MSMT_LEADCHNL_RV_SENSING_INTR_AMPL: 17.25 MV
MDC_IDC_MSMT_LEADCHNL_RV_SENSING_INTR_AMPL: 17.25 MV
MDC_IDC_PG_IMPLANT_DTM: NORMAL
MDC_IDC_PG_MFG: NORMAL
MDC_IDC_PG_MODEL: NORMAL
MDC_IDC_PG_SERIAL: NORMAL
MDC_IDC_PG_TYPE: NORMAL
MDC_IDC_SESS_CLINIC_NAME: NORMAL
MDC_IDC_SESS_DTM: NORMAL
MDC_IDC_SESS_TYPE: NORMAL
MDC_IDC_SET_BRADY_AT_MODE_SWITCH_RATE: 150 {BEATS}/MIN
MDC_IDC_SET_BRADY_HYSTRATE: NORMAL
MDC_IDC_SET_BRADY_LOWRATE: 70 {BEATS}/MIN
MDC_IDC_SET_BRADY_MAX_SENSOR_RATE: 130 {BEATS}/MIN
MDC_IDC_SET_BRADY_MAX_TRACKING_RATE: 130 {BEATS}/MIN
MDC_IDC_SET_BRADY_MODE: NORMAL
MDC_IDC_SET_BRADY_NIGHT_RATE: 60 {BEATS}/MIN
MDC_IDC_SET_BRADY_PAV_DELAY_HIGH: 140 MS
MDC_IDC_SET_BRADY_PAV_DELAY_LOW: 180 MS
MDC_IDC_SET_BRADY_SAV_DELAY_HIGH: 110 MS
MDC_IDC_SET_BRADY_SAV_DELAY_LOW: 150 MS
MDC_IDC_SET_LEADCHNL_RA_PACING_AMPLITUDE: 1.5 V
MDC_IDC_SET_LEADCHNL_RA_PACING_ANODE_ELECTRODE_1: NORMAL
MDC_IDC_SET_LEADCHNL_RA_PACING_ANODE_LOCATION_1: NORMAL
MDC_IDC_SET_LEADCHNL_RA_PACING_CAPTURE_MODE: NORMAL
MDC_IDC_SET_LEADCHNL_RA_PACING_CATHODE_ELECTRODE_1: NORMAL
MDC_IDC_SET_LEADCHNL_RA_PACING_CATHODE_LOCATION_1: NORMAL
MDC_IDC_SET_LEADCHNL_RA_PACING_POLARITY: NORMAL
MDC_IDC_SET_LEADCHNL_RA_PACING_PULSEWIDTH: 0.4 MS
MDC_IDC_SET_LEADCHNL_RA_SENSING_ANODE_ELECTRODE_1: NORMAL
MDC_IDC_SET_LEADCHNL_RA_SENSING_ANODE_LOCATION_1: NORMAL
MDC_IDC_SET_LEADCHNL_RA_SENSING_CATHODE_ELECTRODE_1: NORMAL
MDC_IDC_SET_LEADCHNL_RA_SENSING_CATHODE_LOCATION_1: NORMAL
MDC_IDC_SET_LEADCHNL_RA_SENSING_POLARITY: NORMAL
MDC_IDC_SET_LEADCHNL_RA_SENSING_SENSITIVITY: 0.15 MV
MDC_IDC_SET_LEADCHNL_RV_PACING_AMPLITUDE: 2 V
MDC_IDC_SET_LEADCHNL_RV_PACING_ANODE_ELECTRODE_1: NORMAL
MDC_IDC_SET_LEADCHNL_RV_PACING_ANODE_LOCATION_1: NORMAL
MDC_IDC_SET_LEADCHNL_RV_PACING_CAPTURE_MODE: NORMAL
MDC_IDC_SET_LEADCHNL_RV_PACING_CATHODE_ELECTRODE_1: NORMAL
MDC_IDC_SET_LEADCHNL_RV_PACING_CATHODE_LOCATION_1: NORMAL
MDC_IDC_SET_LEADCHNL_RV_PACING_POLARITY: NORMAL
MDC_IDC_SET_LEADCHNL_RV_PACING_PULSEWIDTH: 0.4 MS
MDC_IDC_SET_LEADCHNL_RV_SENSING_ANODE_ELECTRODE_1: NORMAL
MDC_IDC_SET_LEADCHNL_RV_SENSING_ANODE_LOCATION_1: NORMAL
MDC_IDC_SET_LEADCHNL_RV_SENSING_CATHODE_ELECTRODE_1: NORMAL
MDC_IDC_SET_LEADCHNL_RV_SENSING_CATHODE_LOCATION_1: NORMAL
MDC_IDC_SET_LEADCHNL_RV_SENSING_POLARITY: NORMAL
MDC_IDC_SET_LEADCHNL_RV_SENSING_SENSITIVITY: 0.9 MV
MDC_IDC_SET_ZONE_DETECTION_INTERVAL: 400 MS
MDC_IDC_SET_ZONE_DETECTION_INTERVAL: 400 MS
MDC_IDC_SET_ZONE_STATUS: NORMAL
MDC_IDC_SET_ZONE_STATUS: NORMAL
MDC_IDC_SET_ZONE_TYPE: NORMAL
MDC_IDC_SET_ZONE_VENDOR_TYPE: NORMAL
MDC_IDC_STAT_AT_BURDEN_PERCENT: 0 %
MDC_IDC_STAT_AT_DTM_END: NORMAL
MDC_IDC_STAT_AT_DTM_START: NORMAL
MDC_IDC_STAT_BRADY_AP_VP_PERCENT: 0.03 %
MDC_IDC_STAT_BRADY_AP_VS_PERCENT: 97.54 %
MDC_IDC_STAT_BRADY_AS_VP_PERCENT: 0 %
MDC_IDC_STAT_BRADY_AS_VS_PERCENT: 2.43 %
MDC_IDC_STAT_BRADY_DTM_END: NORMAL
MDC_IDC_STAT_BRADY_DTM_START: NORMAL
MDC_IDC_STAT_BRADY_RA_PERCENT_PACED: 97.58 %
MDC_IDC_STAT_BRADY_RV_PERCENT_PACED: 0.03 %
MDC_IDC_STAT_EPISODE_RECENT_COUNT: 0
MDC_IDC_STAT_EPISODE_RECENT_COUNT: 1
MDC_IDC_STAT_EPISODE_RECENT_COUNT_DTM_END: NORMAL
MDC_IDC_STAT_EPISODE_RECENT_COUNT_DTM_START: NORMAL
MDC_IDC_STAT_EPISODE_TOTAL_COUNT: 0
MDC_IDC_STAT_EPISODE_TOTAL_COUNT: 0
MDC_IDC_STAT_EPISODE_TOTAL_COUNT: 19
MDC_IDC_STAT_EPISODE_TOTAL_COUNT: 7
MDC_IDC_STAT_EPISODE_TOTAL_COUNT: 72
MDC_IDC_STAT_EPISODE_TOTAL_COUNT_DTM_END: NORMAL
MDC_IDC_STAT_EPISODE_TOTAL_COUNT_DTM_START: NORMAL
MDC_IDC_STAT_EPISODE_TYPE: NORMAL
MDC_IDC_STAT_TACHYTHERAPY_RECENT_DTM_END: NORMAL
MDC_IDC_STAT_TACHYTHERAPY_RECENT_DTM_START: NORMAL
MDC_IDC_STAT_TACHYTHERAPY_TOTAL_DTM_END: NORMAL
MDC_IDC_STAT_TACHYTHERAPY_TOTAL_DTM_START: NORMAL

## 2025-08-27 PROCEDURE — 93296 REM INTERROG EVL PM/IDS: CPT | Performed by: INTERNAL MEDICINE

## 2025-08-27 PROCEDURE — 93294 REM INTERROG EVL PM/LDLS PM: CPT | Performed by: INTERNAL MEDICINE

## (undated) DEVICE — GLOVE PROTEXIS BLUE W/NEU-THERA 6.5  2D73EB65

## (undated) DEVICE — SOL NACL 0.9% IRRIG 1000ML BOTTLE 07138-09

## (undated) DEVICE — 7FR X 13CM SAFESHEATH II TEAR-AWAY VALVED SHEATH SYSTEM, WITH SIDE PORT, 0.038IN GUIDEWIRE, 19.5CM DILATOR

## (undated) DEVICE — CABLE PACING ALLIGATOR CLIP 12FT 5833SL

## (undated) DEVICE — IMM KIT SHOULDER TMAX MASK FACE 7210559

## (undated) DEVICE — GLOVE PROTEXIS W/NEU-THERA 6.5  2D73TE65

## (undated) DEVICE — ENDO TROCAR FIRST ENTRY KII FIOS ADV FIX 11X100MM CFF33

## (undated) DEVICE — DRSG ADAPTIC 3X8" X3

## (undated) DEVICE — SU VICRYL 0 CT-1 36" J946H

## (undated) DEVICE — SU VICRYL 3-0 SH 27" UND J416H

## (undated) DEVICE — SLING ARM LG 79-99157

## (undated) DEVICE — TAPE MEDIPORE 4"X10YD 2964

## (undated) DEVICE — DEVICE ABSORBABLE TACK 5MM SINGLE ABSTACK30

## (undated) DEVICE — GLOVE PROTEXIS BLUE W/NEU-THERA 8.0  2D73EB80

## (undated) DEVICE — ESU ENDO SCISSORS 5MM CVD 5DCS

## (undated) DEVICE — TUBING ARTHROSCOPY PUMP ARTHREX AR-6410

## (undated) DEVICE — DRAPE ARTHROSCOPY SHOULDER BEACHCHAIR 29369

## (undated) DEVICE — SOL WATER IRRIG 1000ML BOTTLE 07139-09

## (undated) DEVICE — Device

## (undated) DEVICE — PACK SHOULDER

## (undated) DEVICE — ENDO TROCAR SLEEVE KII ADV FIXATION 05X100MM CFS02

## (undated) DEVICE — PAD FLOOR SURGISAFE

## (undated) DEVICE — SU VICRYL 0 UR-6 27" J603H

## (undated) DEVICE — ENDO TROCAR FIRST ENTRY KII FIOS ADV FIX 12X100MM CFF73

## (undated) DEVICE — BLADE KNIFE SURG 15 371115

## (undated) DEVICE — CUFF FINGER CLEARSIGHT LG CSCL

## (undated) DEVICE — SU MONOCRYL 2-0 CT-1 36" UND Y945H

## (undated) DEVICE — ADH SKIN CLOSURE PREMIERPRO EXOFIN 1.0ML 3470

## (undated) DEVICE — DRAPE IOBAN LG .375X23.5" 6648EZ

## (undated) DEVICE — CATH TRAY FOLEY SURESTEP 16FR W/URINE MTR STATLK LF A303416A

## (undated) DEVICE — INTRO SHEATH MICRO PLATINUM TIP 4FRX40CM 7274

## (undated) DEVICE — GOWN XLG DISP 9545

## (undated) DEVICE — BUR ARTHREX COOLCUT SABRE 3.8MMX13CM AR-8380SR

## (undated) DEVICE — STOCKING SLEEVE COMPRESSION CALF MED

## (undated) DEVICE — SU MONOCRYL 4-0 PS-2 18" UND Y496G

## (undated) DEVICE — KIT ANCHOR SU Q-FIX 2.8MM W/DRILL GD OBTURATOR 25-2810

## (undated) DEVICE — ABLATOR ARTHREX APOLLO RF MP90 ASPIRATING 90DEG AR-9811

## (undated) DEVICE — GLOVE PROTEXIS W/NEU-THERA 8.0  2D73TE80

## (undated) DEVICE — ENDO TROCAR FIRST ENTRY KII FIOS ADV FIX 05X100MM CFF03

## (undated) DEVICE — DECANTER VIAL 2006S

## (undated) DEVICE — SU ETHIBOND 0 CT-2 30" X412H

## (undated) DEVICE — GOWN IMPERVIOUS SPECIALTY XLG/XLONG 32474

## (undated) DEVICE — BLADE CLIPPER 4406

## (undated) DEVICE — SU MONOCRYL 3-0 PS-2 18" UND Y497G

## (undated) DEVICE — DRSG STERI STRIP 1/2X4" R1547

## (undated) DEVICE — DEVICE SUTURE PASSER 14GA WECK EFX EFXSP2

## (undated) DEVICE — SU VICRYL 0 CT-2 CR 8X18" J727D

## (undated) DEVICE — PREP CHLORAPREP 26ML TINTED ORANGE  260815

## (undated) DEVICE — ESU PENCIL W/COATED BLADE E2450H

## (undated) DEVICE — BLADE KNIFE SURG 10 371110

## (undated) DEVICE — SUCTION TIP POOLE K770

## (undated) DEVICE — GLOVE PROTEXIS W/NEU-THERA 7.5  2D73TE75

## (undated) RX ORDER — LIDOCAINE HYDROCHLORIDE AND EPINEPHRINE 10; 10 MG/ML; UG/ML
INJECTION, SOLUTION INFILTRATION; PERINEURAL
Status: DISPENSED
Start: 2021-05-10

## (undated) RX ORDER — VERAPAMIL HYDROCHLORIDE 2.5 MG/ML
INJECTION, SOLUTION INTRAVENOUS
Status: DISPENSED
Start: 2017-11-09

## (undated) RX ORDER — ADENOSINE 3 MG/ML
INJECTION, SOLUTION INTRAVENOUS
Status: DISPENSED
Start: 2017-11-09

## (undated) RX ORDER — ACETAMINOPHEN 500 MG
TABLET ORAL
Status: DISPENSED
Start: 2017-11-09

## (undated) RX ORDER — PROPOFOL 10 MG/ML
INJECTION, EMULSION INTRAVENOUS
Status: DISPENSED
Start: 2021-08-25

## (undated) RX ORDER — PROPOFOL 10 MG/ML
INJECTION, EMULSION INTRAVENOUS
Status: DISPENSED
Start: 2021-05-10

## (undated) RX ORDER — ACETAMINOPHEN 325 MG/1
TABLET ORAL
Status: DISPENSED
Start: 2021-08-25

## (undated) RX ORDER — FENTANYL CITRATE 50 UG/ML
INJECTION, SOLUTION INTRAMUSCULAR; INTRAVENOUS
Status: DISPENSED
Start: 2021-05-10

## (undated) RX ORDER — LIDOCAINE HCL/EPINEPHRINE/PF 2%-1:200K
VIAL (ML) INJECTION
Status: DISPENSED
Start: 2021-08-25

## (undated) RX ORDER — REGADENOSON 0.08 MG/ML
INJECTION, SOLUTION INTRAVENOUS
Status: DISPENSED
Start: 2022-02-23

## (undated) RX ORDER — DEXMEDETOMIDINE HYDROCHLORIDE 100 UG/ML
INJECTION, SOLUTION INTRAVENOUS
Status: DISPENSED
Start: 2021-08-25

## (undated) RX ORDER — EPHEDRINE SULFATE 50 MG/ML
INJECTION, SOLUTION INTRAMUSCULAR; INTRAVENOUS; SUBCUTANEOUS
Status: DISPENSED
Start: 2021-08-25

## (undated) RX ORDER — FENTANYL CITRATE 50 UG/ML
INJECTION, SOLUTION INTRAMUSCULAR; INTRAVENOUS
Status: DISPENSED
Start: 2017-11-09

## (undated) RX ORDER — DEXAMETHASONE SODIUM PHOSPHATE 4 MG/ML
INJECTION, SOLUTION INTRA-ARTICULAR; INTRALESIONAL; INTRAMUSCULAR; INTRAVENOUS; SOFT TISSUE
Status: DISPENSED
Start: 2021-08-25

## (undated) RX ORDER — CEFAZOLIN SODIUM 1 G/3ML
INJECTION, POWDER, FOR SOLUTION INTRAMUSCULAR; INTRAVENOUS
Status: DISPENSED
Start: 2021-10-29

## (undated) RX ORDER — ROPIVACAINE HYDROCHLORIDE 5 MG/ML
INJECTION, SOLUTION EPIDURAL; INFILTRATION; PERINEURAL
Status: DISPENSED
Start: 2021-08-25

## (undated) RX ORDER — GABAPENTIN 300 MG/1
CAPSULE ORAL
Status: DISPENSED
Start: 2021-05-10

## (undated) RX ORDER — SODIUM CHLORIDE 9 MG/ML
INJECTION, SOLUTION INTRAVENOUS
Status: DISPENSED
Start: 2021-10-29

## (undated) RX ORDER — CEFAZOLIN SODIUM 2 G/100ML
INJECTION, SOLUTION INTRAVENOUS
Status: DISPENSED
Start: 2021-08-25

## (undated) RX ORDER — ACETAMINOPHEN 325 MG/1
TABLET ORAL
Status: DISPENSED
Start: 2021-05-10

## (undated) RX ORDER — LIDOCAINE HYDROCHLORIDE 10 MG/ML
INJECTION, SOLUTION EPIDURAL; INFILTRATION; INTRACAUDAL; PERINEURAL
Status: DISPENSED
Start: 2017-11-09

## (undated) RX ORDER — MEPERIDINE HYDROCHLORIDE 25 MG/ML
INJECTION INTRAMUSCULAR; INTRAVENOUS; SUBCUTANEOUS
Status: DISPENSED
Start: 2021-05-10

## (undated) RX ORDER — LIDOCAINE HYDROCHLORIDE 20 MG/ML
INJECTION, SOLUTION INFILTRATION; PERINEURAL
Status: DISPENSED
Start: 2021-10-29

## (undated) RX ORDER — LIDOCAINE HYDROCHLORIDE 10 MG/ML
INJECTION, SOLUTION EPIDURAL; INFILTRATION; INTRACAUDAL; PERINEURAL
Status: DISPENSED
Start: 2021-08-25

## (undated) RX ORDER — KETOROLAC TROMETHAMINE 30 MG/ML
INJECTION, SOLUTION INTRAMUSCULAR; INTRAVENOUS
Status: DISPENSED
Start: 2021-05-10

## (undated) RX ORDER — FENTANYL CITRATE 50 UG/ML
INJECTION, SOLUTION INTRAMUSCULAR; INTRAVENOUS
Status: DISPENSED
Start: 2021-10-29

## (undated) RX ORDER — CELECOXIB 200 MG/1
CAPSULE ORAL
Status: DISPENSED
Start: 2021-08-25

## (undated) RX ORDER — ONDANSETRON 2 MG/ML
INJECTION INTRAMUSCULAR; INTRAVENOUS
Status: DISPENSED
Start: 2021-08-25

## (undated) RX ORDER — EPINEPHRINE 1 MG/ML(1)
AMPUL (ML) INJECTION
Status: DISPENSED
Start: 2021-08-25

## (undated) RX ORDER — GLYCOPYRROLATE 0.2 MG/ML
INJECTION, SOLUTION INTRAMUSCULAR; INTRAVENOUS
Status: DISPENSED
Start: 2021-08-25

## (undated) RX ORDER — EPHEDRINE SULFATE 50 MG/ML
INJECTION, SOLUTION INTRAMUSCULAR; INTRAVENOUS; SUBCUTANEOUS
Status: DISPENSED
Start: 2021-05-10

## (undated) RX ORDER — ONDANSETRON 2 MG/ML
INJECTION INTRAMUSCULAR; INTRAVENOUS
Status: DISPENSED
Start: 2021-05-10

## (undated) RX ORDER — HEPARIN SODIUM 1000 [USP'U]/ML
INJECTION, SOLUTION INTRAVENOUS; SUBCUTANEOUS
Status: DISPENSED
Start: 2017-11-09

## (undated) RX ORDER — DEXAMETHASONE SODIUM PHOSPHATE 4 MG/ML
INJECTION, SOLUTION INTRA-ARTICULAR; INTRALESIONAL; INTRAMUSCULAR; INTRAVENOUS; SOFT TISSUE
Status: DISPENSED
Start: 2021-05-10

## (undated) RX ORDER — SULFAMETHOXAZOLE AND TRIMETHOPRIM 800; 160 MG/1; MG/1
TABLET ORAL
Status: DISPENSED
Start: 2025-07-31

## (undated) RX ORDER — BUPIVACAINE HYDROCHLORIDE 5 MG/ML
INJECTION, SOLUTION PERINEURAL
Status: DISPENSED
Start: 2021-05-10

## (undated) RX ORDER — MAGNESIUM SULFATE HEPTAHYDRATE 40 MG/ML
INJECTION, SOLUTION INTRAVENOUS
Status: DISPENSED
Start: 2021-08-25

## (undated) RX ORDER — FENTANYL CITRATE 50 UG/ML
INJECTION, SOLUTION INTRAMUSCULAR; INTRAVENOUS
Status: DISPENSED
Start: 2021-08-25